# Patient Record
Sex: MALE | Race: WHITE | NOT HISPANIC OR LATINO | URBAN - METROPOLITAN AREA
[De-identification: names, ages, dates, MRNs, and addresses within clinical notes are randomized per-mention and may not be internally consistent; named-entity substitution may affect disease eponyms.]

---

## 2017-07-15 ENCOUNTER — EMERGENCY (EMERGENCY)
Facility: HOSPITAL | Age: 57
LOS: 1 days | Discharge: HOME | End: 2017-07-15
Attending: EMERGENCY MEDICINE | Admitting: EMERGENCY MEDICINE
Payer: MEDICARE

## 2017-07-15 VITALS
HEART RATE: 90 BPM | OXYGEN SATURATION: 95 % | TEMPERATURE: 98 F | DIASTOLIC BLOOD PRESSURE: 81 MMHG | RESPIRATION RATE: 18 BRPM | SYSTOLIC BLOOD PRESSURE: 127 MMHG

## 2017-07-15 VITALS
RESPIRATION RATE: 20 BRPM | TEMPERATURE: 98 F | OXYGEN SATURATION: 96 % | DIASTOLIC BLOOD PRESSURE: 81 MMHG | SYSTOLIC BLOOD PRESSURE: 134 MMHG | HEART RATE: 80 BPM | WEIGHT: 231.04 LBS

## 2017-07-15 DIAGNOSIS — J44.9 CHRONIC OBSTRUCTIVE PULMONARY DISEASE, UNSPECIFIED: ICD-10-CM

## 2017-07-15 DIAGNOSIS — R06.02 SHORTNESS OF BREATH: ICD-10-CM

## 2017-07-15 DIAGNOSIS — F17.200 NICOTINE DEPENDENCE, UNSPECIFIED, UNCOMPLICATED: ICD-10-CM

## 2017-07-15 DIAGNOSIS — Z79.899 OTHER LONG TERM (CURRENT) DRUG THERAPY: ICD-10-CM

## 2017-07-15 LAB
ALBUMIN SERPL ELPH-MCNC: 3.9 G/DL — SIGNIFICANT CHANGE UP (ref 3.3–5)
ALP SERPL-CCNC: 89 U/L — SIGNIFICANT CHANGE UP (ref 40–120)
ALT FLD-CCNC: 28 U/L — SIGNIFICANT CHANGE UP (ref 10–45)
ANION GAP SERPL CALC-SCNC: 12 MMOL/L — SIGNIFICANT CHANGE UP (ref 5–17)
AST SERPL-CCNC: 21 U/L — SIGNIFICANT CHANGE UP (ref 10–40)
BASOPHILS NFR BLD AUTO: 0.2 % — SIGNIFICANT CHANGE UP (ref 0–2)
BILIRUB SERPL-MCNC: 0.2 MG/DL — SIGNIFICANT CHANGE UP (ref 0.2–1.2)
BUN SERPL-MCNC: 17 MG/DL — SIGNIFICANT CHANGE UP (ref 7–23)
CALCIUM SERPL-MCNC: 9 MG/DL — SIGNIFICANT CHANGE UP (ref 8.4–10.5)
CHLORIDE SERPL-SCNC: 99 MMOL/L — SIGNIFICANT CHANGE UP (ref 96–108)
CO2 SERPL-SCNC: 28 MMOL/L — SIGNIFICANT CHANGE UP (ref 22–31)
CREAT SERPL-MCNC: 0.8 MG/DL — SIGNIFICANT CHANGE UP (ref 0.5–1.3)
EOSINOPHIL NFR BLD AUTO: 5 % — SIGNIFICANT CHANGE UP (ref 0–6)
GLUCOSE SERPL-MCNC: 97 MG/DL — SIGNIFICANT CHANGE UP (ref 70–99)
HCT VFR BLD CALC: 37.4 % — LOW (ref 39–50)
HGB BLD-MCNC: 12.3 G/DL — LOW (ref 13–17)
LYMPHOCYTES # BLD AUTO: 17.2 % — SIGNIFICANT CHANGE UP (ref 13–44)
MCHC RBC-ENTMCNC: 29.9 PG — SIGNIFICANT CHANGE UP (ref 27–34)
MCHC RBC-ENTMCNC: 32.9 G/DL — SIGNIFICANT CHANGE UP (ref 32–36)
MCV RBC AUTO: 91 FL — SIGNIFICANT CHANGE UP (ref 80–100)
MONOCYTES NFR BLD AUTO: 9.5 % — SIGNIFICANT CHANGE UP (ref 2–14)
NEUTROPHILS NFR BLD AUTO: 68.1 % — SIGNIFICANT CHANGE UP (ref 43–77)
PLATELET # BLD AUTO: 185 K/UL — SIGNIFICANT CHANGE UP (ref 150–400)
POTASSIUM SERPL-MCNC: 4.1 MMOL/L — SIGNIFICANT CHANGE UP (ref 3.5–5.3)
POTASSIUM SERPL-SCNC: 4.1 MMOL/L — SIGNIFICANT CHANGE UP (ref 3.5–5.3)
PROT SERPL-MCNC: 7 G/DL — SIGNIFICANT CHANGE UP (ref 6–8.3)
RBC # BLD: 4.11 M/UL — LOW (ref 4.2–5.8)
RBC # FLD: 14 % — SIGNIFICANT CHANGE UP (ref 10.3–16.9)
SODIUM SERPL-SCNC: 139 MMOL/L — SIGNIFICANT CHANGE UP (ref 135–145)
WBC # BLD: 6 K/UL — SIGNIFICANT CHANGE UP (ref 3.8–10.5)
WBC # FLD AUTO: 6 K/UL — SIGNIFICANT CHANGE UP (ref 3.8–10.5)

## 2017-07-15 PROCEDURE — 71010: CPT | Mod: 26

## 2017-07-15 PROCEDURE — 99284 EMERGENCY DEPT VISIT MOD MDM: CPT | Mod: 25

## 2017-07-15 PROCEDURE — 93010 ELECTROCARDIOGRAM REPORT: CPT

## 2017-07-15 RX ORDER — AZITHROMYCIN 500 MG/1
1 TABLET, FILM COATED ORAL
Qty: 4 | Refills: 0 | OUTPATIENT
Start: 2017-07-15 | End: 2017-07-19

## 2017-07-15 RX ORDER — ALBUTEROL 90 UG/1
2.5 AEROSOL, METERED ORAL
Qty: 0 | Refills: 0 | Status: COMPLETED | OUTPATIENT
Start: 2017-07-15 | End: 2017-07-15

## 2017-07-15 RX ORDER — AZITHROMYCIN 500 MG/1
500 TABLET, FILM COATED ORAL ONCE
Qty: 0 | Refills: 0 | Status: COMPLETED | OUTPATIENT
Start: 2017-07-15 | End: 2017-07-15

## 2017-07-15 RX ORDER — IPRATROPIUM BROMIDE 0.2 MG/ML
500 SOLUTION, NON-ORAL INHALATION ONCE
Qty: 0 | Refills: 0 | Status: COMPLETED | OUTPATIENT
Start: 2017-07-15 | End: 2017-07-15

## 2017-07-15 RX ADMIN — Medication 500 MICROGRAM(S): at 01:59

## 2017-07-15 RX ADMIN — AZITHROMYCIN 255 MILLIGRAM(S): 500 TABLET, FILM COATED ORAL at 02:27

## 2017-07-15 RX ADMIN — ALBUTEROL 2.5 MILLIGRAM(S): 90 AEROSOL, METERED ORAL at 01:33

## 2017-07-15 RX ADMIN — Medication 500 MICROGRAM(S): at 01:34

## 2017-07-15 RX ADMIN — ALBUTEROL 2.5 MILLIGRAM(S): 90 AEROSOL, METERED ORAL at 02:27

## 2017-07-15 RX ADMIN — Medication 500 MICROGRAM(S): at 02:27

## 2017-07-15 RX ADMIN — ALBUTEROL 2.5 MILLIGRAM(S): 90 AEROSOL, METERED ORAL at 01:58

## 2017-07-15 RX ADMIN — Medication 40 MILLIGRAM(S): at 01:33

## 2017-07-15 NOTE — ED PROVIDER NOTE - OBJECTIVE STATEMENT
56 yom pw sob x 3 days, hx of COPD/asthma, on albuterol and symbicort.  daily smoker.  no cp, no pleurisy, no leg swelling.  no prior hx of intubation.  last albuterol was 3pm.  no cough, no fc, no vomiting.  no hx of CAD.

## 2017-07-15 NOTE — ED PROVIDER NOTE - MEDICAL DECISION MAKING DETAILS
wheezing noted, no tachypnea or respiratory distress, no dyspnea noted, no leg swelling, hx of copd, likely exacerbation, xray, nebs, prednisone, reassess, no indication for bipap at the moment

## 2017-07-15 NOTE — ED PROVIDER NOTE - PROGRESS NOTE DETAILS
kenendi: pt received at sign out from dr young as needing to be d/c'd w/rx - pt well appearing, hemodynamically stable, ambulatory w/o resp distress

## 2017-07-15 NOTE — ED PROVIDER NOTE - DIAGNOSTIC INTERPRETATION
ER Physician: Ming Lee  CHEST XRAY INTERPRETATION: lungs clear, heart shadow normal, bony structures intact

## 2017-07-15 NOTE — ED PROVIDER NOTE - PHYSICAL EXAMINATION
CON: ao x 3, HENMT: clear oropharynx, soft neck, HEAD: atraumatic, CV: rrr, equal pulses b/l, RESP: end exp wheezing, diffuse, nonlabored breathing, no tachypnea, GI: +BS, soft, nontender, no rebound, no guarding, SKIN: no rash, MSK: no edema, moving all extremities spontaneously, NEURO: no gross motor or sensory deficit, ambulatory steadily, no dyspnea noted w/ ambulation CON: ao x 3, HENMT: clear oropharynx, soft neck, HEAD: atraumatic, CV: rrr, equal pulses b/l, RESP: end exp wheezing, diffuse, nonlabored breathing, no tachypnea, no dyspnea w/ ambulation, GI: +BS, soft, nontender, no rebound, no guarding, SKIN: no rash, MSK: no edema, moving all extremities spontaneously, NEURO: no gross motor or sensory deficit, ambulatory steadily,

## 2017-07-15 NOTE — ED ADULT NURSE NOTE - CHIEF COMPLAINT QUOTE
shortness of breath since yesterday; cant catch my breath when I walk; I took my Albuterol inhaler  tonight with mild relief; hx of COPD; smokes  1.5 ppd

## 2017-07-15 NOTE — ED ADULT NURSE NOTE - OBJECTIVE STATEMENT
pt. with PMH of COPD presented with c/o minor cough and shortness of breath today. pt. states he had similar episode on monday, went to er and got better after tx. pt. denies chest pain, n/v/d, fever, chills, blood in sputum, dizziness, weakness or changes in mentation. blood was sent, tx in progress, will monitor.

## 2017-07-15 NOTE — ED ADULT TRIAGE NOTE - CHIEF COMPLAINT QUOTE
shortness of breath since yesterday; cant catch my breath when I walk shortness of breath since yesterday; cant catch my breath when I walk; I took my Albuterol inhaler  tonight with mild relief; hx of COPD; smokes  1.5 ppd

## 2017-07-17 PROCEDURE — 36415 COLL VENOUS BLD VENIPUNCTURE: CPT

## 2017-07-17 PROCEDURE — 99284 EMERGENCY DEPT VISIT MOD MDM: CPT | Mod: 25

## 2017-07-17 PROCEDURE — 80053 COMPREHEN METABOLIC PANEL: CPT

## 2017-07-17 PROCEDURE — 71045 X-RAY EXAM CHEST 1 VIEW: CPT

## 2017-07-17 PROCEDURE — 93005 ELECTROCARDIOGRAM TRACING: CPT

## 2017-07-17 PROCEDURE — 96375 TX/PRO/DX INJ NEW DRUG ADDON: CPT

## 2017-07-17 PROCEDURE — 96374 THER/PROPH/DIAG INJ IV PUSH: CPT

## 2017-07-17 PROCEDURE — 94640 AIRWAY INHALATION TREATMENT: CPT

## 2017-07-17 PROCEDURE — 85025 COMPLETE CBC W/AUTO DIFF WBC: CPT

## 2018-05-29 ENCOUNTER — EMERGENCY (EMERGENCY)
Facility: HOSPITAL | Age: 58
LOS: 1 days | Discharge: HOME | End: 2018-05-29
Attending: EMERGENCY MEDICINE | Admitting: EMERGENCY MEDICINE
Payer: MEDICARE

## 2018-05-29 VITALS
WEIGHT: 225.09 LBS | SYSTOLIC BLOOD PRESSURE: 126 MMHG | HEART RATE: 88 BPM | DIASTOLIC BLOOD PRESSURE: 76 MMHG | RESPIRATION RATE: 18 BRPM | TEMPERATURE: 98 F | OXYGEN SATURATION: 98 %

## 2018-05-29 DIAGNOSIS — F17.200 NICOTINE DEPENDENCE, UNSPECIFIED, UNCOMPLICATED: ICD-10-CM

## 2018-05-29 DIAGNOSIS — Z79.52 LONG TERM (CURRENT) USE OF SYSTEMIC STEROIDS: ICD-10-CM

## 2018-05-29 DIAGNOSIS — Z79.2 LONG TERM (CURRENT) USE OF ANTIBIOTICS: ICD-10-CM

## 2018-05-29 DIAGNOSIS — R11.2 NAUSEA WITH VOMITING, UNSPECIFIED: ICD-10-CM

## 2018-05-29 DIAGNOSIS — R19.7 DIARRHEA, UNSPECIFIED: ICD-10-CM

## 2018-05-29 DIAGNOSIS — Z79.899 OTHER LONG TERM (CURRENT) DRUG THERAPY: ICD-10-CM

## 2018-05-29 DIAGNOSIS — J44.9 CHRONIC OBSTRUCTIVE PULMONARY DISEASE, UNSPECIFIED: ICD-10-CM

## 2018-05-29 PROCEDURE — 36415 COLL VENOUS BLD VENIPUNCTURE: CPT

## 2018-05-29 PROCEDURE — 81003 URINALYSIS AUTO W/O SCOPE: CPT

## 2018-05-29 PROCEDURE — 87086 URINE CULTURE/COLONY COUNT: CPT

## 2018-05-29 PROCEDURE — 99284 EMERGENCY DEPT VISIT MOD MDM: CPT | Mod: 25

## 2018-05-29 PROCEDURE — 85025 COMPLETE CBC W/AUTO DIFF WBC: CPT

## 2018-05-29 PROCEDURE — 80053 COMPREHEN METABOLIC PANEL: CPT

## 2018-05-29 PROCEDURE — 99284 EMERGENCY DEPT VISIT MOD MDM: CPT

## 2018-05-29 PROCEDURE — 96374 THER/PROPH/DIAG INJ IV PUSH: CPT

## 2018-05-29 PROCEDURE — 83690 ASSAY OF LIPASE: CPT

## 2018-05-29 RX ORDER — SODIUM CHLORIDE 9 MG/ML
1000 INJECTION INTRAMUSCULAR; INTRAVENOUS; SUBCUTANEOUS ONCE
Qty: 0 | Refills: 0 | Status: COMPLETED | OUTPATIENT
Start: 2018-05-29 | End: 2018-05-29

## 2018-05-29 RX ORDER — ONDANSETRON 8 MG/1
4 TABLET, FILM COATED ORAL ONCE
Qty: 0 | Refills: 0 | Status: COMPLETED | OUTPATIENT
Start: 2018-05-29 | End: 2018-05-29

## 2018-05-29 RX ADMIN — ONDANSETRON 4 MILLIGRAM(S): 8 TABLET, FILM COATED ORAL at 23:58

## 2018-05-29 RX ADMIN — SODIUM CHLORIDE 1000 MILLILITER(S): 9 INJECTION INTRAMUSCULAR; INTRAVENOUS; SUBCUTANEOUS at 23:47

## 2018-05-30 VITALS
RESPIRATION RATE: 18 BRPM | DIASTOLIC BLOOD PRESSURE: 88 MMHG | HEART RATE: 69 BPM | SYSTOLIC BLOOD PRESSURE: 144 MMHG | OXYGEN SATURATION: 97 % | TEMPERATURE: 98 F

## 2018-05-30 DIAGNOSIS — Z98.890 OTHER SPECIFIED POSTPROCEDURAL STATES: Chronic | ICD-10-CM

## 2018-05-30 NOTE — ED ADULT NURSE NOTE - OBJECTIVE STATEMENT
58 y/o male with hx of COPD and bronchitis arrived to [facility] ER reporting nausea, vomiting and diarrhea X 4 since 7pm. Pt verbalized that he had some "mexican" food prior to symptoms starting and he had a viral infection several weeks ago. Upon assessment, abdomen soft, lung fields WNL, breathing is equal and unlabored, pulses palpable, no visible injuries noted. Pt denies chest pain, headache, dizziness, blurred vision, slurred speech, fever, chills, LOC, SOB, weakness, fatigue. Care in progress.

## 2018-05-30 NOTE — ED PROVIDER NOTE - PROGRESS NOTE DETAILS
no vomiting, abd remains soft, nontender. recommend PMD f/u  I have discussed the discharge plan with the patient. The patient agrees with the plan, as discussed.  The patient understands Emergency Department diagnosis is a preliminary diagnosis often based on limited information and that the patient must adhere to the follow-up plan as discussed.  The patient understands that if the symptoms worsen the patient may return to the Emergency Department at any time for further evaluation and treatment.

## 2018-05-30 NOTE — ED PROVIDER NOTE - OBJECTIVE STATEMENT
57M hx copd, c/o n/v/d. pt states he ate mexican food tonight at his [employer].  states shortly after developed nonbloody watery diarrhea.  no abd pain.  states vomited once.  no chest pain, no SOB. no fevers. no sick contacts. no recent travel.

## 2018-05-30 NOTE — ED PROVIDER NOTE - MEDICAL DECISION MAKING DETAILS
n/v/d, no abd pain, no guarding, no rebound, no evidence of surgical abd at this time  -check labs, ivf, zofran

## 2018-06-10 ENCOUNTER — EMERGENCY (EMERGENCY)
Facility: HOSPITAL | Age: 58
LOS: 1 days | Discharge: HOME | End: 2018-06-10
Attending: EMERGENCY MEDICINE | Admitting: EMERGENCY MEDICINE
Payer: MEDICARE

## 2018-06-10 DIAGNOSIS — Z79.899 OTHER LONG TERM (CURRENT) DRUG THERAPY: ICD-10-CM

## 2018-06-10 DIAGNOSIS — F17.200 NICOTINE DEPENDENCE, UNSPECIFIED, UNCOMPLICATED: ICD-10-CM

## 2018-06-10 DIAGNOSIS — Z79.52 LONG TERM (CURRENT) USE OF SYSTEMIC STEROIDS: ICD-10-CM

## 2018-06-10 DIAGNOSIS — R06.02 SHORTNESS OF BREATH: ICD-10-CM

## 2018-06-10 DIAGNOSIS — J44.1 CHRONIC OBSTRUCTIVE PULMONARY DISEASE WITH (ACUTE) EXACERBATION: ICD-10-CM

## 2018-06-10 DIAGNOSIS — Z98.890 OTHER SPECIFIED POSTPROCEDURAL STATES: Chronic | ICD-10-CM

## 2018-06-10 DIAGNOSIS — Z79.2 LONG TERM (CURRENT) USE OF ANTIBIOTICS: ICD-10-CM

## 2018-06-10 PROCEDURE — 71046 X-RAY EXAM CHEST 2 VIEWS: CPT | Mod: 26

## 2018-06-10 PROCEDURE — 99284 EMERGENCY DEPT VISIT MOD MDM: CPT | Mod: 25

## 2018-06-11 VITALS
DIASTOLIC BLOOD PRESSURE: 71 MMHG | RESPIRATION RATE: 17 BRPM | HEART RATE: 89 BPM | SYSTOLIC BLOOD PRESSURE: 127 MMHG | OXYGEN SATURATION: 95 %

## 2018-06-11 VITALS
HEART RATE: 102 BPM | SYSTOLIC BLOOD PRESSURE: 138 MMHG | TEMPERATURE: 99 F | RESPIRATION RATE: 18 BRPM | WEIGHT: 207.23 LBS | HEIGHT: 72 IN | OXYGEN SATURATION: 94 % | DIASTOLIC BLOOD PRESSURE: 79 MMHG

## 2018-06-11 PROCEDURE — 94640 AIRWAY INHALATION TREATMENT: CPT

## 2018-06-11 PROCEDURE — 71046 X-RAY EXAM CHEST 2 VIEWS: CPT | Mod: 26

## 2018-06-11 PROCEDURE — 71046 X-RAY EXAM CHEST 2 VIEWS: CPT

## 2018-06-11 PROCEDURE — 99284 EMERGENCY DEPT VISIT MOD MDM: CPT | Mod: 25

## 2018-06-11 RX ORDER — IPRATROPIUM/ALBUTEROL SULFATE 18-103MCG
3 AEROSOL WITH ADAPTER (GRAM) INHALATION ONCE
Qty: 0 | Refills: 0 | Status: COMPLETED | OUTPATIENT
Start: 2018-06-11 | End: 2018-06-11

## 2018-06-11 RX ORDER — ALBUTEROL 90 UG/1
2 AEROSOL, METERED ORAL
Qty: 1 | Refills: 0
Start: 2018-06-11

## 2018-06-11 RX ORDER — AZITHROMYCIN 500 MG/1
500 TABLET, FILM COATED ORAL ONCE
Qty: 0 | Refills: 0 | Status: COMPLETED | OUTPATIENT
Start: 2018-06-11 | End: 2018-06-11

## 2018-06-11 RX ADMIN — AZITHROMYCIN 500 MILLIGRAM(S): 500 TABLET, FILM COATED ORAL at 01:44

## 2018-06-11 RX ADMIN — Medication 3 MILLILITER(S): at 01:20

## 2018-06-11 RX ADMIN — Medication 60 MILLIGRAM(S): at 01:44

## 2018-06-11 NOTE — ED PROVIDER NOTE - OBJECTIVE STATEMENT
57M, smoker, h/o COPD, who p/w worsening SOb and cough productive of clear phlegm x 2 days, he ran out of his albuterol at home. No f/c, no CP, no recent hospitalization. No recent travel or leg swelling.

## 2018-06-11 NOTE — ED PROVIDER NOTE - PHYSICAL EXAMINATION
GEN: Well appearing, well nourished, awake, alert, oriented to person, place, time/situation and in no apparent distress. NTAF  ENT: Airway patent, Nasal mucosa clear. Mouth with normal mucosa.  EYES: Clear bilaterally.  RESPIRATORY: Somewhat increased WOB noted, bilateral prolonged expiration with exp wheezes, no c/r. no hypoxia, no retractions, able to speak in full sentences. No stridor.  CARDIAC: Regular rate and rhythm  ABDOMEN: Soft, nontender, +bowel sounds, no rebound, rigidity, or guarding.  MSK: Range of motion is not limited, no deformities noted.  NEURO: Alert and oriented, no focal deficits.  SKIN: Skin normal color for race, warm, dry and intact. No evidence of rash.  PSYCH: Alert and oriented to person, place, time/situation. normal mood and affect. no apparent risk to self or others.

## 2018-06-11 NOTE — ED PROVIDER NOTE - PROGRESS NOTE DETAILS
Pt feels better, wheezing improved. No infiltrate on CXR. Will DC with rx, f/u with PMD or return to ER for worsening sx.  The patient is stable for DC. They were advised to call their PMD for prompt outpatient follow up. Return precautions were discussed. The patient was advised to return to the ER for any concerning or worsening symptoms.

## 2018-06-11 NOTE — ED PROVIDER NOTE - DIAGNOSTIC INTERPRETATION
ER Physician: Nadja Gutierrez   CHEST XRAY INTERPRETATION: lungs clear, heart shadow normal, bony structures intact

## 2018-06-12 RX ORDER — AZITHROMYCIN 500 MG/1
1 TABLET, FILM COATED ORAL
Qty: 4 | Refills: 0 | OUTPATIENT
Start: 2018-06-12 | End: 2018-06-15

## 2018-06-23 ENCOUNTER — EMERGENCY (EMERGENCY)
Facility: HOSPITAL | Age: 58
LOS: 1 days | Discharge: HOME | End: 2018-06-23
Attending: EMERGENCY MEDICINE | Admitting: EMERGENCY MEDICINE
Payer: MEDICARE

## 2018-06-23 VITALS
DIASTOLIC BLOOD PRESSURE: 81 MMHG | SYSTOLIC BLOOD PRESSURE: 114 MMHG | HEART RATE: 91 BPM | OXYGEN SATURATION: 96 % | TEMPERATURE: 98 F | RESPIRATION RATE: 18 BRPM | WEIGHT: 207.01 LBS

## 2018-06-23 DIAGNOSIS — Z98.890 OTHER SPECIFIED POSTPROCEDURAL STATES: Chronic | ICD-10-CM

## 2018-06-23 PROCEDURE — 94640 AIRWAY INHALATION TREATMENT: CPT

## 2018-06-23 PROCEDURE — 93005 ELECTROCARDIOGRAM TRACING: CPT

## 2018-06-23 PROCEDURE — 99284 EMERGENCY DEPT VISIT MOD MDM: CPT | Mod: 25

## 2018-06-23 PROCEDURE — 93010 ELECTROCARDIOGRAM REPORT: CPT

## 2018-06-23 RX ORDER — IPRATROPIUM/ALBUTEROL SULFATE 18-103MCG
3 AEROSOL WITH ADAPTER (GRAM) INHALATION ONCE
Qty: 0 | Refills: 0 | Status: COMPLETED | OUTPATIENT
Start: 2018-06-23 | End: 2018-06-23

## 2018-06-23 RX ADMIN — Medication 3 MILLILITER(S): at 23:57

## 2018-06-23 RX ADMIN — Medication 3 MILLILITER(S): at 23:58

## 2018-06-23 RX ADMIN — Medication 60 MILLIGRAM(S): at 23:58

## 2018-06-23 NOTE — ED ADULT TRIAGE NOTE - ARRIVAL INFO ADDITIONAL COMMENTS
pt c/o sob with exertion.  seen here last week for same.  pt has run out of his inhalers and has not gotten them refilled yet.  no sob now.

## 2018-06-23 NOTE — ED ADULT NURSE NOTE - OBJECTIVE STATEMENT
Patient w/ Hx. of COPD,  started having SOB and dsypnea w/ exertion and chest pain last night.   was seen in ED for same 2 wks ago.  No difficulty speaking in long sentences, no fevers, no cough.

## 2018-06-24 VITALS
DIASTOLIC BLOOD PRESSURE: 78 MMHG | RESPIRATION RATE: 19 BRPM | OXYGEN SATURATION: 94 % | SYSTOLIC BLOOD PRESSURE: 130 MMHG | HEART RATE: 92 BPM | TEMPERATURE: 98 F

## 2018-06-24 RX ORDER — ALBUTEROL 90 UG/1
2 AEROSOL, METERED ORAL
Qty: 9 | Refills: 0 | OUTPATIENT
Start: 2018-06-24 | End: 2018-07-23

## 2018-06-24 NOTE — ED PROVIDER NOTE - OBJECTIVE STATEMENT
58 yo male with hx of COPD, emphysema, current smoker p/w shortness of breath, wheezing and dry cough worsening over the past 2-3 days.  No fevers/ chills. No CP. No other complaints. States he ran out of his albuterol inhaler.

## 2018-06-24 NOTE — ED PROVIDER NOTE - MEDICAL DECISION MAKING DETAILS
56 yo male with hx of COPD, emphysema, current smoker p/w shortness of breath, wheezing and dry cough worsening over the past 2-3 days.  No fevers/ chills. No CP. No other complaints. States he ran out of his albuterol inhaler. Pt treated with nebs and steroids and sleeping comfortably with no distress. Rxs sent for Albuterol inhaler and prednisone and pt to f/up outpt.

## 2018-06-27 DIAGNOSIS — Z79.51 LONG TERM (CURRENT) USE OF INHALED STEROIDS: ICD-10-CM

## 2018-06-27 DIAGNOSIS — Z79.899 OTHER LONG TERM (CURRENT) DRUG THERAPY: ICD-10-CM

## 2018-06-27 DIAGNOSIS — F17.200 NICOTINE DEPENDENCE, UNSPECIFIED, UNCOMPLICATED: ICD-10-CM

## 2018-06-27 DIAGNOSIS — R06.02 SHORTNESS OF BREATH: ICD-10-CM

## 2018-06-27 DIAGNOSIS — Z79.52 LONG TERM (CURRENT) USE OF SYSTEMIC STEROIDS: ICD-10-CM

## 2018-06-27 DIAGNOSIS — J44.1 CHRONIC OBSTRUCTIVE PULMONARY DISEASE WITH (ACUTE) EXACERBATION: ICD-10-CM

## 2018-06-27 DIAGNOSIS — Z79.2 LONG TERM (CURRENT) USE OF ANTIBIOTICS: ICD-10-CM

## 2018-09-09 ENCOUNTER — EMERGENCY (EMERGENCY)
Facility: HOSPITAL | Age: 58
LOS: 1 days | Discharge: HOME | End: 2018-09-09
Admitting: EMERGENCY MEDICINE
Payer: MEDICARE

## 2018-09-09 VITALS
RESPIRATION RATE: 16 BRPM | DIASTOLIC BLOOD PRESSURE: 88 MMHG | TEMPERATURE: 98 F | OXYGEN SATURATION: 94 % | SYSTOLIC BLOOD PRESSURE: 151 MMHG | HEART RATE: 102 BPM

## 2018-09-09 VITALS
DIASTOLIC BLOOD PRESSURE: 69 MMHG | RESPIRATION RATE: 18 BRPM | HEART RATE: 104 BPM | OXYGEN SATURATION: 96 % | TEMPERATURE: 99 F | SYSTOLIC BLOOD PRESSURE: 118 MMHG

## 2018-09-09 DIAGNOSIS — Z98.890 OTHER SPECIFIED POSTPROCEDURAL STATES: Chronic | ICD-10-CM

## 2018-09-09 PROBLEM — J44.9 CHRONIC OBSTRUCTIVE PULMONARY DISEASE, UNSPECIFIED: Chronic | Status: ACTIVE | Noted: 2017-07-15

## 2018-09-09 LAB
ALBUMIN SERPL ELPH-MCNC: 3.8 G/DL — SIGNIFICANT CHANGE UP (ref 3.3–5)
ALP SERPL-CCNC: SIGNIFICANT CHANGE UP U/L (ref 40–120)
ALT FLD-CCNC: SIGNIFICANT CHANGE UP U/L (ref 10–45)
ANION GAP SERPL CALC-SCNC: 14 MMOL/L — SIGNIFICANT CHANGE UP (ref 5–17)
AST SERPL-CCNC: SIGNIFICANT CHANGE UP U/L (ref 10–40)
BASOPHILS NFR BLD AUTO: 0.3 % — SIGNIFICANT CHANGE UP (ref 0–2)
BILIRUB SERPL-MCNC: 0.3 MG/DL — SIGNIFICANT CHANGE UP (ref 0.2–1.2)
BUN SERPL-MCNC: 9 MG/DL — SIGNIFICANT CHANGE UP (ref 7–23)
CALCIUM SERPL-MCNC: 9.2 MG/DL — SIGNIFICANT CHANGE UP (ref 8.4–10.5)
CHLORIDE SERPL-SCNC: 95 MMOL/L — LOW (ref 96–108)
CO2 SERPL-SCNC: 24 MMOL/L — SIGNIFICANT CHANGE UP (ref 22–31)
CREAT SERPL-MCNC: 0.75 MG/DL — SIGNIFICANT CHANGE UP (ref 0.5–1.3)
EOSINOPHIL NFR BLD AUTO: 2.7 % — SIGNIFICANT CHANGE UP (ref 0–6)
GLUCOSE SERPL-MCNC: 97 MG/DL — SIGNIFICANT CHANGE UP (ref 70–99)
HCT VFR BLD CALC: 38.9 % — LOW (ref 39–50)
HGB BLD-MCNC: 12.8 G/DL — LOW (ref 13–17)
LYMPHOCYTES # BLD AUTO: 13.7 % — SIGNIFICANT CHANGE UP (ref 13–44)
MCHC RBC-ENTMCNC: 30.3 PG — SIGNIFICANT CHANGE UP (ref 27–34)
MCHC RBC-ENTMCNC: 32.9 G/DL — SIGNIFICANT CHANGE UP (ref 32–36)
MCV RBC AUTO: 92.2 FL — SIGNIFICANT CHANGE UP (ref 80–100)
MONOCYTES NFR BLD AUTO: 8.5 % — SIGNIFICANT CHANGE UP (ref 2–14)
NEUTROPHILS NFR BLD AUTO: 74.8 % — SIGNIFICANT CHANGE UP (ref 43–77)
PLATELET # BLD AUTO: 181 K/UL — SIGNIFICANT CHANGE UP (ref 150–400)
POTASSIUM SERPL-MCNC: SIGNIFICANT CHANGE UP MMOL/L (ref 3.5–5.3)
POTASSIUM SERPL-SCNC: SIGNIFICANT CHANGE UP MMOL/L (ref 3.5–5.3)
PROT SERPL-MCNC: 7.6 G/DL — SIGNIFICANT CHANGE UP (ref 6–8.3)
RBC # BLD: 4.22 M/UL — SIGNIFICANT CHANGE UP (ref 4.2–5.8)
RBC # FLD: 14 % — SIGNIFICANT CHANGE UP (ref 10.3–16.9)
SODIUM SERPL-SCNC: 133 MMOL/L — LOW (ref 135–145)
WBC # BLD: 6.7 K/UL — SIGNIFICANT CHANGE UP (ref 3.8–10.5)
WBC # FLD AUTO: 6.7 K/UL — SIGNIFICANT CHANGE UP (ref 3.8–10.5)

## 2018-09-09 PROCEDURE — 71046 X-RAY EXAM CHEST 2 VIEWS: CPT | Mod: 26

## 2018-09-09 PROCEDURE — 71046 X-RAY EXAM CHEST 2 VIEWS: CPT

## 2018-09-09 PROCEDURE — 94640 AIRWAY INHALATION TREATMENT: CPT

## 2018-09-09 PROCEDURE — 85025 COMPLETE CBC W/AUTO DIFF WBC: CPT

## 2018-09-09 PROCEDURE — 96365 THER/PROPH/DIAG IV INF INIT: CPT

## 2018-09-09 PROCEDURE — 99285 EMERGENCY DEPT VISIT HI MDM: CPT | Mod: 25

## 2018-09-09 PROCEDURE — 96375 TX/PRO/DX INJ NEW DRUG ADDON: CPT

## 2018-09-09 PROCEDURE — 99284 EMERGENCY DEPT VISIT MOD MDM: CPT

## 2018-09-09 PROCEDURE — 80053 COMPREHEN METABOLIC PANEL: CPT

## 2018-09-09 RX ORDER — AZITHROMYCIN 500 MG/1
500 TABLET, FILM COATED ORAL ONCE
Qty: 0 | Refills: 0 | Status: COMPLETED | OUTPATIENT
Start: 2018-09-09 | End: 2018-09-09

## 2018-09-09 RX ORDER — IPRATROPIUM/ALBUTEROL SULFATE 18-103MCG
3 AEROSOL WITH ADAPTER (GRAM) INHALATION ONCE
Qty: 0 | Refills: 0 | Status: COMPLETED | OUTPATIENT
Start: 2018-09-09 | End: 2018-09-09

## 2018-09-09 RX ORDER — AZITHROMYCIN 500 MG/1
1 TABLET, FILM COATED ORAL
Qty: 4 | Refills: 0
Start: 2018-09-09 | End: 2018-09-12

## 2018-09-09 RX ORDER — IPRATROPIUM/ALBUTEROL SULFATE 18-103MCG
3 AEROSOL WITH ADAPTER (GRAM) INHALATION
Qty: 0 | Refills: 0 | Status: COMPLETED | OUTPATIENT
Start: 2018-09-09 | End: 2018-09-09

## 2018-09-09 RX ORDER — ALBUTEROL 90 UG/1
2 AEROSOL, METERED ORAL
Qty: 9 | Refills: 0
Start: 2018-09-09 | End: 2018-10-08

## 2018-09-09 RX ADMIN — Medication 125 MILLIGRAM(S): at 08:46

## 2018-09-09 RX ADMIN — Medication 3 MILLILITER(S): at 08:45

## 2018-09-09 RX ADMIN — Medication 3 MILLILITER(S): at 09:14

## 2018-09-09 RX ADMIN — AZITHROMYCIN 500 MILLIGRAM(S): 500 TABLET, FILM COATED ORAL at 10:31

## 2018-09-09 RX ADMIN — AZITHROMYCIN 255 MILLIGRAM(S): 500 TABLET, FILM COATED ORAL at 09:14

## 2018-09-09 RX ADMIN — Medication 3 MILLILITER(S): at 10:11

## 2018-09-09 NOTE — ED ADULT TRIAGE NOTE - CHIEF COMPLAINT QUOTE
Pt states " I think Im coming down with a cold". C/o cough since yesterday, denies fevers or chills.

## 2018-09-09 NOTE — ED ADULT NURSE NOTE - NSIMPLEMENTINTERV_GEN_ALL_ED
Implemented All Universal Safety Interventions:  [place] to call system. Call bell, personal items and telephone within reach. Instruct patient to call for assistance. Room bathroom lighting operational. Non-slip footwear when patient is off stretcher. Physically safe environment: no spills, clutter or unnecessary equipment. Stretcher in lowest position, wheels locked, appropriate side rails in place.

## 2018-09-09 NOTE — ED ADULT NURSE NOTE - PMH
Bipolar disease, chronic    COPD (chronic obstructive pulmonary disease)    Depression    Emphysema with chronic bronchitis

## 2018-09-09 NOTE — ED PROVIDER NOTE - MEDICAL DECISION MAKING DETAILS
56 y/o m hx emphysema presents with nasal congestion, cough, wheezing x 3 days; vss, wheezing on exam which improved with nebs and steroids.  CXR clear, given dose of zithromax.  Pt feeling better and stable for d/c, rx inhaler, prednisone, azithromycin, has pmd f/u this week, advised to return to ED if sx worsen.

## 2018-09-13 DIAGNOSIS — R06.02 SHORTNESS OF BREATH: ICD-10-CM

## 2018-09-13 DIAGNOSIS — J44.1 CHRONIC OBSTRUCTIVE PULMONARY DISEASE WITH (ACUTE) EXACERBATION: ICD-10-CM

## 2018-09-13 DIAGNOSIS — F17.200 NICOTINE DEPENDENCE, UNSPECIFIED, UNCOMPLICATED: ICD-10-CM

## 2018-09-13 DIAGNOSIS — R00.0 TACHYCARDIA, UNSPECIFIED: ICD-10-CM

## 2018-09-13 DIAGNOSIS — Z79.899 OTHER LONG TERM (CURRENT) DRUG THERAPY: ICD-10-CM

## 2018-11-08 ENCOUNTER — EMERGENCY (EMERGENCY)
Facility: HOSPITAL | Age: 58
LOS: 1 days | Discharge: HOME | End: 2018-11-08
Attending: EMERGENCY MEDICINE | Admitting: EMERGENCY MEDICINE
Payer: MEDICARE

## 2018-11-08 VITALS
OXYGEN SATURATION: 97 % | WEIGHT: 199.96 LBS | DIASTOLIC BLOOD PRESSURE: 80 MMHG | TEMPERATURE: 97 F | SYSTOLIC BLOOD PRESSURE: 140 MMHG | RESPIRATION RATE: 18 BRPM | HEIGHT: 72 IN | HEART RATE: 83 BPM

## 2018-11-08 DIAGNOSIS — Z79.52 LONG TERM (CURRENT) USE OF SYSTEMIC STEROIDS: ICD-10-CM

## 2018-11-08 DIAGNOSIS — F31.89 OTHER BIPOLAR DISORDER: ICD-10-CM

## 2018-11-08 DIAGNOSIS — Z98.890 OTHER SPECIFIED POSTPROCEDURAL STATES: Chronic | ICD-10-CM

## 2018-11-08 DIAGNOSIS — Z79.1 LONG TERM (CURRENT) USE OF NON-STEROIDAL ANTI-INFLAMMATORIES (NSAID): ICD-10-CM

## 2018-11-08 DIAGNOSIS — R05 COUGH: ICD-10-CM

## 2018-11-08 DIAGNOSIS — Z79.899 OTHER LONG TERM (CURRENT) DRUG THERAPY: ICD-10-CM

## 2018-11-08 DIAGNOSIS — J44.9 CHRONIC OBSTRUCTIVE PULMONARY DISEASE, UNSPECIFIED: ICD-10-CM

## 2018-11-08 PROCEDURE — 99284 EMERGENCY DEPT VISIT MOD MDM: CPT

## 2018-11-08 RX ORDER — ALBUTEROL 90 UG/1
2.5 AEROSOL, METERED ORAL
Qty: 0 | Refills: 0 | Status: COMPLETED | OUTPATIENT
Start: 2018-11-08 | End: 2018-11-09

## 2018-11-08 RX ORDER — IPRATROPIUM BROMIDE 0.2 MG/ML
500 SOLUTION, NON-ORAL INHALATION ONCE
Qty: 0 | Refills: 0 | Status: COMPLETED | OUTPATIENT
Start: 2018-11-08 | End: 2018-11-08

## 2018-11-08 NOTE — ED PROVIDER NOTE - CARE PLAN
Principal Discharge DX:	COPD (chronic obstructive pulmonary disease)  Secondary Diagnosis:	Bipolar disease, chronic

## 2018-11-08 NOTE — ED PROVIDER NOTE - ATTENDING CONTRIBUTION TO CARE
57 yom pw cough, sob, hx of asthma/copd.  cough is productive, clear.  no fc, no myalgia, no arthralgia.      agree w/ PA, no tachypnea, no resp distress, speaking in full sentences, will peak flow, prednisone, bronchodilators

## 2018-11-08 NOTE — ED ADULT TRIAGE NOTE - ARRIVAL INFO ADDITIONAL COMMENTS
c.o difficulty in breathing with productive cough with clear sputum since this morning. denies any fever/chills, body aches, chest pain, weakness, dizziness. speaking in clear, complete sentences. equal and b.l chest rises with unlabored breathing noted.

## 2018-11-09 VITALS
SYSTOLIC BLOOD PRESSURE: 139 MMHG | DIASTOLIC BLOOD PRESSURE: 79 MMHG | OXYGEN SATURATION: 98 % | RESPIRATION RATE: 20 BRPM | TEMPERATURE: 97 F

## 2018-11-09 PROBLEM — F32.9 MAJOR DEPRESSIVE DISORDER, SINGLE EPISODE, UNSPECIFIED: Chronic | Status: ACTIVE | Noted: 2018-09-09

## 2018-11-09 PROBLEM — J44.9 CHRONIC OBSTRUCTIVE PULMONARY DISEASE, UNSPECIFIED: Chronic | Status: ACTIVE | Noted: 2018-09-09

## 2018-11-09 PROBLEM — F31.9 BIPOLAR DISORDER, UNSPECIFIED: Chronic | Status: ACTIVE | Noted: 2018-09-09

## 2018-11-09 PROCEDURE — 99285 EMERGENCY DEPT VISIT HI MDM: CPT | Mod: 25

## 2018-11-09 PROCEDURE — 94640 AIRWAY INHALATION TREATMENT: CPT

## 2018-11-09 RX ORDER — ALBUTEROL 90 UG/1
1 AEROSOL, METERED ORAL ONCE
Qty: 0 | Refills: 0 | Status: COMPLETED | OUTPATIENT
Start: 2018-11-09 | End: 2018-11-09

## 2018-11-09 RX ADMIN — ALBUTEROL 1 PUFF(S): 90 AEROSOL, METERED ORAL at 01:06

## 2018-11-09 RX ADMIN — ALBUTEROL 2.5 MILLIGRAM(S): 90 AEROSOL, METERED ORAL at 00:07

## 2018-11-09 RX ADMIN — Medication 500 MICROGRAM(S): at 00:08

## 2018-11-09 RX ADMIN — Medication 50 MILLIGRAM(S): at 00:46

## 2018-11-09 RX ADMIN — ALBUTEROL 2.5 MILLIGRAM(S): 90 AEROSOL, METERED ORAL at 00:45

## 2018-11-09 RX ADMIN — ALBUTEROL 2.5 MILLIGRAM(S): 90 AEROSOL, METERED ORAL at 01:04

## 2018-11-09 NOTE — ED ADULT NURSE NOTE - NSIMPLEMENTINTERV_GEN_ALL_ED
Implemented All Universal Safety Interventions:  [place] to call system. Call bell, personal items and telephone within reach. Instruct patient to call for assistance. Room bathroom lighting operational. Non-slip footwear when patient is off stretcher. Physically safe environment: no spills, clutter or unnecessary equipment. Stretcher in lowest position, wheels locked, appropriate side rails in place.

## 2018-11-09 NOTE — ED ADULT NURSE NOTE - CHPI ED NUR SYMPTOMS NEG
no dizziness/no tingling/no decreased eating/drinking/no fever/no vomiting/no weakness/no chills/no nausea/no pain

## 2018-11-09 NOTE — ED ADULT NURSE NOTE - OBJECTIVE STATEMENT
cough since the am, admits to smoking but tried to stop, states he has an MD appt this week, breathing regular non labored

## 2018-11-17 ENCOUNTER — EMERGENCY (EMERGENCY)
Facility: HOSPITAL | Age: 58
LOS: 1 days | Discharge: HOME | End: 2018-11-17
Attending: EMERGENCY MEDICINE | Admitting: EMERGENCY MEDICINE
Payer: MEDICARE

## 2018-11-17 VITALS
SYSTOLIC BLOOD PRESSURE: 134 MMHG | OXYGEN SATURATION: 96 % | RESPIRATION RATE: 18 BRPM | HEART RATE: 77 BPM | TEMPERATURE: 97 F | DIASTOLIC BLOOD PRESSURE: 85 MMHG

## 2018-11-17 DIAGNOSIS — R06.02 SHORTNESS OF BREATH: ICD-10-CM

## 2018-11-17 DIAGNOSIS — R06.09 OTHER FORMS OF DYSPNEA: ICD-10-CM

## 2018-11-17 DIAGNOSIS — R05 COUGH: ICD-10-CM

## 2018-11-17 DIAGNOSIS — Z79.2 LONG TERM (CURRENT) USE OF ANTIBIOTICS: ICD-10-CM

## 2018-11-17 DIAGNOSIS — Z79.899 OTHER LONG TERM (CURRENT) DRUG THERAPY: ICD-10-CM

## 2018-11-17 DIAGNOSIS — Z98.890 OTHER SPECIFIED POSTPROCEDURAL STATES: Chronic | ICD-10-CM

## 2018-11-17 DIAGNOSIS — Z79.52 LONG TERM (CURRENT) USE OF SYSTEMIC STEROIDS: ICD-10-CM

## 2018-11-17 DIAGNOSIS — Z79.51 LONG TERM (CURRENT) USE OF INHALED STEROIDS: ICD-10-CM

## 2018-11-17 DIAGNOSIS — F17.210 NICOTINE DEPENDENCE, CIGARETTES, UNCOMPLICATED: ICD-10-CM

## 2018-11-17 PROCEDURE — 93010 ELECTROCARDIOGRAM REPORT: CPT

## 2018-11-17 PROCEDURE — 71046 X-RAY EXAM CHEST 2 VIEWS: CPT | Mod: 26

## 2018-11-17 PROCEDURE — 99285 EMERGENCY DEPT VISIT HI MDM: CPT | Mod: 25

## 2018-11-17 RX ORDER — IPRATROPIUM/ALBUTEROL SULFATE 18-103MCG
3 AEROSOL WITH ADAPTER (GRAM) INHALATION ONCE
Qty: 0 | Refills: 0 | Status: COMPLETED | OUTPATIENT
Start: 2018-11-17 | End: 2018-11-17

## 2018-11-17 RX ADMIN — Medication 3 MILLILITER(S): at 23:39

## 2018-11-17 RX ADMIN — Medication 125 MILLIGRAM(S): at 23:39

## 2018-11-17 NOTE — ED PROVIDER NOTE - PHYSICAL EXAMINATION
VITAL SIGNS: I have reviewed nursing notes and confirm.  CONSTITUTIONAL: Well-developed; well-nourished; minimal distress.  SKIN: Skin is warm and dry, no acute rash.  HEAD: Normocephalic; atraumatic.  EYES:  EOM intact; conjunctiva and sclera clear.  ENT: No nasal discharge; airway clear.  NECK: Supple; non tender. Voluntary FROM  CARD: No rubs appreciated, Regular rate and rhythm.  RESP: bilateral wheezes diffusely, no rales. No respiratory distress, minimal accessory muscle usage  ABD: Soft; non-distended; non-tender; no rebound or guarding  EXT: Normal ROM. No cyanosis or edema.  NEURO: Alert, oriented. Grossly unremarkable.  PSYCH: Cooperative, appropriate.

## 2018-11-17 NOTE — ED PROVIDER NOTE - PROGRESS NOTE DETAILS
Pt feeling improved, wheeze improved from arrival. Advised outpt abx, f/u with pmd (pt has plan to see MD on tuesday).

## 2018-11-17 NOTE — ED PROVIDER NOTE - DIAGNOSTIC INTERPRETATION
Chest x-ray interpreted by ER Physician Dr. Berg  Findings: heart size normal, no infiltrates, lungs fully expanded, soft tissues appear normal. Unchanged from previous study.

## 2018-11-17 NOTE — ED PROVIDER NOTE - MEDICAL DECISION MAKING DETAILS
pmh copd, current daily smoker p/w sob for past week, acutely worsening today while smoking cigarettes. With associated HOUSE, No fever, no chills. Does endorse cp this morning and afternoon when coughing. No diaphoresis, lightheaded or impending doom. States feels similar to previous exacerbations. pmh copd, current daily smoker p/w sob for past week, acutely worsening today while smoking cigarettes. With associated HOUSE, No fever, no chills. Does endorse cp this morning and afternoon when coughing. No diaphoresis, lightheaded or impending doom. States feels similar to previous exacerbations. Trop neg, ekg not ischemic. Symptoms ongoing for >12 hours, no cp at this time. CXR without focal infection. Pt rx'd steroids, albuterol and azithro for copd exacerbation

## 2018-11-17 NOTE — ED PROVIDER NOTE - OBJECTIVE STATEMENT
57M pmh emphysema, COPD, smoking p/w cough and sob worse today. Pt was recently seen here for same, tx'd steroids for 5d no abx. Since then his cough has persisted until today when he noted it was worse after he smoked this morning. Also associated w/ HOUSE and chest pain when he coughs. No cp now, last episode of CP in afternoon. No nausea, no diaphoresis, no lightheaded. No fever, no chills. Taking all medications as directed.

## 2018-11-17 NOTE — ED ADULT TRIAGE NOTE - CHIEF COMPLAINT QUOTE
pt has a history fo COPD , pt c/o shortness of breath for a few days , pt also c/o occasional chest pain

## 2018-11-18 VITALS
DIASTOLIC BLOOD PRESSURE: 80 MMHG | OXYGEN SATURATION: 97 % | TEMPERATURE: 98 F | HEART RATE: 70 BPM | RESPIRATION RATE: 18 BRPM | SYSTOLIC BLOOD PRESSURE: 133 MMHG

## 2018-11-18 LAB
ALBUMIN SERPL ELPH-MCNC: 4.2 G/DL — SIGNIFICANT CHANGE UP (ref 3.3–5)
ALP SERPL-CCNC: 90 U/L — SIGNIFICANT CHANGE UP (ref 40–120)
ALT FLD-CCNC: 78 U/L — HIGH (ref 10–45)
ANION GAP SERPL CALC-SCNC: 10 MMOL/L — SIGNIFICANT CHANGE UP (ref 5–17)
AST SERPL-CCNC: 37 U/L — SIGNIFICANT CHANGE UP (ref 10–40)
BASOPHILS NFR BLD AUTO: 0.3 % — SIGNIFICANT CHANGE UP (ref 0–2)
BILIRUB SERPL-MCNC: 0.2 MG/DL — SIGNIFICANT CHANGE UP (ref 0.2–1.2)
BUN SERPL-MCNC: 22 MG/DL — SIGNIFICANT CHANGE UP (ref 7–23)
CALCIUM SERPL-MCNC: 9.3 MG/DL — SIGNIFICANT CHANGE UP (ref 8.4–10.5)
CHLORIDE SERPL-SCNC: 96 MMOL/L — SIGNIFICANT CHANGE UP (ref 96–108)
CO2 SERPL-SCNC: 27 MMOL/L — SIGNIFICANT CHANGE UP (ref 22–31)
CREAT SERPL-MCNC: 0.81 MG/DL — SIGNIFICANT CHANGE UP (ref 0.5–1.3)
EOSINOPHIL NFR BLD AUTO: 2.6 % — SIGNIFICANT CHANGE UP (ref 0–6)
GLUCOSE SERPL-MCNC: 102 MG/DL — HIGH (ref 70–99)
HCT VFR BLD CALC: 38.9 % — LOW (ref 39–50)
HGB BLD-MCNC: 12.8 G/DL — LOW (ref 13–17)
LYMPHOCYTES # BLD AUTO: 25.3 % — SIGNIFICANT CHANGE UP (ref 13–44)
MCHC RBC-ENTMCNC: 30.5 PG — SIGNIFICANT CHANGE UP (ref 27–34)
MCHC RBC-ENTMCNC: 32.9 G/DL — SIGNIFICANT CHANGE UP (ref 32–36)
MCV RBC AUTO: 92.8 FL — SIGNIFICANT CHANGE UP (ref 80–100)
MONOCYTES NFR BLD AUTO: 10.4 % — SIGNIFICANT CHANGE UP (ref 2–14)
NEUTROPHILS NFR BLD AUTO: 61.4 % — SIGNIFICANT CHANGE UP (ref 43–77)
PLATELET # BLD AUTO: 238 K/UL — SIGNIFICANT CHANGE UP (ref 150–400)
POTASSIUM SERPL-MCNC: 4.5 MMOL/L — SIGNIFICANT CHANGE UP (ref 3.5–5.3)
POTASSIUM SERPL-SCNC: 4.5 MMOL/L — SIGNIFICANT CHANGE UP (ref 3.5–5.3)
PROT SERPL-MCNC: 6.9 G/DL — SIGNIFICANT CHANGE UP (ref 6–8.3)
RBC # BLD: 4.19 M/UL — LOW (ref 4.2–5.8)
RBC # FLD: 15.4 % — SIGNIFICANT CHANGE UP (ref 10.3–16.9)
SODIUM SERPL-SCNC: 133 MMOL/L — LOW (ref 135–145)
TROPONIN T SERPL-MCNC: <0.01 NG/ML — SIGNIFICANT CHANGE UP (ref 0–0.01)
WBC # BLD: 6.6 K/UL — SIGNIFICANT CHANGE UP (ref 3.8–10.5)
WBC # FLD AUTO: 6.6 K/UL — SIGNIFICANT CHANGE UP (ref 3.8–10.5)

## 2018-11-18 PROCEDURE — 85025 COMPLETE CBC W/AUTO DIFF WBC: CPT

## 2018-11-18 PROCEDURE — 93005 ELECTROCARDIOGRAM TRACING: CPT

## 2018-11-18 PROCEDURE — 94640 AIRWAY INHALATION TREATMENT: CPT

## 2018-11-18 PROCEDURE — 96374 THER/PROPH/DIAG INJ IV PUSH: CPT

## 2018-11-18 PROCEDURE — 80053 COMPREHEN METABOLIC PANEL: CPT

## 2018-11-18 PROCEDURE — 71046 X-RAY EXAM CHEST 2 VIEWS: CPT

## 2018-11-18 PROCEDURE — 84484 ASSAY OF TROPONIN QUANT: CPT

## 2018-11-18 PROCEDURE — 99284 EMERGENCY DEPT VISIT MOD MDM: CPT | Mod: 25

## 2018-11-18 PROCEDURE — 36415 COLL VENOUS BLD VENIPUNCTURE: CPT

## 2018-11-18 RX ORDER — AZITHROMYCIN 500 MG/1
1 TABLET, FILM COATED ORAL
Qty: 8 | Refills: 0
Start: 2018-11-18 | End: 2018-11-21

## 2018-11-18 RX ORDER — ALBUTEROL 90 UG/1
2 AEROSOL, METERED ORAL
Qty: 1 | Refills: 0
Start: 2018-11-18

## 2018-11-18 RX ORDER — AZITHROMYCIN 500 MG/1
500 TABLET, FILM COATED ORAL ONCE
Qty: 0 | Refills: 0 | Status: COMPLETED | OUTPATIENT
Start: 2018-11-18 | End: 2018-11-18

## 2018-11-18 RX ORDER — ASPIRIN/CALCIUM CARB/MAGNESIUM 324 MG
325 TABLET ORAL ONCE
Qty: 0 | Refills: 0 | Status: COMPLETED | OUTPATIENT
Start: 2018-11-18 | End: 2018-11-18

## 2018-11-18 RX ADMIN — AZITHROMYCIN 500 MILLIGRAM(S): 500 TABLET, FILM COATED ORAL at 01:15

## 2018-11-18 RX ADMIN — Medication 325 MILLIGRAM(S): at 01:15

## 2018-12-16 ENCOUNTER — EMERGENCY (EMERGENCY)
Facility: HOSPITAL | Age: 58
LOS: 1 days | Discharge: HOME | End: 2018-12-16
Admitting: EMERGENCY MEDICINE
Payer: MEDICARE

## 2018-12-16 VITALS
HEART RATE: 98 BPM | DIASTOLIC BLOOD PRESSURE: 74 MMHG | OXYGEN SATURATION: 97 % | SYSTOLIC BLOOD PRESSURE: 149 MMHG | RESPIRATION RATE: 20 BRPM | TEMPERATURE: 98 F

## 2018-12-16 DIAGNOSIS — F41.9 ANXIETY DISORDER, UNSPECIFIED: ICD-10-CM

## 2018-12-16 DIAGNOSIS — F17.200 NICOTINE DEPENDENCE, UNSPECIFIED, UNCOMPLICATED: ICD-10-CM

## 2018-12-16 DIAGNOSIS — Z98.890 OTHER SPECIFIED POSTPROCEDURAL STATES: Chronic | ICD-10-CM

## 2018-12-16 DIAGNOSIS — Z79.2 LONG TERM (CURRENT) USE OF ANTIBIOTICS: ICD-10-CM

## 2018-12-16 DIAGNOSIS — Z79.52 LONG TERM (CURRENT) USE OF SYSTEMIC STEROIDS: ICD-10-CM

## 2018-12-16 DIAGNOSIS — J44.9 CHRONIC OBSTRUCTIVE PULMONARY DISEASE, UNSPECIFIED: ICD-10-CM

## 2018-12-16 DIAGNOSIS — Z79.899 OTHER LONG TERM (CURRENT) DRUG THERAPY: ICD-10-CM

## 2018-12-16 PROCEDURE — 99282 EMERGENCY DEPT VISIT SF MDM: CPT

## 2018-12-16 PROCEDURE — 99283 EMERGENCY DEPT VISIT LOW MDM: CPT

## 2018-12-16 NOTE — ED ADULT NURSE REASSESSMENT NOTE - NS ED NURSE REASSESS COMMENT FT1
pt. ambulated to restroom and back to chair, with steady gait noted, without incident, Janet at chair side for evaluation

## 2018-12-16 NOTE — ED ADULT NURSE NOTE - HPI (INCLUDE ILLNESS QUALITY, SEVERITY, DURATION, TIMING, CONTEXT, MODIFYING FACTORS, ASSOCIATED SIGNS AND SYMPTOMS)
as above, acute onset of anxiety at approx. 2300, somewhat resolved at present, denies depression, SI, HI, or other complaint at present

## 2018-12-16 NOTE — ED ADULT NURSE NOTE - OBJECTIVE STATEMENT
pt. presents with c/o acute onset of anxiety approx. 2300 last pm, states that he does have a h/o anxiety d/o and medicated himself, but still feels somewhat nervous and would "like to get checked out," nad at present, assessment as noted, awaiting provider eval.

## 2018-12-16 NOTE — ED PROVIDER NOTE - PHYSICAL EXAMINATION
CONSTITUTIONAL: Well-appearing; well-nourished; in no apparent distress.   HEAD: Normocephalic; atraumatic.   EYES: PERRL; EOM intact; conjunctiva and sclera clear  ENT: normal nose; no rhinorrhea; normal pharynx with no erythema or lesions.   NECK: Supple; non-tender;   CARDIOVASCULAR: Normal S1, S2; no murmurs, rubs, or gallops. Regular rate and rhythm.   RESPIRATORY: Breathing easily; breath sounds clear and equal bilaterally; no wheezes, rhonchi, or rales.  MSK: FROM at all extremities, normal tone   EXT: No cyanosis or edema; N/V intact  SKIN: Normal for age and race; warm; dry; good turgor; no apparent lesions or rash.  Psyc: AAOx3, mood normal

## 2018-12-16 NOTE — ED ADULT NURSE NOTE - NSIMPLEMENTINTERV_GEN_ALL_ED
Implemented All Universal Safety Interventions:  [place] to call system. Call bell, personal items and telephone within reach. Instruct patient to call for assistance. Room bathroom lighting operational. Non-slip footwear when patient is off stretcher. Physically safe environment: no spills, clutter or unnecessary equipment. Stretcher in lowest position, wheels locked, appropriate side rails in place.

## 2018-12-16 NOTE — ED PROVIDER NOTE - OBJECTIVE STATEMENT
59 yo M with pmh of COPD, depression/anxiety, former etoh abuse (sober x 1 year) c/o having a panic attack while at an AA meeting around 8pm. Pt  states he was feeling nervous and shaky. Pt went home and took his hydroxyzine but was still feeling anxious so came to the ED. Pt states he feels better now and is relaxed. Denies SI/HI, AH/VH, cp, sob, dizziness, n/v. Pt last saw his psychiatrist this past week and his hydroxyzine was increased from 50mg to 100mg.

## 2018-12-16 NOTE — ED PROVIDER NOTE - NSFOLLOWUPINSTRUCTIONS_ED_ALL_ED_FT
Follow up with your psychiatrist.    Anxiety    Generalized anxiety disorder (CAIT) is a mental disorder. It is defined as anxiety that is not necessarily related to specific events or activities or is out of proportion to said events. Symptoms include restlessness, fatigue, difficulty concentrations, irritability and difficulty concentrating. It may interfere with life functions, including relationships, work, and school. If you were started on a medication, make sure to take exactly as prescribed and follow up with a psychiatrist.    SEEK IMMEDIATE MEDICAL CARE IF YOU HAVE ANY OF THE FOLLOWING SYMPTOMS: thoughts about hurting killing yourself, thoughts about hurting or killing somebody else, hallucinations, or worsening depression.

## 2018-12-16 NOTE — ED ADULT TRIAGE NOTE - CHIEF COMPLAINT QUOTE
pt. states he had an anxiety attack yesterday night and took his meds around 11pm, presently he is still feeling anxious, shaky and nauseous and would like to be checked.

## 2018-12-16 NOTE — ED PROVIDER NOTE - MEDICAL DECISION MAKING DETAILS
59 yo M with pmh of COPD, depression/anxiety, former etoh abuse (sober x 1 year) c/o having a panic attack while at an AA meeting around 8pm. Pt  states he was feeling nervous and shaky. Pt went home and took his hydroxyzine but was still feeling anxious so came to the ED. Pt states he feels better now and is relaxed. Denies SI/HI, AH/VH, cp, sob, dizziness, n/v. Well appearing, PE unremarkable. Pt states he will call his psychiatrist on Monday to make an appointment this week.

## 2019-01-25 NOTE — ED PROVIDER NOTE - NS HIV RISK FACTOR YES
[Duration: ___ wks] : duration: [unfilled] weeks [] :  [___ lbs.] : [unfilled] lbs [___ oz.] : [unfilled] oz. [Was child in NICU?] : Child was not in NICU [FreeTextEntry6] : prior csection Declined

## 2019-02-22 ENCOUNTER — EMERGENCY (EMERGENCY)
Facility: HOSPITAL | Age: 59
LOS: 1 days | Discharge: HOME | End: 2019-02-22
Attending: EMERGENCY MEDICINE | Admitting: EMERGENCY MEDICINE
Payer: MEDICARE

## 2019-02-22 VITALS
RESPIRATION RATE: 20 BRPM | OXYGEN SATURATION: 96 % | DIASTOLIC BLOOD PRESSURE: 79 MMHG | HEART RATE: 75 BPM | SYSTOLIC BLOOD PRESSURE: 142 MMHG | TEMPERATURE: 98 F

## 2019-02-22 VITALS
WEIGHT: 194.01 LBS | SYSTOLIC BLOOD PRESSURE: 139 MMHG | OXYGEN SATURATION: 95 % | RESPIRATION RATE: 24 BRPM | HEART RATE: 74 BPM | DIASTOLIC BLOOD PRESSURE: 75 MMHG | TEMPERATURE: 98 F

## 2019-02-22 DIAGNOSIS — Z98.890 OTHER SPECIFIED POSTPROCEDURAL STATES: Chronic | ICD-10-CM

## 2019-02-22 PROCEDURE — 71045 X-RAY EXAM CHEST 1 VIEW: CPT | Mod: 26

## 2019-02-22 PROCEDURE — 99284 EMERGENCY DEPT VISIT MOD MDM: CPT | Mod: 25

## 2019-02-22 PROCEDURE — 94640 AIRWAY INHALATION TREATMENT: CPT

## 2019-02-22 PROCEDURE — 71045 X-RAY EXAM CHEST 1 VIEW: CPT

## 2019-02-22 PROCEDURE — 99285 EMERGENCY DEPT VISIT HI MDM: CPT | Mod: 25

## 2019-02-22 RX ORDER — IPRATROPIUM/ALBUTEROL SULFATE 18-103MCG
3 AEROSOL WITH ADAPTER (GRAM) INHALATION
Qty: 0 | Refills: 0 | Status: COMPLETED | OUTPATIENT
Start: 2019-02-22 | End: 2019-02-22

## 2019-02-22 RX ORDER — ALBUTEROL 90 UG/1
1 AEROSOL, METERED ORAL ONCE
Qty: 0 | Refills: 0 | Status: COMPLETED | OUTPATIENT
Start: 2019-02-22 | End: 2019-02-22

## 2019-02-22 RX ADMIN — ALBUTEROL 1 PUFF(S): 90 AEROSOL, METERED ORAL at 17:30

## 2019-02-22 RX ADMIN — Medication 3 MILLILITER(S): at 16:24

## 2019-02-22 RX ADMIN — Medication 60 MILLIGRAM(S): at 15:55

## 2019-02-22 RX ADMIN — Medication 3 MILLILITER(S): at 15:45

## 2019-02-22 RX ADMIN — Medication 3 MILLILITER(S): at 16:00

## 2019-02-22 NOTE — ED ADULT NURSE NOTE - NSIMPLEMENTINTERV_GEN_ALL_ED
Implemented All Fall with Harm Risk Interventions:  [place] to call system. Call bell, personal items and telephone within reach. Instruct patient to call for assistance. Room bathroom lighting operational. Non-slip footwear when patient is off stretcher. Physically safe environment: no spills, clutter or unnecessary equipment. Stretcher in lowest position, wheels locked, appropriate side rails in place. Provide visual cue, wrist band, yellow gown, etc. Monitor gait and stability. Monitor for mental status changes and reorient to person, place, and time. Review medications for side effects contributing to fall risk. Reinforce activity limits and safety measures with patient and family. Provide visual clues: red socks.

## 2019-02-22 NOTE — ED ADULT NURSE NOTE - OBJECTIVE STATEMENT
Patient reports running out of money in order to buy Albuterol ($8.50), was feeling short of breath after walking from [employer], inspiratory and expiratory wheezes, not breathless on exam, no accesory muscle use, no cyanosis.  Placed on continuous pulse oximetry.

## 2019-02-22 NOTE — ED PROVIDER NOTE - OBJECTIVE STATEMENT
57 y/o m hx COPD presents c/o nasal congestion, nonproductive cough with wheezing and chest tightness x 2 days.  Pt stating he ran out of his albuterol inhaler, couldn't afford the co-pay at the pharmacy for a new one.  Denies fever, chills, n/v/d, CP, all other ROS negative.

## 2019-02-22 NOTE — ED PROVIDER NOTE - ATTENDING CONTRIBUTION TO CARE
58M hx of COPD, active smoker, here w/ uri symptoms x 2 days, triggering his COPD. has no albuterol inhaler. mild expiratory wheezing on exam but ambulatory around ED without issue. inhaler given in ED. will rx steroids. DC home in NAD with strict return precautions given.

## 2019-02-22 NOTE — ED PROVIDER NOTE - CLINICAL SUMMARY MEDICAL DECISION MAKING FREE TEXT BOX
57 y/o m hx COPD presents c/o cough, nasal congestion with chest tightness and wheezing for the past 2 days; afebrile, breathing comfortably on RA, cxr negative, given nebs/steroids with improvement, will d/c with rx prednisone, given inhaler from ED, to f/u with pmd, return to ED if sx worsen.

## 2019-02-22 NOTE — ED ADULT TRIAGE NOTE - CHIEF COMPLAINT QUOTE
Pt w hx of COPD, emphysema, chronic bronchitis presents c/o SOB and HOUSE since yesterday, states had a CT on Tue and was supposed to see his pulmonologist tomorrow. Pt also states he has no money to pay copay for his inhaler. Pt is a smoker. Pt tachypneic in triage.

## 2019-02-26 DIAGNOSIS — J44.1 CHRONIC OBSTRUCTIVE PULMONARY DISEASE WITH (ACUTE) EXACERBATION: ICD-10-CM

## 2019-02-26 DIAGNOSIS — Z79.52 LONG TERM (CURRENT) USE OF SYSTEMIC STEROIDS: ICD-10-CM

## 2019-02-26 DIAGNOSIS — Z79.899 OTHER LONG TERM (CURRENT) DRUG THERAPY: ICD-10-CM

## 2019-02-26 DIAGNOSIS — Z79.51 LONG TERM (CURRENT) USE OF INHALED STEROIDS: ICD-10-CM

## 2019-02-26 DIAGNOSIS — R06.02 SHORTNESS OF BREATH: ICD-10-CM

## 2019-02-26 DIAGNOSIS — Z79.2 LONG TERM (CURRENT) USE OF ANTIBIOTICS: ICD-10-CM

## 2019-04-10 NOTE — ED ADULT NURSE NOTE - CAS DISCH TRANSFER METHOD
[facility] -   Nephrology  Progress Note    Patient Name: Cailin Pickett  MRN: [id number]  Admission Date: 4/8/2019  Hospital Length of Stay: 2 days  Attending Provider: Jesus Rodriguez, *   Primary Care Physician: Favio Titus MD  Principal Problem:ESRD needing dialysis    Subjective:     HPI: Cailin Pickett  is a pleasant 76-year-old  woman with history of hypertension, CKD stage 5, presents to hospital with generalized weakness and shortness of breath.  I saw her in the clinic about a week ago.  Advised patient to initiate hemodialysis.  Patient is here for further evaluation and possibility of starting hemodialysis.     she continues to feel tired, she has some exertion or shortness of Breath.  Peripheral edema noted.  Recent transplant note reviewed, patient was declined for a kidney transplant due to multiple medical problems, advanced age,  has solitary right kidney, she is status post left nephrectomy. Multiple cysts reported on the kidney. It measures about 16 cm. Patient Attended chronic kidney disease education and options class. she had a left arm AV fistula placed on 12/4/14 by Dr Foster .        Important Medical Info :       In 2013 patient was diagnosed with pancreatic tail carcinoma. she underwent chemo rx with 5-FU, she tolerated the cycles well. In 5/2013 she underwent resection of tail of pancreas, left nephrectomy, splenectomy, left adrenalectomy.            Interval History:     4/9/19 - seen at HD , first tx today, tolerating well,     4/10/19 - sen at HD, tolerating well,     Review of patient's allergies indicates:   Allergen Reactions    Allegra [fexofenadine] Swelling    Codeine Hives, Itching and Nausea And Vomiting     Current Facility-Administered Medications   Medication Frequency    allopurinol tablet 100 mg Daily    amLODIPine tablet 5 mg BID    carvedilol tablet 12.5 mg BID WM    furosemide tablet 80 mg Daily    gabapentin  capsule 100 mg BID    hydrALAZINE injection 10 mg Q8H PRN    hydrALAZINE tablet 100 mg Q8H    vitamin renal formula (B-complex-vitamin c-folic acid) 1 mg per capsule 1 capsule Daily       Objective:     Vital Signs (Most Recent):  Temp: 97.8 °F (36.6 °C) (04/10/19 0950)  Pulse: 78 (04/10/19 1100)  Resp: 18 (04/10/19 0725)  BP: 134/66 (04/10/19 1100)  SpO2: 96 % (04/10/19 0725)  O2 Device (Oxygen Therapy): room air (04/10/19 0725) Vital Signs (24h Range):  Temp:  [97.7 °F (36.5 °C)-98.8 °F (37.1 °C)] 97.8 °F (36.6 °C)  Pulse:  [50-79] 78  Resp:  [18-19] 18  SpO2:  [94 %-97 %] 96 %  BP: (129-162)/(53-72) 134/66     Weight: 63.9 kg (140 lb 14 oz) (04/08/19 1515)  Body mass index is 23.44 kg/m².  Body surface area is 1.71 meters squared.    I/O last 3 completed shifts:  In: 1570 [P.O.:1320; Other:250]  Out: 1500 [Other:1500]    Physical Exam   Constitutional: She is oriented to person, place, and time. She appears well-developed. No distress.   HENT:   Head: Normocephalic and atraumatic.   Mouth/Throat: Oropharynx is clear and moist. No oropharyngeal exudate.   Eyes: Pupils are equal, round, and reactive to light. Conjunctivae and EOM are normal.   Neck: Normal range of motion. Neck supple. No JVD present. Carotid bruit is not present. No tracheal deviation present. No thyroid mass and no thyromegaly present.   Cardiovascular: Normal rate, regular rhythm and intact distal pulses. Exam reveals no gallop and no friction rub.   Murmur heard.  Pulmonary/Chest: Effort normal. No respiratory distress. She has no wheezes. She has rales. She exhibits no tenderness.   Abdominal: Soft. Bowel sounds are normal. She exhibits no distension, no abdominal bruit, no ascites and no mass. There is no hepatosplenomegaly. There is no tenderness. There is no rebound, no guarding and no CVA tenderness.   Musculoskeletal: She exhibits edema. She exhibits no tenderness.   LUE AVSLADE with good thrill    Lymphadenopathy:     She has no cervical  adenopathy.   Neurological: She is alert and oriented to person, place, and time. She has normal reflexes. She displays normal reflexes. No cranial nerve deficit. She exhibits normal muscle tone. Coordination normal.   Skin: Skin is warm and intact. No rash noted. No erythema. No pallor.   Psychiatric: She has a normal mood and affect. Her behavior is normal.       Significant Labs:  CBC:   Recent Labs   Lab 04/08/19 0817   WBC 5.05   RBC 3.22*   HGB 9.3*   HCT 29.6*      MCV 92   MCH 28.9   MCHC 31.4*     CMP:   Recent Labs   Lab 04/08/19  0817  04/10/19  0554      < > 134*   CALCIUM 9.9   < > 9.1   ALBUMIN 3.6   < > 3.3*   PROT 7.1  --   --       < > 139   K 4.8   < > 4.2   CO2 20*   < > 21*      < > 105   BUN 97*   < > 65*   CREATININE 5.9*   < > 5.2*   ALKPHOS 74  --   --    ALT 31  --   --    AST 25  --   --    BILITOT 0.5  --   --     < > = values in this interval not displayed.     Coagulation:   Recent Labs   Lab 04/08/19 0817   INR 0.9   APTT 28.5     LFTs:   Recent Labs   Lab 04/08/19  0817  04/10/19  0554   ALT 31  --   --    AST 25  --   --    ALKPHOS 74  --   --    BILITOT 0.5  --   --    PROT 7.1  --   --    ALBUMIN 3.6   < > 3.3*    < > = values in this interval not displayed.     All labs within the past 24 hours have been reviewed.     Significant Imaging:  Reviewed     Lab Results   Component Value Date    .0 (H) 04/01/2019    CALCIUM 9.1 04/10/2019    PHOS 4.6 (H) 04/10/2019       Lab Results   Component Value Date    ALBUMIN 3.3 (L) 04/10/2019           Assessment/Plan:     * ESRD needing dialysis  1. ESRD :  Patient is s/p  left nephrectomy, she has  functioning right kidney, second HD today, plan third tx tomorrow,      she had a left arm AV fistula placed on 12/4/14 by Dr Foster .  Good thrill on exam.  case management consult to arrange outpatient dialysis.     Some difficulty accessing AVF, s/p angioplasty today ,     2.  Hypertension - resume home blood  pressure medications, controlled,     3.  Anemia of chronic kidney disease - Procrit with dialysis    4.  Secondary hyperparathyroidism - renal diet, Velphoro with meals,     5. D/c home when out pt HD arranged     6. SOB due to fluid over load, UF as tolerated, improved ,               Thank you for your consult. I will follow-up with patient. Please contact us if you have any additional questions.     Total time spent 40 minutes including time needed to review the records,  patient  evaluation, documentation, face-to-face discussion with the patient,  primary team,   more than 50% of the time was spent on coordination of care and counseling.       Tank Vinson MD  Nephrology  [facility]   [place]

## 2019-07-14 NOTE — ED PROVIDER NOTE - OBJECTIVE STATEMENT
- - - 58 y/o m hx emphysema presents c/o shortness of breath, chest tightness for the past 2 days.  Pt stating sx brought on by cold sx of nasal congestion, cough, sore throat.  Denies fever, chills, n/v/d, recent travel, sick contacts, all other ROS negative.

## 2019-10-01 NOTE — ED PROVIDER NOTE - CHIEF COMPLAINT
The patient is a 57y Male complaining of shortness of breath.
My signature below certifies that the above stated patient is homebound and upon completion of the Face-To-Face encounter, has the need for intermittent skilled nursing, physical therapy and/or speech or occupational therapy services in their home for their current diagnosis as outlined in their initial plan of care. These services will continue to be monitored by myself or another physician.

## 2019-10-27 ENCOUNTER — EMERGENCY (EMERGENCY)
Facility: HOSPITAL | Age: 59
LOS: 1 days | Discharge: HOME | End: 2019-10-27
Attending: EMERGENCY MEDICINE | Admitting: EMERGENCY MEDICINE
Payer: MEDICARE

## 2019-10-27 VITALS
TEMPERATURE: 98 F | HEART RATE: 99 BPM | WEIGHT: 216.05 LBS | DIASTOLIC BLOOD PRESSURE: 84 MMHG | HEIGHT: 72 IN | OXYGEN SATURATION: 93 % | RESPIRATION RATE: 17 BRPM | SYSTOLIC BLOOD PRESSURE: 149 MMHG

## 2019-10-27 DIAGNOSIS — Z98.890 OTHER SPECIFIED POSTPROCEDURAL STATES: Chronic | ICD-10-CM

## 2019-10-27 LAB
ANION GAP SERPL CALC-SCNC: 9 MMOL/L — SIGNIFICANT CHANGE UP (ref 5–17)
BASOPHILS # BLD AUTO: 0.06 K/UL — SIGNIFICANT CHANGE UP (ref 0–0.2)
BASOPHILS NFR BLD AUTO: 1.1 % — SIGNIFICANT CHANGE UP (ref 0–2)
BUN SERPL-MCNC: 18 MG/DL — SIGNIFICANT CHANGE UP (ref 7–23)
CALCIUM SERPL-MCNC: 8.8 MG/DL — SIGNIFICANT CHANGE UP (ref 8.4–10.5)
CHLORIDE SERPL-SCNC: 98 MMOL/L — SIGNIFICANT CHANGE UP (ref 96–108)
CO2 SERPL-SCNC: 29 MMOL/L — SIGNIFICANT CHANGE UP (ref 22–31)
CREAT SERPL-MCNC: 1.06 MG/DL — SIGNIFICANT CHANGE UP (ref 0.5–1.3)
EOSINOPHIL # BLD AUTO: 0.23 K/UL — SIGNIFICANT CHANGE UP (ref 0–0.5)
EOSINOPHIL NFR BLD AUTO: 4 % — SIGNIFICANT CHANGE UP (ref 0–6)
FLU A RESULT: SIGNIFICANT CHANGE UP
FLU A RESULT: SIGNIFICANT CHANGE UP
FLUAV AG NPH QL: SIGNIFICANT CHANGE UP
FLUBV AG NPH QL: SIGNIFICANT CHANGE UP
GLUCOSE SERPL-MCNC: 186 MG/DL — HIGH (ref 70–99)
HCT VFR BLD CALC: 40.4 % — SIGNIFICANT CHANGE UP (ref 39–50)
HGB BLD-MCNC: 13.1 G/DL — SIGNIFICANT CHANGE UP (ref 13–17)
IMM GRANULOCYTES NFR BLD AUTO: 0.4 % — SIGNIFICANT CHANGE UP (ref 0–1.5)
LYMPHOCYTES # BLD AUTO: 1.55 K/UL — SIGNIFICANT CHANGE UP (ref 1–3.3)
LYMPHOCYTES # BLD AUTO: 27.2 % — SIGNIFICANT CHANGE UP (ref 13–44)
MCHC RBC-ENTMCNC: 30.8 PG — SIGNIFICANT CHANGE UP (ref 27–34)
MCHC RBC-ENTMCNC: 32.4 GM/DL — SIGNIFICANT CHANGE UP (ref 32–36)
MCV RBC AUTO: 94.8 FL — SIGNIFICANT CHANGE UP (ref 80–100)
MONOCYTES # BLD AUTO: 0.56 K/UL — SIGNIFICANT CHANGE UP (ref 0–0.9)
MONOCYTES NFR BLD AUTO: 9.8 % — SIGNIFICANT CHANGE UP (ref 2–14)
NEUTROPHILS # BLD AUTO: 3.27 K/UL — SIGNIFICANT CHANGE UP (ref 1.8–7.4)
NEUTROPHILS NFR BLD AUTO: 57.5 % — SIGNIFICANT CHANGE UP (ref 43–77)
NRBC # BLD: 0 /100 WBCS — SIGNIFICANT CHANGE UP (ref 0–0)
PLATELET # BLD AUTO: 178 K/UL — SIGNIFICANT CHANGE UP (ref 150–400)
POTASSIUM SERPL-MCNC: 4.2 MMOL/L — SIGNIFICANT CHANGE UP (ref 3.5–5.3)
POTASSIUM SERPL-SCNC: 4.2 MMOL/L — SIGNIFICANT CHANGE UP (ref 3.5–5.3)
RBC # BLD: 4.26 M/UL — SIGNIFICANT CHANGE UP (ref 4.2–5.8)
RBC # FLD: 13.4 % — SIGNIFICANT CHANGE UP (ref 10.3–14.5)
RSV RESULT: SIGNIFICANT CHANGE UP
RSV RNA RESP QL NAA+PROBE: SIGNIFICANT CHANGE UP
SODIUM SERPL-SCNC: 136 MMOL/L — SIGNIFICANT CHANGE UP (ref 135–145)
WBC # BLD: 5.69 K/UL — SIGNIFICANT CHANGE UP (ref 3.8–10.5)
WBC # FLD AUTO: 5.69 K/UL — SIGNIFICANT CHANGE UP (ref 3.8–10.5)

## 2019-10-27 PROCEDURE — 85025 COMPLETE CBC W/AUTO DIFF WBC: CPT

## 2019-10-27 PROCEDURE — 87631 RESP VIRUS 3-5 TARGETS: CPT

## 2019-10-27 PROCEDURE — 71046 X-RAY EXAM CHEST 2 VIEWS: CPT

## 2019-10-27 PROCEDURE — 71046 X-RAY EXAM CHEST 2 VIEWS: CPT | Mod: 26

## 2019-10-27 PROCEDURE — 80048 BASIC METABOLIC PNL TOTAL CA: CPT

## 2019-10-27 PROCEDURE — 99284 EMERGENCY DEPT VISIT MOD MDM: CPT | Mod: 25

## 2019-10-27 PROCEDURE — 83880 ASSAY OF NATRIURETIC PEPTIDE: CPT

## 2019-10-27 PROCEDURE — 94640 AIRWAY INHALATION TREATMENT: CPT

## 2019-10-27 PROCEDURE — 93010 ELECTROCARDIOGRAM REPORT: CPT

## 2019-10-27 PROCEDURE — 93005 ELECTROCARDIOGRAM TRACING: CPT

## 2019-10-27 PROCEDURE — 84484 ASSAY OF TROPONIN QUANT: CPT

## 2019-10-27 PROCEDURE — 36415 COLL VENOUS BLD VENIPUNCTURE: CPT

## 2019-10-27 PROCEDURE — 99285 EMERGENCY DEPT VISIT HI MDM: CPT | Mod: 25

## 2019-10-27 RX ORDER — ALBUTEROL 90 UG/1
2 AEROSOL, METERED ORAL
Qty: 1 | Refills: 0
Start: 2019-10-27

## 2019-10-27 RX ORDER — IPRATROPIUM/ALBUTEROL SULFATE 18-103MCG
3 AEROSOL WITH ADAPTER (GRAM) INHALATION
Refills: 0 | Status: COMPLETED | OUTPATIENT
Start: 2019-10-27 | End: 2019-10-27

## 2019-10-27 RX ORDER — ALBUTEROL 90 UG/1
2.5 AEROSOL, METERED ORAL
Refills: 0 | Status: COMPLETED | OUTPATIENT
Start: 2019-10-27 | End: 2019-10-27

## 2019-10-27 RX ADMIN — Medication 3 MILLILITER(S): at 21:42

## 2019-10-27 RX ADMIN — Medication 60 MILLIGRAM(S): at 21:28

## 2019-10-27 RX ADMIN — Medication 3 MILLILITER(S): at 21:29

## 2019-10-27 RX ADMIN — ALBUTEROL 2.5 MILLIGRAM(S): 90 AEROSOL, METERED ORAL at 23:08

## 2019-10-27 RX ADMIN — ALBUTEROL 2.5 MILLIGRAM(S): 90 AEROSOL, METERED ORAL at 22:23

## 2019-10-27 RX ADMIN — ALBUTEROL 2.5 MILLIGRAM(S): 90 AEROSOL, METERED ORAL at 22:48

## 2019-10-27 NOTE — ED PROVIDER NOTE - TEMPLATE, MLM
"Lauren Bashir  63 y.o. female  Chief Complaint   Patient presents with   • T-5000 GLF     Per report, pt fell \"to her knees\" at aprox 1200. - head trauma, - LOC   • Knee Pain     Bilat, s/p GLF. Per pt, pain radiates down to her feet.       Pt BIB [facility] form home for above complaint. Per pt, pt's \"knees gave out and fell forward\" earlier on today. Pt denies head trauma and LOC. Pt able to move extremities and CMS is intact. Pt is A&OX4, speaking in full sentences, follows commands and responds appropriately. Pt is tearful but appears sleepy. Pt requires verbal prompting to remain awake., Pt placed on monitor. Chart up for ERP  " General

## 2019-10-27 NOTE — ED PROVIDER NOTE - MUSCULOSKELETAL, MLM
Spine appears normal, range of motion is not limited, no muscle or joint tenderness. 2+ edema bilat le (unchanged from baseline per pt)

## 2019-10-27 NOTE — ED ADULT NURSE NOTE - OBJECTIVE STATEMENT
Pt came in to ED c/o difficulty breathing. Pt with hx of chronic bronchitis, and pt expressed SOB and difficulty breathing started this morning. Pt also endorses dyspnea on exertion. Reports to be on singulair and albuterol inhaler however has not used either today as he ran out of medications. Denies CP, no N/V/D, no fever/chills at home. (+) non productive cough.

## 2019-10-27 NOTE — ED PROVIDER NOTE - PROGRESS NOTE DETAILS
Pt w/o sig change in lung exam but states feeling improved.  .  Will cont w nebs, add labs, cxr, flu swab 2/2 cough, ekg - pt may need admit for copd exacerbation. Flu neg, labs nl, cxr neg.  Pt pending reassessment after additional nebs.  Pt signed out to Dr Gutierrez and FORTUNATO Quarles - may need admit if not improved. Complete alleviation of symptoms. requesting for dc

## 2019-10-27 NOTE — ED PROVIDER NOTE - OBJECTIVE STATEMENT
59 yo M with pmh of COPD, depression/anxiety, former etoh abuse 57 yo M with pmh of COPD, depression/anxiety, former etoh abuse c/o sob similar to his copd x 1 d  Pt ran out of his albuterol and has plan to pick it up in the am but felt v sob so came to ed.  + chronic cough w clear sputum unchanged today.  No cp, uri sx, fever, palpitations, change in pt's chronic le edema (on diuretic daily), sick contacts, travel.  Last steroids June 2019, no recent admissions.  + tob.  No other complaint.

## 2019-10-27 NOTE — ED PROVIDER NOTE - DIAGNOSTIC INTERPRETATION
ER Physician:  Catherine Director  CHEST XRAY INTERPRETATION: lungs clear, heart shadow normal, bony structures intact

## 2019-10-27 NOTE — ED PROVIDER NOTE - CONSTITUTIONAL, MLM
normal... Well appearing, well nourished, awake, alert, oriented to person, place, time/situation and in no resp distress.

## 2019-10-27 NOTE — ED PROVIDER NOTE - PATIENT PORTAL LINK FT
You can access the FollowMyHealth Patient Portal offered by [facility] by registering at the following website: [email or web address]. By joining [employer]’s FollowMyHealth portal, you will also be able to view your health information using other applications (apps) compatible with our system.

## 2019-10-27 NOTE — ED PROVIDER NOTE - NSFOLLOWUPINSTRUCTIONS_ED_ALL_ED_FT
COPD exacerbation    Return for increased pain, difficulty breathing, palpitations, any other concerns.  Take prednisone once a day for 5 days.  Use albuterol 2 puffs every 4-6 hours as needed for cough/shortness of breath.  Stop smoking.  Follow up with your pmd.    Chronic Obstructive Pulmonary Disease    Chronic obstructive pulmonary disease (COPD) is a lung condition in which airflow from the lungs is limited. Causes include smoking, secondhand smoke exposure, genetics, or recurrent infections. Take all medicines (inhaled or pills) as directed by your health care provider. Avoid exposure to irritants such as smoke, chemicals, and fumes that aggravate your breathing.    If you are a smoker, the most important thing that you can do is stop smoking. Continuing to smoke will cause further lung damage and breathing trouble. Ask your health care provider for help with quitting smoking.    SEEK IMMEDIATE MEDICAL CARE IF YOU HAVE ANY OF THE FOLLOWING SYMPTOMS: shortness of breath at rest or when talking, bluish discoloration of lips, skin, fever, worsening cough, unexplained chest pain, or lightheadedness/dizziness.

## 2019-10-27 NOTE — ED PROVIDER NOTE - CLINICAL SUMMARY MEDICAL DECISION MAKING FREE TEXT BOX
Pt w h/o copd c/o sob, no mdi at home pta.  + wheezing on exam.  No fever, uri sx, change in baseline cough to suggest pulm infection.  No cp, change in le edema to suggest acs, chf.  Will try nebs/steroids and reassess.

## 2019-10-28 VITALS
TEMPERATURE: 98 F | RESPIRATION RATE: 16 BRPM | DIASTOLIC BLOOD PRESSURE: 84 MMHG | OXYGEN SATURATION: 99 % | SYSTOLIC BLOOD PRESSURE: 136 MMHG | HEART RATE: 91 BPM

## 2019-10-28 NOTE — ED ADULT NURSE REASSESSMENT NOTE - NS ED NURSE REASSESS COMMENT FT1
Pt reports feeling better. Wheezing improved, lungs clear BL. Educated on need to  medication and not running out of inhalers.

## 2019-11-01 DIAGNOSIS — F17.200 NICOTINE DEPENDENCE, UNSPECIFIED, UNCOMPLICATED: ICD-10-CM

## 2019-11-01 DIAGNOSIS — R06.02 SHORTNESS OF BREATH: ICD-10-CM

## 2019-11-01 DIAGNOSIS — J44.1 CHRONIC OBSTRUCTIVE PULMONARY DISEASE WITH (ACUTE) EXACERBATION: ICD-10-CM

## 2019-11-01 NOTE — ED POST DISCHARGE NOTE - ADDITIONAL DOCUMENTATION
results d/w pt. pt notes went to a [place] ED a couple days after this visit and negative cxr there. pt notes coughing but breathing has improved and no fever. pt has appt with pmd on 11/6/2019 and will d/w pmd xray results. given negative cxr at another hospital 2 days later will not start antibiotics at this time. return precautions d/w pt.

## 2019-11-24 ENCOUNTER — EMERGENCY (EMERGENCY)
Facility: HOSPITAL | Age: 59
LOS: 1 days | Discharge: HOME | End: 2019-11-24
Attending: EMERGENCY MEDICINE | Admitting: EMERGENCY MEDICINE
Payer: MEDICARE

## 2019-11-24 VITALS
DIASTOLIC BLOOD PRESSURE: 86 MMHG | OXYGEN SATURATION: 97 % | SYSTOLIC BLOOD PRESSURE: 130 MMHG | HEART RATE: 75 BPM | WEIGHT: 217.38 LBS | RESPIRATION RATE: 18 BRPM | TEMPERATURE: 98 F | HEIGHT: 72 IN

## 2019-11-24 DIAGNOSIS — R05 COUGH: ICD-10-CM

## 2019-11-24 DIAGNOSIS — J44.1 CHRONIC OBSTRUCTIVE PULMONARY DISEASE WITH (ACUTE) EXACERBATION: ICD-10-CM

## 2019-11-24 DIAGNOSIS — F17.200 NICOTINE DEPENDENCE, UNSPECIFIED, UNCOMPLICATED: ICD-10-CM

## 2019-11-24 DIAGNOSIS — J06.9 ACUTE UPPER RESPIRATORY INFECTION, UNSPECIFIED: ICD-10-CM

## 2019-11-24 DIAGNOSIS — Z98.890 OTHER SPECIFIED POSTPROCEDURAL STATES: Chronic | ICD-10-CM

## 2019-11-24 PROCEDURE — 71046 X-RAY EXAM CHEST 2 VIEWS: CPT

## 2019-11-24 PROCEDURE — 94640 AIRWAY INHALATION TREATMENT: CPT

## 2019-11-24 PROCEDURE — 71046 X-RAY EXAM CHEST 2 VIEWS: CPT | Mod: 26

## 2019-11-24 PROCEDURE — 99284 EMERGENCY DEPT VISIT MOD MDM: CPT

## 2019-11-24 PROCEDURE — 99283 EMERGENCY DEPT VISIT LOW MDM: CPT | Mod: 25

## 2019-11-24 RX ORDER — IPRATROPIUM/ALBUTEROL SULFATE 18-103MCG
3 AEROSOL WITH ADAPTER (GRAM) INHALATION ONCE
Refills: 0 | Status: COMPLETED | OUTPATIENT
Start: 2019-11-24 | End: 2019-11-24

## 2019-11-24 RX ORDER — ALBUTEROL 90 UG/1
2 AEROSOL, METERED ORAL
Qty: 1 | Refills: 0
Start: 2019-11-24

## 2019-11-24 RX ADMIN — Medication 3 MILLILITER(S): at 22:51

## 2019-11-24 RX ADMIN — Medication 50 MILLIGRAM(S): at 22:51

## 2019-11-24 NOTE — ED PROVIDER NOTE - PATIENT PORTAL LINK FT
You can access the FollowMyHealth Patient Portal offered by [facility] by registering at the following website: [email or web address]. By joining [employer]’s FollowMyHealth portal, you will also be able to view your health information using other applications (apps) compatible with our system.

## 2019-11-24 NOTE — ED PROVIDER NOTE - NSFOLLOWUPINSTRUCTIONS_ED_ALL_ED_FT
Can take tylenol 650mg every 6hrs as needed for pain or mild fever.  Take prednisone as prescribed.  Can take albuterol inhaler 2 puffs every 4-6hours as needed for mild shortness of breath or wheezing.   Return for persistent fevers, persistent vomit, uncontrolled pain, worsening breathing, worsening lightheaded.  Follow up with primary doctor within 1-2 days.   Follow up with pulmonologist (lung doctor). Can call [phone] to schedule appointment.     Shortness of breath    Shortness of breath (dyspnea) means you have trouble breathing and could indicate a medical problem. Causes include lung disease, heart disease, low amount of red blood cells (anemia), poor physical fitness, being overweight, smoking, etc. Your health care provider today may not be able to find a cause for your shortness of breath after your exam. In this case, it is important to have a follow-up exam with your primary care physician as instructed. If medicines were prescribed, take them as directed for the full length of time directed. Refrain from tobacco products.    SEEK IMMEDIATE MEDICAL CARE IF YOU HAVE ANY OF THE FOLLOWING SYMPTOMS: worsening shortness of breath, chest pain, back pain, abdominal pain, fever, coughing up blood, lightheadedness/dizziness.     Viral Respiratory Infection    A viral respiratory infection is an illness that affects parts of the body used for breathing, like the lungs, nose, and throat. It is caused by a germ called a virus. Symptoms can include runny nose, coughing, sneezing, fatigue, body aches, sore throat, fever, or headache. Over the counter medicine can be used to manage the symptoms but the infection typically goes away on its own in 5 to 10 days.     SEEK IMMEDIATE MEDICAL CARE IF YOU HAVE ANY OF THE FOLLOWING SYMPTOMS: shortness of breath, chest pain, fever over 10 days, or lightheadedness/dizziness. Can take tylenol 650mg every 6hrs as needed for pain or mild fever.  Take prednisone as prescribed.  Can take albuterol inhaler 2 puffs every 4-6hours as needed for mild shortness of breath or wheezing.   Return for persistent fevers, persistent vomit, uncontrolled pain, worsening breathing, worsening lightheaded.  Follow up with primary doctor within 1-2 days.   XRay from today and from 10/27 with opacity in your left upper lobe. It is important that you follow up with your primary doctor and a pulmonologist (lung doctor). Can call [phone] to schedule appointment.     Shortness of breath    Shortness of breath (dyspnea) means you have trouble breathing and could indicate a medical problem. Causes include lung disease, heart disease, low amount of red blood cells (anemia), poor physical fitness, being overweight, smoking, etc. Your health care provider today may not be able to find a cause for your shortness of breath after your exam. In this case, it is important to have a follow-up exam with your primary care physician as instructed. If medicines were prescribed, take them as directed for the full length of time directed. Refrain from tobacco products.    SEEK IMMEDIATE MEDICAL CARE IF YOU HAVE ANY OF THE FOLLOWING SYMPTOMS: worsening shortness of breath, chest pain, back pain, abdominal pain, fever, coughing up blood, lightheadedness/dizziness.     Viral Respiratory Infection    A viral respiratory infection is an illness that affects parts of the body used for breathing, like the lungs, nose, and throat. It is caused by a germ called a virus. Symptoms can include runny nose, coughing, sneezing, fatigue, body aches, sore throat, fever, or headache. Over the counter medicine can be used to manage the symptoms but the infection typically goes away on its own in 5 to 10 days.     SEEK IMMEDIATE MEDICAL CARE IF YOU HAVE ANY OF THE FOLLOWING SYMPTOMS: shortness of breath, chest pain, fever over 10 days, or lightheadedness/dizziness.

## 2019-11-24 NOTE — ED PROVIDER NOTE - CLINICAL SUMMARY MEDICAL DECISION MAKING FREE TEXT BOX
59M PMH COPD, active smoker, former etoh/MJ use, p/w cough/SOB. Pt w/ 1w of productive cough. nasal congestion/sneezing. also w/ slight SOB, tonight, feels like COPD, some relief w/ inhaler. Last had similar symptoms ~1mo ago, dc'd w/ prednisone. Post discharge note concerning for possible JAGDISH PNA, pt followed up with PMD and symptoms resolved w/o any abx. No other systemic symptoms. vitals wnl, exam as above. Very well appearing, no resp distress even w/ ambulting in ED.   ddx: Likely URI triggering RAD. Possible PNA. clinically not ACS, PE, tamponade, dissection, PTX, perf, myocarditis, mediastinitis.   nebs, steroids, CXR. Reassess.   Pt w/ frequent hospital visits for similar. Discussed importance of pulm f/u and smoking cessation.

## 2019-11-24 NOTE — ED PROVIDER NOTE - PROGRESS NOTE DETAILS
[place]: feeling much better after nebs, still wheezing but much improved. CXR grossly unchanged from prior. HAs no fevers. very low suspicion for bacterial pathology. Will dc w/ steroids. discussed w/ pt finding of opacity of xray and importance of f/u outside the hospital.

## 2019-11-24 NOTE — ED ADULT NURSE NOTE - CHPI ED NUR SYMPTOMS NEG
no vomiting/no chills/no dizziness/no tingling/no weakness/no decreased eating/drinking/no nausea/no fever/no pain

## 2019-11-24 NOTE — ED PROVIDER NOTE - PHYSICAL EXAMINATION
trace b/l LE pitting edema  lungs: no resp distress. good air entry b/l. +diffuse expiratory wheeze.

## 2019-12-01 ENCOUNTER — EMERGENCY (EMERGENCY)
Facility: HOSPITAL | Age: 59
LOS: 1 days | Discharge: HOME | End: 2019-12-01
Attending: EMERGENCY MEDICINE | Admitting: EMERGENCY MEDICINE
Payer: MEDICARE

## 2019-12-01 VITALS
DIASTOLIC BLOOD PRESSURE: 86 MMHG | SYSTOLIC BLOOD PRESSURE: 145 MMHG | OXYGEN SATURATION: 95 % | RESPIRATION RATE: 16 BRPM | HEART RATE: 84 BPM | TEMPERATURE: 98 F | WEIGHT: 216.93 LBS

## 2019-12-01 DIAGNOSIS — Z98.890 OTHER SPECIFIED POSTPROCEDURAL STATES: Chronic | ICD-10-CM

## 2019-12-01 PROCEDURE — 71046 X-RAY EXAM CHEST 2 VIEWS: CPT

## 2019-12-01 PROCEDURE — 71046 X-RAY EXAM CHEST 2 VIEWS: CPT | Mod: 26

## 2019-12-01 PROCEDURE — 94640 AIRWAY INHALATION TREATMENT: CPT

## 2019-12-01 PROCEDURE — 99284 EMERGENCY DEPT VISIT MOD MDM: CPT

## 2019-12-01 PROCEDURE — 99285 EMERGENCY DEPT VISIT HI MDM: CPT | Mod: 25

## 2019-12-01 RX ORDER — AZITHROMYCIN 500 MG/1
500 TABLET, FILM COATED ORAL ONCE
Refills: 0 | Status: COMPLETED | OUTPATIENT
Start: 2019-12-01 | End: 2019-12-01

## 2019-12-01 RX ORDER — IPRATROPIUM/ALBUTEROL SULFATE 18-103MCG
3 AEROSOL WITH ADAPTER (GRAM) INHALATION
Refills: 0 | Status: COMPLETED | OUTPATIENT
Start: 2019-12-01 | End: 2019-12-01

## 2019-12-01 RX ORDER — ALBUTEROL 90 UG/1
2 AEROSOL, METERED ORAL EVERY 6 HOURS
Refills: 0 | Status: DISCONTINUED | OUTPATIENT
Start: 2019-12-01 | End: 2019-12-18

## 2019-12-01 RX ORDER — AZITHROMYCIN 500 MG/1
1 TABLET, FILM COATED ORAL
Qty: 4 | Refills: 0
Start: 2019-12-01 | End: 2019-12-04

## 2019-12-01 RX ADMIN — ALBUTEROL 2 PUFF(S): 90 AEROSOL, METERED ORAL at 23:42

## 2019-12-01 RX ADMIN — Medication 3 MILLILITER(S): at 22:18

## 2019-12-01 RX ADMIN — Medication 3 MILLILITER(S): at 23:06

## 2019-12-01 RX ADMIN — Medication 3 MILLILITER(S): at 22:38

## 2019-12-01 RX ADMIN — AZITHROMYCIN 500 MILLIGRAM(S): 500 TABLET, FILM COATED ORAL at 23:42

## 2019-12-01 NOTE — ED PROVIDER NOTE - NSFOLLOWUPINSTRUCTIONS_ED_ALL_ED_FT
You were evaluated in the ED for shortness of breath and cough in the setting of COPD and continued smoking. You received nebulized treatments with improvement in symptoms. CONTINUE THE STEROIDS (PREDNISONE) YOU STARTED TODAY. YOU HAVE BEEN GIVEN AN ALBUTEROL INHALER TO USE (2 PUFFS EVERY 4 HOURS AS NEEDED FOR WHEEZING), AS WELL AS AZITHROMYCIN (ANTIBIOTICS) IN THE SETTING OF PERSISTENT COUGH. You had an xray of the chest which showed a persistent opacity in the left upper lobe of your lung since last month. It is recommended you get a CT of the chest to further evaluation this Please see your primary doctor this week so they can arrange further CT imaging. STOP SMOKING.    Chronic Obstructive Pulmonary Disease Exacerbation     Chronic obstructive pulmonary disease (COPD) is a long-term (chronic) condition that affects the lungs. COPD is a general term that can be used to describe many different lung problems that cause lung swelling (inflammation) and limit airflow, including chronic bronchitis and emphysema. COPD exacerbations are episodes when breathing symptoms become much worse and require extra treatment.  COPD exacerbations are usually caused by infections. Without treatment, COPD exacerbations can be severe and even life threatening. Frequent COPD exacerbations can cause further damage to the lungs.  What are the causes?  This condition may be caused by:  Respiratory infections, including viral and bacterial infections.Exposure to smoke.Exposure to air pollution, chemical fumes, or dust.Things that give you an allergic reaction (allergens).Not taking your usual COPD medicines as directed.Underlying medical problems, such as congestive heart failure or infections not involving the lungs.In many cases, the cause (trigger) of this condition is not known.  What increases the risk?  The following factors may make you more likely to develop this condition:  Smoking cigarettes.Old age.Frequent prior COPD exacerbations.What are the signs or symptoms?  Symptoms of this condition include:  Increased coughing.Increased production of mucus from your lungs (sputum).Increased wheezing.Increased shortness of breath.Rapid or labored breathing.Chest tightness.Less energy than usual.Sleep disruption from symptoms.Confusion or increased sleepiness.Often these symptoms happen or get worse even with the use of medicines.  How is this diagnosed?  This condition is diagnosed based on:  Your medical history.A physical exam.You may also have tests, including:  A chest X-ray.Blood tests.Lung (pulmonary) function tests.How is this treated?  Treatment for this condition depends on the severity and cause of the symptoms. You may need to be admitted to a hospital for treatment. Some of the treatments commonly used to treat COPD exacerbations are:  Antibiotic medicines. These may be used for severe exacerbations caused by a lung infection, such as pneumonia.Bronchodilators. These are inhaled medicines that expand the air passages and allow increased airflow.Steroid medicines. These act to reduce inflammation in the airways. They may be given with an inhaler, taken by mouth, or given through an IV tube inserted into one of your veins.Supplemental oxygen therapy.Airway clearing techniques, such as noninvasive ventilation (NIV) and positive expiratory pressure (PEP). These provide respiratory support through a mask or other noninvasive device. An example of this would be using a continuous positive airway pressure (CPAP) machine to improve delivery of oxygen into your lungs.Follow these instructions at home:  Medicines     Take over-the-counter and prescription medicines only as told by your health care provider. It is important to use correct technique with inhaled medicines.If you were prescribed an antibiotic medicine or oral steroid, take it as told by your health care provider. Do not stop taking the medicine even if you start to feel better.Lifestyle     Eat a healthy diet.Exercise regularly.Get plenty of sleep.Avoid exposure to all substances that irritate the airway, especially to tobacco smoke.Wash your hands often with soap and water to reduce the risk of infection. If soap and water are not available, use hand .During flu season, avoid enclosed spaces that are crowded with people.General instructions     Drink enough fluid to keep your urine clear or pale yellow (unless you have a medical condition that requires fluid restriction).Use a cool mist vaporizer. This humidifies the air and makes it easier for you to clear your chest when you cough.If you have a home nebulizer and oxygen, continue to use them as told by your health care provider.Keep all follow-up visits as told by your health care provider. This is important.How is this prevented?  Stay up-to-date on pneumococcal and influenza (flu) vaccines. A flu shot is recommended every year to help prevent exacerbations.Do not use any products that contain nicotine or tobacco, such as cigarettes and e-cigarettes. Quitting smoking is very important in preventing COPD from getting worse and in preventing exacerbations from happening as often. If you need help quitting, ask your health care provider.Follow all instructions for pulmonary rehabilitation after a recent exacerbation. This can help prevent future exacerbations.Work with your health care provider to develop and follow an action plan. This tells you what steps to take when you experience certain symptoms.Contact a health care provider if:  You have a worsening of your regular COPD symptoms.Get help right away if:  You have worsening shortness of breath, even when resting.You have trouble talking.You have severe chest pain.You cough up blood.You have a fever.You have weakness, vomit repeatedly, or faint.You feel confused.You are not able to sleep because of your symptoms.You have trouble doing daily activities.Summary  COPD exacerbations are episodes when breathing symptoms become much worse and require extra treatment above your normal treatment.Exacerbations can be severe and even life threatening. Frequent COPD exacerbations can cause further damage to your lungs.COPD exacerbations are usually triggered by infections such as the flu, colds, and even pneumonia.Treatment for this condition depends on the severity and cause of the symptoms. You may need to be admitted to a hospital for treatment.Quitting smoking is very important to prevent COPD from getting worse and to prevent exacerbations from happening as often.This information is not intended to replace advice given to you by your health care provider. Make sure you discuss any questions you have with your health care provider.    Steps to Quit Smoking     Smoking tobacco can be bad for your health. It can also affect almost every organ in your body. Smoking puts you and people around you at risk for many serious long-lasting (chronic) diseases. Quitting smoking is hard, but it is one of the best things that you can do for your health. It is never too late to quit.  What are the benefits of quitting smoking?  When you quit smoking, you lower your risk for getting serious diseases and conditions. They can include:  Lung cancer or lung disease.Heart disease.Stroke.Heart attack.Not being able to have children (infertility).Weak bones (osteoporosis) and broken bones (fractures).If you have coughing, wheezing, and shortness of breath, those symptoms may get better when you quit. You may also get sick less often. If you are pregnant, quitting smoking can help to lower your chances of having a baby of low birth weight.  What can I do to help me quit smoking?  Talk with your doctor about what can help you quit smoking. Some things you can do (strategies) include:  Quitting smoking totally, instead of slowly cutting back how much you smoke over a period of time.Going to in-person counseling. You are more likely to quit if you go to many counseling sessions.Using resources and support systems, such as:  Online chats with a counselor.Phone quitlines.Printed self-help materials.Support groups or group counseling.Text messaging programs.Mobile phone apps or applications.Taking medicines. Some of these medicines may have nicotine in them. If you are pregnant or breastfeeding, do not take any medicines to quit smoking unless your doctor says it is okay. Talk with your doctor about counseling or other things that can help you.Talk with your doctor about using more than one strategy at the same time, such as taking medicines while you are also going to in-person counseling. This can help make quitting easier.  What things can I do to make it easier to quit?  Quitting smoking might feel very hard at first, but there is a lot that you can do to make it easier. Take these steps:  Talk to your family and friends. Ask them to support and encourage you.Call phone quitlines, reach out to support groups, or work with a counselor.Ask people who smoke to not smoke around you.Avoid places that make you want (trigger) to smoke, such as:  Bars.Parties.Smoke-break areas at work.Spend time with people who do not smoke.Lower the stress in your life. Stress can make you want to smoke. Try these things to help your stress:  Getting regular exercise.Deep-breathing exercises.Yoga.Meditating.Doing a body scan. To do this, close your eyes, focus on one area of your body at a time from head to toe, and notice which parts of your body are tense. Try to relax the muscles in those areas.Download or buy apps on your mobile phone or tablet that can help you stick to your quit plan. There are many free apps, such as QuitGuide from the CDC (Centers for Disease Control and Prevention). You can find more support from smokefree.gov and other websites.This information is not intended to replace advice given to you by your health care provider. Make sure you discuss any questions you have with your health care provider.

## 2019-12-01 NOTE — ED PROVIDER NOTE - NS ED ROS FT
Constitutional: No fever or chills.   Eyes: No pain, blurry vision, or discharge.  ENMT: No hearing changes, pain, discharge or infections. No neck pain or stiffness.  Cardiac: No chest pain, or edema. No chest pain with exertion.  Respiratory: NSee HPI  GI: No nausea, vomiting, diarrhea or abdominal pain.  : No dysuria, frequency or burning.  MS: No myalgia, muscle weakness, joint pain or back pain.  Neuro: No headache or weakness. No LOC.  Skin: No skin rash.   Endocrine: No history of thyroid disease or diabetes.  Except as documented in the HPI, all other systems are negative.

## 2019-12-01 NOTE — ED ADULT NURSE NOTE - OBJECTIVE STATEMENT
pt to ER w/ report of sob today.  Hx of COPD, pt states he ran out of his inhaler medication.  Wheezes noted to lung fields on auscultation, Peak Flow 100.  Pt denies cp/f/c/n/v.  Skin warm and dry.  Will continue to monitor.

## 2019-12-01 NOTE — ED PROVIDER NOTE - PATIENT PORTAL LINK FT
You can access the FollowMyHealth Patient Portal offered by [facility] by registering at the following website: [email or web address]. By joining [employer]’s FollowMyHealth portal, you will also be able to view your health information using other applications (apps) compatible with our system.

## 2019-12-01 NOTE — ED PROVIDER NOTE - OBJECTIVE STATEMENT
Pt w/ PMHx COPD, current smoker p/w cough x 3 weeks w/ clear phlegm, wheezing, SOB. no CP, No known f/c. Denies vaping, drug use. Denies abd pain, n/v/d, dysuria, or rash. Pt reports he just started a coarse of steroids today, stating he thinks he took Prednisone 50 mg.

## 2019-12-01 NOTE — ED PROVIDER NOTE - CLINICAL SUMMARY MEDICAL DECISION MAKING FREE TEXT BOX
URI, COPD exacerbation. R/o PNA. Duonebs. Already started steroids today. Re-eval. Dispo pending clinical status

## 2019-12-01 NOTE — ED PROVIDER NOTE - PROGRESS NOTE DETAILS
Pt feeling better. Improved aeration, peak flow improved to 240 from 100. D/w pt XR results, persistent opacity, close f/u PCP for CT imaging. Counseled pt on importance of smoking cessation. Given persistent cough in the setting of COPD exacerbation, opacity (although low suspicion infectious) will prescribe abx. Pt already started steroid course today. Continue steroids. Close f/u

## 2019-12-01 NOTE — ED PROVIDER NOTE - PHYSICAL EXAMINATION
Constitutional: Well appearing, well nourished, awake, alert, oriented to person, place, time/situation and in no apparent distress.  ENMT: Airway patent. Normal MM  Eyes: Clear bilaterally  Cardiac: Normal rate, regular rhythm.  Heart sounds S1, S2.  No murmurs, rubs or gallops.  Respiratory: Diminished air flow w/ diffuse exp wheezing. No R/R. No increased WOB, tachypnea, hypoxia, or accessory mm use. Pt speaks in full sentences.   Musculoskeletal: Range of motion is not limited  Neuro: Alert and oriented x 3, face symmetric and speech fluent. Strength 5/5 x 4 ext and symmetric, nml gross motor movement, nml gait. No focal deficits noted.  Skin: Skin normal color for race, warm, dry and intact. No evidence of rash.  Psych: Alert and oriented to person, place, time/situation. normal mood and affect. no apparent risk to self or others.

## 2019-12-07 DIAGNOSIS — R06.02 SHORTNESS OF BREATH: ICD-10-CM

## 2019-12-07 DIAGNOSIS — J44.1 CHRONIC OBSTRUCTIVE PULMONARY DISEASE WITH (ACUTE) EXACERBATION: ICD-10-CM

## 2019-12-22 ENCOUNTER — EMERGENCY (EMERGENCY)
Facility: HOSPITAL | Age: 59
LOS: 1 days | Discharge: HOME | End: 2019-12-22
Attending: EMERGENCY MEDICINE | Admitting: EMERGENCY MEDICINE
Payer: MEDICARE

## 2019-12-22 VITALS
OXYGEN SATURATION: 96 % | SYSTOLIC BLOOD PRESSURE: 146 MMHG | HEART RATE: 84 BPM | TEMPERATURE: 97 F | RESPIRATION RATE: 24 BRPM | DIASTOLIC BLOOD PRESSURE: 83 MMHG

## 2019-12-22 DIAGNOSIS — Z98.890 OTHER SPECIFIED POSTPROCEDURAL STATES: Chronic | ICD-10-CM

## 2019-12-22 PROCEDURE — 99284 EMERGENCY DEPT VISIT MOD MDM: CPT

## 2019-12-22 PROCEDURE — 93010 ELECTROCARDIOGRAM REPORT: CPT

## 2019-12-23 VITALS
SYSTOLIC BLOOD PRESSURE: 138 MMHG | DIASTOLIC BLOOD PRESSURE: 69 MMHG | RESPIRATION RATE: 20 BRPM | OXYGEN SATURATION: 97 % | HEART RATE: 90 BPM

## 2019-12-23 PROCEDURE — 71046 X-RAY EXAM CHEST 2 VIEWS: CPT | Mod: 26

## 2019-12-23 PROCEDURE — 94640 AIRWAY INHALATION TREATMENT: CPT

## 2019-12-23 PROCEDURE — 71046 X-RAY EXAM CHEST 2 VIEWS: CPT

## 2019-12-23 RX ORDER — AZITHROMYCIN 500 MG/1
500 TABLET, FILM COATED ORAL ONCE
Refills: 0 | Status: COMPLETED | OUTPATIENT
Start: 2019-12-23 | End: 2019-12-23

## 2019-12-23 RX ORDER — AZITHROMYCIN 500 MG/1
1 TABLET, FILM COATED ORAL
Qty: 4 | Refills: 0
Start: 2019-12-23 | End: 2019-12-26

## 2019-12-23 RX ORDER — IPRATROPIUM/ALBUTEROL SULFATE 18-103MCG
3 AEROSOL WITH ADAPTER (GRAM) INHALATION
Refills: 0 | Status: DISCONTINUED | OUTPATIENT
Start: 2019-12-23 | End: 2019-12-30

## 2019-12-23 RX ORDER — ALBUTEROL 90 UG/1
2 AEROSOL, METERED ORAL
Qty: 1 | Refills: 0
Start: 2019-12-23

## 2019-12-23 RX ORDER — CEFPODOXIME PROXETIL 100 MG
1 TABLET ORAL
Qty: 10 | Refills: 0
Start: 2019-12-23 | End: 2019-12-27

## 2019-12-23 RX ORDER — MONTELUKAST 4 MG/1
1 TABLET, CHEWABLE ORAL
Qty: 30 | Refills: 0
Start: 2019-12-23 | End: 2020-01-21

## 2019-12-23 RX ORDER — CEFPODOXIME PROXETIL 100 MG
200 TABLET ORAL ONCE
Refills: 0 | Status: COMPLETED | OUTPATIENT
Start: 2019-12-23 | End: 2019-12-23

## 2019-12-23 RX ORDER — ALBUTEROL 90 UG/1
3 AEROSOL, METERED ORAL
Qty: 90 | Refills: 0
Start: 2019-12-23

## 2019-12-23 RX ADMIN — Medication 200 MILLIGRAM(S): at 01:02

## 2019-12-23 RX ADMIN — AZITHROMYCIN 500 MILLIGRAM(S): 500 TABLET, FILM COATED ORAL at 01:02

## 2019-12-23 RX ADMIN — Medication 50 MILLIGRAM(S): at 00:28

## 2019-12-23 RX ADMIN — Medication 3 MILLILITER(S): at 00:28

## 2019-12-23 NOTE — ED PROVIDER NOTE - NSFOLLOWUPINSTRUCTIONS_ED_ALL_ED_FT
PLEASE FOLLOW-UP WITH YOUR PCP IN 2-3 DAYS. PLEASE CONTINUE ANTIBIOTICS AS PRESCRIBED. PLEASE RETURN IF FEVER/CHILLS, CHEST PAIN, WORSENING SHORTNESS OF BREATH, OTHER CONCERNING SYMPTOMS.        COPD (CHRONIC OBSTRUCTIVE PULMONARY DISEASE) - Discharge Care     COPD (Chronic Obstructive Pulmonary Disease)    WHAT YOU NEED TO KNOW:    COPD (chronic obstructive pulmonary disease) can get worse quickly. Your healthcare providers will help you create a care plan to use at home. The plan will give directions on how to prevent or manage shortness of breath. Your family members or anyone who cares for you will also get directions to help you.Inspiration and Expiration         DISCHARGE INSTRUCTIONS:    Call your local emergency number (911 in the [place]) if:     You feel lightheaded, short of breath, and have chest pain.        Call your doctor if:     You are confused, dizzy, or feel faint.      Your arm or leg feels warm, tender, and painful. It may look swollen and red.      You cough up blood.      You have increased shortness of breath.      You need more medicine than usual to control your symptoms.      You are coughing or wheezing more than usual.      You are coughing up more mucus, or it has a new color or odor.      You gain more than 3 pounds in a week.      You have a fever, a runny or stuffy nose, and a sore throat, or other cold or flu symptoms.      Your skin, lips, or nails start to turn blue.      You have swelling in your legs or ankles.      You are very tired or weak for more than a day.      You notice changes in your mood, or changes in your ability to think or concentrate.      You have questions or concerns about your condition or care.    Medicines:     Short-acting bronchodilators may be called rescue inhalers or relievers. They relieve sudden, severe symptoms and start to work right away.      Long-acting bronchodilators may be called controllers or maintenance medicine. This medicine helps open the airway over time, and is used to decrease and prevent breathing problems. Long-acting bronchodilators should not be used to treat sudden, severe symptoms, such as trouble breathing.      Take your medicine as directed. Contact your healthcare provider if you think your medicine is not helping or if you have side effects. Tell him or her if you are allergic to any medicine. Keep a list of the medicines, vitamins, and herbs you take. Include the amounts, and when and why you take them. Bring the list or the pill bottles to follow-up visits. Carry your medicine list with you in case of an emergency.    Help make breathing easier:     Use pursed-lip breathing any time you feel short of breath. Take a deep breath in through your nose. Slowly breathe out through your mouth with your lips pursed. Try to take 2 times as long to breathe out as to breathe in. This helps you get rid of as much air from your lungs as possible. You can also practice this breathing pattern while you bend, lift, climb stairs, or exercise. It slows down your breathing and helps move more air in and out of your lungs.Breathe in Breathe out           Avoid anything that makes your symptoms worse. Stay out of high altitudes and places with high humidity. Stay inside, or cover your mouth and nose with a scarf when you are outside in cold weather. Stay inside on days when air pollution or pollen counts are high. Do not use aerosol sprays such as deodorant, bug spray, and hairspray.      Exercise daily. Exercise for at least 20 minutes each day to help increase your energy and decrease shortness of breath. Talk to your healthcare provider about the best exercise plan for you.Walking for Exercise         Manage COPD and help prevent exacerbations: COPD is a serious condition that gets worse over time. A COPD exacerbation means your symptoms suddenly get worse. It is important to prevent exacerbations. An exacerbation can cause more lung damage. COPD cannot be cured, but you can take action to feel better and prevent exacerbations:     Do not smoke.     If you currently smoke, quitting is the best way to keep COPD from getting worse. Nicotine and other substances can cause lung irritation or damage and make it harder for you to breathe. Do not use e-cigarettes or smokeless tobacco. They still contain nicotine. It may be hard to quit smoking. Your healthcare provider can help you find resources if you need help to quit. For support and more information:   [email or web address]  Phone: 1-388.812.5846  Web Address: [email or web address]          Avoid secondhand smoke. This is smoke another person exhales. Even if you have never smoked or have quit, it is important to avoid secondhand smoke. This smoke can also cause lung damage or trigger an exacerbation.      Go to pulmonary rehabilitation (rehab) if directed. Rehab is a program run by specialists who help you learn to manage COPD. Examples include a pulmonologist (lung specialist), dietitian, or exercise therapist. The specialists will help you make a plan to avoid triggers that cause an exacerbation.      Take your medicines as directed. Refill your medicines before you are out so that you do not miss a dose. Ask your healthcare provider if you have any questions on how to take your medicines.      Protect yourself from germs. Germs can get into your lungs and cause an infection. An infection in your lungs can create more mucus and make it harder to breathe. An infection can also create swelling in your airway and prevent air from getting in. You can decrease your risk for infection by doing the following:   Wash your hands often with soap and water. Carry germ-killing gel with you. You can use the gel to clean your hands when soap and water are not available. Handwashing           Do not touch your eyes, nose, or mouth unless you have washed your hands first.      Always cover your mouth when you cough. Cough into a tissue or your shirtsleeve so you do not spread germs from your hands.      Try to avoid people who have a cold or the flu. If you are sick, stay away from others as much as possible.      Drink more liquid. Liquid will help to keep your air passages moist and help you cough up mucus. Ask how much liquid to drink each day and which liquids are best for you.      Ask about vaccines. Influenza (the flu) and pneumonia can become life-threatening for a person who has COPD. Get a flu vaccine each year as soon as it becomes available. The pneumonia vaccine may be given every 5 years, or as directed. Ask about other vaccines you may need and when to get them.    Make decisions about your choices for future treatment: Ask for information about advanced medical directives and living cintron. These documents help you write down your choices for treatment and for end-of-life care, such as hospice. It is best to complete them when you feel well and can think clearly about your wishes. The information can then be kept for future use if you are in the hospital or become very ill.    Follow up with your doctor as directed: You may need more tests. Your doctor may refer you to a specialist, depending on your needs. Some specialist services may be available through your pulmonary rehab program. Your doctor may also refer you to home health care or palliative (comfort) care. Write down your questions so you remember to ask them during your visits.       © Copyright [employer] 2019       back to top                      © Copyright [employer] 2019

## 2019-12-23 NOTE — ED PROVIDER NOTE - PATIENT PORTAL LINK FT
You can access the FollowMyHealth Patient Portal offered by [facility] by registering at the following website: [email or web address]. By joining [employer]’s FollowMyHealth portal, you will also be able to view your health information using other applications (apps) compatible with our system.

## 2019-12-23 NOTE — ED ADULT NURSE NOTE - OBJECTIVE STATEMENT
pt c/o of worsening SOB and difficulty and increased work of  breathing especially with exertion. He denies any chest pain at this time. pt endorses recent hospital admission for PNA.

## 2019-12-23 NOTE — ED PROVIDER NOTE - CLINICAL SUMMARY MEDICAL DECISION MAKING FREE TEXT BOX
avss. nontoxic. NAD. no systemic sx. no active cp. no acute resp distress. resolved sx s/p duoneb/steroids. cxr w/o acute focal consol vs ptx vs pulm edema vs widened mediastinum. no indication for labs at this time. will dc home w/ outpatient pcp fu, refilled Rx, strict return precautions. pt agrees w/ plan. questions answered. avss. nontoxic. NAD. no systemic sx. no active cp. no acute resp distress. resolved sx s/p duoneb/steroids. cxr w/o acute focal consol vs ptx vs pulm edema vs widened mediastinum. no indication for labs at this time. pt requesting to go home. will dc home w/ already scheduled outpatient pcp fu on friday 12/27/19, refilled Rx, strict return precautions. pt agrees w/ plan. questions answered.

## 2019-12-23 NOTE — ED PROVIDER NOTE - OBJECTIVE STATEMENT
59M daily smoker, bipolar/depression, copd/emphysema, recently hospitalized for pna/copd exacerbation discharged on azithromycin/cefpodoxime but problem w/ Rx order so never got Rx (12/11/19-12/18/19, [facility]), now c/o 2-3d progressively worsening exertional sob/wheezing since restarting smoking and not getting a refill of his ventolin/symbicort inhalers. persistent chronic cough w/o change in sputum. no fever/chills, no cp, no abd pain/n/v, no orthopnea, no leg pain/swelling/rash, no etoh/ivdu, no phx acs/mi/cad.

## 2019-12-23 NOTE — ED PROVIDER NOTE - PHYSICAL EXAMINATION
CONST: nontoxic NAD speaking in full sentences walking around ED comfortably  HEAD: atraumatic  EYES: conjunctivae clear  ENT: mmm  NECK: supple, no jvd  CARD: rrr no murmurs  CHEST: bl end-exp wheezing, no stridor/retractions/tripoding  ABD: soft, nd, nttp, no rebound/guarding  EXT: FROM, symmetric distal pulses intact  SKIN: warm, dry, no rash, no pedal edema/pain/rash, cap refill <2sec  NEURO: a+ox3, 5/5 strength x4, gross sensation intact x4, normal gait

## 2019-12-23 NOTE — ED ADULT NURSE NOTE - CAS EDN DISCHARGE ASSESSMENT
Dr Boxer - Allergist  [phone], Psych Dr Cook [phone] Alert and oriented to person, place and time

## 2019-12-28 DIAGNOSIS — Z79.52 LONG TERM (CURRENT) USE OF SYSTEMIC STEROIDS: ICD-10-CM

## 2019-12-28 DIAGNOSIS — J44.1 CHRONIC OBSTRUCTIVE PULMONARY DISEASE WITH (ACUTE) EXACERBATION: ICD-10-CM

## 2019-12-28 DIAGNOSIS — Z79.899 OTHER LONG TERM (CURRENT) DRUG THERAPY: ICD-10-CM

## 2019-12-28 DIAGNOSIS — F17.200 NICOTINE DEPENDENCE, UNSPECIFIED, UNCOMPLICATED: ICD-10-CM

## 2019-12-28 DIAGNOSIS — R06.02 SHORTNESS OF BREATH: ICD-10-CM

## 2020-01-08 ENCOUNTER — EMERGENCY (EMERGENCY)
Facility: HOSPITAL | Age: 60
LOS: 1 days | Discharge: HOME | End: 2020-01-08
Attending: EMERGENCY MEDICINE | Admitting: EMERGENCY MEDICINE
Payer: MEDICARE

## 2020-01-08 VITALS
TEMPERATURE: 97 F | WEIGHT: 220.02 LBS | HEIGHT: 72 IN | HEART RATE: 92 BPM | OXYGEN SATURATION: 96 % | DIASTOLIC BLOOD PRESSURE: 85 MMHG | RESPIRATION RATE: 19 BRPM | SYSTOLIC BLOOD PRESSURE: 124 MMHG

## 2020-01-08 VITALS
DIASTOLIC BLOOD PRESSURE: 77 MMHG | RESPIRATION RATE: 18 BRPM | OXYGEN SATURATION: 100 % | SYSTOLIC BLOOD PRESSURE: 111 MMHG | TEMPERATURE: 98 F | HEART RATE: 82 BPM

## 2020-01-08 DIAGNOSIS — Z98.890 OTHER SPECIFIED POSTPROCEDURAL STATES: Chronic | ICD-10-CM

## 2020-01-08 DIAGNOSIS — Z90.89 ACQUIRED ABSENCE OF OTHER ORGANS: Chronic | ICD-10-CM

## 2020-01-08 LAB
ALBUMIN SERPL ELPH-MCNC: 4.4 G/DL — SIGNIFICANT CHANGE UP (ref 3.3–5)
ALP SERPL-CCNC: 62 U/L — SIGNIFICANT CHANGE UP (ref 40–120)
ALT FLD-CCNC: 19 U/L — SIGNIFICANT CHANGE UP (ref 10–45)
ANION GAP SERPL CALC-SCNC: 15 MMOL/L — SIGNIFICANT CHANGE UP (ref 5–17)
APTT BLD: 21.4 SEC — LOW (ref 27.5–36.3)
AST SERPL-CCNC: 19 U/L — SIGNIFICANT CHANGE UP (ref 10–40)
BASOPHILS # BLD AUTO: 0.03 K/UL — SIGNIFICANT CHANGE UP (ref 0–0.2)
BASOPHILS NFR BLD AUTO: 0.4 % — SIGNIFICANT CHANGE UP (ref 0–2)
BILIRUB SERPL-MCNC: 0.2 MG/DL — SIGNIFICANT CHANGE UP (ref 0.2–1.2)
BUN SERPL-MCNC: 14 MG/DL — SIGNIFICANT CHANGE UP (ref 7–23)
CALCIUM SERPL-MCNC: 8.8 MG/DL — SIGNIFICANT CHANGE UP (ref 8.4–10.5)
CHLORIDE SERPL-SCNC: 98 MMOL/L — SIGNIFICANT CHANGE UP (ref 96–108)
CO2 SERPL-SCNC: 25 MMOL/L — SIGNIFICANT CHANGE UP (ref 22–31)
CREAT SERPL-MCNC: 0.84 MG/DL — SIGNIFICANT CHANGE UP (ref 0.5–1.3)
EOSINOPHIL # BLD AUTO: 0.14 K/UL — SIGNIFICANT CHANGE UP (ref 0–0.5)
EOSINOPHIL NFR BLD AUTO: 1.9 % — SIGNIFICANT CHANGE UP (ref 0–6)
GLUCOSE SERPL-MCNC: 144 MG/DL — HIGH (ref 70–99)
HCT VFR BLD CALC: 41.2 % — SIGNIFICANT CHANGE UP (ref 39–50)
HGB BLD-MCNC: 13.2 G/DL — SIGNIFICANT CHANGE UP (ref 13–17)
IMM GRANULOCYTES NFR BLD AUTO: 0.5 % — SIGNIFICANT CHANGE UP (ref 0–1.5)
INR BLD: 0.98 — SIGNIFICANT CHANGE UP (ref 0.88–1.16)
LYMPHOCYTES # BLD AUTO: 2.82 K/UL — SIGNIFICANT CHANGE UP (ref 1–3.3)
LYMPHOCYTES # BLD AUTO: 38.3 % — SIGNIFICANT CHANGE UP (ref 13–44)
MCHC RBC-ENTMCNC: 30.4 PG — SIGNIFICANT CHANGE UP (ref 27–34)
MCHC RBC-ENTMCNC: 32 GM/DL — SIGNIFICANT CHANGE UP (ref 32–36)
MCV RBC AUTO: 94.9 FL — SIGNIFICANT CHANGE UP (ref 80–100)
MONOCYTES # BLD AUTO: 0.69 K/UL — SIGNIFICANT CHANGE UP (ref 0–0.9)
MONOCYTES NFR BLD AUTO: 9.4 % — SIGNIFICANT CHANGE UP (ref 2–14)
NEUTROPHILS # BLD AUTO: 3.65 K/UL — SIGNIFICANT CHANGE UP (ref 1.8–7.4)
NEUTROPHILS NFR BLD AUTO: 49.5 % — SIGNIFICANT CHANGE UP (ref 43–77)
NRBC # BLD: 0 /100 WBCS — SIGNIFICANT CHANGE UP (ref 0–0)
PLATELET # BLD AUTO: 283 K/UL — SIGNIFICANT CHANGE UP (ref 150–400)
POTASSIUM SERPL-MCNC: 4 MMOL/L — SIGNIFICANT CHANGE UP (ref 3.5–5.3)
POTASSIUM SERPL-SCNC: 4 MMOL/L — SIGNIFICANT CHANGE UP (ref 3.5–5.3)
PROT SERPL-MCNC: 7 G/DL — SIGNIFICANT CHANGE UP (ref 6–8.3)
PROTHROM AB SERPL-ACNC: 11.2 SEC — SIGNIFICANT CHANGE UP (ref 10–12.9)
RBC # BLD: 4.34 M/UL — SIGNIFICANT CHANGE UP (ref 4.2–5.8)
RBC # FLD: 14.4 % — SIGNIFICANT CHANGE UP (ref 10.3–14.5)
SODIUM SERPL-SCNC: 138 MMOL/L — SIGNIFICANT CHANGE UP (ref 135–145)
WBC # BLD: 7.37 K/UL — SIGNIFICANT CHANGE UP (ref 3.8–10.5)
WBC # FLD AUTO: 7.37 K/UL — SIGNIFICANT CHANGE UP (ref 3.8–10.5)

## 2020-01-08 PROCEDURE — 94640 AIRWAY INHALATION TREATMENT: CPT

## 2020-01-08 PROCEDURE — 99285 EMERGENCY DEPT VISIT HI MDM: CPT | Mod: 25

## 2020-01-08 PROCEDURE — 80053 COMPREHEN METABOLIC PANEL: CPT

## 2020-01-08 PROCEDURE — 99284 EMERGENCY DEPT VISIT MOD MDM: CPT

## 2020-01-08 PROCEDURE — 93010 ELECTROCARDIOGRAM REPORT: CPT

## 2020-01-08 PROCEDURE — 85730 THROMBOPLASTIN TIME PARTIAL: CPT

## 2020-01-08 PROCEDURE — 85025 COMPLETE CBC W/AUTO DIFF WBC: CPT

## 2020-01-08 PROCEDURE — 93005 ELECTROCARDIOGRAM TRACING: CPT

## 2020-01-08 PROCEDURE — 85610 PROTHROMBIN TIME: CPT

## 2020-01-08 PROCEDURE — 36415 COLL VENOUS BLD VENIPUNCTURE: CPT

## 2020-01-08 RX ORDER — IPRATROPIUM/ALBUTEROL SULFATE 18-103MCG
3 AEROSOL WITH ADAPTER (GRAM) INHALATION ONCE
Refills: 0 | Status: COMPLETED | OUTPATIENT
Start: 2020-01-08 | End: 2020-01-08

## 2020-01-08 RX ADMIN — Medication 3 MILLILITER(S): at 05:33

## 2020-01-08 RX ADMIN — Medication 40 MILLIGRAM(S): at 05:33

## 2020-01-08 NOTE — ED PROVIDER NOTE - OBJECTIVE STATEMENT
58 y/o M PMHx COPD (no home 02), BPD, depression, COPD, History of EtOH abuse presenting to [facility] ED with complaints of acute onset shortness of breath overnight. Patient states he became acutely short of breath over night prompting him to proceed to the ED for further evaluation. Patient states he was sitting at home when he became short of breath. Patient with multiple prior ED visits with similar complaints with poor outpatient follow up, missed last appt in Dec 2019 with PCP. He states he has an upcoming appt with his PCP 1/14/2020. Of note, patient notes he drank 1 pint of janeth prior to his arrival as he was feeling anxious when he became short of breath. He reports a remote history of EtOH use for which he has gone to rehab for. Last drink prior to this evening was 2 months prior. ROS otherwise negative for fevers, chills, NS, chest pain, cough, nausea/vomiting, changes in urinary and bowel habits. Remainder of ROS negative.

## 2020-01-08 NOTE — ED ADULT NURSE NOTE - OBJECTIVE STATEMENT
Patient aox3 and ambulatory upon arrival. Patient c/o SOB that started [id number] ago. Patient c/o productive cough and sinus symptoms x2weeks. Patient denies CP, dizziness, weakness, N/V/D. Patient admits to drinking "a pint" of alcohol tonight. Patient states "I hadn't been drinking in a long time, but I did tonight." Patient smokes 1 pack of cigarettes every day. Patient has bilateral wheezes present. Patient is more comfortable at rest, but dyspneic upon exertion. Patient denies any pain at this time. Patient is drowsy and sleepy, but awakes to verbal stimuli easily. Per patient, PMH: COPD, asthma

## 2020-01-08 NOTE — ED PROVIDER NOTE - PHYSICAL EXAMINATION
Constitutional: Obese  male, flat affect, resting comfortably in bed; NAD  Head: NC/AT  Eyes: PERRL, EOMI, anicteric sclera  ENT: no nasal discharge; uvula midline, no oropharyngeal erythema or exudates; MMM  Neck: supple; no JVD or thyromegaly  Respiratory: diffuse expiratory wheezing, non-labored breathing, on supplemental 02 with NC (3L), no accessory muscle use  Cardiac: +S1/S2; RRR; no M/R/G; PMI non-displaced  Gastrointestinal: abdomen soft, NT/ND; no rebound or guarding; +BSx4  Genitourinary: normal external genitalia  Back: spine midline, no bony tenderness or step-offs; no CVAT B/L  Extremities: WWP, no clubbing or cyanosis; no peripheral edema  Musculoskeletal: NROM x4; no joint swelling, tenderness or erythema  Vascular: 2+ radial, femoral, DP/PT pulses B/L  Dermatologic: skin warm, dry and intact; no rashes, wounds, or scars  Lymphatic: no submandibular or cervical LAD  Neurologic: AAOx3; CNII-XII grossly intact; no focal deficits  Psychiatric: affect and characteristics of appearance, verbalizations, behaviors are appropriate

## 2020-01-08 NOTE — ED PROVIDER NOTE - PMH
Bipolar disease, chronic    COPD (chronic obstructive pulmonary disease)    Depression    Emphysema with chronic bronchitis    History of ETOH abuse

## 2020-01-08 NOTE — ED ADULT NURSE NOTE - NSIMPLEMENTINTERV_GEN_ALL_ED
Implemented All Fall Risk Interventions:  [place] to call system. Call bell, personal items and telephone within reach. Instruct patient to call for assistance. Room bathroom lighting operational. Non-slip footwear when patient is off stretcher. Physically safe environment: no spills, clutter or unnecessary equipment. Stretcher in lowest position, wheels locked, appropriate side rails in place. Provide visual cue, wrist band, yellow gown, etc. Monitor gait and stability. Monitor for mental status changes and reorient to person, place, and time. Review medications for side effects contributing to fall risk. Reinforce activity limits and safety measures with patient and family.

## 2020-01-08 NOTE — ED PROVIDER NOTE - NSFOLLOWUPINSTRUCTIONS_ED_ALL_ED_FT
Chronic Obstructive Pulmonary Disease     Chronic obstructive pulmonary disease (COPD) is a long-term (chronic) condition that affects the lungs. COPD is a general term that can be used to describe many different lung problems that cause lung swelling (inflammation) and limit airflow, including chronic bronchitis and emphysema. If you have COPD, your lung function will probably never return to normal. In most cases, it gets worse over time. However, there are steps you can take to slow the progression of the disease and improve your quality of life.    What are the causes?  This condition may be caused by:  Smoking. This is the most common cause. Certain genes passed down through families. What increases the risk?    The following factors may make you more likely to develop this condition:  Secondhand smoke from cigarettes, pipes, or cigars. Exposure to chemicals and other irritants such as fumes and dust in the work environment. Chronic lung conditions or infections. What are the signs or symptoms?    Symptoms of this condition include:  Shortness of breath, especially during physical activity. Chronic cough with a large amount of thick mucus. Sometimes the cough may not have any mucus (dry cough).Wheezing. Rapid breaths. Gray or bluish discoloration (cyanosis) of the skin, especially in your fingers, toes, or lips. Feeling tired (fatigue).Weight loss. Chest tightness. Frequent infections. Episodes when breathing symptoms become much worse (exacerbations).Swelling in the ankles, feet, or legs. This may occur in later stages of the disease. How is this diagnosed?    This condition is diagnosed based on:  Your medical history. A physical exam. You may also have tests, including:  Lung (pulmonary) function tests. This may include a spirometry test, which measures your ability to exhale properly. Chest X-ray. CT scan. Blood tests.    How is this treated?    This condition may be treated with:  Medicines. These may include inhaled rescue medicines to treat acute exacerbations as well as long-term, or maintenance, medicines to prevent flare-ups of COPD. You have been prescribed to take an oral steroid to help decrease the inflammation in your lungs which will help you breathe better. A prescription for this medication has been sent to your pharmacy. Please take this medication as instructed.  Bronchodilators help treat COPD by dilating the airways to allow increased airflow and make your breathing more comfortable. Steroids can reduce airway inflammation and help prevent exacerbations. Smoking cessation. If you smoke, your health care provider may ask you to quit, and may also recommend therapy or replacement products to help you quit.   Pulmonary rehabilitation. This may involve working with a team of health care providers and specialists, such as respiratory, occupational, and physical therapists.   Exercise and physical activity. These are beneficial for nearly all people with COPD.   Nutrition therapy to gain weight, if you are underweight.   Oxygen. Supplemental oxygen therapy is only helpful if you have a low oxygen level in your blood (hypoxemia).Lung surgery or transplant.   Palliative care. This is to help people with COPD feel comfortable when treatment is no longer working.     Follow these instructions at home:  Medicines     Take over-the-counter and prescription medicines (inhaled or pills) only as told by your health care provider. Talk to your health care provider before taking any cough or allergy medicines. You may need to avoid certain medicines that dry out your airways.     Lifestyle     If you are a smoker, the most important thing that you can do is to stop smoking. Do not use any products that contain nicotine or tobacco, such as cigarettes and e-cigarettes. If you need help quitting, ask your health care provider. Continuing to smoke will cause the disease to progress faster. Avoid exposure to things that irritate your lungs, such as smoke, chemicals, and fumes. Stay active, but balance activity with periods of rest. Exercise and physical activity will help you maintain your ability to do things you want to do. Learn and use relaxation techniques to manage stress and to control your breathing. Get the right amount of sleep and get quality sleep. Most adults need 7 or more hours per night. Eat healthy foods. Eating smaller, more frequent meals and resting before meals may help you maintain your strength.    Controlled breathing. Learn and use controlled breathing techniques as directed by your health care provider. Controlled breathing techniques include:  Pursed lip breathing. Start by breathing in (inhaling) through your nose for 1 second. Then, purse your lips as if you were going to whistle and breathe out (exhale) through the pursed lips for 2 seconds. Diaphragmatic breathing. Start by putting one hand on your abdomen just above your waist. Inhale slowly through your nose. The hand on your abdomen should move out. Then purse your lips and exhale slowly. You should be able to feel the hand on your abdomen moving in as you exhale.     Controlled coughing. Learn and use controlled coughing to clear mucus from your lungs. Controlled coughing is a series of short, progressive coughs. The steps of controlled coughing are:  Lean your head slightly forward.  Breathe in deeply using diaphragmatic breathing.  Try to hold your breath for 3 seconds.  Keep your mouth slightly open while coughing twice.  Spit any mucus out into a tissue.  Rest and repeat the steps once or twice as needed.    General instructions    Make sure you receive all the vaccines that your health care provider recommends, especially the pneumococcal and influenza vaccines. Preventing infection and hospitalization is very important when you have COPD. Use oxygen therapy and pulmonary rehabilitation if directed to by your health care provider. If you require home oxygen therapy, ask your health care provider whether you should purchase a pulse oximeter to measure your oxygen level at home. Work with your health care provider to develop a COPD action plan. This will help you know what steps to take if your condition gets worse. Keep other chronic health conditions under control as told by your health care provider. Avoid extreme temperature and humidity changes. Avoid contact with people who have an illness that spreads from person to person (is contagious), such as viral infections or pneumonia. Keep all follow-up visits as told by your health care provider. This is important.    Contact a health care provider if:  You are coughing up more mucus than usual. There is a change in the color or thickness of your mucus. Your breathing is more labored than usual. Your breathing is faster than usual. You have difficulty sleeping. You need to use your rescue medicines or inhalers more often than expected. You have trouble doing routine activities such as getting dressed or walking around the house. Get help right away if:  You have shortness of breath while you are resting. You have shortness of breath that prevents you from:  Being able to talk. Performing your usual physical activities. You have chest pain lasting longer than 5 minutes. Your skin color is more blue (cyanotic) than usual. You measure low oxygen saturations for longer than 5 minutes with a pulse oximeter. You have a fever. You feel too tired to breathe normally.     Summary  Chronic obstructive pulmonary disease (COPD) is a long-term (chronic) condition that affects the lungs. Your lung function will probably never return to normal. In most cases, it gets worse over time. However, there are steps you can take to slow the progression of the disease and improve your quality of life. Treatment for COPD may include taking medicines, quitting smoking, pulmonary rehabilitation, and changes to diet and exercise. As the disease progresses, you may need oxygen therapy, a lung transplant, or palliative care. To help manage your condition, do not smoke, avoid exposure to things that irritate your lungs, stay up to date on all vaccines, and follow your health care provider's instructions for taking medicines. This information is not intended to replace advice given to you by your health care provider. Make sure you discuss any questions you have with your health care provider.

## 2020-01-08 NOTE — ED PROVIDER NOTE - PATIENT PORTAL LINK FT
You can access the FollowMyHealth Patient Portal offered by [facility] by registering at the following website: [email or web address]. By joining [employer]’s FollowMyHealth portal, you will also be able to view your health information using other applications (apps) compatible with our system.

## 2020-01-08 NOTE — ED PROVIDER NOTE - CLINICAL SUMMARY MEDICAL DECISION MAKING FREE TEXT BOX
60 y/o M PMHx COPD, current daily smoker, BPD, COPD, History of EtoH abuse presenting with acute onset shortness of breath x1 day. Labs sent, low suspicion for infectious etiology, shortness of breath likely 2/2 medication non-compliance and poor outpatient follow up for COPD. Will administer Duoneb x1 and Prednisone 40 mg x1. Noted to be 92% on RA, subsequently placed on NC, to be discontinued. AA0x3 however intoxicated, will continue to monitor pending clinical improvement. 60 y/o M PMHx COPD, current daily smoker, BPD, COPD, History of EtoH abuse presenting with acute onset shortness of breath x1 day. Labs sent, low suspicion for infectious etiology, shortness of breath likely 2/2 medication non-compliance and poor outpatient follow up for COPD. Will administer Duoneb x1 and Prednisone 40 mg x1. Noted to be 92% on RA, subsequently placed on NC, to be discontinued. AA0x3 however intoxicated, clinically improved s/p duoneb x1 and prednisone.

## 2020-01-08 NOTE — ED ADULT TRIAGE NOTE - CHIEF COMPLAINT QUOTE
Pt states "I have trouble breathing since tonight. I have a hx pf COPD and I smoke. I was also drank 1 pint of Cher[place]".

## 2020-01-08 NOTE — ED PROVIDER NOTE - ATTENDING CONTRIBUTION TO CARE
Attending Statement: I have personally performed a face to face diagnostic evaluation on this patient. I have reviewed the resident CP note and agree with the history, exam and plan of care, except as noted.     Attending Contribution to Care:  60 y/o M PMHx COPD, current daily smoker, BPD, COPD, History of EtoH abuse presenting with acute onset shortness of breath x1 day. Labs sent, low suspicion for infectious etiology, shortness of breath likely 2/2 medication non-compliance and poor outpatient follow up for COPD. Will administer Duoneb x1 and Prednisone 40 mg x1. Pt observed until clinically sober, wob improved. Stable for DC.

## 2020-01-12 ENCOUNTER — EMERGENCY (EMERGENCY)
Facility: HOSPITAL | Age: 60
LOS: 1 days | Discharge: HOME | End: 2020-01-12
Attending: EMERGENCY MEDICINE | Admitting: EMERGENCY MEDICINE
Payer: MEDICARE

## 2020-01-12 VITALS
SYSTOLIC BLOOD PRESSURE: 132 MMHG | TEMPERATURE: 98 F | DIASTOLIC BLOOD PRESSURE: 84 MMHG | RESPIRATION RATE: 18 BRPM | HEIGHT: 72 IN | HEART RATE: 61 BPM | WEIGHT: 210.98 LBS | OXYGEN SATURATION: 94 %

## 2020-01-12 DIAGNOSIS — Z90.89 ACQUIRED ABSENCE OF OTHER ORGANS: Chronic | ICD-10-CM

## 2020-01-12 DIAGNOSIS — Z98.890 OTHER SPECIFIED POSTPROCEDURAL STATES: Chronic | ICD-10-CM

## 2020-01-12 PROCEDURE — 93010 ELECTROCARDIOGRAM REPORT: CPT

## 2020-01-12 PROCEDURE — 99284 EMERGENCY DEPT VISIT MOD MDM: CPT

## 2020-01-12 NOTE — ED ADULT TRIAGE NOTE - CHIEF COMPLAINT QUOTE
Patient states having trouble breathing starting tonight, w/ cough , states has a slight cold, states not the first time having this and coming here for same symptoms. Took Singulair and another medication for breathing which helped .  States still smoking.  No difficulty speaking in long sentences.  Denies chest pain.  PMHx. COPD, Chronic bronchitis, Asthma, SMokes 1 pack daily

## 2020-01-13 VITALS
SYSTOLIC BLOOD PRESSURE: 113 MMHG | DIASTOLIC BLOOD PRESSURE: 73 MMHG | OXYGEN SATURATION: 96 % | RESPIRATION RATE: 18 BRPM | TEMPERATURE: 98 F | HEART RATE: 72 BPM

## 2020-01-13 PROBLEM — F10.11 ALCOHOL ABUSE, IN REMISSION: Chronic | Status: ACTIVE | Noted: 2020-01-08

## 2020-01-13 PROCEDURE — 99285 EMERGENCY DEPT VISIT HI MDM: CPT | Mod: 25

## 2020-01-13 PROCEDURE — 93005 ELECTROCARDIOGRAM TRACING: CPT

## 2020-01-13 PROCEDURE — 94640 AIRWAY INHALATION TREATMENT: CPT

## 2020-01-13 RX ORDER — ALBUTEROL 90 UG/1
2 AEROSOL, METERED ORAL
Qty: 1 | Refills: 0
Start: 2020-01-13

## 2020-01-13 RX ORDER — HYDROXYZINE HCL 10 MG
50 TABLET ORAL ONCE
Refills: 0 | Status: COMPLETED | OUTPATIENT
Start: 2020-01-13 | End: 2020-01-13

## 2020-01-13 RX ORDER — HYDROXYZINE HCL 10 MG
1 TABLET ORAL
Qty: 10 | Refills: 0
Start: 2020-01-13

## 2020-01-13 RX ORDER — IPRATROPIUM/ALBUTEROL SULFATE 18-103MCG
3 AEROSOL WITH ADAPTER (GRAM) INHALATION
Refills: 0 | Status: COMPLETED | OUTPATIENT
Start: 2020-01-13 | End: 2020-01-13

## 2020-01-13 RX ADMIN — Medication 50 MILLIGRAM(S): at 00:56

## 2020-01-13 RX ADMIN — Medication 3 MILLILITER(S): at 00:18

## 2020-01-13 RX ADMIN — Medication 3 MILLILITER(S): at 00:53

## 2020-01-13 RX ADMIN — Medication 60 MILLIGRAM(S): at 00:19

## 2020-01-13 RX ADMIN — Medication 3 MILLILITER(S): at 00:25

## 2020-01-13 NOTE — ED ADULT NURSE NOTE - NS ED PATIENT SAFETY CONCERN
-converted in the ER after push of IV dilt  -[id number] score is 0 or 1  -Baby ASA daily  -controlled on metoprolol 25 mg BID  -ECHO pending   No

## 2020-01-13 NOTE — ED ADULT NURSE NOTE - CHPI ED NUR SYMPTOMS NEG
no chills/no edema/no body aches/no headache/no cough/no diaphoresis/no wheezing/no chest pain/no fever/no hemoptysis

## 2020-01-13 NOTE — ED PROVIDER NOTE - OBJECTIVE STATEMENT
59 M pmh COPD, chronic bronchitis, etoh abuse, + tobacco use p/w sob since this evening.  States he was at [facility] in [place] using bathroom, left to smoke a cigarette then became SOB which triggered his anxiety and caused increased SOB.  states his nebulizer machine is broken, ran out of albuterol pump yesterday and also ran out of hydroxyzine a few days ago so took train here because "I like this hospital best."  Has appointment with PMD Tuesday to get rx for new nebulizer machine.  Also reports chronic cough clear sputum, no change.  Denies fever, chills, dizziness, chest pain, palpitations, abd pain, nvd, leg pain/swelling, travel, trauma

## 2020-01-13 NOTE — ED ADULT NURSE NOTE - NSIMPLEMENTINTERV_GEN_ALL_ED
Implemented All Fall with Harm Risk Interventions:  [place] to call system. Call bell, personal items and telephone within reach. Instruct patient to call for assistance. Room bathroom lighting operational. Non-slip footwear when patient is off stretcher. Physically safe environment: no spills, clutter or unnecessary equipment. Stretcher in lowest position, wheels locked, appropriate side rails in place. Provide visual cue, wrist band, yellow gown, etc. Monitor gait and stability. Monitor for mental status changes and reorient to person, place, and time. Review medications for side effects contributing to fall risk. Reinforce activity limits and safety measures with patient and family. Provide visual clues: red socks.

## 2020-01-13 NOTE — ED PROVIDER NOTE - CLINICAL SUMMARY MEDICAL DECISION MAKING FREE TEXT BOX
59 M pmh COPD, chronic bronchitis, etoh abuse, + tobacco use p/w sob since this evening. + wheezing on exam, improved with nebs/prednisone.  likely COPDe, afebrile/no systemic sxs to indicate infection, EKG nonischemic.  also given hydroxyzine for anxiety as pt ran out.  rx albuterol, prednisone, hydroxyzine sent to pharmacy.  instructed to follow up with pmd this week as scheduled.  discussed strict return parameters.  d/w attending

## 2020-01-13 NOTE — ED PROVIDER NOTE - ATTENDING CONTRIBUTION TO CARE
59M daily smoker, bipolar/depression, copd/emphysema, chronic cough w/ clear sputum, multiple ED visits for similar sx, c/o <24h sob/wheezing. nebulizer unit reportedly broken. no fever/chills, no cp, no leg pain/swelling/rash. avss. nontoxic. NAD. no active cp. no acute resp distress. ekg w/o acute abnl. sx resolved s/p duonebs/steroids. no indication for labs/cxr/abx at this time. likely uncomplicated mild copd exacerbation. will dc w/ outpatient pcp fu, albuterol/hydroxyzine refill, strict return precautions. pt agrees w/ plan. questions answered.     I saw and discussed the care of the pt directly with the ACP while the pt was in the ED. i have reviewed the ACP note and agree w/ the history, exam and plan of care other than as noted above.

## 2020-01-13 NOTE — ED PROVIDER NOTE - PATIENT PORTAL LINK FT
You can access the FollowMyHealth Patient Portal offered by [facility] by registering at the following website: [email or web address]. By joining [employer]’s FollowMyHealth portal, you will also be able to view your health information using other applications (apps) compatible with our system.

## 2020-01-13 NOTE — ED PROVIDER NOTE - NSFOLLOWUPINSTRUCTIONS_ED_ALL_ED_FT
Continue albuterol as needed for shortness of breath, take short course of prednisone and continue all other home medications.  Follow up with PMD in [place] this week as scheduled.  Return to ED for fever, difficulty breathing, chest pain, dizziness, fainting    Chronic Obstructive Pulmonary Disease    Chronic obstructive pulmonary disease (COPD) is a lung condition in which airflow from the lungs is limited. Causes include smoking, secondhand smoke exposure, genetics, or recurrent infections. Take all medicines (inhaled or pills) as directed by your health care provider. Avoid exposure to irritants such as smoke, chemicals, and fumes that aggravate your breathing.    If you are a smoker, the most important thing that you can do is stop smoking. Continuing to smoke will cause further lung damage and breathing trouble. Ask your health care provider for help with quitting smoking.    SEEK IMMEDIATE MEDICAL CARE IF YOU HAVE ANY OF THE FOLLOWING SYMPTOMS: shortness of breath at rest or when talking, bluish discoloration of lips, skin, fever, worsening cough, unexplained chest pain, or lightheadedness/dizziness.

## 2020-01-13 NOTE — ED PROVIDER NOTE - PHYSICAL EXAMINATION
Vitals reviewed  Gen: well appearing, nad, speaking in full sentences  Skin: wwp   HEENT: ncat, eomi, mmm  CV: rrr, no audible m/r/g  Resp: symmetrical expansion, decreased breath sounds b/l w/ exp wheezing, no rales/rhonchi  Ext: FROM throughout, no peripheral edema/calf ttp   Neuro: alert/oriented, no focal deficits, steady gait

## 2020-01-17 ENCOUNTER — EMERGENCY (EMERGENCY)
Facility: HOSPITAL | Age: 60
LOS: 1 days | Discharge: LEFT BEFORE DISCHARGE | End: 2020-01-17
Attending: EMERGENCY MEDICINE | Admitting: EMERGENCY MEDICINE

## 2020-01-17 VITALS
HEIGHT: 72 IN | WEIGHT: 210.1 LBS | RESPIRATION RATE: 18 BRPM | TEMPERATURE: 98 F | HEART RATE: 86 BPM | OXYGEN SATURATION: 95 % | SYSTOLIC BLOOD PRESSURE: 128 MMHG | DIASTOLIC BLOOD PRESSURE: 70 MMHG

## 2020-01-17 DIAGNOSIS — Z90.89 ACQUIRED ABSENCE OF OTHER ORGANS: Chronic | ICD-10-CM

## 2020-01-17 DIAGNOSIS — Z98.890 OTHER SPECIFIED POSTPROCEDURAL STATES: Chronic | ICD-10-CM

## 2020-01-17 NOTE — ED ADULT NURSE NOTE - EXPLANATION OF PATIENT'S REASON FOR LEAVING
refused to change into a gown and do blood work. states "I actually feel better. I don't want to stay here any longer", educated to return to the ED at anytime especially if symptoms worsen. steady gait noted. left ed in no distress

## 2020-01-18 DIAGNOSIS — R06.02 SHORTNESS OF BREATH: ICD-10-CM

## 2020-01-20 ENCOUNTER — EMERGENCY (EMERGENCY)
Facility: HOSPITAL | Age: 60
LOS: 1 days | Discharge: HOME | End: 2020-01-20
Attending: EMERGENCY MEDICINE | Admitting: EMERGENCY MEDICINE
Payer: MEDICARE

## 2020-01-20 VITALS
RESPIRATION RATE: 20 BRPM | SYSTOLIC BLOOD PRESSURE: 129 MMHG | HEIGHT: 72 IN | TEMPERATURE: 98 F | HEART RATE: 100 BPM | WEIGHT: 212.53 LBS | DIASTOLIC BLOOD PRESSURE: 84 MMHG | OXYGEN SATURATION: 96 %

## 2020-01-20 VITALS
HEART RATE: 84 BPM | OXYGEN SATURATION: 97 % | TEMPERATURE: 98 F | DIASTOLIC BLOOD PRESSURE: 90 MMHG | SYSTOLIC BLOOD PRESSURE: 158 MMHG | RESPIRATION RATE: 20 BRPM

## 2020-01-20 DIAGNOSIS — Z90.89 ACQUIRED ABSENCE OF OTHER ORGANS: Chronic | ICD-10-CM

## 2020-01-20 DIAGNOSIS — Z98.890 OTHER SPECIFIED POSTPROCEDURAL STATES: Chronic | ICD-10-CM

## 2020-01-20 LAB
ALBUMIN SERPL ELPH-MCNC: 4.1 G/DL — SIGNIFICANT CHANGE UP (ref 3.3–5)
ALP SERPL-CCNC: 55 U/L — SIGNIFICANT CHANGE UP (ref 40–120)
ALT FLD-CCNC: 19 U/L — SIGNIFICANT CHANGE UP (ref 10–45)
ANION GAP SERPL CALC-SCNC: 11 MMOL/L — SIGNIFICANT CHANGE UP (ref 5–17)
AST SERPL-CCNC: 17 U/L — SIGNIFICANT CHANGE UP (ref 10–40)
BASOPHILS # BLD AUTO: 0.03 K/UL — SIGNIFICANT CHANGE UP (ref 0–0.2)
BASOPHILS NFR BLD AUTO: 0.4 % — SIGNIFICANT CHANGE UP (ref 0–2)
BILIRUB SERPL-MCNC: 0.2 MG/DL — SIGNIFICANT CHANGE UP (ref 0.2–1.2)
BUN SERPL-MCNC: 20 MG/DL — SIGNIFICANT CHANGE UP (ref 7–23)
CALCIUM SERPL-MCNC: 8.7 MG/DL — SIGNIFICANT CHANGE UP (ref 8.4–10.5)
CHLORIDE SERPL-SCNC: 101 MMOL/L — SIGNIFICANT CHANGE UP (ref 96–108)
CO2 SERPL-SCNC: 28 MMOL/L — SIGNIFICANT CHANGE UP (ref 22–31)
CREAT SERPL-MCNC: 0.89 MG/DL — SIGNIFICANT CHANGE UP (ref 0.5–1.3)
EOSINOPHIL # BLD AUTO: 0.15 K/UL — SIGNIFICANT CHANGE UP (ref 0–0.5)
EOSINOPHIL NFR BLD AUTO: 1.9 % — SIGNIFICANT CHANGE UP (ref 0–6)
GLUCOSE SERPL-MCNC: 171 MG/DL — HIGH (ref 70–99)
HCT VFR BLD CALC: 39.7 % — SIGNIFICANT CHANGE UP (ref 39–50)
HGB BLD-MCNC: 12.8 G/DL — LOW (ref 13–17)
IMM GRANULOCYTES NFR BLD AUTO: 0.5 % — SIGNIFICANT CHANGE UP (ref 0–1.5)
LYMPHOCYTES # BLD AUTO: 1.44 K/UL — SIGNIFICANT CHANGE UP (ref 1–3.3)
LYMPHOCYTES # BLD AUTO: 18.6 % — SIGNIFICANT CHANGE UP (ref 13–44)
MCHC RBC-ENTMCNC: 31 PG — SIGNIFICANT CHANGE UP (ref 27–34)
MCHC RBC-ENTMCNC: 32.2 GM/DL — SIGNIFICANT CHANGE UP (ref 32–36)
MCV RBC AUTO: 96.1 FL — SIGNIFICANT CHANGE UP (ref 80–100)
MONOCYTES # BLD AUTO: 0.62 K/UL — SIGNIFICANT CHANGE UP (ref 0–0.9)
MONOCYTES NFR BLD AUTO: 8 % — SIGNIFICANT CHANGE UP (ref 2–14)
NEUTROPHILS # BLD AUTO: 5.45 K/UL — SIGNIFICANT CHANGE UP (ref 1.8–7.4)
NEUTROPHILS NFR BLD AUTO: 70.6 % — SIGNIFICANT CHANGE UP (ref 43–77)
NRBC # BLD: 0 /100 WBCS — SIGNIFICANT CHANGE UP (ref 0–0)
NT-PROBNP SERPL-SCNC: 42 PG/ML — SIGNIFICANT CHANGE UP (ref 0–300)
PLATELET # BLD AUTO: 197 K/UL — SIGNIFICANT CHANGE UP (ref 150–400)
POTASSIUM SERPL-MCNC: 4.3 MMOL/L — SIGNIFICANT CHANGE UP (ref 3.5–5.3)
POTASSIUM SERPL-SCNC: 4.3 MMOL/L — SIGNIFICANT CHANGE UP (ref 3.5–5.3)
PROT SERPL-MCNC: 6.4 G/DL — SIGNIFICANT CHANGE UP (ref 6–8.3)
RBC # BLD: 4.13 M/UL — LOW (ref 4.2–5.8)
RBC # FLD: 14.6 % — HIGH (ref 10.3–14.5)
SODIUM SERPL-SCNC: 140 MMOL/L — SIGNIFICANT CHANGE UP (ref 135–145)
TROPONIN T SERPL-MCNC: <0.01 NG/ML — SIGNIFICANT CHANGE UP (ref 0–0.01)
WBC # BLD: 7.73 K/UL — SIGNIFICANT CHANGE UP (ref 3.8–10.5)
WBC # FLD AUTO: 7.73 K/UL — SIGNIFICANT CHANGE UP (ref 3.8–10.5)

## 2020-01-20 PROCEDURE — 71045 X-RAY EXAM CHEST 1 VIEW: CPT | Mod: 26

## 2020-01-20 PROCEDURE — 71045 X-RAY EXAM CHEST 1 VIEW: CPT

## 2020-01-20 PROCEDURE — 99283 EMERGENCY DEPT VISIT LOW MDM: CPT | Mod: 25

## 2020-01-20 PROCEDURE — 94640 AIRWAY INHALATION TREATMENT: CPT

## 2020-01-20 PROCEDURE — 93005 ELECTROCARDIOGRAM TRACING: CPT

## 2020-01-20 PROCEDURE — 84484 ASSAY OF TROPONIN QUANT: CPT

## 2020-01-20 PROCEDURE — 85025 COMPLETE CBC W/AUTO DIFF WBC: CPT

## 2020-01-20 PROCEDURE — 93010 ELECTROCARDIOGRAM REPORT: CPT

## 2020-01-20 PROCEDURE — 99285 EMERGENCY DEPT VISIT HI MDM: CPT | Mod: 25

## 2020-01-20 PROCEDURE — 83880 ASSAY OF NATRIURETIC PEPTIDE: CPT

## 2020-01-20 PROCEDURE — 80053 COMPREHEN METABOLIC PANEL: CPT

## 2020-01-20 PROCEDURE — 36415 COLL VENOUS BLD VENIPUNCTURE: CPT

## 2020-01-20 RX ORDER — ALBUTEROL 90 UG/1
2 AEROSOL, METERED ORAL ONCE
Refills: 0 | Status: COMPLETED | OUTPATIENT
Start: 2020-01-20 | End: 2020-01-20

## 2020-01-20 RX ORDER — AZITHROMYCIN 500 MG/1
1 TABLET, FILM COATED ORAL
Qty: 4 | Refills: 0
Start: 2020-01-20 | End: 2020-01-23

## 2020-01-20 RX ORDER — SODIUM CHLORIDE 9 MG/ML
1000 INJECTION INTRAMUSCULAR; INTRAVENOUS; SUBCUTANEOUS ONCE
Refills: 0 | Status: COMPLETED | OUTPATIENT
Start: 2020-01-20 | End: 2020-01-20

## 2020-01-20 RX ORDER — DEXAMETHASONE 0.5 MG/5ML
10 ELIXIR ORAL ONCE
Refills: 0 | Status: COMPLETED | OUTPATIENT
Start: 2020-01-20 | End: 2020-01-20

## 2020-01-20 RX ORDER — AZITHROMYCIN 500 MG/1
500 TABLET, FILM COATED ORAL ONCE
Refills: 0 | Status: COMPLETED | OUTPATIENT
Start: 2020-01-20 | End: 2020-01-20

## 2020-01-20 RX ORDER — IPRATROPIUM/ALBUTEROL SULFATE 18-103MCG
3 AEROSOL WITH ADAPTER (GRAM) INHALATION ONCE
Refills: 0 | Status: COMPLETED | OUTPATIENT
Start: 2020-01-20 | End: 2020-01-20

## 2020-01-20 RX ADMIN — Medication 3 MILLILITER(S): at 09:36

## 2020-01-20 RX ADMIN — Medication 10 MILLIGRAM(S): at 09:36

## 2020-01-20 RX ADMIN — SODIUM CHLORIDE 1000 MILLILITER(S): 9 INJECTION INTRAMUSCULAR; INTRAVENOUS; SUBCUTANEOUS at 09:36

## 2020-01-20 RX ADMIN — ALBUTEROL 2 PUFF(S): 90 AEROSOL, METERED ORAL at 10:50

## 2020-01-20 RX ADMIN — AZITHROMYCIN 500 MILLIGRAM(S): 500 TABLET, FILM COATED ORAL at 10:34

## 2020-01-20 NOTE — ED PROVIDER NOTE - NSFOLLOWUPINSTRUCTIONS_ED_ALL_ED_FT
Immediately return to the Emergency Department or call 911 for any high fever, trouble breathing, severe vomiting, worsening pain, or any other concerns.      Chronic Obstructive Pulmonary Disease    Chronic obstructive pulmonary disease (COPD) is a lung condition in which airflow from the lungs is limited. Causes include smoking, secondhand smoke exposure, genetics, or recurrent infections. Take all medicines (inhaled or pills) as directed by your health care provider. Avoid exposure to irritants such as smoke, chemicals, and fumes that aggravate your breathing.    If you are a smoker, the most important thing that you can do is stop smoking. Continuing to smoke will cause further lung damage and breathing trouble. Ask your health care provider for help with quitting smoking.    SEEK IMMEDIATE MEDICAL CARE IF YOU HAVE ANY OF THE FOLLOWING SYMPTOMS: shortness of breath at rest or when talking, bluish discoloration of lips, skin, fever, worsening cough, unexplained chest pain, or lightheadedness/dizziness.

## 2020-01-20 NOTE — ED PROVIDER NOTE - OBJECTIVE STATEMENT
58 yo M smoker, with COPD, asthma, bipolar disease who presents with SOB. Patient reports he began feeling SOB after smoking last evening, causing difficulty with ambulation and using stairs. 1 week ago pt developed a "cold," congestion, worsening cough productive of clear phlegm, no blood. Denies any chest pain, palpitations, recent fevers, chills. Endorses LE edema, but this is not acute in nature. Of note, ran out of ventolin and singulair.

## 2020-01-20 NOTE — ED ADULT NURSE NOTE - OBJECTIVE STATEMENT
Patient AOX4 c/o bilat LE edema and SOB x 3 days with wheezing on auscultation--- patient speaking in full, complete sentences. Patient reports hx of chronic bronchitis and tobacco use. Denies CP/dizziness.

## 2020-01-20 NOTE — ED PROVIDER NOTE - PMH
Bipolar disease, chronic    COPD (chronic obstructive pulmonary disease)    Depression    Emphysema with chronic bronchitis    History of ETOH abuse
No adenopathy or splenomegaly. No cervical or inguinal lymphadenopathy.

## 2020-01-20 NOTE — ED PROVIDER NOTE - CLINICAL SUMMARY MEDICAL DECISION MAKING FREE TEXT BOX
60 yo M, smoker with hx of COPD and asthma who presents with SOB. Patient is HD stable, not tachypneic and does not appear to be in significant respiratory distress. Most likely 2/2 COPD exacerbation in the setting of viral URI vs cardiac but patient denies CP, palpitations, or acute changes in LE edema and EKG is wnl.   - Duonebs, decadron, azithromycin  - Discuss smoking cessation  - f/u CMP, BNP, troponin 59M, smoker with hx of COPD and asthma who presents with SOB. Patient is HD stable, not tachypneic and does not appear to be in significant respiratory distress. Most likely 2/2 COPD exacerbation in the setting of viral URI vs cardiac but patient denies CP, palpitations, or acute changes in LE edema and EKG is wnl. - Duonebs, decadron, azithromycin, completed smoking cessation discussion, pt wants to  nicotine gum. Has f/u w/ mind thursday re nebulizer machine. No sig lab abnormalities. CXr w/o focal consolidation. Unlikely cardiac. Feeling much improved, no acute life threatening illness. Pt seeking discharge.

## 2020-01-20 NOTE — ED PROVIDER NOTE - DIAGNOSTIC INTERPRETATION
Chest x-ray interpreted by ER Physician Dr. Berg  Findings: heart size normal, no infiltrates, large lung volume, no pleural effusion, no PTX, no appreciated fracture.

## 2020-01-20 NOTE — ED PROVIDER NOTE - PROGRESS NOTE DETAILS
Pt sitting up and speaking full sentences on phone. Pt resting comfortably. Pt resting comfortably. imprvoed

## 2020-01-20 NOTE — ED PROVIDER NOTE - ATTENDING CONTRIBUTION TO CARE
59M pmh COPD, chronic bronchitis, emphysema, last night started feeling sob after having smoke. Out of ventolin & nebuilzer HOUSE & SOB on walking, no palp no cp. BLE edema unhcanged. Min productive cough x1wk, nonbloody, no phlegm. Active smoker. Exam noted for mild wheezing, no resp distress. Concern copd, w/ smoking, consider pna, uri, doubt ACS given ekg wnl and sx. 59M pmh COPD, chronic bronchitis, emphysema, last night started feeling sob after having smoke. Out of ventolin & nebuilzer HOUSE & SOB on walking, no palp no cp. BLE edema unhcanged. Min productive cough x1wk, nonbloody, no phlegm. Active smoker. Exam noted for mild wheezing, no resp distress. Concern copd, w/ smoking, consider pna, uri, doubt ACS given ekg wnl and sx. Pt feeling much improved, exam improved w/ inhaler. has f/u. Feeling much improved, no acute life threatening illness. Pt seeking discharge.

## 2020-01-20 NOTE — ED PROVIDER NOTE - PATIENT PORTAL LINK FT
You can access the FollowMyHealth Patient Portal offered by [facility] by registering at the following website: [email or web address]. By joining [facility]’s FollowMyHealth portal, you will also be able to view your health information using other applications (apps) compatible with our system.

## 2020-01-24 DIAGNOSIS — F17.200 NICOTINE DEPENDENCE, UNSPECIFIED, UNCOMPLICATED: ICD-10-CM

## 2020-01-24 DIAGNOSIS — J44.1 CHRONIC OBSTRUCTIVE PULMONARY DISEASE WITH (ACUTE) EXACERBATION: ICD-10-CM

## 2020-01-24 DIAGNOSIS — R06.02 SHORTNESS OF BREATH: ICD-10-CM

## 2020-01-26 ENCOUNTER — INPATIENT (INPATIENT)
Facility: HOSPITAL | Age: 60
LOS: 1 days | Discharge: HOME | DRG: 191 | End: 2020-01-28
Attending: INTERNAL MEDICINE | Admitting: INTERNAL MEDICINE
Payer: MEDICARE

## 2020-01-26 VITALS
DIASTOLIC BLOOD PRESSURE: 96 MMHG | HEIGHT: 72 IN | SYSTOLIC BLOOD PRESSURE: 176 MMHG | TEMPERATURE: 99 F | RESPIRATION RATE: 26 BRPM | OXYGEN SATURATION: 93 % | HEART RATE: 128 BPM | WEIGHT: 214.95 LBS

## 2020-01-26 DIAGNOSIS — J44.1 CHRONIC OBSTRUCTIVE PULMONARY DISEASE WITH (ACUTE) EXACERBATION: ICD-10-CM

## 2020-01-26 DIAGNOSIS — F32.9 MAJOR DEPRESSIVE DISORDER, SINGLE EPISODE, UNSPECIFIED: ICD-10-CM

## 2020-01-26 DIAGNOSIS — Z29.9 ENCOUNTER FOR PROPHYLACTIC MEASURES, UNSPECIFIED: ICD-10-CM

## 2020-01-26 DIAGNOSIS — R63.8 OTHER SYMPTOMS AND SIGNS CONCERNING FOOD AND FLUID INTAKE: ICD-10-CM

## 2020-01-26 DIAGNOSIS — Z91.89 OTHER SPECIFIED PERSONAL RISK FACTORS, NOT ELSEWHERE CLASSIFIED: ICD-10-CM

## 2020-01-26 DIAGNOSIS — Z72.0 TOBACCO USE: ICD-10-CM

## 2020-01-26 DIAGNOSIS — Z98.890 OTHER SPECIFIED POSTPROCEDURAL STATES: Chronic | ICD-10-CM

## 2020-01-26 DIAGNOSIS — F31.9 BIPOLAR DISORDER, UNSPECIFIED: ICD-10-CM

## 2020-01-26 DIAGNOSIS — Z90.89 ACQUIRED ABSENCE OF OTHER ORGANS: Chronic | ICD-10-CM

## 2020-01-26 LAB
ANION GAP SERPL CALC-SCNC: 12 MMOL/L — SIGNIFICANT CHANGE UP (ref 5–17)
APTT BLD: 31.6 SEC — SIGNIFICANT CHANGE UP (ref 27.5–36.3)
BASOPHILS # BLD AUTO: 0.02 K/UL — SIGNIFICANT CHANGE UP (ref 0–0.2)
BASOPHILS NFR BLD AUTO: 0.3 % — SIGNIFICANT CHANGE UP (ref 0–2)
BUN SERPL-MCNC: 13 MG/DL — SIGNIFICANT CHANGE UP (ref 7–23)
CALCIUM SERPL-MCNC: 9.1 MG/DL — SIGNIFICANT CHANGE UP (ref 8.4–10.5)
CHLORIDE SERPL-SCNC: 98 MMOL/L — SIGNIFICANT CHANGE UP (ref 96–108)
CO2 SERPL-SCNC: 25 MMOL/L — SIGNIFICANT CHANGE UP (ref 22–31)
CREAT SERPL-MCNC: 0.76 MG/DL — SIGNIFICANT CHANGE UP (ref 0.5–1.3)
EOSINOPHIL # BLD AUTO: 0.09 K/UL — SIGNIFICANT CHANGE UP (ref 0–0.5)
EOSINOPHIL NFR BLD AUTO: 1.2 % — SIGNIFICANT CHANGE UP (ref 0–6)
FLU A RESULT: SIGNIFICANT CHANGE UP
FLU A RESULT: SIGNIFICANT CHANGE UP
FLUAV AG NPH QL: SIGNIFICANT CHANGE UP
FLUBV AG NPH QL: SIGNIFICANT CHANGE UP
GAS PNL BLDV: SIGNIFICANT CHANGE UP
GLUCOSE BLDC GLUCOMTR-MCNC: 110 MG/DL — HIGH (ref 70–99)
GLUCOSE BLDC GLUCOMTR-MCNC: 229 MG/DL — HIGH (ref 70–99)
GLUCOSE SERPL-MCNC: 141 MG/DL — HIGH (ref 70–99)
HCT VFR BLD CALC: 40.9 % — SIGNIFICANT CHANGE UP (ref 39–50)
HGB BLD-MCNC: 13.3 G/DL — SIGNIFICANT CHANGE UP (ref 13–17)
IMM GRANULOCYTES NFR BLD AUTO: 0.1 % — SIGNIFICANT CHANGE UP (ref 0–1.5)
INR BLD: 1.03 — SIGNIFICANT CHANGE UP (ref 0.88–1.16)
LACTATE SERPL-SCNC: 0.8 MMOL/L — SIGNIFICANT CHANGE UP (ref 0.5–2)
LYMPHOCYTES # BLD AUTO: 0.27 K/UL — LOW (ref 1–3.3)
LYMPHOCYTES # BLD AUTO: 3.5 % — LOW (ref 13–44)
MCHC RBC-ENTMCNC: 30.7 PG — SIGNIFICANT CHANGE UP (ref 27–34)
MCHC RBC-ENTMCNC: 32.5 GM/DL — SIGNIFICANT CHANGE UP (ref 32–36)
MCV RBC AUTO: 94.5 FL — SIGNIFICANT CHANGE UP (ref 80–100)
MONOCYTES # BLD AUTO: 0.36 K/UL — SIGNIFICANT CHANGE UP (ref 0–0.9)
MONOCYTES NFR BLD AUTO: 4.6 % — SIGNIFICANT CHANGE UP (ref 2–14)
NEUTROPHILS # BLD AUTO: 7.04 K/UL — SIGNIFICANT CHANGE UP (ref 1.8–7.4)
NEUTROPHILS NFR BLD AUTO: 90.3 % — HIGH (ref 43–77)
NRBC # BLD: 0 /100 WBCS — SIGNIFICANT CHANGE UP (ref 0–0)
NT-PROBNP SERPL-SCNC: 78 PG/ML — SIGNIFICANT CHANGE UP (ref 0–300)
PLATELET # BLD AUTO: 177 K/UL — SIGNIFICANT CHANGE UP (ref 150–400)
POTASSIUM SERPL-MCNC: 4.3 MMOL/L — SIGNIFICANT CHANGE UP (ref 3.5–5.3)
POTASSIUM SERPL-SCNC: 4.3 MMOL/L — SIGNIFICANT CHANGE UP (ref 3.5–5.3)
PROTHROM AB SERPL-ACNC: 11.7 SEC — SIGNIFICANT CHANGE UP (ref 10–12.9)
RBC # BLD: 4.33 M/UL — SIGNIFICANT CHANGE UP (ref 4.2–5.8)
RBC # FLD: 14.6 % — HIGH (ref 10.3–14.5)
RSV RESULT: SIGNIFICANT CHANGE UP
RSV RNA RESP QL NAA+PROBE: SIGNIFICANT CHANGE UP
SODIUM SERPL-SCNC: 135 MMOL/L — SIGNIFICANT CHANGE UP (ref 135–145)
TROPONIN T SERPL-MCNC: <0.01 NG/ML — SIGNIFICANT CHANGE UP (ref 0–0.01)
WBC # BLD: 7.79 K/UL — SIGNIFICANT CHANGE UP (ref 3.8–10.5)
WBC # FLD AUTO: 7.79 K/UL — SIGNIFICANT CHANGE UP (ref 3.8–10.5)

## 2020-01-26 PROCEDURE — 99222 1ST HOSP IP/OBS MODERATE 55: CPT | Mod: GC

## 2020-01-26 PROCEDURE — 99285 EMERGENCY DEPT VISIT HI MDM: CPT

## 2020-01-26 PROCEDURE — 71045 X-RAY EXAM CHEST 1 VIEW: CPT | Mod: 26

## 2020-01-26 RX ORDER — ALBUTEROL 90 UG/1
2.5 AEROSOL, METERED ORAL
Refills: 0 | Status: COMPLETED | OUTPATIENT
Start: 2020-01-26 | End: 2020-01-26

## 2020-01-26 RX ORDER — DEXTROSE 50 % IN WATER 50 %
12.5 SYRINGE (ML) INTRAVENOUS ONCE
Refills: 0 | Status: DISCONTINUED | OUTPATIENT
Start: 2020-01-26 | End: 2020-01-28

## 2020-01-26 RX ORDER — FLUOXETINE HCL 10 MG
60 CAPSULE ORAL DAILY
Refills: 0 | Status: DISCONTINUED | OUTPATIENT
Start: 2020-01-26 | End: 2020-01-28

## 2020-01-26 RX ORDER — IPRATROPIUM/ALBUTEROL SULFATE 18-103MCG
3 AEROSOL WITH ADAPTER (GRAM) INHALATION EVERY 4 HOURS
Refills: 0 | Status: DISCONTINUED | OUTPATIENT
Start: 2020-01-26 | End: 2020-01-28

## 2020-01-26 RX ORDER — RISPERIDONE 4 MG/1
4 TABLET ORAL AT BEDTIME
Refills: 0 | Status: DISCONTINUED | OUTPATIENT
Start: 2020-01-26 | End: 2020-01-28

## 2020-01-26 RX ORDER — HYDROXYZINE HCL 10 MG
0 TABLET ORAL
Qty: 0 | Refills: 0 | DISCHARGE

## 2020-01-26 RX ORDER — DEXTROSE 50 % IN WATER 50 %
15 SYRINGE (ML) INTRAVENOUS ONCE
Refills: 0 | Status: DISCONTINUED | OUTPATIENT
Start: 2020-01-26 | End: 2020-01-28

## 2020-01-26 RX ORDER — NICOTINE POLACRILEX 2 MG
1 GUM BUCCAL DAILY
Refills: 0 | Status: DISCONTINUED | OUTPATIENT
Start: 2020-01-26 | End: 2020-01-28

## 2020-01-26 RX ORDER — SODIUM CHLORIDE 9 MG/ML
500 INJECTION INTRAMUSCULAR; INTRAVENOUS; SUBCUTANEOUS ONCE
Refills: 0 | Status: COMPLETED | OUTPATIENT
Start: 2020-01-26 | End: 2020-01-26

## 2020-01-26 RX ORDER — HYDROXYZINE HCL 10 MG
50 TABLET ORAL AT BEDTIME
Refills: 0 | Status: DISCONTINUED | OUTPATIENT
Start: 2020-01-26 | End: 2020-01-27

## 2020-01-26 RX ORDER — SPIRONOLACTONE 25 MG/1
0 TABLET, FILM COATED ORAL
Qty: 0 | Refills: 0 | DISCHARGE

## 2020-01-26 RX ORDER — IPRATROPIUM/ALBUTEROL SULFATE 18-103MCG
3 AEROSOL WITH ADAPTER (GRAM) INHALATION ONCE
Refills: 0 | Status: COMPLETED | OUTPATIENT
Start: 2020-01-26 | End: 2020-01-26

## 2020-01-26 RX ORDER — FLUOXETINE HCL 10 MG
0 CAPSULE ORAL
Qty: 0 | Refills: 0 | DISCHARGE

## 2020-01-26 RX ORDER — CHLORHEXIDINE GLUCONATE 213 G/1000ML
1 SOLUTION TOPICAL DAILY
Refills: 0 | Status: DISCONTINUED | OUTPATIENT
Start: 2020-01-26 | End: 2020-01-28

## 2020-01-26 RX ORDER — DEXTROSE 50 % IN WATER 50 %
25 SYRINGE (ML) INTRAVENOUS ONCE
Refills: 0 | Status: DISCONTINUED | OUTPATIENT
Start: 2020-01-26 | End: 2020-01-28

## 2020-01-26 RX ORDER — ENOXAPARIN SODIUM 100 MG/ML
40 INJECTION SUBCUTANEOUS EVERY 24 HOURS
Refills: 0 | Status: DISCONTINUED | OUTPATIENT
Start: 2020-01-26 | End: 2020-01-28

## 2020-01-26 RX ORDER — RISPERIDONE 4 MG/1
0 TABLET ORAL
Qty: 0 | Refills: 0 | DISCHARGE

## 2020-01-26 RX ORDER — SODIUM CHLORIDE 9 MG/ML
1000 INJECTION, SOLUTION INTRAVENOUS
Refills: 0 | Status: DISCONTINUED | OUTPATIENT
Start: 2020-01-26 | End: 2020-01-28

## 2020-01-26 RX ORDER — GLUCAGON INJECTION, SOLUTION 0.5 MG/.1ML
1 INJECTION, SOLUTION SUBCUTANEOUS ONCE
Refills: 0 | Status: DISCONTINUED | OUTPATIENT
Start: 2020-01-26 | End: 2020-01-28

## 2020-01-26 RX ORDER — ACETAMINOPHEN 500 MG
650 TABLET ORAL EVERY 6 HOURS
Refills: 0 | Status: DISCONTINUED | OUTPATIENT
Start: 2020-01-26 | End: 2020-01-28

## 2020-01-26 RX ORDER — INSULIN LISPRO 100/ML
VIAL (ML) SUBCUTANEOUS
Refills: 0 | Status: DISCONTINUED | OUTPATIENT
Start: 2020-01-26 | End: 2020-01-28

## 2020-01-26 RX ORDER — MONTELUKAST 4 MG/1
10 TABLET, CHEWABLE ORAL DAILY
Refills: 0 | Status: DISCONTINUED | OUTPATIENT
Start: 2020-01-26 | End: 2020-01-28

## 2020-01-26 RX ADMIN — ENOXAPARIN SODIUM 40 MILLIGRAM(S): 100 INJECTION SUBCUTANEOUS at 13:18

## 2020-01-26 RX ADMIN — Medication 650 MILLIGRAM(S): at 18:54

## 2020-01-26 RX ADMIN — ALBUTEROL 2.5 MILLIGRAM(S): 90 AEROSOL, METERED ORAL at 12:33

## 2020-01-26 RX ADMIN — Medication 60 MILLIGRAM(S): at 13:19

## 2020-01-26 RX ADMIN — ALBUTEROL 2.5 MILLIGRAM(S): 90 AEROSOL, METERED ORAL at 09:19

## 2020-01-26 RX ADMIN — Medication 4: at 18:06

## 2020-01-26 RX ADMIN — MONTELUKAST 10 MILLIGRAM(S): 4 TABLET, CHEWABLE ORAL at 13:19

## 2020-01-26 RX ADMIN — ALBUTEROL 2.5 MILLIGRAM(S): 90 AEROSOL, METERED ORAL at 18:08

## 2020-01-26 RX ADMIN — Medication 125 MILLIGRAM(S): at 08:30

## 2020-01-26 RX ADMIN — RISPERIDONE 4 MILLIGRAM(S): 4 TABLET ORAL at 22:24

## 2020-01-26 RX ADMIN — Medication 1 PATCH: at 18:08

## 2020-01-26 RX ADMIN — Medication 3 MILLILITER(S): at 18:07

## 2020-01-26 RX ADMIN — SODIUM CHLORIDE 500 MILLILITER(S): 9 INJECTION INTRAMUSCULAR; INTRAVENOUS; SUBCUTANEOUS at 10:27

## 2020-01-26 RX ADMIN — ALBUTEROL 2.5 MILLIGRAM(S): 90 AEROSOL, METERED ORAL at 08:42

## 2020-01-26 RX ADMIN — Medication 3 MILLILITER(S): at 13:18

## 2020-01-26 RX ADMIN — Medication 3 MILLILITER(S): at 08:26

## 2020-01-26 RX ADMIN — ALBUTEROL 2.5 MILLIGRAM(S): 90 AEROSOL, METERED ORAL at 09:00

## 2020-01-26 RX ADMIN — ALBUTEROL 2.5 MILLIGRAM(S): 90 AEROSOL, METERED ORAL at 13:18

## 2020-01-26 RX ADMIN — Medication 1 PATCH: at 13:19

## 2020-01-26 RX ADMIN — Medication 3 MILLILITER(S): at 22:24

## 2020-01-26 NOTE — ED PROVIDER NOTE - OBJECTIVE STATEMENT
59 year old male with history of COPD (current smoker) presents to ED with concern for shortness of breath and wheezing, gradually worsening overnight.  Patient notes he does not have a nebulizer machine at home, however does use inhalers.  He did not feel as though these medications were working, prompting his ED visit.  He denies associated fever, chills, chest pain, abdominal pain, nausea, emesis, changes to bowel movements, changes to baseline peripheral edema or any additional acute complaints or concerns at this time.  He denies use of home oxygen.

## 2020-01-26 NOTE — H&P ADULT - ASSESSMENT
58 y/o M, current smoker w/ PMHx notable for COPD, anxiety, depression, bipolar disorder who is presenting with shortness of breath, wheezing and productive cough x1 days admitted for COPD exacerbation.

## 2020-01-26 NOTE — ED PROVIDER NOTE - CARE PLAN
Principal Discharge DX:	COPD exacerbation  Secondary Diagnosis:	Tobacco use  Secondary Diagnosis:	Wheezing  Secondary Diagnosis:	Shortness of breath

## 2020-01-26 NOTE — H&P ADULT - NSICDXPASTMEDICALHX_GEN_ALL_CORE_FT
PAST MEDICAL HISTORY:  Bipolar disease, chronic     COPD (chronic obstructive pulmonary disease)     Depression     Emphysema with chronic bronchitis     History of ETOH abuse

## 2020-01-26 NOTE — H&P ADULT - NSHPLABSRESULTS_GEN_ALL_CORE
.  LABS:                         13.3   7.79  )-----------( 177      ( 26 Jan 2020 08:28 )             40.9     01-26    135  |  98  |  13  ----------------------------<  141<H>  4.3   |  25  |  0.76    Ca    9.1      26 Jan 2020 08:28      PT/INR - ( 26 Jan 2020 08:28 )   PT: 11.7 sec;   INR: 1.03          PTT - ( 26 Jan 2020 08:28 )  PTT:31.6 sec    CARDIAC MARKERS ( 26 Jan 2020 08:28 )  x     / <0.01 ng/mL / x     / x     / x          Serum Pro-Brain Natriuretic Peptide: 78 pg/mL (01-26 @ 08:28)    Lactate, Blood: 0.8 mmol/L (01-26 @ 08:28)      RADIOLOGY, EKG & ADDITIONAL TESTS: Reviewed.

## 2020-01-26 NOTE — ED PROVIDER NOTE - MUSCULOSKELETAL, MLM
No pitting edema to LEs.  Spine appears normal, range of motion is not limited, no muscle or joint tenderness

## 2020-01-26 NOTE — ED ADULT NURSE REASSESSMENT NOTE - NS ED NURSE REASSESS COMMENT FT1
Pt returned from CT suite.  Upon placing pt on cardiac monitor, small insect resembling a bed bug noted on his bed. Management notified, Pt placed on contact isolation. Area terminally clean.

## 2020-01-26 NOTE — CHART NOTE - NSCHARTNOTEFT_GEN_A_CORE
Went to evaluate patient as pulmonary called for COPD exacerbation  - Patient was asleep and after attempting to engage multiple times, patient woke up and responded that he would like to sleep at this time and not be disturbed  - Recommendations discussed with admitting team but no physical exam or history was possible  - We will reattempt to evaluate in the am

## 2020-01-26 NOTE — ED ADULT NURSE NOTE - OBJECTIVE STATEMENT
Pt presents to ED complaining of shortness of breath. Pt reports started feeling short of breath last night. PMHx of COPD, not on oxygen at home. Pt noted to be tachycardic, tachypneic with labored respirations. Pt speaking in short sentences.  Pt placed on continuous cardiac and pulse oximetry monitoring. MD Guzman at the bedside. Pt presents to ED complaining of shortness of breath. Pt reports started feeling short of breath last night. PMHx of COPD, not on oxygen at home. Pt noted to be tachycardic, tachypneic with labored respirations. Pt speaking in short sentences.  Pt placed on continuous cardiac and pulse oximetry monitoring. MD Guzman at the bedside. Denies fevers, chills, n/v/d, recent sickness.

## 2020-01-26 NOTE — H&P ADULT - NSHPPHYSICALEXAM_GEN_ALL_CORE
.  VITAL SIGNS:  T(C): 37 (01-26-20 @ 08:09), Max: 37 (01-26-20 @ 08:09)  T(F): 98.6 (01-26-20 @ 08:09), Max: 98.6 (01-26-20 @ 08:09)  HR: 115 (01-26-20 @ 09:27) (115 - 128)  BP: 151/69 (01-26-20 @ 09:27) (151/69 - 176/96)  BP(mean): --  RR: 26 (01-26-20 @ 09:27) (26 - 26)  SpO2: 95% (01-26-20 @ 09:27) (93% - 95%)  Wt(kg): --    PHYSICAL EXAM:    Constitutional: WDWN resting comfortably in bed; NAD  Head: NC/AT  Eyes: PERRL, EOMI, anicteric sclera  ENT: no nasal discharge; uvula midline, no oropharyngeal erythema or exudates; MMM  Neck: supple; no JVD or thyromegaly  Respiratory: CTA B/L; no W/R/R, no retractions  Cardiac: +S1/S2; RRR; no M/R/G; PMI non-displaced  Gastrointestinal: abdomen soft, NT/ND; no rebound or guarding; +BSx4  Genitourinary: normal external genitalia  Back: spine midline, no bony tenderness or step-offs; no CVAT B/L  Extremities: WWP, no clubbing or cyanosis; no peripheral edema  Musculoskeletal: NROM x4; no joint swelling, tenderness or erythema  Vascular: 2+ radial, femoral, DP/PT pulses B/L  Dermatologic: skin warm, dry and intact; no rashes, wounds, or scars  Lymphatic: no submandibular or cervical LAD  Neurologic: AAOx3; CNII-XII grossly intact; no focal deficits  Psychiatric: affect and characteristics of appearance, verbalizations, behaviors are appropriate .  VITAL SIGNS:  T(C): 37 (01-26-20 @ 08:09), Max: 37 (01-26-20 @ 08:09)  T(F): 98.6 (01-26-20 @ 08:09), Max: 98.6 (01-26-20 @ 08:09)  HR: 115 (01-26-20 @ 09:27) (115 - 128)  BP: 151/69 (01-26-20 @ 09:27) (151/69 - 176/96)  BP(mean): --  RR: 26 (01-26-20 @ 09:27) (26 - 26)  SpO2: 95% (01-26-20 @ 09:27) (93% - 95%)  Wt(kg): --    PHYSICAL EXAM:    Constitutional: WDWN resting comfortably in bed; NAD  Head: NC/AT  Eyes: PERRL, EOMI, anicteric sclera  ENT: no nasal discharge; uvula midline, no oropharyngeal erythema or exudates; MMM  Neck: supple; no JVD or thyromegaly  Respiratory: Diffuse wheezing b/l lung fields, no accessory muscle use, speaking in full sentences   Cardiac: +S1/S2; RRR; no M/R/G; PMI non-displaced  Gastrointestinal: abdomen soft, NT/ND; no rebound or guarding; +BSx4  Back: spine midline, no bony tenderness or step-offs; no CVAT B/L  Extremities: WWP, no clubbing or cyanosis; no peripheral edema  Musculoskeletal: NROM x4; no joint swelling, tenderness or erythema  Vascular: 2+ radial, femoral, DP/PT pulses B/L  Neurologic: AAOx3; CNII-XII grossly intact; no focal deficits

## 2020-01-26 NOTE — ED PROVIDER NOTE - CLINICAL SUMMARY MEDICAL DECISION MAKING FREE TEXT BOX
Pt in ED w concern for COPD exacerbation.  + Dyspneic in conversation on ED arrival.  Given nebs, steroid.  CXR without evidence of effusion and infiltrate.  CTA chest ordered as patient is persistently tachycardic, however he is adamantly refusing.  On repeat auscultation, patient states he feels improvement, though diffuse wheezes are still present.  + Mild dyspnea in conversation.  I spoke with pt re recommendation for admission as he does not have nebulizer machine at home, this is his second ED visit within the past few days and he is still symptomatic.  Patient is initially apprehensive, however does agree to admission for ATC steroid, nebs and close monitoring.  Will admit at this time.

## 2020-01-26 NOTE — H&P ADULT - HISTORY OF PRESENT ILLNESS
60 y/o M, current smoker w/ PMHx notable for COPD    In the ED: T: 98.6, HR: 128-->115, BP: 176/96-->151/69, RR: 26, O2: 93-95% on room air  Labs WBC 7.79 with 90.3% neutrophils, Hgb 13.3, Na 135, K 4.3, BUN 13, Cr 0.76  Cxray w/ no focal consolidation, flattened diaphragms.   Patient given Solumedrol 125, Duonebs x3, NS 500cc bolus 58 y/o M, current smoker w/ PMHx notable for COPD, anxiety, depression, bipolar disorder who is presenting with shortness of breath, wheezing and productive cough x1 days. Patient reports that he was in his usual state of health and     In the ED: T: 98.6, HR: 128-->115, BP: 176/96-->151/69, RR: 26, O2: 93-95% on room air  Labs WBC 7.79 with 90.3% neutrophils, Hgb 13.3, Na 135, K 4.3, BUN 13, Cr 0.76  Cxray w/ no focal consolidation, flattened diaphragms.   Patient given Solumedrol 125, Duonebs x3, NS 500cc bolus 58 y/o M, current smoker w/ PMHx notable for COPD, anxiety, depression, bipolar disorder who is presenting with shortness of breath, wheezing and productive cough x1 days. Patient reports that he was in his usual state of health and this morning woke up with feeling of shortness of breath, productive cough with increased sputum (yellow) and shortness of breath. Reports recent URI symptoms of stuffy/runny nose and sore throat. Of note patient reports that he also ran out of his ventolin and singulare. Patient w/ multiple ED visits over the past month for COPD exacerbation with most recently on 1/20, patient discharged on Prednisone and Azithromycin however non-compliant.      In the ED: T: 98.6, HR: 128-->115, BP: 176/96-->151/69, RR: 26, O2: 93-95% on room air  Labs WBC 7.79 with 90.3% neutrophils, Hgb 13.3, Na 135, K 4.3, BUN 13, Cr 0.76  Cxray w/ no focal consolidation, flattened diaphragms.   Patient given Solumedrol 125, Duonebs x3, NS 500cc bolus   Of note nursing staff in ED noted small bug on patients belongings. Patient placed on isolation for concern of bedbugs.

## 2020-01-26 NOTE — H&P ADULT - ATTENDING COMMENTS
58 yo male smoker with hx COPD, depression, a/w SOB when walking short distance to [employer] [facility] today.  + cough. + cough and increased sputum production for past 1-2 days.  REports he does not use his inhalers regularly.    AA and O x 3 NAD  NCAT  neck supple  s1s2 RRR  lungs wheezing b/l  abd soft, NTND  ext 1+ b/l LE edema  no focal deficits  skin warm    58 yo male with COPD exacerbation    1) SOB - 2/2 COPD exacerbation  - respiratory status now improved/stabilized  - s/p IV solumedrol in ED, c/w prednisone 40mg daily  - len  - pulm consult     2) smoking - counseled on smoking cessation    3) depression - stable, c/w home meds

## 2020-01-26 NOTE — ED ADULT TRIAGE NOTE - CHIEF COMPLAINT QUOTE
patient complains of SOB since last night with exertional chest pain. tachypneic speaking short sentences in triage. patient with hx of COPD. denies fever, chills. audible wheezes

## 2020-01-26 NOTE — H&P ADULT - PROBLEM SELECTOR PLAN 1
Patient w/ shortness of breath, increase in cough and phlegm production over the past day. Patient non-compliant with medications at home and still active tobacco smoker. Patient w/ multiple ED visits over the past month for similar symptoms. Flu and RSV negative. Cxray w/ no focal consolidation. No need for abx at this time.   -S/p Solumedrol 125mg in the ED  -Will start on Prednisone 40mg daily for 5 days   -Duonebs q4hrs standing   -C/w Singulair 10mg daily  -COPD Bundle consult  -F/u RVP add on to Flu and RSV

## 2020-01-26 NOTE — H&P ADULT - PROBLEM SELECTOR PLAN 2
Patient  1PPD smoker for >40 years. Last smoked last night.   -Nicotine patch  -Discussed with patient regarding tobacco cessation

## 2020-01-27 ENCOUNTER — TRANSCRIPTION ENCOUNTER (OUTPATIENT)
Age: 60
End: 2020-01-27

## 2020-01-27 DIAGNOSIS — J44.1 CHRONIC OBSTRUCTIVE PULMONARY DISEASE WITH (ACUTE) EXACERBATION: ICD-10-CM

## 2020-01-27 DIAGNOSIS — F17.200 NICOTINE DEPENDENCE, UNSPECIFIED, UNCOMPLICATED: ICD-10-CM

## 2020-01-27 LAB
ANION GAP SERPL CALC-SCNC: 12 MMOL/L — SIGNIFICANT CHANGE UP (ref 5–17)
BUN SERPL-MCNC: 16 MG/DL — SIGNIFICANT CHANGE UP (ref 7–23)
CALCIUM SERPL-MCNC: 8.8 MG/DL — SIGNIFICANT CHANGE UP (ref 8.4–10.5)
CHLORIDE SERPL-SCNC: 104 MMOL/L — SIGNIFICANT CHANGE UP (ref 96–108)
CO2 SERPL-SCNC: 24 MMOL/L — SIGNIFICANT CHANGE UP (ref 22–31)
CREAT SERPL-MCNC: 0.72 MG/DL — SIGNIFICANT CHANGE UP (ref 0.5–1.3)
GLUCOSE BLDC GLUCOMTR-MCNC: 100 MG/DL — HIGH (ref 70–99)
GLUCOSE BLDC GLUCOMTR-MCNC: 123 MG/DL — HIGH (ref 70–99)
GLUCOSE BLDC GLUCOMTR-MCNC: 154 MG/DL — HIGH (ref 70–99)
GLUCOSE BLDC GLUCOMTR-MCNC: 164 MG/DL — HIGH (ref 70–99)
GLUCOSE SERPL-MCNC: 136 MG/DL — HIGH (ref 70–99)
HBA1C BLD-MCNC: 6.2 % — HIGH (ref 4–5.6)
HCT VFR BLD CALC: 35.3 % — LOW (ref 39–50)
HCV AB S/CO SERPL IA: 0.09 S/CO — SIGNIFICANT CHANGE UP
HCV AB SERPL-IMP: SIGNIFICANT CHANGE UP
HGB BLD-MCNC: 11.6 G/DL — LOW (ref 13–17)
MAGNESIUM SERPL-MCNC: 2.1 MG/DL — SIGNIFICANT CHANGE UP (ref 1.6–2.6)
MCHC RBC-ENTMCNC: 31 PG — SIGNIFICANT CHANGE UP (ref 27–34)
MCHC RBC-ENTMCNC: 32.9 GM/DL — SIGNIFICANT CHANGE UP (ref 32–36)
MCV RBC AUTO: 94.4 FL — SIGNIFICANT CHANGE UP (ref 80–100)
NRBC # BLD: 0 /100 WBCS — SIGNIFICANT CHANGE UP (ref 0–0)
PLATELET # BLD AUTO: 173 K/UL — SIGNIFICANT CHANGE UP (ref 150–400)
POTASSIUM SERPL-MCNC: 4 MMOL/L — SIGNIFICANT CHANGE UP (ref 3.5–5.3)
POTASSIUM SERPL-SCNC: 4 MMOL/L — SIGNIFICANT CHANGE UP (ref 3.5–5.3)
RBC # BLD: 3.74 M/UL — LOW (ref 4.2–5.8)
RBC # FLD: 15.2 % — HIGH (ref 10.3–14.5)
SODIUM SERPL-SCNC: 140 MMOL/L — SIGNIFICANT CHANGE UP (ref 135–145)
WBC # BLD: 7.55 K/UL — SIGNIFICANT CHANGE UP (ref 3.8–10.5)
WBC # FLD AUTO: 7.55 K/UL — SIGNIFICANT CHANGE UP (ref 3.8–10.5)

## 2020-01-27 PROCEDURE — 99223 1ST HOSP IP/OBS HIGH 75: CPT | Mod: GC

## 2020-01-27 PROCEDURE — 99233 SBSQ HOSP IP/OBS HIGH 50: CPT | Mod: GC

## 2020-01-27 PROCEDURE — 94010 BREATHING CAPACITY TEST: CPT | Mod: 26

## 2020-01-27 RX ORDER — BUDESONIDE AND FORMOTEROL FUMARATE DIHYDRATE 160; 4.5 UG/1; UG/1
2 AEROSOL RESPIRATORY (INHALATION)
Refills: 0 | Status: DISCONTINUED | OUTPATIENT
Start: 2020-01-27 | End: 2020-01-28

## 2020-01-27 RX ORDER — HYDROXYZINE HCL 10 MG
50 TABLET ORAL AT BEDTIME
Refills: 0 | Status: DISCONTINUED | OUTPATIENT
Start: 2020-01-27 | End: 2020-01-28

## 2020-01-27 RX ORDER — CEFTRIAXONE 500 MG/1
1000 INJECTION, POWDER, FOR SOLUTION INTRAMUSCULAR; INTRAVENOUS EVERY 24 HOURS
Refills: 0 | Status: DISCONTINUED | OUTPATIENT
Start: 2020-01-27 | End: 2020-01-28

## 2020-01-27 RX ORDER — AZITHROMYCIN 500 MG/1
500 TABLET, FILM COATED ORAL ONCE
Refills: 0 | Status: COMPLETED | OUTPATIENT
Start: 2020-01-27 | End: 2020-01-27

## 2020-01-27 RX ORDER — PANTOPRAZOLE SODIUM 20 MG/1
40 TABLET, DELAYED RELEASE ORAL
Refills: 0 | Status: DISCONTINUED | OUTPATIENT
Start: 2020-01-28 | End: 2020-01-28

## 2020-01-27 RX ORDER — AZITHROMYCIN 500 MG/1
500 TABLET, FILM COATED ORAL DAILY
Refills: 0 | Status: DISCONTINUED | OUTPATIENT
Start: 2020-01-28 | End: 2020-01-28

## 2020-01-27 RX ADMIN — BUDESONIDE AND FORMOTEROL FUMARATE DIHYDRATE 2 PUFF(S): 160; 4.5 AEROSOL RESPIRATORY (INHALATION) at 17:08

## 2020-01-27 RX ADMIN — Medication 3 MILLILITER(S): at 16:42

## 2020-01-27 RX ADMIN — CEFTRIAXONE 100 MILLIGRAM(S): 500 INJECTION, POWDER, FOR SOLUTION INTRAMUSCULAR; INTRAVENOUS at 16:39

## 2020-01-27 RX ADMIN — CHLORHEXIDINE GLUCONATE 1 APPLICATION(S): 213 SOLUTION TOPICAL at 12:15

## 2020-01-27 RX ADMIN — Medication 60 MILLIGRAM(S): at 12:12

## 2020-01-27 RX ADMIN — Medication 40 MILLIGRAM(S): at 05:22

## 2020-01-27 RX ADMIN — Medication 3 MILLILITER(S): at 00:49

## 2020-01-27 RX ADMIN — Medication 1 PATCH: at 08:15

## 2020-01-27 RX ADMIN — MONTELUKAST 10 MILLIGRAM(S): 4 TABLET, CHEWABLE ORAL at 12:11

## 2020-01-27 RX ADMIN — Medication 1 PATCH: at 12:12

## 2020-01-27 RX ADMIN — AZITHROMYCIN 500 MILLIGRAM(S): 500 TABLET, FILM COATED ORAL at 12:12

## 2020-01-27 RX ADMIN — ENOXAPARIN SODIUM 40 MILLIGRAM(S): 100 INJECTION SUBCUTANEOUS at 13:30

## 2020-01-27 RX ADMIN — Medication 2: at 12:52

## 2020-01-27 RX ADMIN — Medication 3 MILLILITER(S): at 05:22

## 2020-01-27 RX ADMIN — Medication 3 MILLILITER(S): at 21:47

## 2020-01-27 RX ADMIN — Medication 1 PATCH: at 12:00

## 2020-01-27 RX ADMIN — Medication 100 MILLIGRAM(S): at 22:10

## 2020-01-27 RX ADMIN — Medication 3 MILLILITER(S): at 10:17

## 2020-01-27 RX ADMIN — Medication 1 PATCH: at 08:08

## 2020-01-27 RX ADMIN — Medication 2: at 16:42

## 2020-01-27 RX ADMIN — RISPERIDONE 4 MILLIGRAM(S): 4 TABLET ORAL at 21:48

## 2020-01-27 NOTE — CONSULT NOTE ADULT - PROBLEM SELECTOR RECOMMENDATION 2
Reports using nicotrol inhaler as outpatient via his pulmonologist    Recommendations:  - c/w nicotine transdermal patch daily  - consider adding polacrilex gum/lozenge 4mg q1-2h PRN cravings and can discharge on this regimen if he prefers vs. the inhaler

## 2020-01-27 NOTE — PROGRESS NOTE ADULT - PROBLEM SELECTOR PLAN 2
Patient is a 1PPD smoker for >40 years. Last smoked last night prior to admission.  -Nicotine patch  -Discussed with patient regarding tobacco cessation, pt interested in quitting

## 2020-01-27 NOTE — CONSULT NOTE ADULT - SUBJECTIVE AND OBJECTIVE BOX
PULMONARY SERVICE / COPD RESPONSE TEAM INITIAL CONSULT NOTE    HPI:  58 y/o M, current smoker w/ PMHx notable for COPD, anxiety, depression, bipolar disorder who is presenting with shortness of breath, wheezing and productive cough x1 days. Patient reports that he was in his usual state of health and this morning woke up with feeling of shortness of breath, productive cough with increased sputum (yellow) and shortness of breath. Reports recent URI symptoms of stuffy/runny nose and sore throat. Of note patient reports that he also ran out of his ventolin and singulare. Patient w/ multiple ED visits over the past month for COPD exacerbation with most recently on 1/20, patient discharged on Prednisone and Azithromycin however non-compliant.      In the ED: T: 98.6, HR: 128-->115, BP: 176/96-->151/69, RR: 26, O2: 93-95% on room air  Labs WBC 7.79 with 90.3% neutrophils, Hgb 13.3, Na 135, K 4.3, BUN 13, Cr 0.76  Cxray w/ no focal consolidation, flattened diaphragms.   Patient given Solumedrol 125, Duonebs x3, NS 500cc bolus   Of note nursing staff in ED noted small bug on patients belongings. Patient placed on isolation for concern of bedbugs. (26 Jan 2020 11:37)    REVIEW OF SYSTEMS:  Constitutional: No fever, weight loss or fatigue  Eyes: No eye pain, visual disturbances, or discharge  ENMT:  No difficulty hearing, tinnitus, vertigo; No sinus or throat pain  Neck: No pain, stiffness or neck swelling  Respiratory: see HPI  Cardiovascular: No chest pain, palpitations, dizziness or leg swelling  Gastrointestinal: No abdominal or epigastric pain. No nausea, vomiting or hematemesis; No diarrhea or constipation. No melena or hematochezia.  Genitourinary: No dysuria, frequency, hematuria or incontinence  Neurological: No headaches, memory loss, loss of strength, numbness or tremors  Skin: No itching, burning, rashes or lesions   Lymph Nodes: No enlarged glands  Endocrine: No heat or cold intolerance; No hair loss  Musculoskeletal: No joint pain or swelling; No muscle, back or extremity pain  Psychiatric: No depression, anxiety, mood swings or difficulty sleeping  Heme/Lymph: No easy bruising or bleeding gums  Allergy and Immunologic: No hives or eczema    PAST MEDICAL & SURGICAL HISTORY:  History of ETOH abuse  Depression  Bipolar disease, chronic  Emphysema with chronic bronchitis  COPD (chronic obstructive pulmonary disease)  History of tonsillectomy  H/O hernia repair    FAMILY HISTORY:  Family history of cerebrovascular accident (CVA) (Father)    SOCIAL HISTORY:  Smoking Status: [X] Current, [ ] Former, [ ] Never  Pack Years: 1ppd x 40yrs trying to quit    MEDICATIONS:  Pulmonary:  albuterol/ipratropium for Nebulization. 3 milliLiter(s) Nebulizer every 4 hours  guaiFENesin   Syrup  (Sugar-Free) 100 milliGRAM(s) Oral every 6 hours PRN  montelukast 10 milliGRAM(s) Oral daily    Antimicrobials:    Anticoagulants:  enoxaparin Injectable 40 milliGRAM(s) SubCutaneous every 24 hours    Onc:    GI/:    Endocrine:  dextrose 40% Gel 15 Gram(s) Oral once PRN  dextrose 50% Injectable 12.5 Gram(s) IV Push once  dextrose 50% Injectable 25 Gram(s) IV Push once  dextrose 50% Injectable 25 Gram(s) IV Push once  glucagon  Injectable 1 milliGRAM(s) IntraMuscular once PRN  insulin lispro (HumaLOG) corrective regimen sliding scale   SubCutaneous Before meals and at bedtime  predniSONE   Tablet 40 milliGRAM(s) Oral daily    Cardiac:    Other Medications:  acetaminophen   Tablet .. 650 milliGRAM(s) Oral every 6 hours PRN  chlorhexidine 2% Cloths 1 Application(s) Topical daily  dextrose 5%. 1000 milliLiter(s) IV Continuous <Continuous>  FLUoxetine 60 milliGRAM(s) Oral daily  hydrOXYzine  Oral Tab/Cap - Peds 50 milliGRAM(s) Oral at bedtime PRN  nicotine - 21 mG/24Hr(s) Patch 1 patch Transdermal daily  risperiDONE   Tablet 4 milliGRAM(s) Oral at bedtime    Allergies    No Known Allergies    Intolerances    Vital Signs Last 24 Hrs  T(C): 36.7 (27 Jan 2020 10:29), Max: 36.9 (26 Jan 2020 23:00)  T(F): 98.1 (27 Jan 2020 10:29), Max: 98.4 (26 Jan 2020 23:00)  HR: 111 (27 Jan 2020 10:29) (97 - 113)  BP: 144/84 (27 Jan 2020 10:29) (136/76 - 158/82)  BP(mean): --  RR: 17 (27 Jan 2020 10:29) (17 - 18)  SpO2: 94% (27 Jan 2020 10:29) (92% - 98%)    01-27 @ 07:01  -  01-27 @ 13:45  --------------------------------------------------------  IN: 300 mL / OUT: 0 mL / NET: 300 mL    PHYSICAL EXAM:  Constitutional: WDWN  Head: NC/AT  EENT: PERRL, anicteric sclera; oropharynx clear, MMM  Neck: supple, no appreciable JVD  Respiratory: CTA B/L; no W/R/R  Cardiovascular: +S1/S2, RRR  Gastrointestinal: soft, NT/ND; +BSx4  Extremities: WWP; no edema, clubbing or cyanosis  Vascular: 2+ radial, DP/PT pulses B/L  Neurological: AAOx3; no focal deficits    LABS:  CBC Full  -  ( 27 Jan 2020 06:28 )  WBC Count : 7.55 K/uL  RBC Count : 3.74 M/uL  Hemoglobin : 11.6 g/dL  Hematocrit : 35.3 %  Platelet Count - Automated : 173 K/uL  Mean Cell Volume : 94.4 fl  Mean Cell Hemoglobin : 31.0 pg  Mean Cell Hemoglobin Concentration : 32.9 gm/dL  Auto Neutrophil # : x  Auto Lymphocyte # : x  Auto Monocyte # : x  Auto Eosinophil # : x  Auto Basophil # : x  Auto Neutrophil % : x  Auto Lymphocyte % : x  Auto Monocyte % : x  Auto Eosinophil % : x  Auto Basophil % : x    01-27    140  |  104  |  16  ----------------------------<  136<H>  4.0   |  24  |  0.72    Ca    8.8      27 Jan 2020 06:28  Mg     2.1     01-27      PT/INR - ( 26 Jan 2020 08:28 )   PT: 11.7 sec;   INR: 1.03          PTT - ( 26 Jan 2020 08:28 )  PTT:31.6 sec    RADIOLOGY & ADDITIONAL STUDIES:  Reviewed personally.

## 2020-01-27 NOTE — DISCHARGE NOTE PROVIDER - PROVIDER TOKENS
FREE:[LAST:[[facility] [place]],PHONE:[phone]],FAX:[(   )    -],ADDRESS:[[address] [place] [address]],SCHEDULEDAPPT:[01/31/2020],SCHEDULEDAPPTTIME:[01:30 PM],ESTABLISHEDPATIENT:[T]] FREE:[LAST:[[facility] [place]],PHONE:[phone]],FAX:[(   )    -],ADDRESS:[[address] [place] [address]],SCHEDULEDAPPT:[01/31/2020],SCHEDULEDAPPTTIME:[01:30 PM],ESTABLISHEDPATIENT:[T]],PROVIDER:[TOKEN:[[id number]],FOLLOWUP:[2 weeks]]

## 2020-01-27 NOTE — DISCHARGE NOTE PROVIDER - CARE PROVIDER_API CALL
[employer] [facility] [place],   [address] [place], [place] [address]  Phone: [phone]  Fax: (   )    -  Established Patient  Scheduled Appointment: 01/31/2020 01:30 PM [facility],   [address] [place] [address]  Phone: [phone]  Fax: (   )    -  Established Patient  Scheduled Appointment: 01/31/2020 01:30 PM    Germain Russell)  Critical Care Medicine; Internal Medicine; Pulmonary Disease  [address] [place] [address]  Phone: [phone]  Fax: [phone]  Follow Up Time: 2 weeks

## 2020-01-27 NOTE — CONSULT NOTE ADULT - ASSESSMENT
60yo man and current ppd smoker w/ COPD w/ frequent exacerbations, questionable non-compliance with therapy presenting with SOB, wheezing, and more productive cough consistent with AECOPD.

## 2020-01-27 NOTE — DISCHARGE NOTE PROVIDER - NSDCMRMEDTOKEN_GEN_ALL_CORE_FT
albuterol 90 mcg/inh inhalation aerosol: 2 puff(s) inhaled every 6 hours as needed for shortness of breath  hydrOXYzine hydrochloride 50 mg oral tablet: 1 tab(s) orally once a day (at bedtime)  Neurontin: orally once a day  predniSONE 20 mg oral tablet: 2 tab(s) orally once a day  PROzac: 60 milligram(s) orally once a day  risperiDONE 4 mg oral tablet: 1 tab(s) orally once a day (at bedtime)  Singulair 10 mg oral tablet: 1 tab(s) orally once a day albuterol 90 mcg/inh inhalation aerosol: 2 puff(s) inhaled every 6 hours as needed for shortness of breath  azithromycin 500 mg oral tablet: 1 tab(s) orally once a day  budesonide-formoterol 160 mcg-4.5 mcg/inh inhalation aerosol: 2 inhaler(s) inhaled 2 times a day   cefpodoxime 200 mg oral tablet: 1 tab(s) orally 2 times a day   hydrOXYzine hydrochloride 50 mg oral tablet: 1 tab(s) orally once a day (at bedtime)  Neurontin: orally once a day  nicotine 21 mg/24 hr transdermal film, extended release: 1 patch transdermal once a day   predniSONE 20 mg oral tablet: 2 tab(s) orally once a day  PROzac: 60 milligram(s) orally once a day  risperiDONE 4 mg oral tablet: 1 tab(s) orally once a day (at bedtime)  Singulair 10 mg oral tablet: 1 tab(s) orally once a day  tiotropium 18 mcg inhalation capsule: 1 cap(s) inhaled once a day

## 2020-01-27 NOTE — PROGRESS NOTE ADULT - PROBLEM SELECTOR PLAN 1
Patient w/ shortness of breath, increase in cough and phlegm production over the past day. Patient non-compliant with medications at home and still active tobacco smoker. Patient w/ multiple ED visits over the past month for similar symptoms. Flu and RSV negative. Cxray w/ no focal consolidation.  -S/p Solumedrol 125mg in the ED, c/w Prednisone 40mg daily for 5 days   - Duonebs q4hrs standing   -C/w Singulair 10mg daily  -COPD Bundle consulted, appreciate reccs  - Started symbicort  - Started ceftriaxone 1 gram q24hr x 5 days and azithromycin 500mg q24hr x 3 days for Gold D COPD exacerbation

## 2020-01-27 NOTE — CONSULT NOTE ADULT - PROBLEM SELECTOR RECOMMENDATION 9
Symptoms and exam consistent with AECOPD, unclear level of compliance and patient has difficulty recalling his inhalers or frequency in which he uses them. CAT score 14; presumptive GOLD class D which would necessitate triple therapy.     Recommendations:  - patient should be on ABx for severe (inpatient) COPD exacerbation; can start CFX/azithro vs. levaquin x5 days  - agree w/ 5d prednisone 40mg daily  - standing duonebs q4hrs  - patient questionably on symbicort, would start while inpatient  - once patient's exacerbation begins improving, would start spiriva and d/c duonebs (to avoid dual MERLY/LAMA dosing -- standalone ventolin/albuterol nebs would be ok to keep)  --> patient should be d/c on symbicort and spiriva and have follow-up w/ his pulmonologist in [place] within 1-2 weeks  - c/w singulair  - PT daily for COPD bundle  - COPD response team activated

## 2020-01-27 NOTE — DISCHARGE NOTE PROVIDER - HOSPITAL COURSE
Patient is 58 yo M with past medical history of COPD, anxiety, depression, bipolar disorder.    Presented with shortness of breath, wheezing and productive cough x1 days, found to have COPD exacerbation.        Problem List/Main Diagnoses (system-based):     # COPD exacerbation    Patient w/ shortness of breath, increase in cough and phlegm production over the past day. Patient non-compliant with medications at home and still active tobacco smoker. Patient w/ multiple ED visits over the past month for similar symptoms. Flu and RSV negative. Cxray w/ no focal consolidation. No need for pneumonia treatment at this time.     -S/p Solumedrol 125mg in the ED, will start on Prednisone 40mg daily for 5 days     -Duonebs q4hrs standing     -C/w Singulair 10mg daily    -COPD Bundle consulted    - Start azithromycin 500mg for 3 days        # Tobacco use    Patient  1PPD smoker for >40 years. Last smoked last night.     -Nicotine patch    -Discussed with patient regarding tobacco cessation, pt reports he would like to quit        # Depression    - C/w Prozac 60mg daily.         # Bipolar disease, chronic    - C/w Risperdal 4mg at bedtime and Hydroxizine 50mg PRN for anxiety.             Inpatient treatment course: Duonebs, prednisone 40mg for 5 days, azithromycin 500mg for 3 days        New medications: prednisone 40mg for 5 days, azithromycin 500mg for 3 days        Labs to be followed outpatient: None    Exam to be followed outpatient: Lung exam Patient is 58 yo M with past medical history of COPD, anxiety, depression, bipolar disorder.    Presented with shortness of breath, wheezing and productive cough x1 days, found to have COPD exacerbation.        Problem List/Main Diagnoses (system-based):     # COPD exacerbation    Patient w/ shortness of breath, increase in cough and phlegm production over the past day. Patient non-compliant with medications at home and still active tobacco smoker. Patient w/ multiple ED visits over the past month for similar symptoms. Flu and RSV negative. Cxray w/ no focal consolidation. No need for pneumonia treatment at this time.     -S/p Solumedrol 125mg in the ED, will start on Prednisone 40mg daily for steroid treatment for a total of 5 days     -Duonebs q4hrs standing     -C/w Singulair 10mg daily    -COPD Bundle consulted, started symbicort, spiriva, ceftriaxone and azithromycin    - Discharge on PO cefpodoxime for a total of 5 days, PO azithromycin for a total of 3 days        # Tobacco use    Patient  1PPD smoker for >40 years. Last smoked last night.     -Nicotine patch    -Discussed with patient regarding tobacco cessation, pt reports he would like to quit    - Discharge on nicotine patch and gum/lozenges        # Depression    - C/w Prozac 60mg daily.         # Bipolar disease, chronic    - C/w Risperdal 4mg at bedtime and Hydroxizine 50mg PRN for anxiety.             Inpatient treatment course: Symbicort, Spiriva, Prednisone, Ceftriaxone, Azithromycin        New medications: Symbicort, Spiriva, Prednisone 40mg for 5 days total, Cefpodoxime for 5 days total, Azithromycin 500mg for 3 days total        Labs to be followed outpatient: None    Exam to be followed outpatient: Lung exam

## 2020-01-27 NOTE — DISCHARGE NOTE PROVIDER - CARE PROVIDERS DIRECT ADDRESSES
,DirectAddress_Unknown ,DirectAddress_Unknown,[email or web address]

## 2020-01-27 NOTE — PROGRESS NOTE ADULT - SUBJECTIVE AND OBJECTIVE BOX
OVERNIGHT EVENTS: NAEO    SUBJECTIVE / INTERVAL HPI: Patient seen and examined at bedside. Pt feeling alright, does not offer any complaints at this time. Breathing is improved. Denies f/c, n/v, HA, chest pain, SOB, abdominal pain, diarrhea, constipation, dysuria.    MEDICATIONS  (STANDING):  albuterol/ipratropium for Nebulization. 3 milliLiter(s) Nebulizer every 4 hours  budesonide 160 MICROgram(s)/formoterol 4.5 MICROgram(s) Inhaler 2 Puff(s) Inhalation two times a day  cefTRIAXone   IVPB 1000 milliGRAM(s) IV Intermittent every 24 hours  chlorhexidine 2% Cloths 1 Application(s) Topical daily  dextrose 5%. 1000 milliLiter(s) (50 mL/Hr) IV Continuous <Continuous>  dextrose 50% Injectable 12.5 Gram(s) IV Push once  dextrose 50% Injectable 25 Gram(s) IV Push once  dextrose 50% Injectable 25 Gram(s) IV Push once  enoxaparin Injectable 40 milliGRAM(s) SubCutaneous every 24 hours  FLUoxetine 60 milliGRAM(s) Oral daily  insulin lispro (HumaLOG) corrective regimen sliding scale   SubCutaneous Before meals and at bedtime  montelukast 10 milliGRAM(s) Oral daily  nicotine - 21 mG/24Hr(s) Patch 1 patch Transdermal daily  predniSONE   Tablet 40 milliGRAM(s) Oral daily  risperiDONE   Tablet 4 milliGRAM(s) Oral at bedtime    MEDICATIONS  (PRN):  acetaminophen   Tablet .. 650 milliGRAM(s) Oral every 6 hours PRN Moderate Pain (4 - 6)  dextrose 40% Gel 15 Gram(s) Oral once PRN Blood Glucose LESS THAN 70 milliGRAM(s)/deciliter  glucagon  Injectable 1 milliGRAM(s) IntraMuscular once PRN Glucose LESS THAN 70 milligrams/deciliter  guaiFENesin   Syrup  (Sugar-Free) 100 milliGRAM(s) Oral every 6 hours PRN Cough  hydrOXYzine  Oral Tab/Cap - Peds 50 milliGRAM(s) Oral at bedtime PRN Anxiety    Allergies    No Known Allergies    Intolerances        VITAL SIGNS:  Vital Signs Last 24 Hrs  T(C): 36.7 (27 Jan 2020 14:03), Max: 36.9 (26 Jan 2020 23:00)  T(F): 98 (27 Jan 2020 14:03), Max: 98.4 (26 Jan 2020 23:00)  HR: 100 (27 Jan 2020 14:03) (97 - 113)  BP: 134/78 (27 Jan 2020 14:03) (134/78 - 158/82)  BP(mean): --  RR: 19 (27 Jan 2020 14:03) (17 - 19)  SpO2: 95% (27 Jan 2020 14:03) (92% - 98%)      01-27-20 @ 07:01  -  01-27-20 @ 15:19  --------------------------------------------------------  IN: 520 mL / OUT: 400 mL / NET: 120 mL        PHYSICAL EXAM:  General: NAD, Laying comfortably in bed, on RA  HEENT: NC/AT, anicteric sclera, MMM  Neck: supple  Cardiovascular: +S1/S2, RRR, No murmurs, rubs, gallops  Respiratory: Mild diffuse rhonchi and expiratory wheezing throughout all lung fields, poor air entry throughout  Gastrointestinal: soft, NT/ND, +BSx4  Extremities: WWP, no edema, clubbing or cyanosis  Vascular: 2+ radial, DP/PT pulses B/L  Neurological: AAOx3, no focal deficits      LABS:                        11.6   7.55  )-----------( 173      ( 27 Jan 2020 06:28 )             35.3     01-27    140  |  104  |  16  ----------------------------<  136<H>  4.0   |  24  |  0.72    Ca    8.8      27 Jan 2020 06:28  Mg     2.1     01-27      PT/INR - ( 26 Jan 2020 08:28 )   PT: 11.7 sec;   INR: 1.03     PTT - ( 26 Jan 2020 08:28 )  PTT:31.6 sec    CAPILLARY BLOOD GLUCOSE  POCT Blood Glucose.: 164 mg/dL (27 Jan 2020 12:34)  POCT Blood Glucose.: 123 mg/dL (27 Jan 2020 06:43)  POCT Blood Glucose.: 110 mg/dL (26 Jan 2020 21:57)  POCT Blood Glucose.: 229 mg/dL (26 Jan 2020 17:44)      RADIOLOGY & ADDITIONAL TESTS: Reviewed.

## 2020-01-27 NOTE — DISCHARGE NOTE PROVIDER - NSDCCPCAREPLAN_GEN_ALL_CORE_FT
PRINCIPAL DISCHARGE DIAGNOSIS  Diagnosis: COPD exacerbation  Assessment and Plan of Treatment: You have a lung condition called chronic obstructive pulmonary disease (COPD). It is caused by lung damage from chronic irritation and inflammation from smoking. COPD is a serious condition that can get worse over time. However, there are measures you can take to help you feel better and prevent exacerbations. These measure include smoking cessation (if you have not already), avoiding others who smoke, exercising for at least 20 minutes a day, annual influenza vaccine, and a pneumonia vaccine (if you have not received this already). When you feel short of breath, take a deep breath through your nose and slowly breath out through your mouth with your lips pursed for twice as long as you inhaled. It is important for you to take your medication as directed since it can prevent future attacks. Please return to the emergency department should have symptoms such as but not limited to: severe shortness of breath, wheezing, cough, coughing up blood, vomiting associated with cough, chest pain. Please continue to take Prednisone 40mg one tab daily for 5 days, Azithromycin 500mg one tab daily for 3 days, and your albuterol inhaler as needed and follow-up with your primary care physician (and/or pulmonologist).      SECONDARY DISCHARGE DIAGNOSES  Diagnosis: Tobacco use  Assessment and Plan of Treatment: You have a history of tobacco use. Smoking puts you at exponential risk for debilitating stroke, heart disease and heart attack, lung disease and lung cancer, and many other types of cancers including but not limited to cancer of the mouth, throat, stomach, kidney, and bladder. Cutting back or quitting will dramatically decrease your risk of premature death and all of the above medical problems. When you feel ready, there are many resources that may help you cut back and/or quit. Call the [facility] Smokers' Quitline at 8-926-HY-QUITS (1-189.316.2533) or visit www.[employer]. The Quitline also provides free starter kits of nicotine replacement therapy (NRT) to eligible [facility]. Services are free and confidential. You may also work with your primary doctor to develop strategies to cut back and/or quit. PRINCIPAL DISCHARGE DIAGNOSIS  Diagnosis: COPD exacerbation  Assessment and Plan of Treatment: You have a lung condition called chronic obstructive pulmonary disease (COPD). It is caused by lung damage from chronic irritation and inflammation from  COPD is a serious condition that can get worse over time. However, there are measures you can take to help you feel better and prevent exacerbations. These measure include smoking cessation (if you have not already), avoiding others who smoke, exercising for at least 20 minutes a day, annual influenza vaccine, and a pneumonia vaccine (if you have not received this already). When you feel short of breath, take a deep breath through your nose and slowly breath out through your mouth with your lips pursed for twice as long as you inhaled. It is important for you to take your medication as directed since it can prevent future attacks. Please return to the emergency department should have symptoms such as but not limited to: severe shortness of breath, wheezing, cough, coughing up blood, vomiting associated with cough, chest pain.   Please continue to take Prednisone for 2 more days, Cefpodoxime for 3 more days, Azithromycin for 1 more day, Spiriva and Symbicort inhalers daily, and your albuterol inhaler as needed. Please follow-up with your primary care physician (and/or pulmonologist).      SECONDARY DISCHARGE DIAGNOSES  Diagnosis: Tobacco use  Assessment and Plan of Treatment: You have a history of tobacco use. Smoking puts you at exponential risk for debilitating stroke, heart disease and heart attack, lung disease and lung cancer, and many other types of cancers including but not limited to cancer of the mouth, throat, stomach, kidney, and bladder. Cutting back or quitting will dramatically decrease your risk of premature death and all of the above medical problems. When you feel ready, there are many resources that may help you cut back and/or quit. Call the [facility] Smokers' Quitline at 8-335-FS-QUITS (1-619.709.7605) or visit www.[employer]. The Quitline also provides free starter kits of nicotine replacement therapy (NRT) to eligible [place]. Services are free and confidential. You may also work with your primary doctor to develop strategies to cut back and/or quit.

## 2020-01-27 NOTE — PROGRESS NOTE ADULT - ASSESSMENT
60 y/o M, current smoker w/ PMHx notable for COPD, anxiety, depression, bipolar disorder who is presenting with shortness of breath, wheezing and productive cough x1 days admitted for COPD exacerbation.

## 2020-01-27 NOTE — CONSULT NOTE ADULT - PROBLEM/RECOMMENDATION-2
[facility] Emergency Department    [address]    [facility] [address]    Phone:  [phone]    Fax:  [phone]                                       Micheline Camargo   MRN: [id number]    Department:  [facility] Emergency Department   Date of Visit:  11/20/2018           After Visit Summary Signature Page     I have received my discharge instructions, and my questions have been answered. I have discussed any challenges I see with this plan with the nurse or doctor.    ..........................................................................................................................................  Patient/Patient Representative Signature      ..........................................................................................................................................  Patient Representative Print Name and Relationship to Patient    ..................................................               ................................................  Date                                   Time    ..........................................................................................................................................  Reviewed by Signature/Title    ...................................................              ..............................................  Date                                               Time          22EPIC Rev 08/18         DISPLAY PLAN FREE TEXT

## 2020-01-27 NOTE — CONSULT NOTE ADULT - ATTENDING COMMENTS
59 year old male, current smoker with acute exacerbation of COPD with h/o frequent exacerbation in recent past with possible non compliance to medications/inhaler.  Labs reviewed (no eosinophilia), CXR reviewed by me: No pneumonia. Emphysema  Plan:  - Agree with adding Ceftriaxone and azithromycin for 5 days  - Continue prednisone 40 mg daily for 5 days  - Continue Duoneb Q4hr prn for now  - Continue symbicort  - Patient will need addition of LAMA (Spiriva or incruze) on discharge  - Follow up with his pulmonary as outpatient (we will reach out to his pulmonary physician)  - Counselling done about quit smoking.

## 2020-01-28 ENCOUNTER — TRANSCRIPTION ENCOUNTER (OUTPATIENT)
Age: 60
End: 2020-01-28

## 2020-01-28 VITALS
OXYGEN SATURATION: 98 % | SYSTOLIC BLOOD PRESSURE: 138 MMHG | RESPIRATION RATE: 17 BRPM | TEMPERATURE: 99 F | HEART RATE: 102 BPM | DIASTOLIC BLOOD PRESSURE: 72 MMHG

## 2020-01-28 DIAGNOSIS — R06.02 SHORTNESS OF BREATH: ICD-10-CM

## 2020-01-28 DIAGNOSIS — J44.9 CHRONIC OBSTRUCTIVE PULMONARY DISEASE, UNSPECIFIED: ICD-10-CM

## 2020-01-28 DIAGNOSIS — R09.81 NASAL CONGESTION: ICD-10-CM

## 2020-01-28 DIAGNOSIS — R05 COUGH: ICD-10-CM

## 2020-01-28 DIAGNOSIS — R06.09 OTHER FORMS OF DYSPNEA: ICD-10-CM

## 2020-01-28 DIAGNOSIS — Z79.899 OTHER LONG TERM (CURRENT) DRUG THERAPY: ICD-10-CM

## 2020-01-28 LAB
ANION GAP SERPL CALC-SCNC: 12 MMOL/L — SIGNIFICANT CHANGE UP (ref 5–17)
BUN SERPL-MCNC: 13 MG/DL — SIGNIFICANT CHANGE UP (ref 7–23)
CALCIUM SERPL-MCNC: 8.2 MG/DL — LOW (ref 8.4–10.5)
CHLORIDE SERPL-SCNC: 101 MMOL/L — SIGNIFICANT CHANGE UP (ref 96–108)
CO2 SERPL-SCNC: 27 MMOL/L — SIGNIFICANT CHANGE UP (ref 22–31)
CREAT SERPL-MCNC: 0.81 MG/DL — SIGNIFICANT CHANGE UP (ref 0.5–1.3)
GLUCOSE BLDC GLUCOMTR-MCNC: 180 MG/DL — HIGH (ref 70–99)
GLUCOSE SERPL-MCNC: 110 MG/DL — HIGH (ref 70–99)
HCT VFR BLD CALC: 34 % — LOW (ref 39–50)
HGB BLD-MCNC: 11.2 G/DL — LOW (ref 13–17)
MAGNESIUM SERPL-MCNC: 1.9 MG/DL — SIGNIFICANT CHANGE UP (ref 1.6–2.6)
MCHC RBC-ENTMCNC: 31 PG — SIGNIFICANT CHANGE UP (ref 27–34)
MCHC RBC-ENTMCNC: 32.9 GM/DL — SIGNIFICANT CHANGE UP (ref 32–36)
MCV RBC AUTO: 94.2 FL — SIGNIFICANT CHANGE UP (ref 80–100)
NRBC # BLD: 0 /100 WBCS — SIGNIFICANT CHANGE UP (ref 0–0)
PHOSPHATE SERPL-MCNC: 3.1 MG/DL — SIGNIFICANT CHANGE UP (ref 2.5–4.5)
PLATELET # BLD AUTO: 163 K/UL — SIGNIFICANT CHANGE UP (ref 150–400)
POTASSIUM SERPL-MCNC: 3.8 MMOL/L — SIGNIFICANT CHANGE UP (ref 3.5–5.3)
POTASSIUM SERPL-SCNC: 3.8 MMOL/L — SIGNIFICANT CHANGE UP (ref 3.5–5.3)
RBC # BLD: 3.61 M/UL — LOW (ref 4.2–5.8)
RBC # FLD: 15.7 % — HIGH (ref 10.3–14.5)
SODIUM SERPL-SCNC: 140 MMOL/L — SIGNIFICANT CHANGE UP (ref 135–145)
WBC # BLD: 5.47 K/UL — SIGNIFICANT CHANGE UP (ref 3.8–10.5)
WBC # FLD AUTO: 5.47 K/UL — SIGNIFICANT CHANGE UP (ref 3.8–10.5)

## 2020-01-28 PROCEDURE — 84132 ASSAY OF SERUM POTASSIUM: CPT

## 2020-01-28 PROCEDURE — 82330 ASSAY OF CALCIUM: CPT

## 2020-01-28 PROCEDURE — 83036 HEMOGLOBIN GLYCOSYLATED A1C: CPT

## 2020-01-28 PROCEDURE — 87040 BLOOD CULTURE FOR BACTERIA: CPT

## 2020-01-28 PROCEDURE — 99239 HOSP IP/OBS DSCHRG MGMT >30: CPT

## 2020-01-28 PROCEDURE — 96374 THER/PROPH/DIAG INJ IV PUSH: CPT

## 2020-01-28 PROCEDURE — 83880 ASSAY OF NATRIURETIC PEPTIDE: CPT

## 2020-01-28 PROCEDURE — 83735 ASSAY OF MAGNESIUM: CPT

## 2020-01-28 PROCEDURE — 99232 SBSQ HOSP IP/OBS MODERATE 35: CPT | Mod: GC

## 2020-01-28 PROCEDURE — 85025 COMPLETE CBC W/AUTO DIFF WBC: CPT

## 2020-01-28 PROCEDURE — 82962 GLUCOSE BLOOD TEST: CPT

## 2020-01-28 PROCEDURE — 71045 X-RAY EXAM CHEST 1 VIEW: CPT

## 2020-01-28 PROCEDURE — 84295 ASSAY OF SERUM SODIUM: CPT

## 2020-01-28 PROCEDURE — 84484 ASSAY OF TROPONIN QUANT: CPT

## 2020-01-28 PROCEDURE — 99285 EMERGENCY DEPT VISIT HI MDM: CPT | Mod: 25

## 2020-01-28 PROCEDURE — 83605 ASSAY OF LACTIC ACID: CPT

## 2020-01-28 PROCEDURE — 85027 COMPLETE CBC AUTOMATED: CPT

## 2020-01-28 PROCEDURE — 85730 THROMBOPLASTIN TIME PARTIAL: CPT

## 2020-01-28 PROCEDURE — 94640 AIRWAY INHALATION TREATMENT: CPT

## 2020-01-28 PROCEDURE — 84100 ASSAY OF PHOSPHORUS: CPT

## 2020-01-28 PROCEDURE — 36415 COLL VENOUS BLD VENIPUNCTURE: CPT

## 2020-01-28 PROCEDURE — 80048 BASIC METABOLIC PNL TOTAL CA: CPT

## 2020-01-28 PROCEDURE — 87631 RESP VIRUS 3-5 TARGETS: CPT

## 2020-01-28 PROCEDURE — 82803 BLOOD GASES ANY COMBINATION: CPT

## 2020-01-28 PROCEDURE — 85610 PROTHROMBIN TIME: CPT

## 2020-01-28 PROCEDURE — 86803 HEPATITIS C AB TEST: CPT

## 2020-01-28 RX ORDER — TIOTROPIUM BROMIDE 18 UG/1
1 CAPSULE ORAL; RESPIRATORY (INHALATION) DAILY
Refills: 0 | Status: DISCONTINUED | OUTPATIENT
Start: 2020-01-28 | End: 2020-01-28

## 2020-01-28 RX ORDER — ALBUTEROL 90 UG/1
2 AEROSOL, METERED ORAL EVERY 6 HOURS
Refills: 0 | Status: DISCONTINUED | OUTPATIENT
Start: 2020-01-28 | End: 2020-01-28

## 2020-01-28 RX ORDER — BUDESONIDE AND FORMOTEROL FUMARATE DIHYDRATE 160; 4.5 UG/1; UG/1
2 AEROSOL RESPIRATORY (INHALATION)
Qty: 1 | Refills: 0
Start: 2020-01-28 | End: 2020-02-26

## 2020-01-28 RX ORDER — IPRATROPIUM/ALBUTEROL SULFATE 18-103MCG
3 AEROSOL WITH ADAPTER (GRAM) INHALATION EVERY 6 HOURS
Refills: 0 | Status: DISCONTINUED | OUTPATIENT
Start: 2020-01-28 | End: 2020-01-28

## 2020-01-28 RX ADMIN — Medication 1 PATCH: at 12:05

## 2020-01-28 RX ADMIN — Medication 3 MILLILITER(S): at 03:23

## 2020-01-28 RX ADMIN — MONTELUKAST 10 MILLIGRAM(S): 4 TABLET, CHEWABLE ORAL at 12:02

## 2020-01-28 RX ADMIN — BUDESONIDE AND FORMOTEROL FUMARATE DIHYDRATE 2 PUFF(S): 160; 4.5 AEROSOL RESPIRATORY (INHALATION) at 07:08

## 2020-01-28 RX ADMIN — Medication 60 MILLIGRAM(S): at 12:02

## 2020-01-28 RX ADMIN — CEFTRIAXONE 100 MILLIGRAM(S): 500 INJECTION, POWDER, FOR SOLUTION INTRAMUSCULAR; INTRAVENOUS at 16:19

## 2020-01-28 RX ADMIN — PANTOPRAZOLE SODIUM 40 MILLIGRAM(S): 20 TABLET, DELAYED RELEASE ORAL at 07:07

## 2020-01-28 RX ADMIN — Medication 40 MILLIGRAM(S): at 07:12

## 2020-01-28 RX ADMIN — Medication 1 PATCH: at 12:00

## 2020-01-28 RX ADMIN — Medication 3 MILLILITER(S): at 11:57

## 2020-01-28 RX ADMIN — Medication 3 MILLILITER(S): at 07:08

## 2020-01-28 RX ADMIN — Medication 1 PATCH: at 07:14

## 2020-01-28 RX ADMIN — TIOTROPIUM BROMIDE 1 CAPSULE(S): 18 CAPSULE ORAL; RESPIRATORY (INHALATION) at 16:19

## 2020-01-28 RX ADMIN — ENOXAPARIN SODIUM 40 MILLIGRAM(S): 100 INJECTION SUBCUTANEOUS at 13:47

## 2020-01-28 RX ADMIN — AZITHROMYCIN 500 MILLIGRAM(S): 500 TABLET, FILM COATED ORAL at 16:19

## 2020-01-28 NOTE — DISCHARGE NOTE NURSING/CASE MANAGEMENT/SOCIAL WORK - PATIENT PORTAL LINK FT
You can access the FollowMyHealth Patient Portal offered by [facility] by registering at the following website: [email or web address]. By joining [facility] [employer] [facility] FollowMyHealth portal, you will also be able to view your health information using other applications (apps) compatible with our system.

## 2020-01-28 NOTE — PROGRESS NOTE ADULT - ASSESSMENT
58yo man and current ppd smoker w/ COPD w/ frequent exacerbations, questionable non-compliance with therapy presenting with SOB, wheezing, and more productive cough consistent with AECOPD.

## 2020-01-28 NOTE — PROGRESS NOTE ADULT - PROBLEM SELECTOR PLAN 2
Reports using nicotrol inhaler as outpatient via his pulmonologist    Recommendations:  - c/w nicotine transdermal patch daily  - consider adding polacrilex gum/lozenge 4mg q1-2h PRN cravings while admitted  - patient would prefer to resume using the nicotrol inhaler once he is discharged

## 2020-01-28 NOTE — PROGRESS NOTE ADULT - PROBLEM SELECTOR PLAN 1
Symptoms and exam consistent with AECOPD, unclear level of compliance and patient has difficulty recalling his inhalers or frequency in which he uses them. CAT score 14; presumptive GOLD class D which would necessitate triple therapy.     Recommendations:  - c/w 5d course of CFX/azithro for AECOPD  - c/w 5d course of prednisone 40mg daily  - standing duonebs q4hrs  - c/w symbicort  - once patient's exacerbation begins improving, would start spiriva and d/c duonebs (to avoid dual MERLY/LAMA dosing -- standalone ventolin/albuterol nebs would be ok to keep)  --> patient should be d/c on symbicort and spiriva and have follow-up w/ his pulmonologist in [place] within 1-2 weeks  - c/w singulair  - PT daily for COPD bundle  - COPD response team following  - please ensure patient has f/u with his community pulmonologist in [place] within 1-2wks of discharge from the hospital. This was also relayed to the patient on rounds.

## 2020-01-28 NOTE — PROGRESS NOTE ADULT - ATTENDING COMMENTS
Clinical improvement in COPD acute exacerbation. Recommendation as above. Follow up with his pulmonologist with in 1 week. Rest as above.

## 2020-01-28 NOTE — PROGRESS NOTE ADULT - SUBJECTIVE AND OBJECTIVE BOX
PULMONARY CONSULT SERVICE FOLLOW-UP NOTE    INTERVAL HPI:  Reviewed chart and overnight events; patient seen and examined at bedside. Denies new or worsening respiratory sx. Says "this is all just a cold." Has been ambulating to/from the bathroom and does not feel he needs oxygen. Still coughing and wheezing but feels better after nebulizer treatments.    MEDICATIONS:  Pulmonary:  ALBUTerol    90 MICROgram(s) HFA Inhaler 2 Puff(s) Inhalation every 6 hours  budesonide 160 MICROgram(s)/formoterol 4.5 MICROgram(s) Inhaler 2 Puff(s) Inhalation two times a day  guaiFENesin   Syrup  (Sugar-Free) 100 milliGRAM(s) Oral every 6 hours PRN  montelukast 10 milliGRAM(s) Oral daily  tiotropium 18 MICROgram(s) Capsule 1 Capsule(s) Inhalation daily    Antimicrobials:  azithromycin   Tablet 500 milliGRAM(s) Oral daily  cefTRIAXone   IVPB 1000 milliGRAM(s) IV Intermittent every 24 hours    Anticoagulants:  enoxaparin Injectable 40 milliGRAM(s) SubCutaneous every 24 hours    Cardiac:    Allergies    No Known Allergies    Intolerances    Vital Signs Last 24 Hrs  T(C): 37.1 (28 Jan 2020 15:18), Max: 37.1 (28 Jan 2020 15:18)  T(F): 98.7 (28 Jan 2020 15:18), Max: 98.7 (28 Jan 2020 15:18)  HR: 102 (28 Jan 2020 15:18) (102 - 113)  BP: 138/72 (28 Jan 2020 15:18) (123/73 - 150/97)  BP(mean): --  RR: 17 (28 Jan 2020 15:18) (17 - 18)  SpO2: 98% (28 Jan 2020 15:18) (94% - 99%)    01-27 @ 07:01  -  01-28 @ 07:00  --------------------------------------------------------  IN: 970 mL / OUT: 400 mL / NET: 570 mL    01-28 @ 07:01 - 01-28 @ 17:19  --------------------------------------------------------  IN: 1200 mL / OUT: 0 mL / NET: 1200 mL    PHYSICAL EXAM:  Constitutional: WDWN man resting in bed; NAD  HEENT: NC/AT; PERRL, anicteric sclera; MMM  Neck: supple  Cardiovascular: +S1/S2, RRR  Respiratory: diffuse bilateral wheezing with faint BS at bases, some component of transmitted BS; conversive in full sentences and w/o labored breathing  Gastrointestinal: soft, NT/ND  Extremities: WWP; no edema, clubbing or cyanosis  Vascular: 2+ radial pulses B/L  Neurological: awake and alert; YEE  Psychiatric: normal mood, euthymic affect    LABS:  CBC Full  -  ( 28 Jan 2020 05:49 )  WBC Count : 5.47 K/uL  RBC Count : 3.61 M/uL  Hemoglobin : 11.2 g/dL  Hematocrit : 34.0 %  Platelet Count - Automated : 163 K/uL  Mean Cell Volume : 94.2 fl  Mean Cell Hemoglobin : 31.0 pg  Mean Cell Hemoglobin Concentration : 32.9 gm/dL  Auto Neutrophil # : x  Auto Lymphocyte # : x  Auto Monocyte # : x  Auto Eosinophil # : x  Auto Basophil # : x  Auto Neutrophil % : x  Auto Lymphocyte % : x  Auto Monocyte % : x  Auto Eosinophil % : x  Auto Basophil % : x    01-28    140  |  101  |  13  ----------------------------<  110<H>  3.8   |  27  |  0.81    Ca    8.2<L>      28 Jan 2020 05:49  Phos  3.1     01-28  Mg     1.9     01-28    RADIOLOGY & ADDITIONAL STUDIES:  Reviewed personally.

## 2020-01-29 RX ORDER — CEFPODOXIME PROXETIL 100 MG
1 TABLET ORAL
Qty: 6 | Refills: 0
Start: 2020-01-29 | End: 2020-01-31

## 2020-01-29 RX ORDER — NICOTINE POLACRILEX 2 MG
1 GUM BUCCAL
Qty: 7 | Refills: 0
Start: 2020-01-29 | End: 2020-02-04

## 2020-01-29 RX ORDER — TIOTROPIUM BROMIDE 18 UG/1
1 CAPSULE ORAL; RESPIRATORY (INHALATION)
Qty: 30 | Refills: 0
Start: 2020-01-29 | End: 2020-02-27

## 2020-01-29 RX ORDER — AZITHROMYCIN 500 MG/1
1 TABLET, FILM COATED ORAL
Qty: 1 | Refills: 0
Start: 2020-01-29 | End: 2020-01-29

## 2020-01-31 DIAGNOSIS — R06.02 SHORTNESS OF BREATH: ICD-10-CM

## 2020-01-31 DIAGNOSIS — J44.9 CHRONIC OBSTRUCTIVE PULMONARY DISEASE, UNSPECIFIED: ICD-10-CM

## 2020-01-31 DIAGNOSIS — Z79.899 OTHER LONG TERM (CURRENT) DRUG THERAPY: ICD-10-CM

## 2020-01-31 LAB
CULTURE RESULTS: SIGNIFICANT CHANGE UP
SPECIMEN SOURCE: SIGNIFICANT CHANGE UP

## 2020-02-05 DIAGNOSIS — F17.210 NICOTINE DEPENDENCE, CIGARETTES, UNCOMPLICATED: ICD-10-CM

## 2020-02-05 DIAGNOSIS — F31.89 OTHER BIPOLAR DISORDER: ICD-10-CM

## 2020-02-05 DIAGNOSIS — Z91.14 PATIENT'S OTHER NONCOMPLIANCE WITH MEDICATION REGIMEN: ICD-10-CM

## 2020-02-05 DIAGNOSIS — F32.9 MAJOR DEPRESSIVE DISORDER, SINGLE EPISODE, UNSPECIFIED: ICD-10-CM

## 2020-02-05 DIAGNOSIS — J20.9 ACUTE BRONCHITIS, UNSPECIFIED: ICD-10-CM

## 2020-02-05 DIAGNOSIS — J44.1 CHRONIC OBSTRUCTIVE PULMONARY DISEASE WITH (ACUTE) EXACERBATION: ICD-10-CM

## 2020-03-10 ENCOUNTER — EMERGENCY (EMERGENCY)
Facility: HOSPITAL | Age: 60
LOS: 1 days | Discharge: HOME | End: 2020-03-10
Attending: EMERGENCY MEDICINE | Admitting: EMERGENCY MEDICINE
Payer: MEDICARE

## 2020-03-10 VITALS
DIASTOLIC BLOOD PRESSURE: 89 MMHG | WEIGHT: 210.1 LBS | RESPIRATION RATE: 18 BRPM | OXYGEN SATURATION: 94 % | SYSTOLIC BLOOD PRESSURE: 148 MMHG | HEIGHT: 72 IN | TEMPERATURE: 98 F | HEART RATE: 83 BPM

## 2020-03-10 DIAGNOSIS — J44.9 CHRONIC OBSTRUCTIVE PULMONARY DISEASE, UNSPECIFIED: ICD-10-CM

## 2020-03-10 DIAGNOSIS — R06.02 SHORTNESS OF BREATH: ICD-10-CM

## 2020-03-10 DIAGNOSIS — Z98.890 OTHER SPECIFIED POSTPROCEDURAL STATES: Chronic | ICD-10-CM

## 2020-03-10 DIAGNOSIS — Z90.89 ACQUIRED ABSENCE OF OTHER ORGANS: Chronic | ICD-10-CM

## 2020-03-10 PROCEDURE — 99284 EMERGENCY DEPT VISIT MOD MDM: CPT

## 2020-03-10 RX ORDER — IPRATROPIUM/ALBUTEROL SULFATE 18-103MCG
3 AEROSOL WITH ADAPTER (GRAM) INHALATION ONCE
Refills: 0 | Status: COMPLETED | OUTPATIENT
Start: 2020-03-10 | End: 2020-03-10

## 2020-03-10 NOTE — ED PROVIDER NOTE - PHYSICAL EXAMINATION
GEN: Well appearing, well nourished, awake, alert, oriented to person, place, time/situation and in no apparent distress.  ENT: Airway patent, Nasal mucosa clear. Mouth with normal mucosa.  EYES: Clear bilaterally.  RESPIRATORY: Breathing comfortably with normal RR.  CARDIAC: Regular rate and rhythm  ABDOMEN: Soft, nontender, +bowel sounds, no rebound, rigidity, or guarding.  MSK: Range of motion is not limited, no deformities noted.  NEURO: Alert and oriented, no focal deficits.  SKIN: Skin normal color for race, warm, dry and intact. No evidence of rash.  PSYCH: Alert and oriented to person, place, time/situation. normal mood and affect. no apparent risk to self or others. GEN: Well appearing, well nourished, awake, alert, oriented to person, place, time/situation and in no apparent distress.  ENT: Airway patent, Nasal mucosa clear. Mouth with normal mucosa.  EYES: Clear bilaterally.  RESPIRATORY: Breathing comfortably with normal RR. bilateral end expiratory wheezing, no crackles, no hypoxia, no rsp distress.  CARDIAC: Regular rate and rhythm  ABDOMEN: Soft, nontender, +bowel sounds, no rebound, rigidity, or guarding.  MSK: Range of motion is not limited, no deformities noted.  NEURO: Alert and oriented, no focal deficits.  SKIN: Skin normal color for race, warm, dry and intact. No evidence of rash.  PSYCH: Alert and oriented to person, place, time/situation. normal mood and affect. no apparent risk to self or others.

## 2020-03-10 NOTE — ED PROVIDER NOTE - CLINICAL SUMMARY MEDICAL DECISION MAKING FREE TEXT BOX
58 y/o M pt with above PMHx presents to ED with mild COPD exacerbation. Bilateral wheezing noted on exam, however pt is not hypoxic and not tachypneic. Will treat with nebs and PO Prednisone, obtain CXR, and anticipate discharge home if pt feels better and workup unremarkable.

## 2020-03-10 NOTE — ED PROVIDER NOTE - NSFOLLOWUPINSTRUCTIONS_ED_ALL_ED_FT
Please follow up with your primary care physician. If you have any problem getting an appointment please call the ED Referral Coordinator at [phone].  Please take medications as directed and pick them up from the pharmacy.  Return to the Emergency Department if you have any new or worsening symptoms, or for any other concerns. Please read below for further information.    Chronic Obstructive Pulmonary Disease    Chronic obstructive pulmonary disease (COPD) is a lung condition in which airflow from the lungs is limited. Causes include smoking, secondhand smoke exposure, genetics, or recurrent infections. Take all medicines (inhaled or pills) as directed by your health care provider. Avoid exposure to irritants such as smoke, chemicals, and fumes that aggravate your breathing.    If you are a smoker, the most important thing that you can do is stop smoking. Continuing to smoke will cause further lung damage and breathing trouble. Ask your health care provider for help with quitting smoking.    SEEK IMMEDIATE MEDICAL CARE IF YOU HAVE ANY OF THE FOLLOWING SYMPTOMS: shortness of breath at rest or when talking, bluish discoloration of lips, skin, fever, worsening cough, unexplained chest pain, or lightheadedness/dizziness.

## 2020-03-10 NOTE — ED ADULT NURSE NOTE - OBJECTIVE STATEMENT
Pt is a 58 y/o male A&Ox4 in NAD ambulatory with steady gait c/o HOUSE. Pt denies chest pain, cough, fever/chills, N/V/D. Pt reports recent admission for same complaint last month and "ran out of albuterol inhaler today." Wheezes noted on auscultation, pt talking in clear, full sentences, respirations even and unlabored, 95% on RA.

## 2020-03-10 NOTE — ED PROVIDER NOTE - OBJECTIVE STATEMENT
60 y/o M pt with PMHx of COPD, anxiety, depression, and bipolar, and PSHx of tonsillectomy presents to ED c/o shortness of breath, wheezing, and dry cough x 1 day. Pt states he ran out of his albuterol recently, but is tolerating PO. Pt denies fevers, chills, sputum production, chest pain, dizziness, syncope, recent travel, sick contacts including potential COVID-19 exposure, and any other acute complaints. 58 y/o M pt with PMHx of COPD, anxiety, depression, and bipolar, and PSHx of tonsillectomy presents to ED c/o shortness of breath, wheezing, and dry cough x 1 day. Pt states he ran out of his albuterol recently, he is tolerating PO. Pt denies fevers, chills, sputum production, chest pain, dizziness, syncope, recent travel, sick contacts including potential COVID-19 exposure, and any other acute complaints.

## 2020-03-10 NOTE — ED PROVIDER NOTE - PATIENT PORTAL LINK FT
You can access the FollowMyHealth Patient Portal offered by [facility] by registering at the following website: [email or web address]. By joining [employer]’s FollowMyHealth portal, you will also be able to view your health information using other applications (apps) compatible with our system.

## 2020-03-11 VITALS
HEART RATE: 80 BPM | OXYGEN SATURATION: 98 % | RESPIRATION RATE: 16 BRPM | TEMPERATURE: 98 F | SYSTOLIC BLOOD PRESSURE: 140 MMHG | DIASTOLIC BLOOD PRESSURE: 85 MMHG

## 2020-03-11 PROCEDURE — 71046 X-RAY EXAM CHEST 2 VIEWS: CPT | Mod: 26

## 2020-03-11 PROCEDURE — 71046 X-RAY EXAM CHEST 2 VIEWS: CPT

## 2020-03-11 PROCEDURE — 94640 AIRWAY INHALATION TREATMENT: CPT

## 2020-03-11 PROCEDURE — 99283 EMERGENCY DEPT VISIT LOW MDM: CPT | Mod: 25

## 2020-03-11 RX ORDER — ALBUTEROL 90 UG/1
2 AEROSOL, METERED ORAL
Qty: 1 | Refills: 0
Start: 2020-03-11

## 2020-03-11 RX ADMIN — Medication 3 MILLILITER(S): at 00:12

## 2020-03-11 RX ADMIN — Medication 60 MILLIGRAM(S): at 00:12

## 2020-09-18 NOTE — ED PROVIDER NOTE - ST/T WAVE
Inpatient Podiatry Consult  Consult performed by: Festus Clemente DPM  Consult ordered by: Ellie Raza MD          Patient Care Team:  Alisia Ramirez APRN as PCP - General (Nurse Practitioner)  Alisia Ramirez APRN (Nurse Practitioner)  Vijay Han MD as Consulting Physician (Hematology and Oncology)    Chief complaint: toe pain     History of Present Illness    Pleasant 68-year-old female with past medical history significant for depression, schizoaffective disorder and diabetes mellitus seen in the psychiatric [facility] at the request the primary care team Dr. Raza for evaluation of painful toe nails.  Patient states that her toenails have become very long, thick and painful.  They are catching on her socks causing discomfort.  Patient is unable to trim them herself due to her psychiatric illness and the fact that she is blind.  She has no other pedal complaints today.    Review of Systems   Constitutional: Negative for chills and fever.   HENT: Negative for facial swelling and hearing loss.    Respiratory: Negative.    Cardiovascular: Negative.    Gastrointestinal: Negative.    Endocrine: Negative.    Musculoskeletal:        Toe pain    Skin:        Dry skin    Psychiatric/Behavioral: Negative for agitation. The patient is nervous/anxious.         Past Medical History:   Diagnosis Date   • Depression    • Psychiatric illness    • Schizoaffective disorder (CMS/HCC)    ,   Past Surgical History:   Procedure Laterality Date   • COLONOSCOPY N/A 8/29/2020    Procedure: COLONOSCOPY;  Surgeon: Ge Gorman MD;  Location: [facility];  Service: Gastroenterology;  Laterality: N/A;   • ENDOSCOPY N/A 8/28/2020    Procedure: ESOPHAGOGASTRODUODENOSCOPY;  Surgeon: Ge Gorman MD;  Location: [facility];  Service: Gastroenterology;  Laterality: N/A;   ,   Family History   Problem Relation Age of Onset   • Dementia Mother    • No Known Problems Father    • No Known Problems Sister    •  "No Known Problems Brother    • No Known Problems Maternal Aunt    • No Known Problems Paternal Aunt    • No Known Problems Maternal Uncle    • No Known Problems Paternal Uncle    • No Known Problems Maternal Grandfather    • No Known Problems Maternal Grandmother    • No Known Problems Paternal Grandfather    • No Known Problems Paternal Grandmother    • No Known Problems Cousin    • No Known Problems Other    • Suicide Attempts Neg Hx    • ADD / ADHD Neg Hx    • Alcohol abuse Neg Hx    • Anxiety disorder Neg Hx    • Bipolar disorder Neg Hx    • Depression Neg Hx    • Drug abuse Neg Hx    • OCD Neg Hx    • Paranoid behavior Neg Hx    • Schizophrenia Neg Hx    • Seizures Neg Hx    • Self-Injurious Behavior  Neg Hx    ,   Social History     Tobacco Use   • Smoking status: Former Smoker   • Smokeless tobacco: Never Used   • Tobacco comment: \" a little bit a long time ago\"   Substance Use Topics   • Alcohol use: Not Currently   • Drug use: Never     E-cigarette/Vaping     E-cigarette/Vaping Substances     E-cigarette/Vaping Devices       ,   Medications Prior to Admission   Medication Sig Dispense Refill Last Dose   • Empagliflozin (Jardiance) 10 MG tablet Take 10 mg by mouth Daily.   9/3/2020 at Unknown time   • escitalopram (LEXAPRO) 20 MG tablet Take 20 mg by mouth Daily.   9/3/2020 at Unknown time   • ferrous sulfate (FerrouSul) 325 (65 FE) MG tablet Take 1 tablet by mouth Daily With Breakfast. 30 tablet 0 9/3/2020 at Unknown time   • glipiZIDE-metFORMIN (METAGLIP) 2.5-500 MG per tablet Take 1 tablet by mouth 2 (Two) Times a Day Before Meals.   9/3/2020 at Unknown time   • pantoprazole (PROTONIX) 40 MG EC tablet Take 1 tablet by mouth Daily. 30 tablet 0 9/3/2020 at Unknown time   • risperiDONE (risperDAL) 1 MG tablet Take 1 tablet by mouth Every Night.   9/3/2020 at Unknown time   • risperiDONE (risperDAL) 1 MG tablet Take 1 tablet by mouth At Night As Needed (severe paranoia or agitation).   Unknown at Unknown time "   , Scheduled Meds:  escitalopram, 10 mg, Oral, Daily  ferrous sulfate, 324 mg, Oral, Daily With Breakfast  insulin aspart, 0-7 Units, Subcutaneous, TID AC  pantoprazole, 40 mg, Oral, Q AM  risperiDONE, 2 mg, Oral, Daily With Dinner    , Continuous Infusions:   , PRN Meds:  •  aluminum-magnesium hydroxide-simethicone  •  cloNIDine  •  dextrose  •  dextrose  •  glucagon (human recombinant)  •  loperamide  •  magnesium hydroxide and Allergies:  Patient has no known allergies.    Objective      Vital Signs  Temp:  [97.3 °F (36.3 °C)-99 °F (37.2 °C)] 99 °F (37.2 °C)  Heart Rate:  [65-88] 88  Resp:  [18] 18  BP: ()/(53-57) 98/53    Physical Exam  Vitals signs reviewed.   Constitutional:       General: She is not in acute distress.     Appearance: She is well-developed.   HENT:      Head: Normocephalic and atraumatic.      Nose: Nose normal.   Cardiovascular:      Pulses:           Dorsalis pedis pulses are 2+ on the right side and 2+ on the left side.        Posterior tibial pulses are 2+ on the right side and 2+ on the left side.   Pulmonary:      Effort: Pulmonary effort is normal. No respiratory distress.      Breath sounds: No wheezing.   Musculoskeletal: Normal range of motion.         General: Tenderness present. No deformity.      Right foot: Normal range of motion. No swelling, deformity or foot drop.      Left foot: Normal range of motion. No swelling, deformity or foot drop.   Feet:      Right foot:      Protective Sensation: 5 sites tested. 5 sites sensed.      Skin integrity: Dry skin present. No ulcer or skin breakdown.      Toenail Condition: Right toenails are abnormally thick and long. Fungal disease present.     Left foot:      Protective Sensation: 5 sites tested. 5 sites sensed.      Skin integrity: Dry skin present. No ulcer or skin breakdown.      Toenail Condition: Left toenails are abnormally thick and long. Fungal disease present.  Skin:     General: Skin is warm and dry.      Capillary  Refill: Capillary refill takes less than 2 seconds.   Neurological:      Mental Status: She is alert and oriented to person, place, and time.   Psychiatric:         Mood and Affect: Mood and affect normal.         Results Review:    None        Assessment/Plan       Acute exacerbation of chronic paranoid schizophrenia (CMS/HCC)    Schizophrenia, paranoid type (CMS/HCC)    Acute blood loss anemia    DMII (diabetes mellitus, type 2) (CMS/HCC)    Onychomycosis  Chronic toe pain   DMII    - A diabetic foot screening exam was performed.  - Nails 1-5 bilateral were debrided in length and thickness with nail nipper and electric  to decrease fungal load and risk of infection.  - All the patients questions were answered.  -Thank you for allowing me to participate in the care of this patient.  Please not hesitate to call with questions.    I discussed the patients findings and my recommendations with patient and nursing staff    Festus Clemente DPM  09/18/20  18:09 CDT         no st/t changes

## 2020-11-26 ENCOUNTER — EMERGENCY (EMERGENCY)
Facility: HOSPITAL | Age: 60
LOS: 1 days | Discharge: HOME | End: 2020-11-26
Attending: EMERGENCY MEDICINE | Admitting: EMERGENCY MEDICINE
Payer: MEDICARE

## 2020-11-26 VITALS
HEIGHT: 72 IN | RESPIRATION RATE: 22 BRPM | WEIGHT: 179.9 LBS | TEMPERATURE: 98 F | HEART RATE: 84 BPM | OXYGEN SATURATION: 94 % | DIASTOLIC BLOOD PRESSURE: 86 MMHG | SYSTOLIC BLOOD PRESSURE: 140 MMHG

## 2020-11-26 VITALS — SYSTOLIC BLOOD PRESSURE: 144 MMHG | DIASTOLIC BLOOD PRESSURE: 76 MMHG | HEART RATE: 63 BPM | RESPIRATION RATE: 20 BRPM

## 2020-11-26 DIAGNOSIS — Z98.890 OTHER SPECIFIED POSTPROCEDURAL STATES: Chronic | ICD-10-CM

## 2020-11-26 DIAGNOSIS — Z79.51 LONG TERM (CURRENT) USE OF INHALED STEROIDS: ICD-10-CM

## 2020-11-26 DIAGNOSIS — Z20.828 CONTACT WITH AND (SUSPECTED) EXPOSURE TO OTHER VIRAL COMMUNICABLE DISEASES: ICD-10-CM

## 2020-11-26 DIAGNOSIS — J44.9 CHRONIC OBSTRUCTIVE PULMONARY DISEASE, UNSPECIFIED: ICD-10-CM

## 2020-11-26 DIAGNOSIS — R06.02 SHORTNESS OF BREATH: ICD-10-CM

## 2020-11-26 DIAGNOSIS — F17.200 NICOTINE DEPENDENCE, UNSPECIFIED, UNCOMPLICATED: ICD-10-CM

## 2020-11-26 DIAGNOSIS — R05 COUGH: ICD-10-CM

## 2020-11-26 DIAGNOSIS — Z90.89 ACQUIRED ABSENCE OF OTHER ORGANS: Chronic | ICD-10-CM

## 2020-11-26 LAB — SARS-COV-2 RNA SPEC QL NAA+PROBE: SIGNIFICANT CHANGE UP

## 2020-11-26 PROCEDURE — U0003: CPT

## 2020-11-26 PROCEDURE — 99285 EMERGENCY DEPT VISIT HI MDM: CPT | Mod: 25

## 2020-11-26 PROCEDURE — 94640 AIRWAY INHALATION TREATMENT: CPT

## 2020-11-26 PROCEDURE — 71045 X-RAY EXAM CHEST 1 VIEW: CPT

## 2020-11-26 PROCEDURE — 99284 EMERGENCY DEPT VISIT MOD MDM: CPT | Mod: CS

## 2020-11-26 PROCEDURE — 71045 X-RAY EXAM CHEST 1 VIEW: CPT | Mod: 26

## 2020-11-26 RX ORDER — AZITHROMYCIN 500 MG/1
1 TABLET, FILM COATED ORAL
Qty: 5 | Refills: 0
Start: 2020-11-26 | End: 2020-11-30

## 2020-11-26 RX ORDER — ALBUTEROL 90 UG/1
2 AEROSOL, METERED ORAL
Qty: 1 | Refills: 0
Start: 2020-11-26

## 2020-11-26 RX ORDER — IPRATROPIUM/ALBUTEROL SULFATE 18-103MCG
3 AEROSOL WITH ADAPTER (GRAM) INHALATION
Refills: 0 | Status: COMPLETED | OUTPATIENT
Start: 2020-11-26 | End: 2020-11-26

## 2020-11-26 RX ADMIN — Medication 3 MILLILITER(S): at 04:12

## 2020-11-26 RX ADMIN — Medication 3 MILLILITER(S): at 04:11

## 2020-11-26 RX ADMIN — Medication 3 MILLILITER(S): at 04:16

## 2020-11-26 RX ADMIN — Medication 60 MILLIGRAM(S): at 03:57

## 2020-11-26 NOTE — ED PROVIDER NOTE - PROGRESS NOTE DETAILS
pt improved s/p nebs/prednisone.  CXR with questionable R sided infiltrate vs mass- pt informed and need for outpt f/u.  will dc with albuterol, prednisone and abx.  pt to f/u outpt with pulm.  discussed strict return parameters

## 2020-11-26 NOTE — ED PROVIDER NOTE - NSFOLLOWUPINSTRUCTIONS_ED_ALL_ED_FT
Use albuterol pump as needed.  Take full course of antibiotics and prednisone as prescribed.  Follow up with primary care doctor within one week.  Return to ED for fever, difficulty breathing, chest pain, dizzines, fainting, vomiting or other concerns      COPD (Chronic Obstructive Pulmonary Disease)    WHAT YOU NEED TO KNOW:    COPD (chronic obstructive pulmonary disease) can get worse quickly. Your healthcare providers will help you create a care plan to use at home. The plan will give directions on how to prevent or manage shortness of breath. Your family members or anyone who cares for you will also get directions to help you.    Inspiration and Expiration         DISCHARGE INSTRUCTIONS:    Call your local emergency number (911 in the [place]) if:   •You feel lightheaded, short of breath, and have chest pain.          Return to the emergency department if:   •You cough up blood.      •You are confused, dizzy, or feel faint.      •Your arm or leg feels warm, tender, and painful. It may look swollen and red.      Call your doctor if:   •You have increased shortness of breath.      •You need more medicine than usual to control your symptoms.      •You are coughing or wheezing more than usual.      •You are coughing up more mucus, or it has a new color or odor.      •You gain more than 3 pounds in a week.      •You have a fever, a runny or stuffy nose, and a sore throat, or other cold or flu symptoms.      •Your skin, lips, or nails start to turn blue.      •You have swelling in your legs or ankles.      •You are very tired or weak for more than a day.      •You notice changes in your mood, or changes in your ability to think or concentrate.      •You have questions or concerns about your condition or care.      Medicines:   •Short-acting bronchodilators may be called rescue inhalers or relievers. They relieve sudden, severe symptoms and start to work right away.      •Long-acting bronchodilators may be called controllers. This medicine helps open the airways over time, and is used to decrease and prevent breathing problems. Long-acting bronchodilators should not be used to treat sudden, severe symptoms, such as trouble breathing.      •Take your medicine as directed. Contact your healthcare provider if you think your medicine is not helping or if you have side effects. Tell him or her if you are allergic to any medicine. Keep a list of the medicines, vitamins, and herbs you take. Include the amounts, and when and why you take them. Bring the list or the pill bottles to follow-up visits. Carry your medicine list with you in case of an emergency.      Help make breathing easier:   •Use pursed-lip breathing any time you feel short of breath. Take a deep breath in through your nose. Slowly breathe out through your mouth with your lips pursed. Try to take 2 times as long to breathe out as to breathe in. This helps you get rid of as much air from your lungs as possible. You can also practice this breathing pattern while you bend, lift, climb stairs, or exercise. It slows down your breathing and helps move more air in and out of your lungs.  Breathe in Breathe out           •Avoid anything that makes your symptoms worse. Stay out of high altitudes and places with high humidity. Stay inside, or cover your mouth and nose with a scarf when you are outside in cold weather. Stay inside on days when air pollution or pollen counts are high. Do not use aerosol sprays such as deodorant, bug spray, and hairspray.      •Exercise daily. Exercise for at least 20 minutes each day to help increase your energy and decrease shortness of breath. Talk to your healthcare provider about the best exercise plan for you.  Walking for Exercise           Manage COPD and help prevent exacerbations: COPD is a serious condition that gets worse over time. A COPD exacerbation means your symptoms suddenly get worse. It is important to prevent exacerbations. An exacerbation can cause more lung damage. COPD cannot be cured, but you can take action to feel better and prevent exacerbations:   •Do not smoke. Nicotine and other chemicals in cigarettes and cigars can cause lung damage and make your COPD worse. Ask your healthcare provider for information if you currently smoke and need help to quit. E-cigarettes or smokeless tobacco still contain nicotine. Talk to your healthcare provider before you use these products.       •Avoid secondhand smoke. This is smoke another person exhales. Even if you have never smoked or have quit, it is important to avoid secondhand smoke. This smoke can also cause lung damage or trigger an exacerbation.      •Go to pulmonary rehabilitation (rehab) if directed. Rehab is a program run by specialists who help you learn to manage COPD. Examples include a pulmonologist (lung specialist), dietitian, or exercise therapist. The specialists will help you make a plan to avoid triggers that cause an exacerbation.      •Take your medicines as directed. Refill your medicines before you are out so that you do not miss a dose. Ask your healthcare provider if you have any questions on how to take your medicines.      •Protect yourself from germs. Germs can get into your lungs and cause an infection. An infection in your lungs can create more mucus and make it harder to breathe. An infection can also create swelling in your airway and prevent air from getting in. You can decrease your risk for infection by doing the following:        ?Wash your hands often with soap and water. Carry germ-killing gel with you. You can use the gel to clean your hands when soap and water are not available.      ?Do not touch your eyes, nose, or mouth unless you have washed your hands first.      ?Always cover your mouth when you cough. Cough into a tissue or your shirtsleeve so you do not spread germs from your hands.      ?Try to avoid people who have a cold or the flu. If you are sick, stay away from others as much as possible.      ?Ask about vaccines. Influenza (the flu) and pneumonia can become life-threatening for a person who has COPD. Get a flu vaccine each year as soon as it becomes available. The pneumonia vaccine may be given every 5 years, or as directed. Ask about other vaccines you may need and when to get them.      •Drink liquids as directed. You may need to drink more liquid than usual. Liquid will help to keep your air passages moist and help you cough up mucus. Ask how much liquid to drink each day and which liquids are best for you.      Follow up with your doctor as directed: You may need more tests. Your doctor may refer you to a specialist, depending on your needs. Some specialist services may be available through your pulmonary rehab program. Write down your questions so you remember to ask them during your visits

## 2020-11-26 NOTE — ED ADULT NURSE NOTE - OBJECTIVE STATEMENT
Per pt, c/o SOB with exertion. Presents with h/o COPD.  Placed on continuous cardiac monitor and pulse oximetry along with administration of prescribed medications.  All procedures explained to pt with same verbalizing understanding to patient teaching satisfactorily.  Pt observed in no respiratory distress.

## 2020-11-26 NOTE — ED PROVIDER NOTE - ATTENDING CONTRIBUTION TO CARE
60M hx copd, depression, smoker, bipolar, c/o sob. +wheezing, +cough with clear phlegm.  no fevers. no hx of intubation.  states ran out of albuterol pump.  gen- nad  heent- ncat, clear conj  cv -rrr  lungs -wheezing  abd - soft, nt, nd  ext -wwp, no edema  neuro -aox3, steady gait, ramirez  SOB, wheezing on exam, given nebs, prednisone. recommend f/u with pmd

## 2020-11-26 NOTE — ED PROVIDER NOTE - CLINICAL SUMMARY MEDICAL DECISION MAKING FREE TEXT BOX
60 M pmh COPD, current smoker, anxiety, depression, bipolar d/o p/w cough, shortness of breath and wheezing x 1 day.  pt afebrile, appears comfortable lying flat on stretcher without any hypoxia/dyspnea, lungs w/ diffuse insp/exp wheezing and decreased air movement, no crackles/rhonchi.  will give duonebs/steroids, obtain CXR and covid swab and reassess after meds

## 2020-11-26 NOTE — ED PROVIDER NOTE - PATIENT PORTAL LINK FT
You can access the FollowMyHealth Patient Portal offered by [facility] by registering at the following website: [email or web address]. By joining [employer]’s FollowMyHealth portal, you will also be able to view your health information using other applications (apps) compatible with our system.

## 2020-11-26 NOTE — ED ADULT TRIAGE NOTE - BMI (KG/M2)
24.4 Is This A New Presentation, Or A Follow-Up?: Skin Lesions Have Your Skin Lesions Been Treated?: been treated

## 2020-11-26 NOTE — ED PROVIDER NOTE - PHYSICAL EXAMINATION
Vitals reviewed  Gen: well appearing, nad, speaking in full sentences- no dyspnea or hypoxia   Skin: wwp, no rash/lesions  HEENT: ncat, eomi, mmm  CV: rrr, no audible m/r/g  Resp: symmetrical expansion, diffuse insp/exp wheezing and decreased air movement, no crackles/rhonchi  Abd: nondistended, soft/nt  Ext: FROM throughout, no peripheral edema/calf ttp   Neuro: alert/oriented, no focal deficits, steady gait

## 2020-11-26 NOTE — ED PROVIDER NOTE - OBJECTIVE STATEMENT
60 M pmh COPD, current smoker, anxiety, depression, bipolar d/o p/w cough, shortness of breath and wheezing x 1 day.  pt reports he has cough w/ clear sputum and congestion which exacerbated his COPD- ran out of albuterol pump awhile ago.  States his wheezing/sob worsened when it started to rain tonight.  Denies h/o intubations.  Also requesting place to stay until trains start running.  Denies f/c, headache, dizziness, fainting, chest pain, palpitations, abd pain, nvd, urinary sxs, leg pain/swelling, sick contacts, trauma

## 2020-12-02 ENCOUNTER — EMERGENCY (EMERGENCY)
Facility: HOSPITAL | Age: 60
LOS: 1 days | Discharge: HOME | End: 2020-12-02
Attending: EMERGENCY MEDICINE | Admitting: EMERGENCY MEDICINE
Payer: MEDICARE

## 2020-12-02 VITALS
OXYGEN SATURATION: 99 % | TEMPERATURE: 98 F | WEIGHT: 179.9 LBS | DIASTOLIC BLOOD PRESSURE: 75 MMHG | HEART RATE: 65 BPM | RESPIRATION RATE: 16 BRPM | HEIGHT: 72 IN | SYSTOLIC BLOOD PRESSURE: 139 MMHG

## 2020-12-02 VITALS
TEMPERATURE: 98 F | HEART RATE: 67 BPM | OXYGEN SATURATION: 100 % | RESPIRATION RATE: 17 BRPM | DIASTOLIC BLOOD PRESSURE: 80 MMHG | SYSTOLIC BLOOD PRESSURE: 134 MMHG

## 2020-12-02 DIAGNOSIS — M54.5 LOW BACK PAIN: ICD-10-CM

## 2020-12-02 DIAGNOSIS — Z90.89 ACQUIRED ABSENCE OF OTHER ORGANS: Chronic | ICD-10-CM

## 2020-12-02 DIAGNOSIS — Z98.890 OTHER SPECIFIED POSTPROCEDURAL STATES: Chronic | ICD-10-CM

## 2020-12-02 PROCEDURE — 72100 X-RAY EXAM L-S SPINE 2/3 VWS: CPT

## 2020-12-02 PROCEDURE — 72100 X-RAY EXAM L-S SPINE 2/3 VWS: CPT | Mod: 26

## 2020-12-02 PROCEDURE — 99283 EMERGENCY DEPT VISIT LOW MDM: CPT | Mod: 25

## 2020-12-02 PROCEDURE — 99283 EMERGENCY DEPT VISIT LOW MDM: CPT

## 2020-12-02 RX ORDER — LIDOCAINE 4 G/100G
2 CREAM TOPICAL ONCE
Refills: 0 | Status: COMPLETED | OUTPATIENT
Start: 2020-12-02 | End: 2020-12-02

## 2020-12-02 RX ORDER — ACETAMINOPHEN 500 MG
1000 TABLET ORAL ONCE
Refills: 0 | Status: COMPLETED | OUTPATIENT
Start: 2020-12-02 | End: 2020-12-02

## 2020-12-02 RX ADMIN — LIDOCAINE 2 PATCH: 4 CREAM TOPICAL at 02:30

## 2020-12-02 RX ADMIN — Medication 1000 MILLIGRAM(S): at 03:05

## 2020-12-02 RX ADMIN — Medication 1000 MILLIGRAM(S): at 02:35

## 2020-12-02 NOTE — ED ADULT NURSE NOTE - OBJECTIVE STATEMENT
Pt presents with atraumatic "low back pain", onset yesterday AM when getting out of bed per pt. Denies any urinary s/s, no pain, numbness/tingling to lower extremities. Observed ambulating without difficulty. Took Tylenol 100 mg PO at 1600 without relief.    sitting up on foot of bed, awaiting provider eval

## 2020-12-02 NOTE — ED PROVIDER NOTE - PHYSICAL EXAMINATION
CONSTITUTIONAL: disheveled elderly man in NAD  SKIN: Normal color and turgor. No rash.    HEAD: NC/AT.  EYES: Conjunctiva clear. EOMI. PERRL.    ENT: Airway patent, OP without erythema, tonsillar swelling or exudate; uvula midline without swelling. Nasal mucosa clear, no rhinorrhea.   RESPIRATORY:  Breathing non-labored. No retractions or accessory muscle use.  Lungs CTA bilat.  CARDIOVASCULAR:  RRR, S1S2. No M/R/G.      GI:  Abdomen soft, nontender.  No pulsatile mass or abdominal bruits.    MSK: Neck supple with painless ROM.  TTP over lumbar spine.  No deformity, warmth, erythema, swelling.  SLR negative.  No LE edema.   Strong DP pulses bilat.   NEURO: Alert and oriented; CN II-XII grossly intact. Speech clear. 5/5 strength in all extremities.  SILT throughout including saddle region.  Good balance. Steady gait.  +2 patellar DTR bilat.  Patient refused a rectal exam.

## 2020-12-02 NOTE — ED ADULT TRIAGE NOTE - CHIEF COMPLAINT QUOTE
Pt presents with atraumatic "low back pain", onset yesterday AM when getting out of bed per pt. Denies any urinary s/s, no pain, numbness/tingling to lower extremities. Observed ambulating without difficulty. Took Tylenol 100 mg PO at 1600 without relief.

## 2020-12-02 NOTE — ED PROVIDER NOTE - OBJECTIVE STATEMENT
61 yo m with hx COPD, alcohol abuse c/o pain across the lumbar region since waking up around 8-9 am yesterday morning.  Located across the lumbar region in the midline and bilaterally, does not radiate up back, to abdomen, or to legs.  Pain worse with movement, but not having any problems walking, numbness or weakness in legs, bladder or bowel dysfunction, fever or chills.  No hx of IVDU.  Denies history of trauma.  Has been on steroids for COPD exacerbations over a period of years.  Took two doses of acetaminophen 1000 mg yesterday with slight relief (9 am and 5 pm).  Requesting a meal and somewhere to stay until trains start running in the morning.     Has an in-person appointment visit scheduled with his PCP this Bi Dec 6.

## 2020-12-02 NOTE — ED PROVIDER NOTE - PATIENT PORTAL LINK FT
You can access the FollowMyHealth Patient Portal offered by [facility] by registering at the following website: [email or web address]. By joining [employer]’s FollowMyHealth portal, you will also be able to view your health information using other applications (apps) compatible with our system.

## 2020-12-02 NOTE — ED ADULT NURSE REASSESSMENT NOTE - NS ED NURSE REASSESS COMMENT FT1
pt. medicated as noted, kevin. well, awaiting XR, given sandwich and juice, per request, assessment on-going

## 2020-12-02 NOTE — ED PROVIDER NOTE - NS ED ROS FT
CONSTITUTIONAL: No fever, chills, or weakness  NEURO: No headache, no dizziness, no syncope; No focal weakness/tingling/numbness  EYES: No visual changes  ENT: No rhinorrhea or sore throat  PULM: recent ED visit for cough/wheezing, currently on abx and prednisone, improved  CV: No chest pain or palpitations  GI: No abdominal pain, vomiting, or diarrhea  : No dysuria, hematuria, frequency  MSK: HPI  SKIN: no rash or unusual bruising

## 2020-12-02 NOTE — ED PROVIDER NOTE - NSFOLLOWUPINSTRUCTIONS_ED_ALL_ED_FT
Follow up with your doctor as scheduled this Sunday.    ===================    Acute Low Back Pain    WHAT YOU NEED TO KNOW:    Acute low back pain is sudden discomfort in your lower back area that lasts for up to 6 weeks. The discomfort makes it difficult to tolerate activity.     DISCHARGE INSTRUCTIONS:    Return to the emergency department if:   •You have severe pain.      •You have sudden stiffness and heaviness on both buttocks down to both legs.      •You have numbness or weakness in one leg, or pain in both legs.      •You have numbness in your genital area or across your lower back.      •You cannot control your urine or bowel movements.      Contact your healthcare provider if:   •You have a fever.      •You have pain at night or when you rest.      •Your pain does not get better with treatment.      •You have pain that worsens when you cough or sneeze.      •You suddenly feel something pop or snap in your back.      •You have questions or concerns about your condition or care.      Medicines: You may need any of the following:   •NSAIDs help decrease swelling and pain. This medicine is available with or without a doctor's order. NSAIDs can cause stomach bleeding or kidney problems in certain people. If you take blood thinner medicine, always ask your healthcare provider if NSAIDs are safe for you. Always read the medicine label and follow directions.      •Acetaminophen decreases pain and fever. It is available without a doctor's order. Ask how much to take and how often to take it. Follow directions. Read the labels of all other medicines you are using to see if they also contain acetaminophen, or ask your doctor or pharmacist. Acetaminophen can cause liver damage if not taken correctly. Do not use more than 4 grams (4,000 milligrams) total of acetaminophen in one day.       •Muscle relaxers decrease pain by relaxing the muscles in your lower spine.      •Prescription pain medicine may be given. Ask your healthcare provider how to take this medicine safely. Some prescription pain medicines contain acetaminophen. Do not take other medicines that contain acetaminophen without talking to your healthcare provider. Too much acetaminophen may cause liver damage. Prescription pain medicine may cause constipation. Ask your healthcare provider how to prevent or treat constipation.       Self-care:   •Stay active as much as you can without causing more pain. Bed rest could make your back pain worse. Start with some light exercises, such as walking. Avoid heavy lifting until your pain is gone. Ask for more information about the activities or exercises that are right for you.       •Apply ice on your back for 15 to 20 minutes every hour or as directed. Use an ice pack, or put crushed ice in a plastic bag. Cover it with a towel before you apply it to your skin. Ice helps prevent tissue damage and decreases swelling and pain.       •Apply heat on your back for 20 to 30 minutes every 2 hours for as many days as directed. Heat helps decrease pain and muscle spasms. Alternate heat and ice.      Prevent acute low back pain:   •Use proper body mechanics. ?Bend at the hips and knees when you  objects. Do not bend from the waist. Use your leg muscles as you lift the load. Do not use your back. Keep the object close to your chest as you lift it. Try not to twist or lift anything above your waist.      ?Change your position often when you stand for long periods of time. Rest one foot on a small box or footrest, and then switch to the other foot often.      ?Try not to sit for long periods of time. When you do, sit in a straight-backed chair with your feet flat on the floor. Never reach, pull, or push while you are sitting.      •Do exercises that strengthen your back muscles. Warm up before you exercise. Ask your healthcare provider the best exercises for you.      •Maintain a healthy weight. Ask your healthcare provider how much you should weigh. Ask him or her to help you create a weight loss plan if you are overweight.      Follow up with your healthcare provider as directed: Return for a follow-up visit if you still have pain after 1 to 3 weeks of treatment. You may need to visit an orthopedist if your back pain lasts longer than 12 weeks. Write down your questions so you remember to ask them during your visits.

## 2020-12-02 NOTE — ED ADULT NURSE NOTE - CHPI ED NUR SYMPTOMS NEG
no constipation/no bladder dysfunction/no difficulty bearing weight/no motor function loss/no neck tenderness/no numbness/no tingling/no bowel dysfunction/no fatigue/no anorexia

## 2020-12-02 NOTE — ED PROVIDER NOTE - CLINICAL SUMMARY MEDICAL DECISION MAKING FREE TEXT BOX
Acute low back pain, likely muscular strain.  Neurologically intact.  Hx of steroid use, will XR to eval for potential fx.  No fever, hx of IVDU, immunosuppression, or exam findings to suggest an infectious process.  No hx of malignancy.  Do not feel CT/MRI indicated emergently.

## 2020-12-11 ENCOUNTER — EMERGENCY (EMERGENCY)
Facility: HOSPITAL | Age: 60
LOS: 1 days | Discharge: HOME | End: 2020-12-11
Attending: EMERGENCY MEDICINE | Admitting: EMERGENCY MEDICINE
Payer: MEDICARE

## 2020-12-11 VITALS
RESPIRATION RATE: 16 BRPM | TEMPERATURE: 98 F | HEART RATE: 72 BPM | OXYGEN SATURATION: 98 % | SYSTOLIC BLOOD PRESSURE: 119 MMHG | DIASTOLIC BLOOD PRESSURE: 73 MMHG

## 2020-12-11 VITALS
HEIGHT: 72 IN | HEART RATE: 70 BPM | OXYGEN SATURATION: 96 % | DIASTOLIC BLOOD PRESSURE: 87 MMHG | WEIGHT: 179.9 LBS | SYSTOLIC BLOOD PRESSURE: 144 MMHG | RESPIRATION RATE: 16 BRPM | TEMPERATURE: 98 F

## 2020-12-11 DIAGNOSIS — Z90.89 ACQUIRED ABSENCE OF OTHER ORGANS: Chronic | ICD-10-CM

## 2020-12-11 DIAGNOSIS — Z98.890 OTHER SPECIFIED POSTPROCEDURAL STATES: Chronic | ICD-10-CM

## 2020-12-11 PROCEDURE — 99283 EMERGENCY DEPT VISIT LOW MDM: CPT

## 2020-12-11 PROCEDURE — 82962 GLUCOSE BLOOD TEST: CPT

## 2020-12-11 PROCEDURE — 99285 EMERGENCY DEPT VISIT HI MDM: CPT

## 2020-12-11 NOTE — ED ADULT NURSE NOTE - OBJECTIVE STATEMENT
pt to ED for AMS. pt reports alcohol ETOH - 4 pints of janeth, denies drug use. A+Ox3, denies injury, trauma, sob, cp, n/v/d, unilateral weakness, fever/chills/cough, urinary symptoms. no external bleeding/bruising noted.

## 2020-12-11 NOTE — ED PROVIDER NOTE - PATIENT PORTAL LINK FT
You can access the FollowMyHealth Patient Portal offered by [facility] by registering at the following website: [email or web address]. By joining [facility]’s FollowMyHealth portal, you will also be able to view your health information using other applications (apps) compatible with our system.

## 2020-12-11 NOTE — ED PROVIDER NOTE - OBJECTIVE STATEMENT
Pt is a 60M who presents to ED for altered mental status today. pt was found drinking by EMS, brought to ED by EMS. Pt with (+) odor of ETOH, pt admits to drinking rum and coke. vitals stable.

## 2020-12-11 NOTE — ED ADULT NURSE NOTE - CHPI ED NUR SYMPTOMS NEG
no pain/no dizziness/no nausea/no weakness/no vomiting/no tingling/no decreased eating/drinking/no chills/no fever

## 2020-12-11 NOTE — ED PROVIDER NOTE - CLINICAL SUMMARY MEDICAL DECISION MAKING FREE TEXT BOX
Pt with alcohol intoxication. pt with no sign of trauma. AAOX3 in ED. will observe to clinical sobriety.  On re-evaluation, pt is AAOx3 and in NAD. Pt ambulating at baseline with no difficulty. Outpatient instructions given, return to ED for worsening condition. Pt expresses understanding.

## 2020-12-15 DIAGNOSIS — F10.129 ALCOHOL ABUSE WITH INTOXICATION, UNSPECIFIED: ICD-10-CM

## 2020-12-15 DIAGNOSIS — R41.82 ALTERED MENTAL STATUS, UNSPECIFIED: ICD-10-CM

## 2021-03-05 ENCOUNTER — EMERGENCY (EMERGENCY)
Facility: HOSPITAL | Age: 61
LOS: 1 days | Discharge: HOME | End: 2021-03-05
Attending: EMERGENCY MEDICINE | Admitting: EMERGENCY MEDICINE
Payer: MEDICARE

## 2021-03-05 VITALS
SYSTOLIC BLOOD PRESSURE: 148 MMHG | TEMPERATURE: 99 F | HEIGHT: 72 IN | DIASTOLIC BLOOD PRESSURE: 88 MMHG | OXYGEN SATURATION: 98 % | HEART RATE: 68 BPM | RESPIRATION RATE: 14 BRPM

## 2021-03-05 DIAGNOSIS — F10.129 ALCOHOL ABUSE WITH INTOXICATION, UNSPECIFIED: ICD-10-CM

## 2021-03-05 DIAGNOSIS — Z98.890 OTHER SPECIFIED POSTPROCEDURAL STATES: Chronic | ICD-10-CM

## 2021-03-05 DIAGNOSIS — F31.9 BIPOLAR DISORDER, UNSPECIFIED: ICD-10-CM

## 2021-03-05 DIAGNOSIS — R41.82 ALTERED MENTAL STATUS, UNSPECIFIED: ICD-10-CM

## 2021-03-05 DIAGNOSIS — Z90.89 ACQUIRED ABSENCE OF OTHER ORGANS: Chronic | ICD-10-CM

## 2021-03-05 PROCEDURE — 82962 GLUCOSE BLOOD TEST: CPT

## 2021-03-05 PROCEDURE — 99285 EMERGENCY DEPT VISIT HI MDM: CPT

## 2021-03-05 PROCEDURE — 99283 EMERGENCY DEPT VISIT LOW MDM: CPT

## 2021-03-05 NOTE — ED ADULT TRIAGE NOTE - ARRIVAL INFO ADDITIONAL COMMENTS
Patient found lying on the ground with three opened - but partially consumed - bottled of janeth. Patient responsive to tactile stimulus. Sat up and started yelling profanities that he wanted to sleep. When inquired if he had "too much alcohol," patient responded "yes." Patient with noted unsteady gait.

## 2021-03-05 NOTE — ED ADULT NURSE NOTE - OBJECTIVE STATEMENT
pt found in a doorway with an unsteady gait and a bottle of janeth.   arrives sleeping but arouses to pain by yelling.  no sob, no n/v.

## 2021-03-06 NOTE — ED PROVIDER NOTE - CARE PLAN
Principal Discharge DX:	Bipolar disease, chronic   Principal Discharge DX:	Alcohol intoxication  Secondary Diagnosis:	Bipolar disease, chronic

## 2021-03-06 NOTE — ED PROVIDER NOTE - OBJECTIVE STATEMENT
61 yo male found to be sleeping at the entrance of ER. Appears intoxicated. Has 8 bottles  liquor ( empty and some half full) in his bag. Pt cooperative and asking for a place to sleep. No signs of injury or trauma noted.

## 2021-03-06 NOTE — ED ADULT NURSE REASSESSMENT NOTE - NS ED NURSE REASSESS COMMENT FT1
pt remains asleep.  arouses to touch.
pt remains asleep.  no distress.
pt undressed and placed in a yellow gown.  belongings labeled and placed at desk.
Patient with noted tobacco and lighters on person while undressing for examination. Personal affects secured away from patient - belongings screened by security.

## 2021-03-06 NOTE — ED PROVIDER NOTE - CLINICAL SUMMARY MEDICAL DECISION MAKING FREE TEXT BOX
Patient is clinically intoxicated, no signs of trauma or focal deficits, will observe for expected clinical sobriety.

## 2021-03-06 NOTE — ED PROVIDER NOTE - PATIENT PORTAL LINK FT
You can access the FollowMyHealth Patient Portal offered by [facility] by registering at the following website: [email or web address]. By joining [facility]’s FollowMyHealth portal, you will also be able to view your health information using other applications (apps) compatible with our system.

## 2021-03-06 NOTE — ED PROVIDER NOTE - NSFOLLOWUPINSTRUCTIONS_ED_ALL_ED_FT
ABUSE OF ALCOHOL - Discharge Care           Abuse of Alcohol    WHAT YOU NEED TO KNOW:    Alcohol abuse means you drink more than the recommended daily or weekly limits. You may be drinking alcohol regularly or drinking large amounts in a short period of time (binge drinking). You continue to drink even though it causes legal, work, or relationship problems.    DISCHARGE INSTRUCTIONS:    Call your local emergency number (911 in the ) for any of the following:   •You have sudden chest pain or trouble breathing.      •You want to harm yourself or others.      •You have a seizure or have shaking or trembling.      Call your doctor if:   •You have hallucinations (you see or hear things that are not real).      •You cannot stop vomiting or you vomit blood.      •You need help to stop drinking alcohol.       •You have questions or concerns about your condition or care.      Medicines:   •Vitamin supplements may be given to treat low vitamin levels. Alcohol can make it hard for your body to absorb enough vitamins such as B1. Vitamin supplements may also be given to prevent alcohol related brain damage.       •Take your medicine as directed. Contact your healthcare provider if you think your medicine is not helping or if you have side effects. Tell him or her if you are allergic to any medicine. Keep a list of the medicines, vitamins, and herbs you take. Include the amounts, and when and why you take them. Bring the list or the pill bottles to follow-up visits. Carry your medicine list with you in case of an emergency.      Recommended alcohol limits:   •Men 21 to 64 years should limit alcohol to 2 drinks a day. Do not have more than 4 drinks in 1 day or more than 14 in 1 week.      •All women, and men 65 or older should limit alcohol to 1 drink in a day. Do not have more than 3 drinks in 1 day or more than 7 in 1 week. No amount of alcohol is okay during pregnancy.      Health problems alcohol abuse can cause:   •Cancer in your liver, pancreas, stomach, colon, kidney, or breast      •Stroke or a heart attack      •Liver, kidney, or lung disease      •Blackouts, memory loss, brain damage, or dementia      •Diabetes, immune system problems, or thiamine (vitamin B1) deficiency      •Problems for you and your baby if you drink while pregnant      Manage alcohol use:   •Decrease the amount you drink. This can help prevent health problems such as brain, heart, and liver damage, high blood pressure, diabetes, and cancer. If you cannot stop completely, healthcare providers can help you set goals to decrease the amount you drink.      •Plan weekly alcohol use. You will be less likely to drink more than the recommended limit if you plan ahead.      •Have food when you drink alcohol. Food will prevent alcohol from getting into your system too quickly. Eat before you have your first alcohol drink.      •Time your drinks carefully. Have no more than 1 drink in an hour. Have a liquid such as water, coffee, or a soft drink between alcohol drinks.      •Do not drive if you have had alcohol. Make sure someone who has not been drinking can help you get home.      •Do not drink alcohol if you are taking medicine. Alcohol is dangerous when you combine it with certain medicines, such as acetaminophen or blood pressure medicine. Talk to your healthcare provider about all the medicines you currently take.      Follow up with your healthcare provider as directed: Write down your questions so you remember to ask them during your visits.    For support and more information:   •Alcoholics Anonymous  Web Address: http://www.aa.org      •Substance Abuse and Mental Health Services Administration  [address]  [place] [address]  Web Address: http://www.sama.gov

## 2021-03-11 NOTE — ED PROVIDER NOTE - BREATH SOUNDS
[FreeTextEntry1] : CAD\par Moderate AS\par No angina\par  \par Reassess AS - echo ordered\par 
WHEEZES

## 2021-03-14 ENCOUNTER — EMERGENCY (EMERGENCY)
Facility: HOSPITAL | Age: 61
LOS: 1 days | Discharge: HOME | End: 2021-03-14
Attending: EMERGENCY MEDICINE | Admitting: EMERGENCY MEDICINE
Payer: MEDICARE

## 2021-03-14 VITALS
DIASTOLIC BLOOD PRESSURE: 91 MMHG | TEMPERATURE: 98 F | SYSTOLIC BLOOD PRESSURE: 169 MMHG | WEIGHT: 199.96 LBS | HEART RATE: 93 BPM | RESPIRATION RATE: 18 BRPM | OXYGEN SATURATION: 99 % | HEIGHT: 72 IN

## 2021-03-14 DIAGNOSIS — Z98.890 OTHER SPECIFIED POSTPROCEDURAL STATES: Chronic | ICD-10-CM

## 2021-03-14 DIAGNOSIS — Z90.89 ACQUIRED ABSENCE OF OTHER ORGANS: Chronic | ICD-10-CM

## 2021-03-14 DIAGNOSIS — Z59.0 HOMELESSNESS: ICD-10-CM

## 2021-03-14 DIAGNOSIS — F41.9 ANXIETY DISORDER, UNSPECIFIED: ICD-10-CM

## 2021-03-14 PROCEDURE — 99283 EMERGENCY DEPT VISIT LOW MDM: CPT

## 2021-03-14 PROCEDURE — 99284 EMERGENCY DEPT VISIT MOD MDM: CPT

## 2021-03-14 PROCEDURE — 94640 AIRWAY INHALATION TREATMENT: CPT

## 2021-03-14 RX ORDER — HYDROCHLOROTHIAZIDE 25 MG
12.5 TABLET ORAL ONCE
Refills: 0 | Status: DISCONTINUED | OUTPATIENT
Start: 2021-03-14 | End: 2021-03-14

## 2021-03-14 RX ORDER — HYDROXYZINE HCL 10 MG
25 TABLET ORAL ONCE
Refills: 0 | Status: COMPLETED | OUTPATIENT
Start: 2021-03-14 | End: 2021-03-14

## 2021-03-14 RX ORDER — HYDROCHLOROTHIAZIDE 25 MG
12.5 TABLET ORAL ONCE
Refills: 0 | Status: COMPLETED | OUTPATIENT
Start: 2021-03-14 | End: 2021-03-14

## 2021-03-14 RX ORDER — ALBUTEROL 90 UG/1
1 AEROSOL, METERED ORAL ONCE
Refills: 0 | Status: COMPLETED | OUTPATIENT
Start: 2021-03-14 | End: 2021-03-14

## 2021-03-14 RX ADMIN — Medication 12.5 MILLIGRAM(S): at 21:07

## 2021-03-14 RX ADMIN — ALBUTEROL 1 PUFF(S): 90 AEROSOL, METERED ORAL at 21:07

## 2021-03-14 RX ADMIN — Medication 25 MILLIGRAM(S): at 21:13

## 2021-03-14 SDOH — ECONOMIC STABILITY - HOUSING INSECURITY: HOMELESSNESS: Z59.0

## 2021-03-14 NOTE — ED ADULT TRIAGE NOTE - CHIEF COMPLAINT QUOTE
59 y/o male came in c/o having anxiety, reports "I have not eating in two days" Pt states I have only had coffee. Pt denies any SI/HI. Pt denies any drugs or alcohol.

## 2021-03-14 NOTE — ED PROVIDER NOTE - CLINICAL SUMMARY MEDICAL DECISION MAKING FREE TEXT BOX
avss. atraumatic. clinically sober. no e/o WD/DTs. fs wnl. given food. tolerating po. sx resolved. s/p hydroxyzine per pt request. no indication for inpatient hospitalization at this time. will dc w/ outpatient pcp fu, requested albuterol inhaler, strict return precautions. pt agrees w/ plan. questions answered.

## 2021-03-14 NOTE — ED ADULT NURSE NOTE - NSIMPLEMENTINTERV_GEN_ALL_ED
Implemented All Universal Safety Interventions:  [place] to call system. Call bell, personal items and telephone within reach. Instruct patient to call for assistance. Room bathroom lighting operational. Non-slip footwear when patient is off stretcher. Physically safe environment: no spills, clutter or unnecessary equipment. Stretcher in lowest position, wheels locked, appropriate side rails in place.

## 2021-03-14 NOTE — ED ADULT NURSE NOTE - CHIEF COMPLAINT QUOTE
61 y/o male came in c/o having anxiety, reports "I have not eating in two days" Pt states I have only had coffee. Pt denies any SI/HI. Pt denies any drugs or alcohol.

## 2021-03-14 NOTE — ED PROVIDER NOTE - OBJECTIVE STATEMENT
60M undomiciled, eoth abuse, copd (no prior intubations), requesting sandwich and place to rest. did not eat today, only coffee. pt also states he ran out of his albuterol inhaler and requesting refill. pt also requesting hydroxyzine for anxiety in setting of excessive caffeine intake today. no fever/chills, no ha/dizziness, no neck pain/stiffness, no uri/cough, no cp/sob, no wheezing, no orthopnea, no exertional dyspnea, no abd pain/n/v, no diarrhea, no dysuria, no pedal edema/pain/rash, no prior covid, no trauma, no etoh-dpt/ivdu. 60M undomiciled, etoh abuse, copd (no prior intubations), requesting sandwich and place to rest. did not eat today, only coffee. pt also states he ran out of his albuterol inhaler and requesting refill. pt also requesting hydroxyzine for anxiety in setting of excessive caffeine intake today. no fever/chills, no ha/dizziness, no neck pain/stiffness, no uri/cough, no cp/sob, no wheezing, no orthopnea, no exertional dyspnea, no abd pain/n/v, no diarrhea, no dysuria, no pedal edema/pain/rash, no prior covid, no trauma, no etoh-dpt/ivdu.

## 2021-03-14 NOTE — ED PROVIDER NOTE - PATIENT PORTAL LINK FT
You can access the FollowMyHealth Patient Portal offered by [facility] by registering at the following website: [email or web address]. By joining [facility]’s FollowMyHealth portal, you will also be able to view your health information using other applications (apps) compatible with our system.

## 2021-03-14 NOTE — ED PROVIDER NOTE - NSFOLLOWUPINSTRUCTIONS_ED_ALL_ED_FT
PLEASE REST AND REMAIN HYDRATED.     PLEASE FOLLOW-UP WITH YOUR PCP IN 1-2 DAYS.    PLEASE RETURN TO THE EMERGENCY DEPARTMENT IF FEVER, SEVERE HEADACHE, CHEST PAIN, SHORTNESS OF BREATH, ABDOMINAL PAIN, VOMITING, OTHER CONCERNING SYMPTOMS.

## 2021-03-14 NOTE — ED ADULT NURSE NOTE - OBJECTIVE STATEMENT
c/o of hunger x 2 days. Verbalized only drinking coffee.  Patient anxious on arrival.  Denies SI / HI, alcohol / drug intake.

## 2021-03-14 NOTE — ED PROVIDER NOTE - PHYSICAL EXAMINATION
CONST: nontoxic NAD speaking in full sentences  HEAD: atraumatic  EYES: conjunctivae clear, PERRL, EOMI  ENT: mmm  NECK: supple/FROM, nttp, no jvd  CARD: rrr no murmurs  CHEST: ctab no r/r/w, no stridor/retractions/tripoding  ABD: soft, nd, nttp, no rebound/guarding  EXT: FROM, symmetric distal pulses intact  SKIN: warm, dry, no rash, no pedal edema/ttp/rash, cap refill <2sec  NEURO: a+ox3, 5/5 strength x4, gross sensation intact x4, normal gait

## 2021-03-14 NOTE — ED ADULT NURSE REASSESSMENT NOTE - NS ED NURSE REASSESS COMMENT FT1
Report received from Gladis TATE, will assume care of pt at this time. Pt in ER guillermo chair 7, NAD noted.

## 2021-04-08 ENCOUNTER — EMERGENCY (EMERGENCY)
Facility: HOSPITAL | Age: 61
LOS: 1 days | Discharge: HOME | End: 2021-04-08
Attending: EMERGENCY MEDICINE | Admitting: EMERGENCY MEDICINE
Payer: MEDICARE

## 2021-04-08 VITALS
RESPIRATION RATE: 16 BRPM | HEART RATE: 80 BPM | OXYGEN SATURATION: 96 % | DIASTOLIC BLOOD PRESSURE: 86 MMHG | HEIGHT: 72 IN | TEMPERATURE: 98 F | SYSTOLIC BLOOD PRESSURE: 143 MMHG

## 2021-04-08 VITALS
SYSTOLIC BLOOD PRESSURE: 129 MMHG | TEMPERATURE: 97 F | RESPIRATION RATE: 18 BRPM | HEART RATE: 91 BPM | DIASTOLIC BLOOD PRESSURE: 78 MMHG | OXYGEN SATURATION: 95 %

## 2021-04-08 DIAGNOSIS — F31.9 BIPOLAR DISORDER, UNSPECIFIED: ICD-10-CM

## 2021-04-08 DIAGNOSIS — F32.9 MAJOR DEPRESSIVE DISORDER, SINGLE EPISODE, UNSPECIFIED: ICD-10-CM

## 2021-04-08 DIAGNOSIS — F19.90 OTHER PSYCHOACTIVE SUBSTANCE USE, UNSPECIFIED, UNCOMPLICATED: ICD-10-CM

## 2021-04-08 DIAGNOSIS — Z79.899 OTHER LONG TERM (CURRENT) DRUG THERAPY: ICD-10-CM

## 2021-04-08 DIAGNOSIS — F10.129 ALCOHOL ABUSE WITH INTOXICATION, UNSPECIFIED: ICD-10-CM

## 2021-04-08 DIAGNOSIS — Z98.890 OTHER SPECIFIED POSTPROCEDURAL STATES: Chronic | ICD-10-CM

## 2021-04-08 DIAGNOSIS — R41.82 ALTERED MENTAL STATUS, UNSPECIFIED: ICD-10-CM

## 2021-04-08 DIAGNOSIS — Z79.51 LONG TERM (CURRENT) USE OF INHALED STEROIDS: ICD-10-CM
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
## 2021-04-08 DIAGNOSIS — Z79.52 LONG TERM (CURRENT) USE OF SYSTEMIC STEROIDS: ICD-10-CM

## 2021-04-08 DIAGNOSIS — J44.9 CHRONIC OBSTRUCTIVE PULMONARY DISEASE, UNSPECIFIED: ICD-10-CM

## 2021-04-08 DIAGNOSIS — Z90.89 ACQUIRED ABSENCE OF OTHER ORGANS: ICD-10-CM

## 2021-04-08 DIAGNOSIS — Z90.89 ACQUIRED ABSENCE OF OTHER ORGANS: Chronic | ICD-10-CM

## 2021-04-08 PROCEDURE — 82962 GLUCOSE BLOOD TEST: CPT

## 2021-04-08 PROCEDURE — 99284 EMERGENCY DEPT VISIT MOD MDM: CPT

## 2021-04-08 PROCEDURE — 99285 EMERGENCY DEPT VISIT HI MDM: CPT | Mod: 25

## 2021-04-08 PROCEDURE — 94640 AIRWAY INHALATION TREATMENT: CPT

## 2021-04-08 RX ORDER — ALBUTEROL 90 UG/1
2 AEROSOL, METERED ORAL EVERY 6 HOURS
Refills: 0 | Status: DISCONTINUED | OUTPATIENT
Start: 2021-04-08 | End: 2021-04-12

## 2021-04-08 RX ORDER — IPRATROPIUM/ALBUTEROL SULFATE 18-103MCG
3 AEROSOL WITH ADAPTER (GRAM) INHALATION
Refills: 0 | Status: COMPLETED | OUTPATIENT
Start: 2021-04-08 | End: 2021-04-08

## 2021-04-08 RX ADMIN — Medication 3 MILLILITER(S): at 21:52

## 2021-04-08 RX ADMIN — ALBUTEROL 2 PUFF(S): 90 AEROSOL, METERED ORAL at 20:59

## 2021-04-08 NOTE — ED PROVIDER NOTE - OBJECTIVE STATEMENT
Pt w/ PMHx COPD, alcoholism BIBA for alcohol intoxication. Pt states he was drinking alcohol and smoking marijuana and fell asleep on the street. He denies falls, trauma, and complaints. Pt has been fed a sandwich on arrival and is eating.

## 2021-04-08 NOTE — ED ADULT TRIAGE NOTE - CHIEF COMPLAINT QUOTE
Pt brought in by EMS after bystander called because pt was sleeping on the sidewalk. Admits to alcohol use 1L of [place] today and marijuana use earlier today. Denies any symptoms.

## 2021-04-08 NOTE — ED ADULT NURSE NOTE - OBJECTIVE STATEMENT
Patient A&ox3, respirations even and unlabored, speaks in clear and full sentences, unkept. Patient was brought in by EMS for sleeping on sidewalk. Patient reports drinking 1 pint of alcohol and marijuana use today. States is SOB, o2 sat currently 97& RA. Patient denies chest pain, abdominal pain. Hx COPD, emphysema, Bipolar. Patient calm and cooperative, resting in stretcher. Pending provider assessment.

## 2021-04-08 NOTE — ED PROVIDER NOTE - NSFOLLOWUPINSTRUCTIONS_ED_ALL_ED_FT
Please refrain from drinking excessive amounts of alcohol and using drugs      Alcohol Intoxication    WHAT YOU NEED TO KNOW:    Alcohol intoxication is a harmful physical condition caused when you drink more alcohol than your body can handle. It is also called ethanol poisoning, or being drunk.    DISCHARGE INSTRUCTIONS:    Call your local emergency number (911 in the [place]) if:   •You have sudden trouble breathing or chest pain.      •You have a seizure.      •You feel sad enough to harm yourself or others.      Call your doctor if:   •You have hallucinations (you see or hear things that are not real).      •You cannot stop vomiting.      •You have questions or concerns about your condition or care.      Recommended alcohol limits:   •Men 21 to 64 years should limit alcohol to 2 drinks a day. Do not have more than 4 drinks in 1 day or more than 14 in 1 week.      •All women, and men 65 or older should limit alcohol to 1 drink in a day. Do not have more than 3 drinks in 1 day or more than 7 in 1 week. No amount of alcohol is okay during pregnancy.      Manage alcohol use:   •Decrease the amount you drink. This can help prevent health problems such as brain, heart, and liver damage, high blood pressure, diabetes, and cancer. If you cannot stop completely, healthcare providers can help you set goals to decrease the amount you drink.      •Plan weekly alcohol use. You will be less likely to drink more than the recommended limit if you plan ahead.      •Have food when you drink alcohol. Food will prevent alcohol from getting into your system too quickly. Eat before you have your first alcohol drink.      •Time your drinks carefully. Have no more than 1 drink in an hour. Have a liquid such as water, coffee, or a soft drink between alcohol drinks.      •Do not drive if you have had alcohol. Make sure someone who has not been drinking can help you get home.      •Do not drink alcohol if you are taking medicine. Alcohol is dangerous when you combine it with certain medicines, such as acetaminophen or blood pressure medicine. Talk to your healthcare provider about all the medicines you currently take.      For more information:   •Alcoholics Anonymous  Web Address: http://www.aa.org      •Substance Abuse and Mental Health Services Administration  [address]  [place] [address]  Web Address: [email or web address]        Follow up with your healthcare provider as directed: Write down your questions so you remember to ask them during your visits.

## 2021-04-08 NOTE — ED PROVIDER NOTE - CARE PLAN
Principal Discharge DX:	Alcoholic intoxication without complication  Secondary Diagnosis:	Polysubstance abuse   Principal Discharge DX:	Alcoholic intoxication without complication  Secondary Diagnosis:	Polysubstance abuse  Secondary Diagnosis:	Chronic obstructive pulmonary disease, unspecified COPD type

## 2021-04-08 NOTE — ED PROVIDER NOTE - CLINICAL SUMMARY MEDICAL DECISION MAKING FREE TEXT BOX
Pt presents s/p alcohol and marijuana use. Awake and alert, no signs of trauma, no complaints. Admits to use. FS wnl. Fed on arrival. Observe for sobriety.

## 2021-04-08 NOTE — ED PROVIDER NOTE - PHYSICAL EXAMINATION
Constitutional: Well appearing, well nourished, awake, alert, oriented to person, place, time/situation and in no apparent distress. alcohol on breath  Head atraumatic, normocephalic. No signs of trauma  ENMT: Airway patent. Normal MM  Eyes: Clear bilaterally, PERRL, EOMI  Chest: no trauma  Cardiac: Normal rate, regular rhythm.  Heart sounds S1, S2.  No murmurs, rubs or gallops.  Respiratory: Breaths sounds equal and clear b/l. No increased WOB, tachypnea, hypoxia, or accessory mm use. Pt speaks in full sentences.   Gastrointestinal: Abd soft, NT, ND, NABS. No guarding, rebound, or rigidity. No pulsatile abdominal masses. No organomegaly appreciated. no trauma  Musculoskeletal: Range of motion is not limited. FROM all joints x 4 ext w/o dec ROM, pain, or signs of trauma  Neuro: Alert and oriented x 3, face symmetric. Strength 5/5 x 4 ext and symmetric, nml gross motor movement, nml gait. No focal deficits noted. Slurred speech c/w intoxication  Skin: Skin normal color for race, warm, dry and intact. No evidence of rash.  Psych: Alert and oriented to person, place, time/situation. normal mood and affect. intoxicated

## 2021-04-08 NOTE — ED PROVIDER NOTE - PATIENT PORTAL LINK FT
You can access the FollowMyHealth Patient Portal offered by [facility] by registering at the following website: [email or web address]. By joining [employer]’s FollowMyHealth portal, you will also be able to view your health information using other applications (apps) compatible with our system.

## 2021-04-08 NOTE — ED PROVIDER NOTE - PROGRESS NOTE DETAILS
Pt w/ clear speech and steady gait. Pt c/o wheezing, + wheezing on exam w/ good aeration. Will give duonebs Pt refusing further nebs. Improved to mild wheezing and good aeration. NO resp distress. Clear speech and steady gait. Pt requesting d/c

## 2021-04-08 NOTE — ED ADULT NURSE NOTE - NSIMPLEMENTINTERV_GEN_ALL_ED
Implemented All Fall Risk Interventions:  [place] to call system. Call bell, personal items and telephone within reach. Instruct patient to call for assistance. Room bathroom lighting operational. Non-slip footwear when patient is off stretcher. Physically safe environment: no spills, clutter or unnecessary equipment. Stretcher in lowest position, wheels locked, appropriate side rails in place. Provide visual cue, wrist band, yellow gown, etc. Monitor gait and stability. Monitor for mental status changes and reorient to person, place, and time. Review medications for side effects contributing to fall risk. Reinforce activity limits and safety measures with patient and family.

## 2021-04-09 ENCOUNTER — EMERGENCY (EMERGENCY)
Facility: HOSPITAL | Age: 61
LOS: 1 days | Discharge: HOME | End: 2021-04-09
Attending: EMERGENCY MEDICINE | Admitting: EMERGENCY MEDICINE
Payer: MEDICARE

## 2021-04-09 VITALS
OXYGEN SATURATION: 95 % | HEIGHT: 72 IN | TEMPERATURE: 97 F | DIASTOLIC BLOOD PRESSURE: 81 MMHG | SYSTOLIC BLOOD PRESSURE: 125 MMHG | HEART RATE: 81 BPM | RESPIRATION RATE: 18 BRPM

## 2021-04-09 DIAGNOSIS — F10.129 ALCOHOL ABUSE WITH INTOXICATION, UNSPECIFIED: ICD-10-CM

## 2021-04-09 DIAGNOSIS — Z90.89 ACQUIRED ABSENCE OF OTHER ORGANS: Chronic | ICD-10-CM

## 2021-04-09 DIAGNOSIS — Z98.890 OTHER SPECIFIED POSTPROCEDURAL STATES: Chronic | ICD-10-CM

## 2021-04-09 DIAGNOSIS — Z79.52 LONG TERM (CURRENT) USE OF SYSTEMIC STEROIDS: ICD-10-CM

## 2021-04-09 DIAGNOSIS — Z79.899 OTHER LONG TERM (CURRENT) DRUG THERAPY: ICD-10-CM

## 2021-04-09 DIAGNOSIS — J44.9 CHRONIC OBSTRUCTIVE PULMONARY DISEASE, UNSPECIFIED: ICD-10-CM

## 2021-04-09 DIAGNOSIS — F31.9 BIPOLAR DISORDER, UNSPECIFIED: ICD-10-CM

## 2021-04-09 DIAGNOSIS — F17.200 NICOTINE DEPENDENCE, UNSPECIFIED, UNCOMPLICATED: ICD-10-CM

## 2021-04-09 DIAGNOSIS — Z79.51 LONG TERM (CURRENT) USE OF INHALED STEROIDS: ICD-10-CM
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
## 2021-04-09 PROCEDURE — 99283 EMERGENCY DEPT VISIT LOW MDM: CPT

## 2021-04-09 PROCEDURE — 99285 EMERGENCY DEPT VISIT HI MDM: CPT

## 2021-04-09 NOTE — ED PROVIDER NOTE - PATIENT PORTAL LINK FT
You can access the FollowMyHealth Patient Portal offered by [facility] by registering at the following website: [email or web address]. By joining [employer]s FollowMyHealth portal, you will also be able to view your health information using other applications (apps) compatible with our system.

## 2021-04-09 NOTE — ED PROVIDER NOTE - OBJECTIVE STATEMENT
59 yo m with Hx alcoholism COPD was seen here earlier tonight brought in for intoxication found sleeping in the streets, no signs of trauma.  Rec'd neb for wheeze and sobered up.  Now brought in again for intoxication.  No complaints, but pt is apparently intoxicated again, verbally abusive to nursing staff.  Has no complaints, says he needs somewhere to sleep.  Uncooperative and refuses to answer some questions. 59 yo m with Hx alcoholism COPD was seen here earlier tonight brought in for intoxication found sleeping in the streets, no signs of trauma.  Now brought in again for intoxication.  No complaints, but seems to be intoxicated again, verbally abusive to nursing staff.  Has no complaints, denies fall or other trauma, says he needs somewhere to sleep.  Uncooperative and refuses to answer some questions when he arrived.

## 2021-04-09 NOTE — ED PROVIDER NOTE - PROGRESS NOTE DETAILS
patient states he wants to be discharged.  he has no complaints.  speech clear. awake alert and oriented to time and place. no longer verbally abusive. gait is steady.  he declined food and drink.

## 2021-04-09 NOTE — ED ADULT NURSE NOTE - OBJECTIVE STATEMENT
BIBA, per EMS pt called from outside [facility]. Seen in [facility] ED 3hour pta for intox. Speech slurred, requesting a bed repeatedly, no other complaints. Gait steady.

## 2021-04-09 NOTE — ED PROVIDER NOTE - CLINICAL SUMMARY MEDICAL DECISION MAKING FREE TEXT BOX
Alcohol intoxication. No signs of trauma. Nontoxic. HDS.  Pt requested place to sleep for a while and requested DC when he was clinically sober.

## 2021-04-09 NOTE — ED PROVIDER NOTE - PHYSICAL EXAMINATION
GENERAL: alert NAD, disheveled.  uncooperative  HEAD: NCAT  EYE: anicteric, no nystagmus  ENT:  CV:  RRR  PULM: CTAB, no signs of resp distress  GI: abd soft nontender  :  SKIN: normal color and turgor  MSK:  NEURO:  alert. speech slurred.  YEE. steady gait. GENERAL: alert NAD, disheveled.  uncooperative  HEAD: NCAT  EYE: anicteric, no nystagmus  ENT:  CV:  RRR  PULM: CTAB, no signs of resp distress  GI: abd soft nontender  :  SKIN: normal color and turgor  MSK:  NEURO:  alert. speech mildly slurred on arrival.  YEE. steady gait.

## 2021-04-09 NOTE — ED PROVIDER NOTE - NSFOLLOWUPINSTRUCTIONS_ED_ALL_ED_FT
Follow up with primary care provider in 1-2 days.  Return to the Emergency Department if you have any new or worsening symptoms, or if you have any concerns.  ======================    Abuse of Alcohol    WHAT YOU NEED TO KNOW:    Alcohol abuse means you drink more than the recommended daily or weekly limits. You may be drinking alcohol regularly or drinking large amounts in a short period of time (binge drinking). You continue to drink even though it causes legal, work, or relationship problems.    DISCHARGE INSTRUCTIONS:    Call your local emergency number (911 in the ) for any of the following:   •You have sudden chest pain or trouble breathing.      •You want to harm yourself or others.      •You have a seizure or have shaking or trembling.      Call your doctor if:   •You have hallucinations (you see or hear things that are not real).      •You cannot stop vomiting or you vomit blood.      •You need help to stop drinking alcohol.       •You have questions or concerns about your condition or care.      Medicines:   •Vitamin supplements may be given to treat low vitamin levels. Alcohol can make it hard for your body to absorb enough vitamins such as B1. Vitamin supplements may also be given to prevent alcohol related brain damage.       •Take your medicine as directed. Contact your healthcare provider if you think your medicine is not helping or if you have side effects. Tell him or her if you are allergic to any medicine. Keep a list of the medicines, vitamins, and herbs you take. Include the amounts, and when and why you take them. Bring the list or the pill bottles to follow-up visits. Carry your medicine list with you in case of an emergency.      Health problems alcohol abuse can cause:   •Cancer in your liver, pancreas, stomach, colon, kidney, or breast      •Stroke or a heart attack      •Liver, kidney, or lung disease      •Blackouts, memory loss, brain damage, or dementia      •Diabetes, immune system problems, or thiamine (vitamin B1) deficiency      •Problems for you and your baby if you drink while pregnant      Recommended alcohol limits:   •Men 21 to 64 years should limit alcohol to 2 drinks a day. Do not have more than 4 drinks in 1 day or more than 14 in 1 week.      •All women, and men 65 or older should limit alcohol to 1 drink in a day. Do not have more than 3 drinks in 1 day or more than 7 in 1 week. No amount of alcohol is okay during pregnancy.      Manage alcohol use:   •Decrease the amount you drink. This can help prevent health problems such as brain, heart, and liver damage, high blood pressure, diabetes, and cancer. If you cannot stop completely, healthcare providers can help you set goals to decrease the amount you drink.      •Plan weekly alcohol use. You will be less likely to drink more than the recommended limit if you plan ahead.      •Have food when you drink alcohol. Food will prevent alcohol from getting into your system too quickly. Eat before you have your first alcohol drink.      •Time your drinks carefully. Have no more than 1 drink in an hour. Have a liquid such as water, coffee, or a soft drink between alcohol drinks.      •Do not drive if you have had alcohol. Make sure someone who has not been drinking can help you get home.      •Do not drink alcohol if you are taking medicine. Alcohol is dangerous when you combine it with certain medicines, such as acetaminophen or blood pressure medicine. Talk to your healthcare provider about all the medicines you currently take.      Follow up with your healthcare provider as directed: Write down your questions so you remember to ask them during your visits.    For support and more information:   •Alcoholics Anonymous  Web Address: http://www.aa.org      •Substance Abuse and Mental Health Services Administration  [address]  [place] [address]  Web Address: http://www.samhsa.gov

## 2021-04-09 NOTE — ED PROVIDER NOTE - ATTENDING CONTRIBUTION TO CARE
60M hx etoh abuse, copd, BIBEMS for intoxication. pt was in ED earlier tonight for intox and was discharged. pt states would like to have a bed ot lay down in. no trauma. no vomiting.   gen- poor hygiene  heent- ncat  cv -rrr  lungs -ctab  ext -wwp  neuro - ramirez  intoxicated, no evidence of head trauma, will discharge upon clinical sobriety

## 2021-04-09 NOTE — ED ADULT TRIAGE NOTE - ARRIVAL INFO ADDITIONAL COMMENTS
pt discharged from this ER 1 hour ago and returns stating he wants to lay down.  speech slurred.  gait steady.

## 2021-08-01 ENCOUNTER — EMERGENCY (EMERGENCY)
Facility: HOSPITAL | Age: 61
LOS: 1 days | Discharge: HOME | End: 2021-08-01
Attending: EMERGENCY MEDICINE | Admitting: EMERGENCY MEDICINE
Payer: MEDICARE

## 2021-08-01 VITALS
HEART RATE: 70 BPM | DIASTOLIC BLOOD PRESSURE: 73 MMHG | RESPIRATION RATE: 18 BRPM | SYSTOLIC BLOOD PRESSURE: 161 MMHG | OXYGEN SATURATION: 98 % | HEIGHT: 72 IN | WEIGHT: 179.9 LBS | TEMPERATURE: 98 F

## 2021-08-01 VITALS
HEART RATE: 67 BPM | RESPIRATION RATE: 18 BRPM | SYSTOLIC BLOOD PRESSURE: 163 MMHG | DIASTOLIC BLOOD PRESSURE: 89 MMHG | TEMPERATURE: 98 F | OXYGEN SATURATION: 97 %

## 2021-08-01 DIAGNOSIS — Z20.822 CONTACT WITH AND (SUSPECTED) EXPOSURE TO COVID-19: ICD-10-CM
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
## 2021-08-01 DIAGNOSIS — J43.9 EMPHYSEMA, UNSPECIFIED: ICD-10-CM

## 2021-08-01 DIAGNOSIS — Z79.51 LONG TERM (CURRENT) USE OF INHALED STEROIDS: ICD-10-CM

## 2021-08-01 DIAGNOSIS — F10.10 ALCOHOL ABUSE, UNCOMPLICATED: ICD-10-CM

## 2021-08-01 DIAGNOSIS — R09.81 NASAL CONGESTION: ICD-10-CM

## 2021-08-01 DIAGNOSIS — Z90.89 ACQUIRED ABSENCE OF OTHER ORGANS: Chronic | ICD-10-CM

## 2021-08-01 DIAGNOSIS — R05 COUGH: ICD-10-CM

## 2021-08-01 DIAGNOSIS — R06.02 SHORTNESS OF BREATH: ICD-10-CM

## 2021-08-01 DIAGNOSIS — Z98.890 OTHER SPECIFIED POSTPROCEDURAL STATES: Chronic | ICD-10-CM

## 2021-08-01 LAB — SARS-COV-2 RNA SPEC QL NAA+PROBE: NEGATIVE — SIGNIFICANT CHANGE UP

## 2021-08-01 PROCEDURE — 99283 EMERGENCY DEPT VISIT LOW MDM: CPT | Mod: 25

## 2021-08-01 PROCEDURE — 93010 ELECTROCARDIOGRAM REPORT: CPT

## 2021-08-01 PROCEDURE — 71046 X-RAY EXAM CHEST 2 VIEWS: CPT | Mod: 26

## 2021-08-01 PROCEDURE — 99285 EMERGENCY DEPT VISIT HI MDM: CPT | Mod: CS,25

## 2021-08-01 PROCEDURE — 94640 AIRWAY INHALATION TREATMENT: CPT

## 2021-08-01 PROCEDURE — 71046 X-RAY EXAM CHEST 2 VIEWS: CPT

## 2021-08-01 PROCEDURE — 87635 SARS-COV-2 COVID-19 AMP PRB: CPT

## 2021-08-01 RX ORDER — AZITHROMYCIN 500 MG/1
1 TABLET, FILM COATED ORAL
Qty: 6 | Refills: 0
Start: 2021-08-01 | End: 2021-08-05

## 2021-08-01 RX ORDER — IPRATROPIUM/ALBUTEROL SULFATE 18-103MCG
3 AEROSOL WITH ADAPTER (GRAM) INHALATION
Refills: 0 | Status: DISCONTINUED | OUTPATIENT
Start: 2021-08-01 | End: 2021-08-05

## 2021-08-01 RX ADMIN — Medication 3 MILLILITER(S): at 22:01

## 2021-08-01 RX ADMIN — Medication 60 MILLIGRAM(S): at 21:23

## 2021-08-01 RX ADMIN — Medication 3 MILLILITER(S): at 21:23

## 2021-08-01 NOTE — ED PROVIDER NOTE - CLINICAL SUMMARY MEDICAL DECISION MAKING FREE TEXT BOX
60 M pmh COPD, etoh abuse p/w cough, congestion, sob/wheezing since this morning, forgot to take albuterol mdi with him when he left house. pt afebrile, appears comfortable, no dyspnea or hypoxia, lungs w/ diminished breath sounds and diffuse wheezing.  pt given nebs/prednisone with improvement.  CXR no infiltrate/effusion but will dc with zpak in setting copd/tobacco use and steroids.  discussed strict return parameters

## 2021-08-01 NOTE — ED PROVIDER NOTE - OBJECTIVE STATEMENT
60 M pmh COPD, etoh abuse p/w cough, congestion, sob/wheezing since this morning.  pt report cough w/ clear sputum and nasal congestion which he reports exacerbated his COPD today.  Used albuterol mdi prior to leaving house with improvement but forget to take with him.  Pt fully vaccinated for covid (moderna).  Denies f/c, headache, dizziness, fainting, chest pain, palpitations, abd pain, nvd, leg pain/swelling, travel, sick contacts, trauma

## 2021-08-01 NOTE — ED PROVIDER NOTE - PATIENT PORTAL LINK FT
You can access the FollowMyHealth Patient Portal offered by [facility] by registering at the following website: [email or web address]. By joining [facility]’s FollowMyHealth portal, you will also be able to view your health information using other applications (apps) compatible with our system.

## 2021-08-01 NOTE — ED PROVIDER NOTE - NSFOLLOWUPINSTRUCTIONS_ED_ALL_ED_FT
Continue albuterol as needed and take prednisone once daily as prescribed.  Take full course of antibiotics as prescribed      Chronic Obstructive Pulmonary Disease Exacerbation       Chronic obstructive pulmonary disease (COPD) is a long-term (chronic) condition that affects the lungs. COPD is a general term that can be used to describe many different lung problems that cause lung swelling (inflammation) and limit airflow, including chronic bronchitis and emphysema. COPD exacerbations are episodes when breathing symptoms become much worse and require extra treatment.    COPD exacerbations are usually caused by infections. Without treatment, COPD exacerbations can be severe and even life threatening. Frequent COPD exacerbations can cause further damage to the lungs.      What are the causes?  This condition may be caused by:  •Respiratory infections, including viral and bacterial infections.      •Exposure to smoke.      •Exposure to air pollution, chemical fumes, or dust.      •Things that give you an allergic reaction (allergens).      •Not taking your usual COPD medicines as directed.      •Underlying medical problems, such as congestive heart failure or infections not involving the lungs.      In many cases, the cause (trigger) of this condition is not known.      What increases the risk?  The following factors may make you more likely to develop this condition:  •Smoking cigarettes.      •Old age.      •Frequent prior COPD exacerbations.        What are the signs or symptoms?  Symptoms of this condition include:  •Increased coughing.      •Increased production of mucus from your lungs (sputum).      •Increased wheezing.      •Increased shortness of breath.      •Rapid or labored breathing.      •Chest tightness.      •Less energy than usual.      •Sleep disruption from symptoms.      •Confusion or increased sleepiness.      Often these symptoms happen or get worse even with the use of medicines.      How is this diagnosed?  This condition is diagnosed based on:  •Your medical history.      •A physical exam.    You may also have tests, including:  •A chest X-ray.      •Blood tests.      •Lung (pulmonary) function tests.        How is this treated?  Treatment for this condition depends on the severity and cause of the symptoms. You may need to be admitted to a hospital for treatment. Some of the treatments commonly used to treat COPD exacerbations are:  •Antibiotic medicines. These may be used for severe exacerbations caused by a lung infection, such as pneumonia.      •Bronchodilators. These are inhaled medicines that expand the air passages and allow increased airflow.      •Steroid medicines. These act to reduce inflammation in the airways. They may be given with an inhaler, taken by mouth, or given through an IV tube inserted into one of your veins.      •Supplemental oxygen therapy.      •Airway clearing techniques, such as noninvasive ventilation (NIV) and positive expiratory pressure (PEP). These provide respiratory support through a mask or other noninvasive device. An example of this would be using a continuous positive airway pressure (CPAP) machine to improve delivery of oxygen into your lungs.        Follow these instructions at home:    Medicines     •Take over-the-counter and prescription medicines only as told by your health care provider. It is important to use correct technique with inhaled medicines.      •If you were prescribed an antibiotic medicine or oral steroid, take it as told by your health care provider. Do not stop taking the medicine even if you start to feel better.      Lifestyle     •Eat a healthy diet.      •Exercise regularly.      •Get plenty of sleep.      •Avoid exposure to all substances that irritate the airway, especially to tobacco smoke.      •Wash your hands often with soap and water to reduce the risk of infection. If soap and water are not available, use hand .      •During flu season, avoid enclosed spaces that are crowded with people.      General instructions     •Drink enough fluid to keep your urine clear or pale yellow (unless you have a medical condition that requires fluid restriction).      •Use a cool mist vaporizer. This humidifies the air and makes it easier for you to clear your chest when you cough.      •If you have a home nebulizer and oxygen, continue to use them as told by your health care provider.      •Keep all follow-up visits as told by your health care provider. This is important.        How is this prevented?    •Stay up-to-date on pneumococcal and influenza (flu) vaccines. A flu shot is recommended every year to help prevent exacerbations.      • Do not use any products that contain nicotine or tobacco, such as cigarettes and e-cigarettes. Quitting smoking is very important in preventing COPD from getting worse and in preventing exacerbations from happening as often. If you need help quitting, ask your health care provider.      •Follow all instructions for pulmonary rehabilitation after a recent exacerbation. This can help prevent future exacerbations.      •Work with your health care provider to develop and follow an action plan. This tells you what steps to take when you experience certain symptoms.        Contact a health care provider if:    •You have a worsening of your regular COPD symptoms.        Get help right away if:    •You have worsening shortness of breath, even when resting.      •You have trouble talking.      •You have severe chest pain.      •You cough up blood.      •You have a fever.      •You have weakness, vomit repeatedly, or faint.      •You feel confused.      •You are not able to sleep because of your symptoms.      •You have trouble doing daily activities.        Summary    •COPD exacerbations are episodes when breathing symptoms become much worse and require extra treatment above your normal treatment.      •Exacerbations can be severe and even life threatening. Frequent COPD exacerbations can cause further damage to your lungs.      •COPD exacerbations are usually triggered by infections such as the flu, colds, and even pneumonia.      •Treatment for this condition depends on the severity and cause of the symptoms. You may need to be admitted to a hospital for treatment.      •Quitting smoking is very important to prevent COPD from getting worse and to prevent exacerbations from happening as often.      This information is not intended to replace advice given to you by your health care provider. Make sure you discuss any questions you have with your health care provider.

## 2021-08-01 NOTE — ED PROVIDER NOTE - WR ORDER NAME 1
[Weight management counseling provided] : Weight management [None] : None [Decrease Portions] : Decrease food portions [Good understanding] : Patient has a good understanding of lifestyle changes and the steps needed to achieve self management goals Xray Chest 2 Views PA/Lat

## 2021-08-01 NOTE — ED PROVIDER NOTE - PHYSICAL EXAMINATION
Vitals reviewed  Gen: well appearing, nad, speaking in full sentences- no dyspnea or hypoxia   Skin: wwp, no rash/lesions  HEENT: ncat, eomi, mmm  CV: rrr, no audible m/r/g  Resp: symmetrical expansion, diminished breath sounds, diffuse wheezing   Ext: FROM throughout, no peripheral edema/calf ttp   Neuro: alert/oriented, no focal deficits, steady gait

## 2021-08-01 NOTE — ED ADULT NURSE NOTE - OBJECTIVE STATEMENT
pt is 60y male, here for difficulty breathing today, hx of COPD, sx unrelieved with inhaler, pt is ambulatory with steady gait, normal WOB and able to speak in full sentences, expiratory wheezes bilat, normal hear sounds, NAD present pt is 60y male, here for difficulty breathing today, hx of COPD, sx unrelieved with inhaler, pt is ambulatory with steady gait, normal WOB and able to speak in full sentences, inspiratory and expiratory wheezes bilat, normal hear sounds, NAD present

## 2021-09-03 VITALS
WEIGHT: 179.9 LBS | HEIGHT: 72 IN | RESPIRATION RATE: 22 BRPM | TEMPERATURE: 98 F | OXYGEN SATURATION: 97 % | HEART RATE: 93 BPM | DIASTOLIC BLOOD PRESSURE: 75 MMHG | SYSTOLIC BLOOD PRESSURE: 159 MMHG

## 2021-09-03 PROCEDURE — 93010 ELECTROCARDIOGRAM REPORT: CPT | Mod: 76

## 2021-09-03 PROCEDURE — 99284 EMERGENCY DEPT VISIT MOD MDM: CPT | Mod: 25

## 2021-09-03 RX ORDER — IPRATROPIUM/ALBUTEROL SULFATE 18-103MCG
3 AEROSOL WITH ADAPTER (GRAM) INHALATION
Refills: 0 | Status: COMPLETED | OUTPATIENT
Start: 2021-09-03 | End: 2021-09-03

## 2021-09-03 RX ADMIN — Medication 3 MILLILITER(S): at 23:15

## 2021-09-03 RX ADMIN — Medication 3 MILLILITER(S): at 23:21

## 2021-09-03 RX ADMIN — Medication 3 MILLILITER(S): at 23:00

## 2021-09-03 NOTE — ED ADULT NURSE NOTE - OBJECTIVE STATEMENT
received A&OX3 60years old male pt with c/o of " I need breathing treatment." currently, pt is wheezing bilaterally and respiratory rate is 25, albuterol treatment started. pt is not cyanotic and able to ambulates. complains of chest congestion, the breathing treatment ran out at home. no fever, nausea, vomiting, diarrhea. ot has hx of COPD and bronchitis. pt actively smokes cigarettes. no fever

## 2021-09-04 ENCOUNTER — INPATIENT (INPATIENT)
Facility: HOSPITAL | Age: 61
LOS: 3 days | Discharge: HOME | DRG: 191 | End: 2021-09-08
Attending: HOSPITALIST | Admitting: FAMILY MEDICINE
Payer: MEDICARE

## 2021-09-04 DIAGNOSIS — J44.9 CHRONIC OBSTRUCTIVE PULMONARY DISEASE, UNSPECIFIED: ICD-10-CM

## 2021-09-04 DIAGNOSIS — Z98.890 OTHER SPECIFIED POSTPROCEDURAL STATES: Chronic | ICD-10-CM

## 2021-09-04 DIAGNOSIS — J44.1 CHRONIC OBSTRUCTIVE PULMONARY DISEASE WITH (ACUTE) EXACERBATION: ICD-10-CM

## 2021-09-04 DIAGNOSIS — F10.10 ALCOHOL ABUSE, UNCOMPLICATED: ICD-10-CM

## 2021-09-04 DIAGNOSIS — Z90.89 ACQUIRED ABSENCE OF OTHER ORGANS: Chronic | ICD-10-CM

## 2021-09-04 DIAGNOSIS — R60.9 EDEMA, UNSPECIFIED: ICD-10-CM

## 2021-09-04 DIAGNOSIS — D64.9 ANEMIA, UNSPECIFIED: ICD-10-CM

## 2021-09-04 DIAGNOSIS — E11.9 TYPE 2 DIABETES MELLITUS WITHOUT COMPLICATIONS: Chronic | ICD-10-CM

## 2021-09-04 DIAGNOSIS — R11.2 NAUSEA WITH VOMITING, UNSPECIFIED: ICD-10-CM

## 2021-09-04 DIAGNOSIS — F17.200 NICOTINE DEPENDENCE, UNSPECIFIED, UNCOMPLICATED: ICD-10-CM

## 2021-09-04 DIAGNOSIS — R63.8 OTHER SYMPTOMS AND SIGNS CONCERNING FOOD AND FLUID INTAKE: ICD-10-CM

## 2021-09-04 DIAGNOSIS — F31.9 BIPOLAR DISORDER, UNSPECIFIED: ICD-10-CM

## 2021-09-04 LAB
ALBUMIN SERPL ELPH-MCNC: 4.1 G/DL — SIGNIFICANT CHANGE UP (ref 3.3–5)
ALP SERPL-CCNC: 72 U/L — SIGNIFICANT CHANGE UP (ref 40–120)
ALT FLD-CCNC: 15 U/L — SIGNIFICANT CHANGE UP (ref 10–45)
ANION GAP SERPL CALC-SCNC: 10 MMOL/L — SIGNIFICANT CHANGE UP (ref 5–17)
ANISOCYTOSIS BLD QL: SLIGHT — SIGNIFICANT CHANGE UP
APTT BLD: 26.6 SEC — LOW (ref 27.5–35.5)
AST SERPL-CCNC: 18 U/L — SIGNIFICANT CHANGE UP (ref 10–40)
BASE EXCESS BLDV CALC-SCNC: 3.9 MMOL/L — HIGH (ref -2–3)
BASOPHILS # BLD AUTO: 0 K/UL — SIGNIFICANT CHANGE UP (ref 0–0.2)
BASOPHILS NFR BLD AUTO: 0 % — SIGNIFICANT CHANGE UP (ref 0–2)
BILIRUB SERPL-MCNC: 0.2 MG/DL — SIGNIFICANT CHANGE UP (ref 0.2–1.2)
BLD GP AB SCN SERPL QL: NEGATIVE — SIGNIFICANT CHANGE UP
BUN SERPL-MCNC: 24 MG/DL — HIGH (ref 7–23)
CA-I SERPL-SCNC: 1.12 MMOL/L — LOW (ref 1.15–1.33)
CALCIUM SERPL-MCNC: 8.6 MG/DL — SIGNIFICANT CHANGE UP (ref 8.4–10.5)
CHLORIDE SERPL-SCNC: 106 MMOL/L — SIGNIFICANT CHANGE UP (ref 96–108)
CK MB CFR SERPL CALC: 2.3 NG/ML — SIGNIFICANT CHANGE UP (ref 0–6.7)
CK SERPL-CCNC: 113 U/L — SIGNIFICANT CHANGE UP (ref 30–200)
CO2 BLDV-SCNC: 31.8 MMOL/L — HIGH (ref 22–26)
CO2 SERPL-SCNC: 29 MMOL/L — SIGNIFICANT CHANGE UP (ref 22–31)
CREAT SERPL-MCNC: 1.09 MG/DL — SIGNIFICANT CHANGE UP (ref 0.5–1.3)
D DIMER BLD IA.RAPID-MCNC: 365 NG/ML DDU — HIGH
EOSINOPHIL # BLD AUTO: 0.06 K/UL — SIGNIFICANT CHANGE UP (ref 0–0.5)
EOSINOPHIL NFR BLD AUTO: 0.8 % — SIGNIFICANT CHANGE UP (ref 0–6)
GAS PNL BLDV: 139 MMOL/L — SIGNIFICANT CHANGE UP (ref 136–145)
GAS PNL BLDV: SIGNIFICANT CHANGE UP
GAS PNL BLDV: SIGNIFICANT CHANGE UP
GIANT PLATELETS BLD QL SMEAR: PRESENT — SIGNIFICANT CHANGE UP
GLUCOSE BLDC GLUCOMTR-MCNC: 132 MG/DL — HIGH (ref 70–99)
GLUCOSE BLDC GLUCOMTR-MCNC: 154 MG/DL — HIGH (ref 70–99)
GLUCOSE SERPL-MCNC: 124 MG/DL — HIGH (ref 70–99)
HCO3 BLDV-SCNC: 30 MMOL/L — HIGH (ref 22–29)
HCT VFR BLD CALC: 23.5 % — LOW (ref 39–50)
HCT VFR BLD CALC: 24.5 % — LOW (ref 39–50)
HCT VFR BLD CALC: 26.1 % — LOW (ref 39–50)
HGB BLD-MCNC: 7.1 G/DL — LOW (ref 13–17)
HGB BLD-MCNC: 7.5 G/DL — LOW (ref 13–17)
HGB BLD-MCNC: 8.1 G/DL — LOW (ref 13–17)
HYPOCHROMIA BLD QL: SIGNIFICANT CHANGE UP
INR BLD: 1.01 — SIGNIFICANT CHANGE UP (ref 0.88–1.16)
LDH SERPL L TO P-CCNC: 405 U/L — HIGH (ref 50–242)
LYMPHOCYTES # BLD AUTO: 0.26 K/UL — LOW (ref 1–3.3)
LYMPHOCYTES # BLD AUTO: 3.5 % — LOW (ref 13–44)
MACROCYTES BLD QL: SLIGHT — SIGNIFICANT CHANGE UP
MAGNESIUM SERPL-MCNC: 2 MG/DL — SIGNIFICANT CHANGE UP (ref 1.6–2.6)
MANUAL SMEAR VERIFICATION: SIGNIFICANT CHANGE UP
MCHC RBC-ENTMCNC: 27.6 PG — SIGNIFICANT CHANGE UP (ref 27–34)
MCHC RBC-ENTMCNC: 28.1 PG — SIGNIFICANT CHANGE UP (ref 27–34)
MCHC RBC-ENTMCNC: 28.3 PG — SIGNIFICANT CHANGE UP (ref 27–34)
MCHC RBC-ENTMCNC: 30.2 GM/DL — LOW (ref 32–36)
MCHC RBC-ENTMCNC: 30.6 GM/DL — LOW (ref 32–36)
MCHC RBC-ENTMCNC: 31 GM/DL — LOW (ref 32–36)
MCV RBC AUTO: 91.3 FL — SIGNIFICANT CHANGE UP (ref 80–100)
MCV RBC AUTO: 91.4 FL — SIGNIFICANT CHANGE UP (ref 80–100)
MCV RBC AUTO: 91.8 FL — SIGNIFICANT CHANGE UP (ref 80–100)
MONOCYTES # BLD AUTO: 0.26 K/UL — SIGNIFICANT CHANGE UP (ref 0–0.9)
MONOCYTES NFR BLD AUTO: 3.5 % — SIGNIFICANT CHANGE UP (ref 2–14)
NEUTROPHILS # BLD AUTO: 6.98 K/UL — SIGNIFICANT CHANGE UP (ref 1.8–7.4)
NEUTROPHILS NFR BLD AUTO: 92.2 % — HIGH (ref 43–77)
NRBC # BLD: 0 /100 WBCS — SIGNIFICANT CHANGE UP (ref 0–0)
NRBC # BLD: 0 /100 WBCS — SIGNIFICANT CHANGE UP (ref 0–0)
NT-PROBNP SERPL-SCNC: 111 PG/ML — SIGNIFICANT CHANGE UP (ref 0–300)
OVALOCYTES BLD QL SMEAR: SLIGHT — SIGNIFICANT CHANGE UP
PCO2 BLDV: 51 MMHG — SIGNIFICANT CHANGE UP (ref 42–55)
PH BLDV: 7.38 — SIGNIFICANT CHANGE UP (ref 7.32–7.43)
PLAT MORPH BLD: ABNORMAL
PLATELET # BLD AUTO: 214 K/UL — SIGNIFICANT CHANGE UP (ref 150–400)
PLATELET # BLD AUTO: 225 K/UL — SIGNIFICANT CHANGE UP (ref 150–400)
PLATELET # BLD AUTO: 260 K/UL — SIGNIFICANT CHANGE UP (ref 150–400)
PO2 BLDV: 49 MMHG — HIGH (ref 25–45)
POLYCHROMASIA BLD QL SMEAR: SLIGHT — SIGNIFICANT CHANGE UP
POTASSIUM BLDV-SCNC: 3.8 MMOL/L — SIGNIFICANT CHANGE UP (ref 3.5–5.1)
POTASSIUM SERPL-MCNC: 4 MMOL/L — SIGNIFICANT CHANGE UP (ref 3.5–5.3)
POTASSIUM SERPL-SCNC: 4 MMOL/L — SIGNIFICANT CHANGE UP (ref 3.5–5.3)
PROCALCITONIN SERPL-MCNC: 0.31 NG/ML — HIGH (ref 0.02–0.1)
PROT SERPL-MCNC: 6.7 G/DL — SIGNIFICANT CHANGE UP (ref 6–8.3)
PROTHROM AB SERPL-ACNC: 12.1 SEC — SIGNIFICANT CHANGE UP (ref 10.6–13.6)
RAPID RVP RESULT: SIGNIFICANT CHANGE UP
RBC # BLD: 2.57 M/UL — LOW (ref 4.2–5.8)
RBC # BLD: 2.67 M/UL — LOW (ref 4.2–5.8)
RBC # BLD: 2.86 M/UL — LOW (ref 4.2–5.8)
RBC # FLD: 14.4 % — SIGNIFICANT CHANGE UP (ref 10.3–14.5)
RBC # FLD: 14.7 % — HIGH (ref 10.3–14.5)
RBC # FLD: 14.7 % — HIGH (ref 10.3–14.5)
RBC BLD AUTO: ABNORMAL
RETICS #: 35.6 K/UL — SIGNIFICANT CHANGE UP (ref 25–125)
RETICS/RBC NFR: 1.4 % — SIGNIFICANT CHANGE UP (ref 0.5–2.5)
RH IG SCN BLD-IMP: POSITIVE — SIGNIFICANT CHANGE UP
SAO2 % BLDV: 81.1 % — SIGNIFICANT CHANGE UP (ref 67–88)
SARS-COV-2 RNA SPEC QL NAA+PROBE: SIGNIFICANT CHANGE UP
SARS-COV-2 RNA SPEC QL NAA+PROBE: SIGNIFICANT CHANGE UP
SODIUM SERPL-SCNC: 145 MMOL/L — SIGNIFICANT CHANGE UP (ref 135–145)
TROPONIN T SERPL-MCNC: 0.01 NG/ML — SIGNIFICANT CHANGE UP (ref 0–0.01)
TROPONIN T SERPL-MCNC: 0.01 NG/ML — SIGNIFICANT CHANGE UP (ref 0–0.01)
TSH SERPL-MCNC: 0.72 UIU/ML — SIGNIFICANT CHANGE UP (ref 0.27–4.2)
WBC # BLD: 5.47 K/UL — SIGNIFICANT CHANGE UP (ref 3.8–10.5)
WBC # BLD: 6.05 K/UL — SIGNIFICANT CHANGE UP (ref 3.8–10.5)
WBC # BLD: 7.57 K/UL — SIGNIFICANT CHANGE UP (ref 3.8–10.5)
WBC # FLD AUTO: 5.47 K/UL — SIGNIFICANT CHANGE UP (ref 3.8–10.5)
WBC # FLD AUTO: 6.05 K/UL — SIGNIFICANT CHANGE UP (ref 3.8–10.5)
WBC # FLD AUTO: 7.57 K/UL — SIGNIFICANT CHANGE UP (ref 3.8–10.5)

## 2021-09-04 PROCEDURE — 71275 CT ANGIOGRAPHY CHEST: CPT | Mod: 26,MG

## 2021-09-04 PROCEDURE — G1004: CPT

## 2021-09-04 PROCEDURE — 71045 X-RAY EXAM CHEST 1 VIEW: CPT | Mod: 26

## 2021-09-04 PROCEDURE — 99223 1ST HOSP IP/OBS HIGH 75: CPT | Mod: GC

## 2021-09-04 RX ORDER — FLUOXETINE HCL 10 MG
40 CAPSULE ORAL AT BEDTIME
Refills: 0 | Status: DISCONTINUED | OUTPATIENT
Start: 2021-09-04 | End: 2021-09-08

## 2021-09-04 RX ORDER — DEXTROSE 50 % IN WATER 50 %
15 SYRINGE (ML) INTRAVENOUS ONCE
Refills: 0 | Status: DISCONTINUED | OUTPATIENT
Start: 2021-09-04 | End: 2021-09-08

## 2021-09-04 RX ORDER — ONDANSETRON 8 MG/1
4 TABLET, FILM COATED ORAL EVERY 8 HOURS
Refills: 0 | Status: DISCONTINUED | OUTPATIENT
Start: 2021-09-04 | End: 2021-09-08

## 2021-09-04 RX ORDER — ASPIRIN/CALCIUM CARB/MAGNESIUM 324 MG
325 TABLET ORAL ONCE
Refills: 0 | Status: COMPLETED | OUTPATIENT
Start: 2021-09-04 | End: 2021-09-04

## 2021-09-04 RX ORDER — HYDROCHLOROTHIAZIDE 25 MG
25 TABLET ORAL DAILY
Refills: 0 | Status: DISCONTINUED | OUTPATIENT
Start: 2021-09-04 | End: 2021-09-08

## 2021-09-04 RX ORDER — FLUOXETINE HCL 10 MG
60 CAPSULE ORAL
Qty: 0 | Refills: 0 | DISCHARGE

## 2021-09-04 RX ORDER — SODIUM CHLORIDE 9 MG/ML
1000 INJECTION, SOLUTION INTRAVENOUS
Refills: 0 | Status: DISCONTINUED | OUTPATIENT
Start: 2021-09-04 | End: 2021-09-08

## 2021-09-04 RX ORDER — GABAPENTIN 400 MG/1
0 CAPSULE ORAL
Qty: 0 | Refills: 1 | DISCHARGE

## 2021-09-04 RX ORDER — ALBUTEROL 90 UG/1
2.5 AEROSOL, METERED ORAL ONCE
Refills: 0 | Status: COMPLETED | OUTPATIENT
Start: 2021-09-04 | End: 2021-09-04

## 2021-09-04 RX ORDER — DEXTROSE 50 % IN WATER 50 %
25 SYRINGE (ML) INTRAVENOUS ONCE
Refills: 0 | Status: DISCONTINUED | OUTPATIENT
Start: 2021-09-04 | End: 2021-09-08

## 2021-09-04 RX ORDER — SODIUM CHLORIDE 9 MG/ML
1000 INJECTION INTRAMUSCULAR; INTRAVENOUS; SUBCUTANEOUS ONCE
Refills: 0 | Status: COMPLETED | OUTPATIENT
Start: 2021-09-04 | End: 2021-09-04

## 2021-09-04 RX ORDER — GABAPENTIN 400 MG/1
600 CAPSULE ORAL AT BEDTIME
Refills: 0 | Status: DISCONTINUED | OUTPATIENT
Start: 2021-09-04 | End: 2021-09-08

## 2021-09-04 RX ORDER — ENOXAPARIN SODIUM 100 MG/ML
40 INJECTION SUBCUTANEOUS EVERY 24 HOURS
Refills: 0 | Status: DISCONTINUED | OUTPATIENT
Start: 2021-09-04 | End: 2021-09-05

## 2021-09-04 RX ORDER — PANTOPRAZOLE SODIUM 20 MG/1
40 TABLET, DELAYED RELEASE ORAL
Refills: 0 | Status: DISCONTINUED | OUTPATIENT
Start: 2021-09-04 | End: 2021-09-04

## 2021-09-04 RX ORDER — INFLUENZA VIRUS VACCINE 15; 15; 15; 15 UG/.5ML; UG/.5ML; UG/.5ML; UG/.5ML
0.5 SUSPENSION INTRAMUSCULAR ONCE
Refills: 0 | Status: DISCONTINUED | OUTPATIENT
Start: 2021-09-04 | End: 2021-09-08

## 2021-09-04 RX ORDER — GABAPENTIN 400 MG/1
0 CAPSULE ORAL
Qty: 0 | Refills: 0 | DISCHARGE

## 2021-09-04 RX ORDER — DEXTROSE 50 % IN WATER 50 %
12.5 SYRINGE (ML) INTRAVENOUS ONCE
Refills: 0 | Status: DISCONTINUED | OUTPATIENT
Start: 2021-09-04 | End: 2021-09-08

## 2021-09-04 RX ORDER — INSULIN LISPRO 100/ML
VIAL (ML) SUBCUTANEOUS
Refills: 0 | Status: DISCONTINUED | OUTPATIENT
Start: 2021-09-04 | End: 2021-09-08

## 2021-09-04 RX ORDER — GLUCAGON INJECTION, SOLUTION 0.5 MG/.1ML
1 INJECTION, SOLUTION SUBCUTANEOUS ONCE
Refills: 0 | Status: DISCONTINUED | OUTPATIENT
Start: 2021-09-04 | End: 2021-09-08

## 2021-09-04 RX ORDER — MONTELUKAST 4 MG/1
10 TABLET, CHEWABLE ORAL DAILY
Refills: 0 | Status: DISCONTINUED | OUTPATIENT
Start: 2021-09-04 | End: 2021-09-08

## 2021-09-04 RX ORDER — PANTOPRAZOLE SODIUM 20 MG/1
40 TABLET, DELAYED RELEASE ORAL EVERY 24 HOURS
Refills: 0 | Status: DISCONTINUED | OUTPATIENT
Start: 2021-09-04 | End: 2021-09-05

## 2021-09-04 RX ORDER — HYDROXYZINE HCL 10 MG
50 TABLET ORAL AT BEDTIME
Refills: 0 | Status: DISCONTINUED | OUTPATIENT
Start: 2021-09-04 | End: 2021-09-08

## 2021-09-04 RX ORDER — RISPERIDONE 4 MG/1
1 TABLET ORAL
Qty: 0 | Refills: 0 | DISCHARGE

## 2021-09-04 RX ORDER — TIOTROPIUM BROMIDE 18 UG/1
1 CAPSULE ORAL; RESPIRATORY (INHALATION) DAILY
Refills: 0 | Status: DISCONTINUED | OUTPATIENT
Start: 2021-09-04 | End: 2021-09-08

## 2021-09-04 RX ORDER — LANOLIN ALCOHOL/MO/W.PET/CERES
3 CREAM (GRAM) TOPICAL AT BEDTIME
Refills: 0 | Status: DISCONTINUED | OUTPATIENT
Start: 2021-09-04 | End: 2021-09-08

## 2021-09-04 RX ORDER — RISPERIDONE 4 MG/1
2 TABLET ORAL AT BEDTIME
Refills: 0 | Status: DISCONTINUED | OUTPATIENT
Start: 2021-09-04 | End: 2021-09-08

## 2021-09-04 RX ORDER — ONDANSETRON 8 MG/1
4 TABLET, FILM COATED ORAL ONCE
Refills: 0 | Status: COMPLETED | OUTPATIENT
Start: 2021-09-04 | End: 2021-09-04

## 2021-09-04 RX ORDER — ALBUTEROL 90 UG/1
1 AEROSOL, METERED ORAL EVERY 6 HOURS
Refills: 0 | Status: DISCONTINUED | OUTPATIENT
Start: 2021-09-04 | End: 2021-09-08

## 2021-09-04 RX ORDER — ACETAMINOPHEN 500 MG
650 TABLET ORAL EVERY 6 HOURS
Refills: 0 | Status: DISCONTINUED | OUTPATIENT
Start: 2021-09-04 | End: 2021-09-08

## 2021-09-04 RX ADMIN — TIOTROPIUM BROMIDE 1 CAPSULE(S): 18 CAPSULE ORAL; RESPIRATORY (INHALATION) at 15:21

## 2021-09-04 RX ADMIN — PANTOPRAZOLE SODIUM 40 MILLIGRAM(S): 20 TABLET, DELAYED RELEASE ORAL at 21:07

## 2021-09-04 RX ADMIN — Medication 40 MILLIGRAM(S): at 15:20

## 2021-09-04 RX ADMIN — GABAPENTIN 600 MILLIGRAM(S): 400 CAPSULE ORAL at 22:05

## 2021-09-04 RX ADMIN — Medication 25 MILLIGRAM(S): at 15:20

## 2021-09-04 RX ADMIN — SODIUM CHLORIDE 1000 MILLILITER(S): 9 INJECTION INTRAMUSCULAR; INTRAVENOUS; SUBCUTANEOUS at 03:35

## 2021-09-04 RX ADMIN — ALBUTEROL 2.5 MILLIGRAM(S): 90 AEROSOL, METERED ORAL at 06:09

## 2021-09-04 RX ADMIN — Medication 50 MILLIGRAM(S): at 22:56

## 2021-09-04 RX ADMIN — RISPERIDONE 2 MILLIGRAM(S): 4 TABLET ORAL at 22:56

## 2021-09-04 RX ADMIN — ONDANSETRON 4 MILLIGRAM(S): 8 TABLET, FILM COATED ORAL at 02:37

## 2021-09-04 RX ADMIN — Medication 40 MILLIGRAM(S): at 22:09

## 2021-09-04 RX ADMIN — PANTOPRAZOLE SODIUM 40 MILLIGRAM(S): 20 TABLET, DELAYED RELEASE ORAL at 17:54

## 2021-09-04 RX ADMIN — MONTELUKAST 10 MILLIGRAM(S): 4 TABLET, CHEWABLE ORAL at 15:20

## 2021-09-04 RX ADMIN — Medication 2: at 22:21

## 2021-09-04 RX ADMIN — Medication 325 MILLIGRAM(S): at 02:37

## 2021-09-04 RX ADMIN — Medication 125 MILLIGRAM(S): at 02:37

## 2021-09-04 RX ADMIN — ALBUTEROL 2.5 MILLIGRAM(S): 90 AEROSOL, METERED ORAL at 04:28

## 2021-09-04 RX ADMIN — ENOXAPARIN SODIUM 40 MILLIGRAM(S): 100 INJECTION SUBCUTANEOUS at 15:20

## 2021-09-04 NOTE — H&P ADULT - PROBLEM SELECTOR PLAN 9
F: s/p 1LNS in ED, pt eating and drinking  E: replete when K<4 and Mg<2  N: regular diet     VTE PPx: not required, pt low risk  GI PPx: not required     Code status: FULL CODE    Dispo: TERI

## 2021-09-04 NOTE — ED PROVIDER NOTE - PHYSICAL EXAMINATION
CONST: nontoxic NAD speaking in full sentences  HEAD: atraumatic  EYES: conjunctivae clear, PERRL, EOMI  ENT: mmm  NECK: supple/FROM, nttp, no jvd  CARD: rrr no murmurs  CHEST: +intermittent bl end exp wheezing, no rales/rhonchi/stridor/tripoding  ABD: soft, nd, nttp, no rebound/guarding  EXT: FROM, symmetric distal pulses intact  SKIN: warm, dry, no rash, no pedal edema/ttp/rash, cap refill <2sec  NEURO: a+ox3, 5/5 strength x4, gross sensation intact x4, baseline gait CONST: nontoxic NAD speaking in full sentences  HEAD: atraumatic  EYES: conjunctivae clear, PERRL, EOMI  ENT: mmm  NECK: supple/FROM, nttp, no jvd  CARD: rrr no murmurs  CHEST: +intermittent bl end exp wheezing, no rales/rhonchi/stridor/tripoding  ABD: soft, nd, nttp, no rebound/guarding  EXT: FROM, symmetric distal pulses intact  SKIN: warm, dry, no rash, cap refill <2sec  NEURO: a+ox3, 5/5 strength x4, gross sensation intact x4, baseline gait

## 2021-09-04 NOTE — H&P ADULT - PROBLEM SELECTOR PLAN 1
Met SIRS 2/4 criteria at presentation w/ HR 93 and RR 22, improved to HR 80s and RR <20 on RA. RVP sent x2, covid neg. CXR w/o infiltrates, CT PE protocol negative for PE (9/4), clinically improved w/ duonebs and steroids. At home takes albuterol, ventolin, and tiotropium.   -s/p IV solumedrol 125 mg in ED   -start prednisone 40mg PO daily (9/4-), plan for total 5 day course   -c/w duonebs PRN   -c/w home albuterol, ventolin, and tiotropium  -repeat RVP, f/u  -f/u add-on procalcitonin   -no signs of bacterial infection at this time , continue to monitor for infection  -trend CBC Met SIRS 2/4 criteria at presentation w/ HR 93 and RR 22, improved to HR 80s and RR <20 on RA. RVP sent x2, covid neg. CXR w/o infiltrates, CT PE protocol negative for PE (9/4), clinically improved w/ duonebs and steroids. At home takes albuterol, ventolin, montelukast and tiotropium.   -s/p IV solumedrol 125 mg in ED   -start prednisone 40mg PO daily (9/4-), plan for total 5 day course   -c/w duonebs PRN   -c/w home albuterol, ventolin, and tiotropium  -repeat RVP, f/u  -f/u add-on procalcitonin   -no signs of bacterial infection at this time , continue to monitor for infection  -trend CBC Met SIRS 2/4 criteria at presentation w/ HR 93 and RR 22, improved to HR 80s and RR <20 on RA. RVP sent x2, covid neg. CXR w/o infiltrates, CT PE protocol negative for PE (9/4), clinically improved w/ duonebs and steroids. At home takes albuterol, ventolin, montelukast and tiotropium.   -s/p IV solumedrol 125 mg in ED   -start prednisone 40mg PO daily (9/4-), plan for total 5 day course   -c/w duonebs PRN   -c/w home albuterol, ventolin, and tiotropium  -repeat RVP, f/u  -f/u add-on procalcitonin   -no signs of bacterial infection at this time, continue to monitor for infection  -trend CBC

## 2021-09-04 NOTE — H&P ADULT - NSHPSOCIALHISTORY_GEN_ALL_CORE
Lives in 3/4 house w/ roommates, likes current roommates, one from previous house. Never , no children. Has a brother in [place] and sister who lives in [place]. Parents both . Not working, previously worked as  and foot messenger. EtOH abuse in past, attends AA, sober 17 months currently. Active smoker, tries to smoke less than 1 ppd, is interested in quitting and will f/u options w/ PCP. Denies other drugs.

## 2021-09-04 NOTE — H&P ADULT - NSHPLABSRESULTS_GEN_ALL_CORE
LABS:                         8.1    7.57  >-----< 260           ( 09-04-21 @ 02:35 )             26.1       145  |  106  |   24  ----------------------< 124    (09-04-21 @ 02:35)     4.0  |  29  |  1.09    Anion Gap: 10    Ca   8.6   (09-04-21 @ 02:35)  Mg   2.0   (09-04-21 @ 02:35)  Phos x    (09-04-21 @ 02:35)       TP 8.6     |  AST 4.1    -------------------------     Alb x     |  ALT x               (09-04-21 @ 02:35)  -------------------------     T-bili 4.1  |  AlkPh 72    D-bili x   COAGULATION STUDIES:     aPTT  26.6 sec    (09-04-21 @ 02:35)     PT     12.1 sec    (09-04-21 @ 02:35)     INR    1.01          (09-04-21 @ 02:35)     Trop 0.01->0.01            CKMB 0.01

## 2021-09-04 NOTE — H&P ADULT - PROBLEM SELECTOR PLAN 6
Currently sober 1 yr and 5 months, regular AA attendance.   -encouraged continued sobriety and AA attendance. Currently sober 1 yr and 5 months, regular AA attendance. Takes acamprosate 333 mg daily at home.  -c/w home acamprosate  -encouraged continued sobriety and AA attendance. Pt last smoked before AA meeting yesterday (~7:30PM), pt is interested in quitting and will do so w/ medical aids w/ PCP as outpatient.  -declined nicotine replacement while inpatient

## 2021-09-04 NOTE — H&P ADULT - PROBLEM SELECTOR PROBLEM 9
[facility]  Certificate of Child Health Examination  Student's Name:   Krys Swann Birth Date  2010  Sex  female  Race/Ethnicity   School/Grade Level/ID#     Address:   [address] [facility]  [address] [place] [id number]  Parent/Guardian             Telephone#                                            Home:                               Work:   IMMUNIZATIONS: To be completed by health care provider. The mo/da/yr for every dose administered is required. If a specific vaccine is medically contraindicated, a separate written statement must be attached by the health care responsible for completing the health examination explaining the medical reason for the contraindication.      Immunization History   Administered Date(s) Administered   • DTaP, 5 Pertussis Antigens (DAPTACEL) 05/09/2011, 07/08/2011, 05/17/2012, 06/07/2012, 05/27/2015   • HIB (HbOC) 05/09/2011, 08/07/2011, 05/17/2012   • Hep A, ped/adol, 2 dose 11/25/2019   • Hep B, adolescent or pediatric 01/10/2011, 02/16/2011, 05/09/2011, 07/08/2011, 05/07/2012   • Influenza, Unspecified Formulation 10/17/2019   • MMR 01/23/2012, 07/20/2015   • Pneumococcal Conjugate 13 valent 01/10/2011, 05/09/2011, 01/23/2012   • Polio, INACTIVATED 02/28/2013, 02/27/2014, 05/21/2014, 05/24/2015   • Rotavirus - monovalent 02/10/2011, 05/09/2011   • Varicella 07/20/2015, 08/17/2016      Immunizations given 11/25/2019: None      Health care provider (MD, DO, APN, PA, school health professional, health official) verifying above immunization history must sign below. If adding dates to the above immunization history section, put your initials by date(s) and sign here.)  Signature                                                                 Title                                                Date  _____________________________________________________________________________________  Signature                                                                 Title                                                 Date  _____________________________________________________________________________________   ALTERNATIVE PROOF OF IMMUNITY   1. Clinical diagnosis (measles, mumps, hepatitis B) is allowed when verified by physician and supported with lab confirmation.  Attach copy of lab result.    * MEASLES (Rubeola)  MO   DA  YR          **MUMPS  MO   DA  YR             HEPATITIS B  MO   DA   YR           VARICELLA  MO   DA  YR      2. History of varicella (chickenpox) disease is acceptable if verified by health care provider, school health professional or health official.  Person signing below verifies that the parent/guardian’s description of varicella disease history is indicative of past infection and is accepting such history as documentation of disease.  Date of Disease                                  Signature                                                           Title   3. Laboratory Evidence of Immunity (check one)      __ Measles*         __ Mumps**        __ Rubella        __Varicella    (Attach copy of lab result)         *All measles cases diagnosed on or after July 1, 2002, must be confirmed by laboratory evidence.      **All mumps cases diagnosed on or after July 1, 2013, must be confirmed by laboratory evidence.     Completion of Alternative 1 or 3 MUST be accompanied by Labs & Physician Signature: ___________________________  Physician Statements of Immunity MUST be submitted to [facility] for review.     Certificates of [employer] Exemption to Immunizations or Physician Medical Statements of Medical Contraindication Are Reviewed   and Maintained by the School Authority     (11/2015)                                         (COMPLETE BOTH SIDES)                                  Approved [facility] 3/2016      Student's Name:  Krys Swann Birth Date 2010 Sex female School Grade Level/ID#     Page 2 of 2   HEALTH HISTORY      TO BE COMPLETED AND SIGNED BY  PARENT/GUARDIAN AND VERIFIED BY HEALTH CARE PROVIDER  ALLERGIES: (Food, drug, insect, other) No has no allergies on file.   MEDICATION: (List all prescribed or taken on a regular basis.)   No   Diagnosis of asthma?  Child wakes during night coughing? Yes    No  Yes    No  Loss of function of one of paired  organs?(eye/ear/kidney/testicle) Yes    No    Birth defects? Yes    No  Hospitalizations? Yes    No    Developmental delay? Yes    No   When? What for? Yes    No    Blood disorders? Hemophilia,  Sickle Cell, Other? Explain. Yes    No  Surgery? (List all.)  When? What for? Yes    No    Diabetes? Yes    No  Serious injury or illness? Yes    No    Head injury/Concussion/Passed out? Yes    No  TB skin test positive (past/present)? * Yes    No *If yes, refer to local health dept   Seizures? What are they like?  Yes    No  TB disease (past or present)? * Yes    No *If yes, refer to local [facility] dept   Heart problem/Shortness of breath?  Yes    No  Tobacco use (type, frequency)?  Yes    No    Heart murmur/High blood pressure? Yes    No  Alcohol/Drug use?  Yes    No    Dizziness or chest pain with exercise? Yes    No  Family history of sudden death  before age 50? (Cause?) Yes    No    Eye/Vision problems? ______           __Glasses          __Contacts  Last exam by eye doctor ______  Other concerns? (crossed eye, drooping lids, squinting, difficulty reading) Dental?   __ Braces     __ Bridge     __ Plate   __Other   Ear/Hearing problems? Yes    No  Information may be shared with appropriate personnel for health and educational purposes.   Bone/Joint problem/injury/scoliosis? Yes    No  Parent/Guardian  Signature                                               Date   PHYSICAL EXAMINATION REQUIREMENTS   Entire section below to be completed by MD/DO/APN/PA Head Circumference if <2-3 years old - /64   Temp 97.1 °F (36.2 °C) (Temporal)   Ht 4' 7.63\" (1.413 m)   Wt (!) 45.6 kg (100 lb 8 oz)   BMI 22.83 kg/m²   BSA  1.32 m²    97 %ile (Z= 1.82) based on CDC (Girls, 2-20 Years) BMI-for-age based on BMI available as of 11/25/2019.   DIABETES SCREENING (NOT REQUIRED FOR ) BMI>85% age/sex No     And any two of the following: Family History: No  Ethnic Minority: No    Signs of Insulin Resistance (hypertension, dyslipidemia, polycystic ovarian syndrome, acanthosis nigricans): No  At Risk: No   LEAD RISK QUESTIONNAIRE Required for children age 6 months through 6 years enrolled in licensed or public school operated day care, , nursery school and/or . (Blood test required if resides in [place] or high risk [place] [facility]  Questionnaire Administered? Yes  Blood Test Indicated? No   Blood Test Date _____   Blood Test Result ______________   TB SKIN OR BLOOD TEST Recommended only for children in high-risk groups including children immunosuppressed due to HIV infection or other conditions, frequent travel to or born in high prevalence countries or those exposed to adults in high-risk categories. See CDC guidelines. http://www.cdc.gov/tb/publications/factsheets/testing/TB_testing.htm.  Test Needed: No     Test performed: No                          Skin Test:    Date Read              /      /             Result:   Positive__      Negative__        mm________                          Blood Test: Date Reported       /      /               Result:  Positive__      Negative__      Value________  LAB TESTS (recommended) Date/Result  Date/Results   Hemoglobin or Hematocrit NA Sickle Cell (when indicated) NA   Urinalysis NA Developmental Screening Tool NA        SYSTEM REVIEW Normal  Comments/Follow-up/Needs  Normal Comments/Follow-up/Needs   Skin  Yes  Endocrine Yes    Ears Yes                  Screening Result Gastrointestinal Yes    Eyes Yes                  Screening Result: Genito-Urinary Yes                                            LMP   Nose Yes  Neurologic Yes    Throat Yes  Musculoskeletal Yes    Mouth/Dental Yes   Spinal Exam Yes    Cardiovascular/HTN Yes  Nutritional status Yes    Respiratory Yes Diagnosis of Asthma: No   Mental Health Yes    Currently Prescribed Asthma Medication:        No  Quick-relief medication (e.g. Short Acting Beta Antagonist)        No   Controller medication (e.g. inhaled corticosteroid) Other     NEEDS/MODIFICATIONS required in the school setting: No restrictions DIETARY Needs/Restrictions: No restrictions   SPECIAL INSTRUCTIONS/DEVICES e.g. safety glasses, glass eye, chest protector for arrhythmia, pacemaker, prosthetic device, dental bridge, false teeth, athletic support/cup: No restrictions   MENTAL HEALTH/OTHER Is there anything else the school should know about this student?  If you would like to discuss this student’s health with school or school health personnel:  Not needed   EMERGENCY ACTION needed while at school due to child’s health condition (e.g. ,seizures, asthma, insect sting, food, peanut allergy, bleeding problem, diabetes, heart problem)?   No   On the basis of the examination on this day, I approve this child’s participation in                                (If No or Modified please attach explanation.)  PHYSICAL EDUCATION:  Yes                               INTERSCHOLASTIC SPORTS   Yes   Print Name  Mariella Monterroso CNP         Signature                                                                       Date: 11/25/2019   Address:  [address] [address]  [phone]         (Complete Both Sides)     Approved [facility] 3/2016   Nutrition, metabolism, and development symptoms

## 2021-09-04 NOTE — H&P ADULT - PROBLEM SELECTOR PLAN 5
Pt last smoked before AA meeting yesterday (~7:30PM), pt is interested in quitting and will do so w/ medical aids w/ PCP as outpatient.  -declined nicotine replacement while inpatient Pt takes at home neurontin 1200 mg, prozac 60 mg, risperidone 2 mg, and hydroxyzine 50 mg at bedtime.  -c/w home neurontin, prozac, risperidone, and hydroxyzine Pt takes at home neurontin 1200 mg, prozac 60 mg, risperidone 2 mg, and hydroxyzine 50 mg at bedtime.  -c/w home neurontin, prozac, risperidone, and hydroxyzine  -f/u AM A1c, lipid panel

## 2021-09-04 NOTE — H&P ADULT - PROBLEM SELECTOR PLAN 4
Pt takes at home neurontin 1200 mg, prozac 60 mg, risperidone 2 mg, and hydroxyzine 50 mg at bedtime.  -c/w home neurontin, prozac, risperidone, and hydroxyxine Pt takes at home neurontin 1200 mg, prozac 60 mg, risperidone 2 mg, and hydroxyzine 50 mg at bedtime.  -c/w home neurontin, prozac, risperidone, and hydroxyzine Patient presented with Hgb of 8.1, reportedly previously in the 11 to 12s on previous checks. Denies melanic stool or BRBPR. Denies any recent blood loss.  - f/u repeat CBC at 6 PM  - Transfusion goal >7 given no known CAD

## 2021-09-04 NOTE — ED ADULT NURSE REASSESSMENT NOTE - NS ED NURSE REASSESS COMMENT FT1
pt complaining of chest discomfort and had 1 episode of vomiting. IV access established, medications given per MD order, 2nd EKG performed. pt resting on the stretcher. connected to cardiac monitor

## 2021-09-04 NOTE — H&P ADULT - PROBLEM SELECTOR PLAN 3
No known history of CHF, pt takes hydrochlorothiazide at home and sleeps with legs elevated. , low concern for CHF at this time.   -c/w home hydrochlorothiazide   -recommended compression stockings at home   -pt can f/u w/ PCP as outpatient No known history of CHF, pt takes hydrochlorothiazide 25 mg daily at home and sleeps with legs elevated. , low concern for CHF at this time.   -c/w home hydrochlorothiazide   -recommended compression stockings at home   -pt can f/u w/ PCP as outpatient No known history of CHF, pt takes hydrochlorothiazide 25 mg daily at home and sleeps with legs elevated. , low concern for CHF at this time.   -c/w home hydrochlorothiazide   -recommended compression stockings at home   -echocardiogram ordered to evaluate heart function

## 2021-09-04 NOTE — ED PROVIDER NOTE - PROGRESS NOTE DETAILS
upon reeval, pt now c/o nausea/nbnb emesis x4 episodes w/ subsequent persistent nonradiating nonpositional nonpleuritic nonexertional lower L chest pressure. wheezing resolved s/p duoneb. pt now consenting to labs/rpt ekg/cxr. trop x 2 neg. CTA neg for PE. Pt c/o worsening sob. +wheezing throughout. Given another neb, still wheezing. Will admit for COPD exacerbation

## 2021-09-04 NOTE — ED PROVIDER NOTE - SHIFT CHANGE DETAILS
60M copd c/o <24h wheezing/sob improved s/p duoneb but then develop n/v and cp while in ED. avss. repeat ekg nonischemic. found to have elavated ddimer.     dispo: pending cta chest and repeat trop -- anticipate admission 60M copd c/o <24h wheezing/sob improved s/p duoneb but then develop n/v and cp while in ED. avss. repeat ekg nonischemic. found to have elevated ddimer.     dispo: pending cta chest and repeat trop -- okay to dc home if no acute abnl

## 2021-09-04 NOTE — H&P ADULT - NSHPADDITIONALINFOADULT_GEN_ALL_CORE
60M PMH current smoker (less than 1PPD), asthma and COPD (not on home O2, no prior intubations, +prior hospitalizations for exacerbations), bipolar/depression, past EtOH abuse (currently sober 1 yr 5 months), multiple ED visits for COPD exacerbation (last in 08/2021) and EtOH intoxication p/w difficulty breathing and coughing x1 day, admitted for likely COPD exacerbation.     Plan  -patient w/ b/l diffuse wheezes; c/w IV solumedrol 50mg BID, spiriva, singulair, albuterol PRN  -elevated JVD, b/l 2+ pitting edema; BNP WNL, no congestion or effusion on CT chest, findings possibly 2/2 R-sided cardiac pathology; pending TTE   -anemia present; repeat CBC   -Cr WNL though uptrending, will monitor  -monitor QTc in the setting of zofran prn

## 2021-09-04 NOTE — ED PROVIDER NOTE - OBJECTIVE STATEMENT
60M daily smoker, bipolar/depression, etoh abuse, copd (no home o2, no prior intubations), multiple ED visits for etoh intoxication vs copd exacerbation, now c/o insidious wheezing/sob while at AA meeting. +chronic cough w/o change in clear sputum. no fever/chills, no cp/sob, no abd pain/n/v, no diarrhea, no dysuria, no pedal edema/pain/rash, no trauma, no etoh-dpt/ivdu/recreational drug use.

## 2021-09-04 NOTE — H&P ADULT - PROBLEM SELECTOR PLAN 7
3x episodes of NBNB emesis in ED early this morning (9/4). No further episodes of emesis, no nausea. Likely 2/2 food poisoning, pt suspects soup and chicken he got at a [employer] last night for dinner.   -continue to monitor  -zofran PRN nausea Currently sober 1 yr and 5 months, regular AA attendance. Takes acamprosate 333 mg daily at home.  -c/w home acamprosate  -encouraged continued sobriety and AA attendance. Currently sober 1 yr and 5 months, regular AA attendance. Takes acamprosate 333 mg daily at home.  -no acamprosate while inpatient, pt can c/w home acamprosate after d/c   -encouraged continued sobriety and AA attendance.

## 2021-09-04 NOTE — H&P ADULT - NSHPPHYSICALEXAM_GEN_ALL_CORE
GENERAL: Elderly male sitting up in bed in NAD, talkative, on room air   HEAD:  Atraumatic, Normocephalic  EYES: EOMI, PERRLA, conjunctiva and sclera clear  ENT: Moist mucous membranes, oropharynx clear, good dentition, some brown staining on all teeth, missing 1 bottom front tooth,   NECK: Supple, No JVD  CHEST/LUNG: on room air, mild wheezing b/l. Unlabored respirations  HEART: Regular rate and rhythm (HR 98); No murmurs, rubs, or gallops  ABDOMEN: BSx4; Soft, nontender, nondistended  EXTREMITIES:  2+ Peripheral Pulses, brisk capillary refill. No clubbing, or cyanosis. +2 pitting edema up to knees b/l over socks and shoes, non-tender. Calf squeeze negative.  NERVOUS SYSTEM:  A&Ox3, CNII-XII grossly intact, no focal deficits   SKIN: No rashes or lesions  PSYCH: affect somewhat flat but appropriate, mood is ok and at baseline GENERAL: Elderly male sitting up in bed in NAD, talkative, on room air   HEAD:  Atraumatic, Normocephalic  EYES: EOMI, PERRLA, conjunctiva and sclera clear  ENT: Moist mucous membranes, oropharynx clear, good dentition, some brown staining on all teeth, missing 1 bottom front tooth,   NECK: Supple, No JVD  CHEST/LUNG: on room air, mild wheezing b/l. Unlabored respirations  HEART: Regular rate and rhythm (HR 98); No murmurs, rubs, or gallops  ABDOMEN: BSx4; Soft, nontender, nondistended  EXTREMITIES:  2+ Peripheral Pulses, brisk capillary refill. No clubbing, or cyanosis. +2 pitting edema up to knees b/l over socks and shoes, non-tender. Calf squeeze negative.  NERVOUS SYSTEM: A&Ox3, CNII-XII grossly intact, no focal deficits   SKIN: No rashes or lesions  PSYCH: affect somewhat flat but appropriate, mood is ok and at baseline

## 2021-09-04 NOTE — H&P ADULT - PROBLEM SELECTOR PLAN 8
F: s/p 1LNC in ED, pt eating and drinking  E: replete when K<4 and Mg<2  N: regular diet     VTE PPx: not required, pt low risk  GI PPx: not required     Code status: FULL CODE    Dispo: TERI F: s/p 1LNS in ED, pt eating and drinking  E: replete when K<4 and Mg<2  N: regular diet     VTE PPx: not required, pt low risk  GI PPx: not required     Code status: FULL CODE    Dispo: TERI 3x episodes of NBNB emesis in ED early this morning (9/4). No further episodes of emesis, no nausea. Likely 2/2 food poisoning, pt suspects soup and chicken he got at a [employer] last night for dinner.   -continue to monitor  -zofran PRN nausea 3x episodes of NBNB emesis in ED early this morning (9/4). No further episodes of emesis, no nausea. Likely 2/2 food poisoning, pt suspects soup and chicken he got at a [employer] last night for dinner.   -continue to monitor  -zofran PRN nausea (QTc 430)

## 2021-09-04 NOTE — ED PROVIDER NOTE - CLINICAL SUMMARY MEDICAL DECISION MAKING FREE TEXT BOX
60M copd c/o <24h wheezing/sob improved s/p duoneb but then develop n/v and cp while in ED. avss. repeat ekg nonischemic. found to have elevated ddimer. s/o'd overnight team stable pending cta chest and repeat trop -- okay to dc home if no acute abnl.

## 2021-09-04 NOTE — H&P ADULT - HISTORY OF PRESENT ILLNESS
CC: "difficulty breathing"  HPI: 60M PMH current smoker (less than 1PPD), asthma and COPD (not on home O2, no prior intubations, +prior hospitalizations for exacerbations), bipolar/depression, past EtOH abuse (currently sober 1 yr 5 months), multiple ED visits for COPD exacerbation (last in 08/2021) and EtOH intoxication p/w difficulty breathing and coughing last night at his AA meeting, causing him to have to leave his meeting. Some wheezing last night as well, symptoms similar to prior COPD exacerbations. Has chronic cough 2/2 smoking, some clear sputum but denies any increase in sputum production or color. No f/c, recently got 2nd dose of Moderna on 8/31/2021, denies sick contacts. Had chest pain when he first came to ED, now resolved, pt thinks it was related to his difficulty breathing. Also had 3x episodes of NBNB emesis last night after eating soup and chicken at a [employer], no emesis during last few hours, no nausea currently. No diarrhea/constipation, no dysuria or changes in urinary/bowel habits. ROS significant for +b/l edema, takes a "water pill," not aware of any heart failure diagnosis.      In the ED:    - VS: Tmax: 36.8, HR: 93, BP: 159/75, RR: 22, O2: 97% on room air    - Pertinent Labs: Hgb 8.1 (last 11.2 in 01/2020, 12-13 before that),     - Imaging: CXR: CT: US: Cath: EKG:     - Treatment/interventions:     PMHx:   PSHx:  Meds: See med rec  Allergies:  Social: see below     CC: "difficulty breathing"  HPI: 60M PMH current smoker (less than 1PPD), asthma and COPD (not on home O2, no prior intubations, +prior hospitalizations for exacerbations), bipolar/depression, past EtOH abuse (currently sober 1 yr 5 months), multiple ED visits for COPD exacerbation (last in 08/2021) and EtOH intoxication p/w difficulty breathing and coughing last night at his AA meeting, causing him to have to leave his meeting. Some wheezing last night as well, symptoms similar to prior COPD exacerbations. Has chronic cough 2/2 smoking, some clear sputum but denies any increase in sputum production or color. No f/c, recently got 2nd dose of Moderna on 8/31/2021, denies sick contacts. Home nebulizer has also not been working for past few months, pt intends to get it fixed/replaced. Had chest pain when he first came to ED, now resolved, pt thinks it was related to his difficulty breathing. Breathing much better since last night after steroids and duonebs. Also had 3x episodes of NBNB emesis last night after eating soup and chicken at a [employer], no emesis during last few hours, no nausea currently. No diarrhea/constipation, no dysuria or changes in urinary/bowel habits. ROS significant for +b/l edema, takes a "water pill," not aware of any heart failure diagnosis.       In the ED:    - VS: Tmax: 36.8, HR: 93, BP: 159/75, RR: 22, O2: 97% on room air    - Pertinent Labs: Hgb 8.1 (last 11.2 in 01/2020, 12-13 before that), WBC 7.57 w/ 92.2% neuts, DDimer 365; VBG w/ CO2 31.8, HCO3 30; covid neg; Trop 0.01->0.01, CK/CKMB wnl;     - Imaging: CXR: no infiltrates, CT PE protocol: pulmonary emphysema, no definite PE (study limited by suboptimal timing of bolus; EKG no ischemic changes    - Treatment/interventions: pt received duonebs x3, albuterol x2, solumedrol 125, 1LNS, and zofran x1 for vomiting    PMHx: as above per HPI  PSHx: see below   Meds: See med rec  Allergies: NKDA  Social: see below

## 2021-09-04 NOTE — H&P ADULT - NSICDXFAMILYHX_GEN_ALL_CORE_FT
FAMILY HISTORY:  Father  Still living? No  Family history of cerebrovascular accident (CVA), Age at diagnosis: Age Unknown  FH: hypertension, Age at diagnosis: Age Unknown    Mother  Still living? Unknown  FH: diabetes mellitus, Age at diagnosis: Age Unknown

## 2021-09-05 DIAGNOSIS — K92.2 GASTROINTESTINAL HEMORRHAGE, UNSPECIFIED: ICD-10-CM

## 2021-09-05 LAB
A1C WITH ESTIMATED AVERAGE GLUCOSE RESULT: 5.6 % — SIGNIFICANT CHANGE UP (ref 4–5.6)
ANION GAP SERPL CALC-SCNC: 11 MMOL/L — SIGNIFICANT CHANGE UP (ref 5–17)
BLD GP AB SCN SERPL QL: NEGATIVE — SIGNIFICANT CHANGE UP
BLD GP AB SCN SERPL QL: NEGATIVE — SIGNIFICANT CHANGE UP
BUN SERPL-MCNC: 18 MG/DL — SIGNIFICANT CHANGE UP (ref 7–23)
CALCIUM SERPL-MCNC: 8.7 MG/DL — SIGNIFICANT CHANGE UP (ref 8.4–10.5)
CHLORIDE SERPL-SCNC: 104 MMOL/L — SIGNIFICANT CHANGE UP (ref 96–108)
CHOLEST SERPL-MCNC: 135 MG/DL — SIGNIFICANT CHANGE UP
CO2 SERPL-SCNC: 25 MMOL/L — SIGNIFICANT CHANGE UP (ref 22–31)
COVID-19 SPIKE DOMAIN AB INTERP: POSITIVE
COVID-19 SPIKE DOMAIN ANTIBODY RESULT: 171 U/ML — HIGH
CREAT SERPL-MCNC: 0.73 MG/DL — SIGNIFICANT CHANGE UP (ref 0.5–1.3)
ESTIMATED AVERAGE GLUCOSE: 114 MG/DL — SIGNIFICANT CHANGE UP (ref 68–114)
FERRITIN SERPL-MCNC: 6 NG/ML — LOW (ref 30–400)
GLUCOSE BLDC GLUCOMTR-MCNC: 125 MG/DL — HIGH (ref 70–99)
GLUCOSE BLDC GLUCOMTR-MCNC: 132 MG/DL — HIGH (ref 70–99)
GLUCOSE BLDC GLUCOMTR-MCNC: 158 MG/DL — HIGH (ref 70–99)
GLUCOSE BLDC GLUCOMTR-MCNC: 163 MG/DL — HIGH (ref 70–99)
GLUCOSE SERPL-MCNC: 122 MG/DL — HIGH (ref 70–99)
HAPTOGLOB SERPL-MCNC: 158 MG/DL — SIGNIFICANT CHANGE UP (ref 34–200)
HCT VFR BLD CALC: 22.4 % — LOW (ref 39–50)
HCT VFR BLD CALC: 27.3 % — LOW (ref 39–50)
HDLC SERPL-MCNC: 62 MG/DL — SIGNIFICANT CHANGE UP
HGB BLD-MCNC: 6.9 G/DL — CRITICAL LOW (ref 13–17)
HGB BLD-MCNC: 8.4 G/DL — LOW (ref 13–17)
IRON SATN MFR SERPL: 18 UG/DL — LOW (ref 45–165)
IRON SATN MFR SERPL: 5 % — LOW (ref 16–55)
LDH SERPL L TO P-CCNC: 184 U/L — SIGNIFICANT CHANGE UP (ref 50–242)
LIPID PNL WITH DIRECT LDL SERPL: 64 MG/DL — SIGNIFICANT CHANGE UP
MAGNESIUM SERPL-MCNC: 2 MG/DL — SIGNIFICANT CHANGE UP (ref 1.6–2.6)
MCHC RBC-ENTMCNC: 27.4 PG — SIGNIFICANT CHANGE UP (ref 27–34)
MCHC RBC-ENTMCNC: 27.9 PG — SIGNIFICANT CHANGE UP (ref 27–34)
MCHC RBC-ENTMCNC: 30.8 GM/DL — LOW (ref 32–36)
MCHC RBC-ENTMCNC: 30.8 GM/DL — LOW (ref 32–36)
MCV RBC AUTO: 88.9 FL — SIGNIFICANT CHANGE UP (ref 80–100)
MCV RBC AUTO: 90.7 FL — SIGNIFICANT CHANGE UP (ref 80–100)
NON HDL CHOLESTEROL: 73 MG/DL — SIGNIFICANT CHANGE UP
NRBC # BLD: 0 /100 WBCS — SIGNIFICANT CHANGE UP (ref 0–0)
NRBC # BLD: 0 /100 WBCS — SIGNIFICANT CHANGE UP (ref 0–0)
PHOSPHATE SERPL-MCNC: 4.6 MG/DL — HIGH (ref 2.5–4.5)
PLATELET # BLD AUTO: 206 K/UL — SIGNIFICANT CHANGE UP (ref 150–400)
PLATELET # BLD AUTO: 234 K/UL — SIGNIFICANT CHANGE UP (ref 150–400)
POTASSIUM SERPL-MCNC: 4.4 MMOL/L — SIGNIFICANT CHANGE UP (ref 3.5–5.3)
POTASSIUM SERPL-SCNC: 4.4 MMOL/L — SIGNIFICANT CHANGE UP (ref 3.5–5.3)
RBC # BLD: 2.47 M/UL — LOW (ref 4.2–5.8)
RBC # BLD: 3.07 M/UL — LOW (ref 4.2–5.8)
RBC # FLD: 14.6 % — HIGH (ref 10.3–14.5)
RBC # FLD: 15.4 % — HIGH (ref 10.3–14.5)
RH IG SCN BLD-IMP: POSITIVE — SIGNIFICANT CHANGE UP
RH IG SCN BLD-IMP: POSITIVE — SIGNIFICANT CHANGE UP
SARS-COV-2 IGG+IGM SERPL QL IA: 171 U/ML — HIGH
SARS-COV-2 IGG+IGM SERPL QL IA: POSITIVE
SODIUM SERPL-SCNC: 140 MMOL/L — SIGNIFICANT CHANGE UP (ref 135–145)
TIBC SERPL-MCNC: 388 UG/DL — SIGNIFICANT CHANGE UP (ref 220–430)
TRIGL SERPL-MCNC: 46 MG/DL — SIGNIFICANT CHANGE UP
UIBC SERPL-MCNC: 370 UG/DL — SIGNIFICANT CHANGE UP (ref 110–370)
WBC # BLD: 6.84 K/UL — SIGNIFICANT CHANGE UP (ref 3.8–10.5)
WBC # BLD: 8.63 K/UL — SIGNIFICANT CHANGE UP (ref 3.8–10.5)
WBC # FLD AUTO: 6.84 K/UL — SIGNIFICANT CHANGE UP (ref 3.8–10.5)
WBC # FLD AUTO: 8.63 K/UL — SIGNIFICANT CHANGE UP (ref 3.8–10.5)

## 2021-09-05 PROCEDURE — 99233 SBSQ HOSP IP/OBS HIGH 50: CPT | Mod: GC

## 2021-09-05 PROCEDURE — 71045 X-RAY EXAM CHEST 1 VIEW: CPT | Mod: 26,77

## 2021-09-05 PROCEDURE — 99222 1ST HOSP IP/OBS MODERATE 55: CPT | Mod: GC

## 2021-09-05 PROCEDURE — 71045 X-RAY EXAM CHEST 1 VIEW: CPT | Mod: 26

## 2021-09-05 RX ORDER — IRON SUCROSE 20 MG/ML
200 INJECTION, SOLUTION INTRAVENOUS EVERY 24 HOURS
Refills: 0 | Status: DISCONTINUED | OUTPATIENT
Start: 2021-09-05 | End: 2021-09-05

## 2021-09-05 RX ORDER — IRON SUCROSE 20 MG/ML
200 INJECTION, SOLUTION INTRAVENOUS EVERY 24 HOURS
Refills: 0 | Status: DISCONTINUED | OUTPATIENT
Start: 2021-09-05 | End: 2021-09-06

## 2021-09-05 RX ORDER — PANTOPRAZOLE SODIUM 20 MG/1
40 TABLET, DELAYED RELEASE ORAL EVERY 12 HOURS
Refills: 0 | Status: DISCONTINUED | OUTPATIENT
Start: 2021-09-05 | End: 2021-09-08

## 2021-09-05 RX ORDER — SENNA PLUS 8.6 MG/1
1 TABLET ORAL ONCE
Refills: 0 | Status: COMPLETED | OUTPATIENT
Start: 2021-09-05 | End: 2021-09-05

## 2021-09-05 RX ADMIN — Medication 40 MILLIGRAM(S): at 06:19

## 2021-09-05 RX ADMIN — PANTOPRAZOLE SODIUM 40 MILLIGRAM(S): 20 TABLET, DELAYED RELEASE ORAL at 17:42

## 2021-09-05 RX ADMIN — Medication 50 MILLIGRAM(S): at 11:19

## 2021-09-05 RX ADMIN — Medication 40 MILLIGRAM(S): at 21:33

## 2021-09-05 RX ADMIN — Medication 50 MILLIGRAM(S): at 17:41

## 2021-09-05 RX ADMIN — RISPERIDONE 2 MILLIGRAM(S): 4 TABLET ORAL at 21:33

## 2021-09-05 RX ADMIN — SENNA PLUS 1 TABLET(S): 8.6 TABLET ORAL at 22:13

## 2021-09-05 RX ADMIN — IRON SUCROSE 110 MILLIGRAM(S): 20 INJECTION, SOLUTION INTRAVENOUS at 16:37

## 2021-09-05 RX ADMIN — Medication 25 MILLIGRAM(S): at 06:19

## 2021-09-05 RX ADMIN — Medication 650 MILLIGRAM(S): at 17:41

## 2021-09-05 RX ADMIN — GABAPENTIN 600 MILLIGRAM(S): 400 CAPSULE ORAL at 21:32

## 2021-09-05 RX ADMIN — Medication 650 MILLIGRAM(S): at 18:41

## 2021-09-05 RX ADMIN — MONTELUKAST 10 MILLIGRAM(S): 4 TABLET, CHEWABLE ORAL at 11:26

## 2021-09-05 RX ADMIN — Medication 50 MILLIGRAM(S): at 21:32

## 2021-09-05 RX ADMIN — TIOTROPIUM BROMIDE 1 CAPSULE(S): 18 CAPSULE ORAL; RESPIRATORY (INHALATION) at 09:52

## 2021-09-05 RX ADMIN — Medication 2: at 17:42

## 2021-09-05 RX ADMIN — Medication 2: at 21:33

## 2021-09-05 NOTE — CONSULT NOTE ADULT - ASSESSMENT
60M PMH current smoker (less than 1PPD), asthma and COPD (not on home O2, no prior intubations, +prior hospitalizations for exacerbations), bipolar/depression, past EtOH abuse (currently sober 1 yr 5 months), multiple ED visits for COPD exacerbation (last in 08/2021) and EtOH intoxication p/w difficulty breathing and coughing, admitted to [facility] for COPD exacerbation, GI consulted for acute drop in H/H in the setting of ROSALIE and for consideration of endoscopic evaluation.    #Anemia  Patient p/w SOB, wheezing, coughing, admitted for COPD exacerbation, with multiple prior admissions for COPD exacerbation in the past requiring multiple courses of Prednisone. Also on ASa 81 mg daily at home. Baseline Hgb 11-12 per record review in 2020. Now presenting with Hgb in 7-8 range, overnight acutely dropping to 6.9, requiring pRBC transfusion. ddx including PUD in the setting of ASA and frequent Prednisone use.   -Maintain active T&S  -Transfusion goal as per primary team  -Monitor H/H  -Protonix 40 mg IVP BID  -Given ROSALIE and no prior C-scope evaluation for CRC screening, would warrant bidirectional evaluation with EGD/C-scope   -Will tentatively plan for EGD/C-scope Wednesday 9/8 pending clinical improvement of COPD exacerbation     Case discussed with [facility] attending and primary team.    Ashley Hi DO  Gastroenterology Fellow  Pager: [phone]

## 2021-09-05 NOTE — PROGRESS NOTE ADULT - PROBLEM SELECTOR PLAN 4
Patient presented with Hgb of 8.1, reportedly previously in the 11 to 12s on previous checks. Denies melanic stool or BRBPR. Denies any recent blood loss.  - f/u repeat CBC at 6 PM  - Transfusion goal >7 given no known CAD Patient presented with Hgb of 8.1, reportedly previously in the 11 to 12s on previous checks. Denies melanic stool or BRBPR. Denies any recent blood loss.   -Hb 9/5 was 6.9, given 1u RBC  - Transfusion goal >7 given no known CAD Patient presented with Hgb of 8.1, reportedly previously in the 11 to 12s on previous checks. Denies melanic stool or BRBPR. Denies any recent blood loss. Now R/o GI bleed  - Transfusion goal >7 given no known CAD  -Hb 9/5 was 6.9, given 1u RBC  -F/u 2pm CBC  -D/c lovenox. No SCDs due to leg edema  -Protonix 40 BID  -Supplemental iron IV  -F/u FOBT. contact GI if + Patient presented with Hgb of 8.1, reportedly previously in the 11 to 12s on previous checks. Denies melanic stool or BRBPR. Denies any recent blood loss. Now R/o GI bleed  - Transfusion goal >7 given no known CAD  -Hb 9/5 was 6.9, given 1u RBC  -F/u 2pm CBC  -D/c lovenox. No SCDs due to leg edema  -Protonix 40 BID  -Supplemental iron sucrose 200mg IV daily  -F/u FOBT. contact GI if +

## 2021-09-05 NOTE — PROVIDER CONTACT NOTE (CRITICAL VALUE NOTIFICATION) - NS PROVIDER READ BACK TO LAB
[No falls in past year] : Patient reported no falls in the past year [] : No [de-identified] : 3x/week 2 drinks each time yes

## 2021-09-05 NOTE — PROGRESS NOTE ADULT - ASSESSMENT
60M PMH current smoker (less than 1PPD), asthma and COPD (not on home O2, no prior intubations, +prior hospitalizations for exacerbations), bipolar/depression, past EtOH abuse (currently sober 1 yr 5 months), multiple ED visits for COPD exacerbation (last in 08/2021) and EtOH intoxication p/w difficulty breathing and coughing x1 day, admitted for likely COPD exacerbation.  60M PMH current smoker (less than 1PPD), asthma and COPD (not on home O2, no prior intubations, +prior hospitalizations for exacerbations), bipolar/depression, past EtOH abuse (currently sober 1 yr 5 months), multiple ED visits for COPD exacerbation (last in 08/2021) and EtOH intoxication p/w difficulty breathing and coughing x1 day, admitted for likely COPD exacerbation found to be anemic. 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

## 2021-09-05 NOTE — PROGRESS NOTE ADULT - PROBLEM SELECTOR PLAN 8
3x episodes of NBNB emesis in ED early this morning (9/4). No further episodes of emesis, no nausea. Likely 2/2 food poisoning, pt suspects soup and chicken he got at a [employer] last night for dinner.   -continue to monitor  -zofran PRN nausea (QTc 430)

## 2021-09-05 NOTE — PROGRESS NOTE ADULT - PROBLEM SELECTOR PLAN 3
No known history of CHF, pt takes hydrochlorothiazide 25 mg daily at home and sleeps with legs elevated. , low concern for CHF at this time.   -c/w home hydrochlorothiazide   -recommended compression stockings at home   -echocardiogram ordered to evaluate heart function No known history of CHF, pt takes hydrochlorothiazide 25 mg daily at home and sleeps with legs elevated. , low concern for CHF at this time.   -c/w home hydrochlorothiazide   -recommended compression stockings at home   -echocardiogram ordered to evaluate heart function  -Elevate legs

## 2021-09-05 NOTE — CONSULT NOTE ADULT - ATTENDING COMMENTS
Anemia secondary to ROSALIE.  Will plan for an EGD and a colonoscopy when optimized from a pulmonary standpoint (COPD).  Continue PPI bid.

## 2021-09-05 NOTE — PROGRESS NOTE ADULT - PROBLEM SELECTOR PLAN 1
Met SIRS 2/4 criteria at presentation w/ HR 93 and RR 22, improved to HR 80s and RR <20 on RA. RVP sent x2, covid neg. CXR w/o infiltrates, CT PE protocol negative for PE (9/4), clinically improved w/ duonebs and steroids. At home takes albuterol, ventolin, montelukast and tiotropium.   -s/p IV solumedrol 125 mg in ED   -start prednisone 40mg PO daily (9/4-), plan for total 5 day course   -c/w duonebs PRN   -c/w home albuterol, ventolin, and tiotropium  -repeat RVP, f/u  -f/u add-on procalcitonin   -no signs of bacterial infection at this time, continue to monitor for infection  -trend CBC Met SIRS 2/4 criteria at presentation w/ HR 93 and RR 22, improved to HR 80s and RR <20 on RA. RVP sent x2, covid neg. CXR w/o infiltrates, CT PE protocol negative for PE (9/4), clinically improved w/ duonebs and steroids. At home takes albuterol, ventolin, montelukast and tiotropium. s/p IV solumedrol 125 mg in ED   -start prednisone 40mg PO daily (9/4-) now Solumedrol 50mg IV for total 5 day course   -c/w duonebs PRN   -c/w home albuterol, ventolin, and tiotropium   -no signs of bacterial infection at this time, continue to monitor for infection  -trend CBC  -F/u CXR

## 2021-09-05 NOTE — PROGRESS NOTE ADULT - ATTENDING COMMENTS
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    Plan  -still w/ significant wheezes and SOB, will c/w IV steroids for now despite the increased risk of UGIB; c/w nebs  -patient denies melena, BRBPR, hematemesis, hematuria; reports unintentional 20lb weight loss; GI consulted, recs appreciated  -SQ lovenox d/c'd; s/p 1u prbc; continue to trend CBC; starting IV ppi   -IV iron for significantly low iron  -f/u TTE; holding SCDs 2/2 LE edema

## 2021-09-05 NOTE — CONSULT NOTE ADULT - SUBJECTIVE AND OBJECTIVE BOX
HPI:    CC: "difficulty breathing"  HPI: 60M PMH current smoker (less than 1PPD), asthma and COPD (not on home O2, no prior intubations, +prior hospitalizations for exacerbations), bipolar/depression, past EtOH abuse (currently sober 1 yr 5 months), multiple ED visits for COPD exacerbation (last in 08/2021) and EtOH intoxication p/w difficulty breathing and coughing last night at his AA meeting, causing him to have to leave his meeting. Some wheezing last night as well, symptoms similar to prior COPD exacerbations. Has chronic cough 2/2 smoking, some clear sputum but denies any increase in sputum production or color. No f/c, recently got 2nd dose of Moderna on 8/31/2021, denies sick contacts. Home nebulizer has also not been working for past few months, pt intends to get it fixed/replaced. Had chest pain when he first came to ED, now resolved, pt thinks it was related to his difficulty breathing. Breathing much better since last night after steroids and duonebs. Also had 3x episodes of NBNB emesis last night after eating soup and chicken at a [employer], no emesis during last few hours, no nausea currently. No diarrhea/constipation, no dysuria or changes in urinary/bowel habits. ROS significant for +b/l edema, takes a "water pill," not aware of any heart failure diagnosis.       In the ED:    - VS: Tmax: 36.8, HR: 93, BP: 159/75, RR: 22, O2: 97% on room air    - Pertinent Labs: Hgb 8.1 (last 11.2 in 01/2020, 12-13 before that), WBC 7.57 w/ 92.2% neuts, DDimer 365; VBG w/ CO2 31.8, HCO3 30; covid neg; Trop 0.01->0.01, CK/CKMB wnl;     - Imaging: CXR: no infiltrates, CT PE protocol: pulmonary emphysema, no definite PE (study limited by suboptimal timing of bolus; EKG no ischemic changes    - Treatment/interventions: pt received duonebs x3, albuterol x2, solumedrol 125, 1LNS, and zofran x1 for vomiting    PMHx: as above per HPI  PSHx: see below   Meds: See med rec  Allergies: NKDA  Social: see below   (04 Sep 2021 12:04)    Allergies    No Known Allergies    Intolerances      MEDICATIONS:  MEDICATIONS  (STANDING):  dextrose 40% Gel 15 Gram(s) Oral once  dextrose 5%. 1000 milliLiter(s) (50 mL/Hr) IV Continuous <Continuous>  dextrose 5%. 1000 milliLiter(s) (100 mL/Hr) IV Continuous <Continuous>  dextrose 50% Injectable 25 Gram(s) IV Push once  dextrose 50% Injectable 12.5 Gram(s) IV Push once  dextrose 50% Injectable 25 Gram(s) IV Push once  FLUoxetine 40 milliGRAM(s) Oral at bedtime  gabapentin 600 milliGRAM(s) Oral at bedtime  glucagon  Injectable 1 milliGRAM(s) IntraMuscular once  hydrochlorothiazide 25 milliGRAM(s) Oral daily  hydrOXYzine hydrochloride 50 milliGRAM(s) Oral at bedtime  influenza   Vaccine 0.5 milliLiter(s) IntraMuscular once  insulin lispro (ADMELOG) corrective regimen sliding scale   SubCutaneous Before meals and at bedtime  iron sucrose IVPB 200 milliGRAM(s) IV Intermittent every 24 hours  methylPREDNISolone sodium succinate Injectable 50 milliGRAM(s) IV Push two times a day  montelukast 10 milliGRAM(s) Oral daily  pantoprazole  Injectable 40 milliGRAM(s) IV Push every 12 hours  risperiDONE   Tablet 2 milliGRAM(s) Oral at bedtime  tiotropium 18 MICROgram(s) Capsule 1 Capsule(s) Inhalation daily    MEDICATIONS  (PRN):  acetaminophen   Tablet .. 650 milliGRAM(s) Oral every 6 hours PRN Temp greater or equal to 38.5C (101.3F), Mild Pain (1 - 3)  ALBUTerol    90 MICROgram(s) HFA Inhaler 1 Puff(s) Inhalation every 6 hours PRN Bronchospasm  melatonin 3 milliGRAM(s) Oral at bedtime PRN Insomnia  ondansetron Injectable 4 milliGRAM(s) IV Push every 8 hours PRN Nausea and/or Vomiting    PAST MEDICAL & SURGICAL HISTORY:  COPD (chronic obstructive pulmonary disease)    Emphysema with chronic bronchitis    Bipolar disease, chronic    Depression    History of ETOH abuse    H/O hernia repair    History of tonsillectomy      FAMILY HISTORY:  Family history of cerebrovascular accident (CVA) (Father)    FH: hypertension (Father)    FH: diabetes mellitus (Mother)      SOCIAL HISTORY:  [employer]: [ ] Current, [ ] Former, [ ] Never; Pack Years:  Alcohol:  Illicit Drugs:    REVIEW OF SYSTEMS:  Constitutional: No fever, chills, weight loss, or fatigue  ENMT:  No visual changes; No difficulty hearing, tinnitus, vertigo; No sinus or throat pain  Neck: No pain or stiffness  Respiratory: No cough, wheezing, or hemoptysis; No shortness of breath  Cardiovascular: No chest pain, palpitations, dizziness, orthopnea, PND, or leg swelling  Gastrointestinal: No abdominal or epigastric pain. No  nausea, vomiting, or hematemesis. No diarrhea, constipation, or steatorrhea. No melena or hematochezia  Genitourinary: No dysuria, urinary frequency/hesitancy, or hematuria  Skin: No itching, burning, rashes or lesions   Musculoskeletal: No joint pain or swelling; No muscle, back or extremity pain    Vital Signs Last 24 Hrs  T(C): 36.8 (05 Sep 2021 12:43), Max: 36.9 (04 Sep 2021 20:25)  T(F): 98.3 (05 Sep 2021 12:43), Max: 98.4 (04 Sep 2021 20:25)  HR: 83 (05 Sep 2021 12:43) (83 - 84)  BP: 143/72 (05 Sep 2021 12:43) (131/75 - 154/81)  BP(mean): --  RR: 18 (05 Sep 2021 12:43) (17 - 18)  SpO2: 95% (05 Sep 2021 12:43) (94% - 96%)      PHYSICAL EXAM:    General: Well developed; well nourished; in no acute distress  HEENT: MMM, conjunctiva and sclera clear  Gastrointestinal: Soft, non-tender non-distended; Normal bowel sounds; No rebound or guarding  Extremities: Normal range of motion, No clubbing, cyanosis or edema  Neurological: Alert and oriented x3  Skin: Warm and dry. No obvious rash    LABS:                        8.4    8.63  )-----------( 234      ( 05 Sep 2021 15:21 )             27.3     09-05    140  |  104  |  18  ----------------------------<  122<H>  4.4   |  25  |  0.73    Ca    8.7      05 Sep 2021 06:32  Phos  4.6     09-05  Mg     2.0     09-05    TPro  6.7  /  Alb  4.1  /  TBili  0.2  /  DBili  x   /  AST  18  /  ALT  15  /  AlkPhos  72  09-04        RADIOLOGY & ADDITIONAL STUDIES:    HPI:    CC: "difficulty breathing"  HPI: 60M PMH current smoker (less than 1PPD), asthma and COPD (not on home O2, no prior intubations, +prior hospitalizations for exacerbations), bipolar/depression, past EtOH abuse (currently sober 1 yr 5 months), multiple ED visits for COPD exacerbation (last in 08/2021) and EtOH intoxication p/w difficulty breathing and coughing last night at his AA meeting, causing him to have to leave his meeting. Some wheezing last night as well, symptoms similar to prior COPD exacerbations. Has chronic cough 2/2 smoking, some clear sputum but denies any increase in sputum production or color. No f/c, recently got 2nd dose of Moderna on 8/31/2021, denies sick contacts. Home nebulizer has also not been working for past few months, pt intends to get it fixed/replaced. Had chest pain when he first came to ED, now resolved, pt thinks it was related to his difficulty breathing. Breathing much better since last night after steroids and duonebs. Also had 3x episodes of NBNB emesis last night after eating soup and chicken at a [employer], no emesis during last few hours, no nausea currently. No diarrhea/constipation, no dysuria or changes in urinary/bowel habits. ROS significant for +b/l edema, takes a "water pill," not aware of any heart failure diagnosis.       In the ED:    - VS: Tmax: 36.8, HR: 93, BP: 159/75, RR: 22, O2: 97% on room air    - Pertinent Labs: Hgb 8.1 (last 11.2 in 01/2020, 12-13 before that), WBC 7.57 w/ 92.2% neuts, DDimer 365; VBG w/ CO2 31.8, HCO3 30; covid neg; Trop 0.01->0.01, CK/CKMB wnl;     - Imaging: CXR: no infiltrates, CT PE protocol: pulmonary emphysema, no definite PE (study limited by suboptimal timing of bolus; EKG no ischemic changes    - Treatment/interventions: pt received duonebs x3, albuterol x2, solumedrol 125, 1LNS, and zofran x1 for vomiting    PMHx: as above per HPI  PSHx: see below   Meds: See med rec  Allergies: NKDA  Social: see below   (04 Sep 2021 12:04)    GI Consulted for acute drop in H/H in the setting of ROSALIE and for consideration of endoscopic evaluation.     Endoscopic History  No prior EGD or C-scope    Allergies    No Known Allergies    Intolerances      MEDICATIONS:  MEDICATIONS  (STANDING):  dextrose 40% Gel 15 Gram(s) Oral once  dextrose 5%. 1000 milliLiter(s) (50 mL/Hr) IV Continuous <Continuous>  dextrose 5%. 1000 milliLiter(s) (100 mL/Hr) IV Continuous <Continuous>  dextrose 50% Injectable 25 Gram(s) IV Push once  dextrose 50% Injectable 12.5 Gram(s) IV Push once  dextrose 50% Injectable 25 Gram(s) IV Push once  FLUoxetine 40 milliGRAM(s) Oral at bedtime  gabapentin 600 milliGRAM(s) Oral at bedtime  glucagon  Injectable 1 milliGRAM(s) IntraMuscular once  hydrochlorothiazide 25 milliGRAM(s) Oral daily  hydrOXYzine hydrochloride 50 milliGRAM(s) Oral at bedtime  influenza   Vaccine 0.5 milliLiter(s) IntraMuscular once  insulin lispro (ADMELOG) corrective regimen sliding scale   SubCutaneous Before meals and at bedtime  iron sucrose IVPB 200 milliGRAM(s) IV Intermittent every 24 hours  methylPREDNISolone sodium succinate Injectable 50 milliGRAM(s) IV Push two times a day  montelukast 10 milliGRAM(s) Oral daily  pantoprazole  Injectable 40 milliGRAM(s) IV Push every 12 hours  risperiDONE   Tablet 2 milliGRAM(s) Oral at bedtime  tiotropium 18 MICROgram(s) Capsule 1 Capsule(s) Inhalation daily    MEDICATIONS  (PRN):  acetaminophen   Tablet .. 650 milliGRAM(s) Oral every 6 hours PRN Temp greater or equal to 38.5C (101.3F), Mild Pain (1 - 3)  ALBUTerol    90 MICROgram(s) HFA Inhaler 1 Puff(s) Inhalation every 6 hours PRN Bronchospasm  melatonin 3 milliGRAM(s) Oral at bedtime PRN Insomnia  ondansetron Injectable 4 milliGRAM(s) IV Push every 8 hours PRN Nausea and/or Vomiting    PAST MEDICAL & SURGICAL HISTORY:  COPD (chronic obstructive pulmonary disease)    Emphysema with chronic bronchitis    Bipolar disease, chronic    Depression    History of ETOH abuse    H/O hernia repair    History of tonsillectomy      FAMILY HISTORY:  Family history of cerebrovascular accident (CVA) (Father)    FH: hypertension (Father)    FH: diabetes mellitus (Mother)      SOCIAL HISTORY:  [employer]: [ ] Current, [ ] Former, [ ] Never; Pack Years:  Alcohol:  Illicit Drugs:    REVIEW OF SYSTEMS:  Constitutional: No fever, chills, weight loss, or fatigue  ENMT:  No visual changes; No difficulty hearing, tinnitus, vertigo; No sinus or throat pain  Neck: No pain or stiffness  Respiratory: No cough, wheezing, or hemoptysis; No shortness of breath  Cardiovascular: No chest pain, palpitations, dizziness, orthopnea, PND, or leg swelling  Gastrointestinal: No abdominal or epigastric pain. No  nausea, vomiting, or hematemesis. No diarrhea, constipation, or steatorrhea. No melena or hematochezia  Genitourinary: No dysuria, urinary frequency/hesitancy, or hematuria  Skin: No itching, burning, rashes or lesions   Musculoskeletal: No joint pain or swelling; No muscle, back or extremity pain    Vital Signs Last 24 Hrs  T(C): 36.8 (05 Sep 2021 12:43), Max: 36.9 (04 Sep 2021 20:25)  T(F): 98.3 (05 Sep 2021 12:43), Max: 98.4 (04 Sep 2021 20:25)  HR: 83 (05 Sep 2021 12:43) (83 - 84)  BP: 143/72 (05 Sep 2021 12:43) (131/75 - 154/81)  BP(mean): --  RR: 18 (05 Sep 2021 12:43) (17 - 18)  SpO2: 95% (05 Sep 2021 12:43) (94% - 96%)      PHYSICAL EXAM:    General: WDWN male, hard of hearing; on RA  HEENT: NC/AT; PERRL, anicteric sclera; MMM  Gastrointestinal: soft, NT/ND; +BSx4  Extremities: WWP; no edema, clubbing or cyanosis  Vascular: 2+ radial, DP/PT pulses B/L  Neurological: AAOx3; no focal deficits    LABS:                        8.4    8.63  )-----------( 234      ( 05 Sep 2021 15:21 )             27.3     09-05    140  |  104  |  18  ----------------------------<  122<H>  4.4   |  25  |  0.73    Ca    8.7      05 Sep 2021 06:32  Phos  4.6     09-05  Mg     2.0     09-05    TPro  6.7  /  Alb  4.1  /  TBili  0.2  /  DBili  x   /  AST  18  /  ALT  15  /  AlkPhos  72  09-04        RADIOLOGY & ADDITIONAL STUDIES:    HPI:    CC: "difficulty breathing"  HPI: 60M PMH current smoker (less than 1PPD), asthma and COPD (not on home O2, no prior intubations, +prior hospitalizations for exacerbations), bipolar/depression, past EtOH abuse (currently sober 1 yr 5 months), multiple ED visits for COPD exacerbation (last in 08/2021) and EtOH intoxication p/w difficulty breathing and coughing last night at his AA meeting, causing him to have to leave his meeting. Some wheezing last night as well, symptoms similar to prior COPD exacerbations. Has chronic cough 2/2 smoking, some clear sputum but denies any increase in sputum production or color. No f/c, recently got 2nd dose of Moderna on 8/31/2021, denies sick contacts. Home nebulizer has also not been working for past few months, pt intends to get it fixed/replaced. Had chest pain when he first came to ED, now resolved, pt thinks it was related to his difficulty breathing. Breathing much better since last night after steroids and duonebs. Also had 3x episodes of NBNB emesis last night after eating soup and chicken at a [employer], no emesis during last few hours, no nausea currently. No diarrhea/constipation, no dysuria or changes in urinary/bowel habits. ROS significant for +b/l edema, takes a "water pill," not aware of any heart failure diagnosis.       In the ED:    - VS: Tmax: 36.8, HR: 93, BP: 159/75, RR: 22, O2: 97% on room air    - Pertinent Labs: Hgb 8.1 (last 11.2 in 01/2020, 12-13 before that), WBC 7.57 w/ 92.2% neuts, DDimer 365; VBG w/ CO2 31.8, HCO3 30; covid neg; Trop 0.01->0.01, CK/CKMB wnl;     - Imaging: CXR: no infiltrates, CT PE protocol: pulmonary emphysema, no definite PE (study limited by suboptimal timing of bolus; EKG no ischemic changes    - Treatment/interventions: pt received duonebs x3, albuterol x2, solumedrol 125, 1LNS, and zofran x1 for vomiting    PMHx: as above per HPI  PSHx: see below   Meds: See med rec  Allergies: NKDA  Social: see below   (04 Sep 2021 12:04)    GI Consulted for acute drop in H/H in the setting of ROSALIE and for consideration of endoscopic evaluation.     Endoscopic History  No prior EGD or C-scope    Allergies    No Known Allergies    Intolerances      MEDICATIONS:  MEDICATIONS  (STANDING):  dextrose 40% Gel 15 Gram(s) Oral once  dextrose 5%. 1000 milliLiter(s) (50 mL/Hr) IV Continuous <Continuous>  dextrose 5%. 1000 milliLiter(s) (100 mL/Hr) IV Continuous <Continuous>  dextrose 50% Injectable 25 Gram(s) IV Push once  dextrose 50% Injectable 12.5 Gram(s) IV Push once  dextrose 50% Injectable 25 Gram(s) IV Push once  FLUoxetine 40 milliGRAM(s) Oral at bedtime  gabapentin 600 milliGRAM(s) Oral at bedtime  glucagon  Injectable 1 milliGRAM(s) IntraMuscular once  hydrochlorothiazide 25 milliGRAM(s) Oral daily  hydrOXYzine hydrochloride 50 milliGRAM(s) Oral at bedtime  influenza   Vaccine 0.5 milliLiter(s) IntraMuscular once  insulin lispro (ADMELOG) corrective regimen sliding scale   SubCutaneous Before meals and at bedtime  iron sucrose IVPB 200 milliGRAM(s) IV Intermittent every 24 hours  methylPREDNISolone sodium succinate Injectable 50 milliGRAM(s) IV Push two times a day  montelukast 10 milliGRAM(s) Oral daily  pantoprazole  Injectable 40 milliGRAM(s) IV Push every 12 hours  risperiDONE   Tablet 2 milliGRAM(s) Oral at bedtime  tiotropium 18 MICROgram(s) Capsule 1 Capsule(s) Inhalation daily    MEDICATIONS  (PRN):  acetaminophen   Tablet .. 650 milliGRAM(s) Oral every 6 hours PRN Temp greater or equal to 38.5C (101.3F), Mild Pain (1 - 3)  ALBUTerol    90 MICROgram(s) HFA Inhaler 1 Puff(s) Inhalation every 6 hours PRN Bronchospasm  melatonin 3 milliGRAM(s) Oral at bedtime PRN Insomnia  ondansetron Injectable 4 milliGRAM(s) IV Push every 8 hours PRN Nausea and/or Vomiting    PAST MEDICAL & SURGICAL HISTORY:  COPD (chronic obstructive pulmonary disease)    Emphysema with chronic bronchitis    Bipolar disease, chronic    Depression    History of ETOH abuse    H/O hernia repair    History of tonsillectomy      FAMILY HISTORY:  Family history of cerebrovascular accident (CVA) (Father)    FH: hypertension (Father)    FH: diabetes mellitus (Mother)      SOCIAL HISTORY:  [employer]: [ ] Current, [ ] Former, [ ] Never; Pack Years:  Alcohol:  Illicit Drugs:    REVIEW OF SYSTEMS:  Constitutional: No fever, chills, weight loss, or fatigue  ENMT:  No visual changes; No difficulty hearing, tinnitus, vertigo; No sinus or throat pain  Neck: No pain or stiffness  Respiratory: No cough, wheezing, or hemoptysis; No shortness of breath  Cardiovascular: No chest pain, palpitations, dizziness, orthopnea, PND, or leg swelling  Gastrointestinal: No abdominal or epigastric pain. No  nausea, vomiting, or hematemesis. No diarrhea, constipation, or steatorrhea. No melena or hematochezia  Genitourinary: No dysuria, urinary frequency/hesitancy, or hematuria  Skin: No itching, burning, rashes or lesions   Musculoskeletal: No joint pain or swelling; No muscle, back or extremity pain    Vital Signs Last 24 Hrs  T(C): 36.8 (05 Sep 2021 12:43), Max: 36.9 (04 Sep 2021 20:25)  T(F): 98.3 (05 Sep 2021 12:43), Max: 98.4 (04 Sep 2021 20:25)  HR: 83 (05 Sep 2021 12:43) (83 - 84)  BP: 143/72 (05 Sep 2021 12:43) (131/75 - 154/81)  BP(mean): --  RR: 18 (05 Sep 2021 12:43) (17 - 18)  SpO2: 95% (05 Sep 2021 12:43) (94% - 96%)      PHYSICAL EXAM:    General: WDWN male, hard of hearing; on RA  HEENT: NC/AT; PERRL, anicteric sclera; MMM  Gastrointestinal: soft, NT/ND; +BSx4  Rectal: brown stool in rectal vault, no external hemorrhoids or anal fissures noted  Extremities: WWP; no edema, clubbing or cyanosis  Vascular: 2+ radial, DP/PT pulses B/L  Neurological: AAOx3; no focal deficits    LABS:                        8.4    8.63  )-----------( 234      ( 05 Sep 2021 15:21 )             27.3     09-05    140  |  104  |  18  ----------------------------<  122<H>  4.4   |  25  |  0.73    Ca    8.7      05 Sep 2021 06:32  Phos  4.6     09-05  Mg     2.0     09-05    TPro  6.7  /  Alb  4.1  /  TBili  0.2  /  DBili  x   /  AST  18  /  ALT  15  /  AlkPhos  72  09-04        RADIOLOGY & ADDITIONAL STUDIES:

## 2021-09-05 NOTE — PROGRESS NOTE ADULT - PROBLEM SELECTOR PLAN 5
Pt takes at home neurontin 1200 mg, prozac 60 mg, risperidone 2 mg, and hydroxyzine 50 mg at bedtime.  -c/w home neurontin, prozac, risperidone, and hydroxyzine  -f/u AM A1c, lipid panel Pt takes at home neurontin 1200 mg, prozac 60 mg, risperidone 2 mg, and hydroxyzine 50 mg at bedtime. Lipid panel wnl. A1c 5.6  -c/w home neurontin, prozac, risperidone, and hydroxyzine  -f/u AM A1c, lipid panel

## 2021-09-05 NOTE — PROGRESS NOTE ADULT - SUBJECTIVE AND OBJECTIVE BOX
OVERNIGHT EVENTS:    SUBJECTIVE / INTERVAL HPI: Patient seen and examined at bedside.     VITAL SIGNS:  Vital Signs Last 24 Hrs  T(C): 36.9 (04 Sep 2021 20:25), Max: 37.2 (04 Sep 2021 06:45)  T(F): 98.4 (04 Sep 2021 20:25), Max: 99 (04 Sep 2021 06:45)  HR: 83 (04 Sep 2021 20:25) (83 - 100)  BP: 154/81 (04 Sep 2021 20:25) (124/76 - 154/81)  BP(mean): --  RR: 18 (04 Sep 2021 20:25) (17 - 18)  SpO2: 96% (04 Sep 2021 20:25) (95% - 99%)    PHYSICAL EXAM:    General: Well developed, well nourished, no acute distress  HEENT: NC/AT; PERRL, anicteric sclera; MMM  Neck: supple  Cardiovascular: +S1/S2, RRR, no murmurs, rubs, gallops  Respiratory: CTA B/L; no W/R/R  Gastrointestinal: soft, NT/ND; +BSx4  Extremities: WWP; no edema, clubbing or cyanosis  Vascular: 2+ radial, DP/PT pulses B/L  Neurological: AAOx3; no focal deficits    MEDICATIONS:  MEDICATIONS  (STANDING):  dextrose 40% Gel 15 Gram(s) Oral once  dextrose 5%. 1000 milliLiter(s) (50 mL/Hr) IV Continuous <Continuous>  dextrose 5%. 1000 milliLiter(s) (100 mL/Hr) IV Continuous <Continuous>  dextrose 50% Injectable 25 Gram(s) IV Push once  dextrose 50% Injectable 12.5 Gram(s) IV Push once  dextrose 50% Injectable 25 Gram(s) IV Push once  enoxaparin Injectable 40 milliGRAM(s) SubCutaneous every 24 hours  FLUoxetine 40 milliGRAM(s) Oral at bedtime  gabapentin 600 milliGRAM(s) Oral at bedtime  glucagon  Injectable 1 milliGRAM(s) IntraMuscular once  hydrochlorothiazide 25 milliGRAM(s) Oral daily  hydrOXYzine hydrochloride 50 milliGRAM(s) Oral at bedtime  influenza   Vaccine 0.5 milliLiter(s) IntraMuscular once  insulin lispro (ADMELOG) corrective regimen sliding scale   SubCutaneous Before meals and at bedtime  montelukast 10 milliGRAM(s) Oral daily  pantoprazole  Injectable 40 milliGRAM(s) IV Push every 24 hours  predniSONE   Tablet 40 milliGRAM(s) Oral daily  risperiDONE   Tablet 2 milliGRAM(s) Oral at bedtime  tiotropium 18 MICROgram(s) Capsule 1 Capsule(s) Inhalation daily    MEDICATIONS  (PRN):  acetaminophen   Tablet .. 650 milliGRAM(s) Oral every 6 hours PRN Temp greater or equal to 38.5C (101.3F), Mild Pain (1 - 3)  ALBUTerol    90 MICROgram(s) HFA Inhaler 1 Puff(s) Inhalation every 6 hours PRN Bronchospasm  melatonin 3 milliGRAM(s) Oral at bedtime PRN Insomnia  ondansetron Injectable 4 milliGRAM(s) IV Push every 8 hours PRN Nausea and/or Vomiting      ALLERGIES:  Allergies    No Known Allergies    Intolerances        LABS:                        7.5    6.05  )-----------( 214      ( 04 Sep 2021 23:04 )             24.5     09-04    145  |  106  |  24<H>  ----------------------------<  124<H>  4.0   |  29  |  1.09    Ca    8.6      04 Sep 2021 02:35  Mg     2.0     09-04    TPro  6.7  /  Alb  4.1  /  TBili  0.2  /  DBili  x   /  AST  18  /  ALT  15  /  AlkPhos  72  09-04    PT/INR - ( 04 Sep 2021 02:35 )   PT: 12.1 sec;   INR: 1.01          PTT - ( 04 Sep 2021 02:35 )  PTT:26.6 sec    CAPILLARY BLOOD GLUCOSE      POCT Blood Glucose.: 154 mg/dL (04 Sep 2021 22:20)      RADIOLOGY & ADDITIONAL TESTS: Reviewed.    PLAN:  OVERNIGHT EVENTS: AM Hb 6.9, ordered for 1 unit pRBC.    SUBJECTIVE / INTERVAL HPI: Patient seen and examined at bedside. Denied CP, SOB, feeling lightheaded, nausea, diarrhea.    VITAL SIGNS:  Vital Signs Last 24 Hrs  T(C): 36.9 (04 Sep 2021 20:25), Max: 37.2 (04 Sep 2021 06:45)  T(F): 98.4 (04 Sep 2021 20:25), Max: 99 (04 Sep 2021 06:45)  HR: 83 (04 Sep 2021 20:25) (83 - 100)  BP: 154/81 (04 Sep 2021 20:25) (124/76 - 154/81)  BP(mean): --  RR: 18 (04 Sep 2021 20:25) (17 - 18)  SpO2: 96% (04 Sep 2021 20:25) (95% - 99%)    PHYSICAL EXAM:    General: NAD. Breathing comfortably on RA, intermittently coughing  HEENT: NC/AT; PERRL, anicteric sclera; MMM  Neck: supple  Cardiovascular: +S1/S2, RRR, no murmurs, rubs, gallops  Respiratory: B/L wheezing  Gastrointestinal: soft, NT/ND; +BSx4  Extremities: WWP; no edema, clubbing or cyanosis  Vascular: 2+ radial, DP/PT pulses B/L  Neurological: AAOx3; no focal deficits    MEDICATIONS:  MEDICATIONS  (STANDING):  dextrose 40% Gel 15 Gram(s) Oral once  dextrose 5%. 1000 milliLiter(s) (50 mL/Hr) IV Continuous <Continuous>  dextrose 5%. 1000 milliLiter(s) (100 mL/Hr) IV Continuous <Continuous>  dextrose 50% Injectable 25 Gram(s) IV Push once  dextrose 50% Injectable 12.5 Gram(s) IV Push once  dextrose 50% Injectable 25 Gram(s) IV Push once  enoxaparin Injectable 40 milliGRAM(s) SubCutaneous every 24 hours  FLUoxetine 40 milliGRAM(s) Oral at bedtime  gabapentin 600 milliGRAM(s) Oral at bedtime  glucagon  Injectable 1 milliGRAM(s) IntraMuscular once  hydrochlorothiazide 25 milliGRAM(s) Oral daily  hydrOXYzine hydrochloride 50 milliGRAM(s) Oral at bedtime  influenza   Vaccine 0.5 milliLiter(s) IntraMuscular once  insulin lispro (ADMELOG) corrective regimen sliding scale   SubCutaneous Before meals and at bedtime  montelukast 10 milliGRAM(s) Oral daily  pantoprazole  Injectable 40 milliGRAM(s) IV Push every 24 hours  predniSONE   Tablet 40 milliGRAM(s) Oral daily  risperiDONE   Tablet 2 milliGRAM(s) Oral at bedtime  tiotropium 18 MICROgram(s) Capsule 1 Capsule(s) Inhalation daily    MEDICATIONS  (PRN):  acetaminophen   Tablet .. 650 milliGRAM(s) Oral every 6 hours PRN Temp greater or equal to 38.5C (101.3F), Mild Pain (1 - 3)  ALBUTerol    90 MICROgram(s) HFA Inhaler 1 Puff(s) Inhalation every 6 hours PRN Bronchospasm  melatonin 3 milliGRAM(s) Oral at bedtime PRN Insomnia  ondansetron Injectable 4 milliGRAM(s) IV Push every 8 hours PRN Nausea and/or Vomiting      ALLERGIES:  Allergies    No Known Allergies    Intolerances        LABS:                        7.5    6.05  )-----------( 214      ( 04 Sep 2021 23:04 )             24.5     09-04    145  |  106  |  24<H>  ----------------------------<  124<H>  4.0   |  29  |  1.09    Ca    8.6      04 Sep 2021 02:35  Mg     2.0     09-04    TPro  6.7  /  Alb  4.1  /  TBili  0.2  /  DBili  x   /  AST  18  /  ALT  15  /  AlkPhos  72  09-04    PT/INR - ( 04 Sep 2021 02:35 )   PT: 12.1 sec;   INR: 1.01          PTT - ( 04 Sep 2021 02:35 )  PTT:26.6 sec    CAPILLARY BLOOD GLUCOSE      POCT Blood Glucose.: 154 mg/dL (04 Sep 2021 22:20)      RADIOLOGY & ADDITIONAL TESTS: Reviewed.    PLAN:  OVERNIGHT EVENTS: AM Hb 6.9, ordered for 1 unit pRBC.    SUBJECTIVE / INTERVAL HPI: Patient seen and examined at bedside. Denied CP, SOB, feeling lightheaded, nausea, diarrhea.    VITAL SIGNS:  Vital Signs Last 24 Hrs  T(C): 36.9 (04 Sep 2021 20:25), Max: 37.2 (04 Sep 2021 06:45)  T(F): 98.4 (04 Sep 2021 20:25), Max: 99 (04 Sep 2021 06:45)  HR: 83 (04 Sep 2021 20:25) (83 - 100)  BP: 154/81 (04 Sep 2021 20:25) (124/76 - 154/81)  BP(mean): --  RR: 18 (04 Sep 2021 20:25) (17 - 18)  SpO2: 96% (04 Sep 2021 20:25) (95% - 99%)    PHYSICAL EXAM:    General: NAD. Breathing comfortably on RA, intermittently coughing  HEENT: NC/AT; PERRL, anicteric sclera; MMM  Neck: +JVD  Cardiovascular: +S1/S2, RRR, no murmurs, rubs, gallops. 2+ pitting LE Edema  Respiratory: B/L wheezing  Gastrointestinal: soft, NT/ND; +BSx4  Extremities: WWP; no edema, clubbing or cyanosis  Vascular: 2+ radial, DP/PT pulses B/L  Neurological: AAOx3; no focal deficits    MEDICATIONS:  MEDICATIONS  (STANDING):  dextrose 40% Gel 15 Gram(s) Oral once  dextrose 5%. 1000 milliLiter(s) (50 mL/Hr) IV Continuous <Continuous>  dextrose 5%. 1000 milliLiter(s) (100 mL/Hr) IV Continuous <Continuous>  dextrose 50% Injectable 25 Gram(s) IV Push once  dextrose 50% Injectable 12.5 Gram(s) IV Push once  dextrose 50% Injectable 25 Gram(s) IV Push once  enoxaparin Injectable 40 milliGRAM(s) SubCutaneous every 24 hours  FLUoxetine 40 milliGRAM(s) Oral at bedtime  gabapentin 600 milliGRAM(s) Oral at bedtime  glucagon  Injectable 1 milliGRAM(s) IntraMuscular once  hydrochlorothiazide 25 milliGRAM(s) Oral daily  hydrOXYzine hydrochloride 50 milliGRAM(s) Oral at bedtime  influenza   Vaccine 0.5 milliLiter(s) IntraMuscular once  insulin lispro (ADMELOG) corrective regimen sliding scale   SubCutaneous Before meals and at bedtime  montelukast 10 milliGRAM(s) Oral daily  pantoprazole  Injectable 40 milliGRAM(s) IV Push every 24 hours  predniSONE   Tablet 40 milliGRAM(s) Oral daily  risperiDONE   Tablet 2 milliGRAM(s) Oral at bedtime  tiotropium 18 MICROgram(s) Capsule 1 Capsule(s) Inhalation daily    MEDICATIONS  (PRN):  acetaminophen   Tablet .. 650 milliGRAM(s) Oral every 6 hours PRN Temp greater or equal to 38.5C (101.3F), Mild Pain (1 - 3)  ALBUTerol    90 MICROgram(s) HFA Inhaler 1 Puff(s) Inhalation every 6 hours PRN Bronchospasm  melatonin 3 milliGRAM(s) Oral at bedtime PRN Insomnia  ondansetron Injectable 4 milliGRAM(s) IV Push every 8 hours PRN Nausea and/or Vomiting      ALLERGIES:  Allergies    No Known Allergies    Intolerances        LABS:                        7.5    6.05  )-----------( 214      ( 04 Sep 2021 23:04 )             24.5     09-04    145  |  106  |  24<H>  ----------------------------<  124<H>  4.0   |  29  |  1.09    Ca    8.6      04 Sep 2021 02:35  Mg     2.0     09-04    TPro  6.7  /  Alb  4.1  /  TBili  0.2  /  DBili  x   /  AST  18  /  ALT  15  /  AlkPhos  72  09-04    PT/INR - ( 04 Sep 2021 02:35 )   PT: 12.1 sec;   INR: 1.01          PTT - ( 04 Sep 2021 02:35 )  PTT:26.6 sec    CAPILLARY BLOOD GLUCOSE      POCT Blood Glucose.: 154 mg/dL (04 Sep 2021 22:20)      RADIOLOGY & ADDITIONAL TESTS: Reviewed.    PLAN:

## 2021-09-06 DIAGNOSIS — I10 ESSENTIAL (PRIMARY) HYPERTENSION: ICD-10-CM

## 2021-09-06 LAB
ANION GAP SERPL CALC-SCNC: 11 MMOL/L — SIGNIFICANT CHANGE UP (ref 5–17)
BASOPHILS # BLD AUTO: 0.01 K/UL — SIGNIFICANT CHANGE UP (ref 0–0.2)
BASOPHILS NFR BLD AUTO: 0.1 % — SIGNIFICANT CHANGE UP (ref 0–2)
BUN SERPL-MCNC: 19 MG/DL — SIGNIFICANT CHANGE UP (ref 7–23)
CALCIUM SERPL-MCNC: 9.6 MG/DL — SIGNIFICANT CHANGE UP (ref 8.4–10.5)
CHLORIDE SERPL-SCNC: 100 MMOL/L — SIGNIFICANT CHANGE UP (ref 96–108)
CO2 SERPL-SCNC: 27 MMOL/L — SIGNIFICANT CHANGE UP (ref 22–31)
CREAT SERPL-MCNC: 0.78 MG/DL — SIGNIFICANT CHANGE UP (ref 0.5–1.3)
EOSINOPHIL # BLD AUTO: 0 K/UL — SIGNIFICANT CHANGE UP (ref 0–0.5)
EOSINOPHIL NFR BLD AUTO: 0 % — SIGNIFICANT CHANGE UP (ref 0–6)
GLUCOSE BLDC GLUCOMTR-MCNC: 100 MG/DL — HIGH (ref 70–99)
GLUCOSE BLDC GLUCOMTR-MCNC: 122 MG/DL — HIGH (ref 70–99)
GLUCOSE BLDC GLUCOMTR-MCNC: 124 MG/DL — HIGH (ref 70–99)
GLUCOSE BLDC GLUCOMTR-MCNC: 169 MG/DL — HIGH (ref 70–99)
GLUCOSE SERPL-MCNC: 109 MG/DL — HIGH (ref 70–99)
HCT VFR BLD CALC: 29.1 % — LOW (ref 39–50)
HGB BLD-MCNC: 9.3 G/DL — LOW (ref 13–17)
IMM GRANULOCYTES NFR BLD AUTO: 0.5 % — SIGNIFICANT CHANGE UP (ref 0–1.5)
LYMPHOCYTES # BLD AUTO: 0.67 K/UL — LOW (ref 1–3.3)
LYMPHOCYTES # BLD AUTO: 6.1 % — LOW (ref 13–44)
MAGNESIUM SERPL-MCNC: 2.2 MG/DL — SIGNIFICANT CHANGE UP (ref 1.6–2.6)
MCHC RBC-ENTMCNC: 28.6 PG — SIGNIFICANT CHANGE UP (ref 27–34)
MCHC RBC-ENTMCNC: 32 GM/DL — SIGNIFICANT CHANGE UP (ref 32–36)
MCV RBC AUTO: 89.5 FL — SIGNIFICANT CHANGE UP (ref 80–100)
MONOCYTES # BLD AUTO: 0.58 K/UL — SIGNIFICANT CHANGE UP (ref 0–0.9)
MONOCYTES NFR BLD AUTO: 5.3 % — SIGNIFICANT CHANGE UP (ref 2–14)
NEUTROPHILS # BLD AUTO: 9.62 K/UL — HIGH (ref 1.8–7.4)
NEUTROPHILS NFR BLD AUTO: 88 % — HIGH (ref 43–77)
NRBC # BLD: 0 /100 WBCS — SIGNIFICANT CHANGE UP (ref 0–0)
OB PNL STL: NEGATIVE — SIGNIFICANT CHANGE UP
PHOSPHATE SERPL-MCNC: 4.1 MG/DL — SIGNIFICANT CHANGE UP (ref 2.5–4.5)
PLATELET # BLD AUTO: 270 K/UL — SIGNIFICANT CHANGE UP (ref 150–400)
POTASSIUM SERPL-MCNC: 4.3 MMOL/L — SIGNIFICANT CHANGE UP (ref 3.5–5.3)
POTASSIUM SERPL-SCNC: 4.3 MMOL/L — SIGNIFICANT CHANGE UP (ref 3.5–5.3)
RBC # BLD: 3.25 M/UL — LOW (ref 4.2–5.8)
RBC # FLD: 15.2 % — HIGH (ref 10.3–14.5)
SODIUM SERPL-SCNC: 138 MMOL/L — SIGNIFICANT CHANGE UP (ref 135–145)
WBC # BLD: 10.94 K/UL — HIGH (ref 3.8–10.5)
WBC # FLD AUTO: 10.94 K/UL — HIGH (ref 3.8–10.5)

## 2021-09-06 PROCEDURE — 99232 SBSQ HOSP IP/OBS MODERATE 35: CPT | Mod: GC

## 2021-09-06 PROCEDURE — 99233 SBSQ HOSP IP/OBS HIGH 50: CPT | Mod: GC

## 2021-09-06 RX ORDER — FERROUS SULFATE 325(65) MG
325 TABLET ORAL DAILY
Refills: 0 | Status: DISCONTINUED | OUTPATIENT
Start: 2021-09-06 | End: 2021-09-08

## 2021-09-06 RX ADMIN — Medication 40 MILLIGRAM(S): at 22:03

## 2021-09-06 RX ADMIN — Medication 50 MILLIGRAM(S): at 22:03

## 2021-09-06 RX ADMIN — Medication 325 MILLIGRAM(S): at 18:24

## 2021-09-06 RX ADMIN — Medication 50 MILLIGRAM(S): at 06:04

## 2021-09-06 RX ADMIN — MONTELUKAST 10 MILLIGRAM(S): 4 TABLET, CHEWABLE ORAL at 11:15

## 2021-09-06 RX ADMIN — PANTOPRAZOLE SODIUM 40 MILLIGRAM(S): 20 TABLET, DELAYED RELEASE ORAL at 06:04

## 2021-09-06 RX ADMIN — PANTOPRAZOLE SODIUM 40 MILLIGRAM(S): 20 TABLET, DELAYED RELEASE ORAL at 18:24

## 2021-09-06 RX ADMIN — Medication 2: at 18:24

## 2021-09-06 RX ADMIN — RISPERIDONE 2 MILLIGRAM(S): 4 TABLET ORAL at 22:03

## 2021-09-06 RX ADMIN — Medication 3 MILLIGRAM(S): at 22:04

## 2021-09-06 RX ADMIN — Medication 25 MILLIGRAM(S): at 06:05

## 2021-09-06 RX ADMIN — ALBUTEROL 1 PUFF(S): 90 AEROSOL, METERED ORAL at 05:59

## 2021-09-06 RX ADMIN — GABAPENTIN 600 MILLIGRAM(S): 400 CAPSULE ORAL at 22:04

## 2021-09-06 RX ADMIN — TIOTROPIUM BROMIDE 1 CAPSULE(S): 18 CAPSULE ORAL; RESPIRATORY (INHALATION) at 11:15

## 2021-09-06 NOTE — PROGRESS NOTE ADULT - ASSESSMENT
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    #Anemia  Patient p/w SOB, wheezing, coughing, admitted for COPD exacerbation, with multiple prior admissions for COPD exacerbation in the past requiring multiple courses of Prednisone. Also on ASa 81 mg daily at home. Baseline Hgb 11-12 per record review in 2020. Now presenting with Hgb in 7-8 range, overnight acutely dropping to 6.9, requiring pRBC transfusion. ddx including PUD in the setting of ASA and frequent Prednisone use.   -Maintain active T&S  -Transfusion goal as per primary team  -Monitor H/H  -Protonix 40 mg IVP BID  -Given ROSALIE and no prior C-scope evaluation for CRC screening, would warrant bidirectional evaluation with EGD/C-scope   -Will tentatively plan for EGD/C-scope Wednesday 9/8 pending clinical improvement of COPD exacerbation     Tuesday 9/7  -CLD 9/7   -Order for GoLytely 4 L, start @ 6 pm on 9/7  -NPO after MN (Tues-->Wed), except medications with sips of water  -Will need repeat COVID PCR swab 9/7  -AM coags on 9/8    Case discussed with c attending and primary team.    Ashley Hi DO  Gastroenterology Fellow  Pager: [phone]

## 2021-09-06 NOTE — PROGRESS NOTE ADULT - PROBLEM SELECTOR PLAN 3
No known history of CHF, pt takes hydrochlorothiazide 25 mg daily at home and sleeps with legs elevated. , low concern for CHF at this time.   -c/w home hydrochlorothiazide   -recommended compression stockings at home   -echocardiogram ordered to evaluate heart function  -Elevate legs No known history of CHF, pt takes hydrochlorothiazide 25 mg daily at home and sleeps with legs elevated. , low concern for CHF at this time.   -c/w home hydrochlorothiazide   -recommended compression stockings at home   -f/u echo  -Elevate legs

## 2021-09-06 NOTE — PROGRESS NOTE ADULT - PROBLEM SELECTOR PLAN 1
Met SIRS 2/4 criteria at presentation w/ HR 93 and RR 22, improved to HR 80s and RR <20 on RA. RVP sent x2, covid neg. CXR w/o infiltrates, CT PE protocol negative for PE (9/4), clinically improved w/ duonebs and steroids. At home takes albuterol, ventolin, montelukast and tiotropium. s/p IV solumedrol 125 mg in ED   -start prednisone 40mg PO daily (9/4-) now Solumedrol 50mg IV for total 5 day course   -c/w duonebs PRN   -c/w home albuterol, ventolin, and tiotropium   -no signs of bacterial infection at this time, continue to monitor for infection  -trend CBC  -F/u CXR Met SIRS 2/4 criteria at presentation w/ HR 93 and RR 22, improved to HR 80s and RR <20 on RA. RVP sent x2, covid neg. CXR w/o infiltrates, CT PE protocol negative for PE (9/4), CXR without infiltrates, clinically improved w/ duonebs and steroids. At home takes albuterol, montelukast and tiotropium.   - On Solumedrol 50mg IV, switched back to oral prednisone 40mg (9/7 - )  -c/w home albuterol, montelukast, and tiotropium

## 2021-09-06 NOTE — PROGRESS NOTE ADULT - SUBJECTIVE AND OBJECTIVE BOX
GASTROENTEROLOGY PROGRESS NOTE  Patient seen and examined at bedside. s/p 1 u PRBC, Hgb responded appropriately, 6.9--> 8.4. AM labs with Hgb 9.3.    ROS: Patient notes no black or bloody stools. No nausea/vomiting, abdominal pain. Notes that his breathing has improved since his admission.    PERTINENT REVIEW OF SYSTEMS:  CONSTITUTIONAL: No weakness, fevers or chills  HEENT: No visual changes; No vertigo or throat pain   GASTROINTESTINAL: As above.  NEUROLOGICAL: No numbness or weakness  SKIN: No itching, burning, rashes, or lesions     Allergies    No Known Allergies    Intolerances      MEDICATIONS:  MEDICATIONS  (STANDING):  dextrose 40% Gel 15 Gram(s) Oral once  dextrose 5%. 1000 milliLiter(s) (50 mL/Hr) IV Continuous <Continuous>  dextrose 5%. 1000 milliLiter(s) (100 mL/Hr) IV Continuous <Continuous>  dextrose 50% Injectable 25 Gram(s) IV Push once  dextrose 50% Injectable 12.5 Gram(s) IV Push once  dextrose 50% Injectable 25 Gram(s) IV Push once  ferrous    sulfate 325 milliGRAM(s) Oral daily  FLUoxetine 40 milliGRAM(s) Oral at bedtime  gabapentin 600 milliGRAM(s) Oral at bedtime  glucagon  Injectable 1 milliGRAM(s) IntraMuscular once  hydrochlorothiazide 25 milliGRAM(s) Oral daily  hydrOXYzine hydrochloride 50 milliGRAM(s) Oral at bedtime  influenza   Vaccine 0.5 milliLiter(s) IntraMuscular once  insulin lispro (ADMELOG) corrective regimen sliding scale   SubCutaneous Before meals and at bedtime  montelukast 10 milliGRAM(s) Oral daily  pantoprazole  Injectable 40 milliGRAM(s) IV Push every 12 hours  risperiDONE   Tablet 2 milliGRAM(s) Oral at bedtime  tiotropium 18 MICROgram(s) Capsule 1 Capsule(s) Inhalation daily    MEDICATIONS  (PRN):  acetaminophen   Tablet .. 650 milliGRAM(s) Oral every 6 hours PRN Temp greater or equal to 38.5C (101.3F), Mild Pain (1 - 3)  ALBUTerol    90 MICROgram(s) HFA Inhaler 1 Puff(s) Inhalation every 6 hours PRN Bronchospasm  melatonin 3 milliGRAM(s) Oral at bedtime PRN Insomnia  ondansetron Injectable 4 milliGRAM(s) IV Push every 8 hours PRN Nausea and/or Vomiting    Vital Signs Last 24 Hrs  T(C): 36.4 (06 Sep 2021 05:54), Max: 36.9 (05 Sep 2021 20:05)  T(F): 97.6 (06 Sep 2021 05:54), Max: 98.4 (05 Sep 2021 20:05)  HR: 73 (06 Sep 2021 05:54) (73 - 80)  BP: 160/84 (06 Sep 2021 05:54) (160/84 - 160/84)  BP(mean): --  RR: 17 (06 Sep 2021 05:54) (17 - 18)  SpO2: 96% (06 Sep 2021 05:54) (95% - 96%)    PHYSICAL EXAM:    General: WDWN male, hard of hearing; on RA  HEENT: NC/AT; PERRL, anicteric sclera; MMM  Gastrointestinal: soft, NT/ND; +BSx4  Extremities: WWP; no edema, clubbing or cyanosis  Vascular: 2+ radial, DP/PT pulses B/L  Neurological: AAOx3; no focal deficits    LABS:                        9.3    10.94 )-----------( 270      ( 06 Sep 2021 08:02 )             29.1     09-06    138  |  100  |  19  ----------------------------<  109<H>  4.3   |  27  |  0.78    Ca    9.6      06 Sep 2021 07:56  Phos  4.1     09-06  Mg     2.2     09-06                        RADIOLOGY & ADDITIONAL STUDIES:  Reviewed

## 2021-09-06 NOTE — PROGRESS NOTE ADULT - PROBLEM SELECTOR PLAN 4
Patient presented with Hgb of 8.1, reportedly previously in the 11 to 12s on previous checks. Denies melanic stool or BRBPR. Denies any recent blood loss. Now R/o GI bleed  - Transfusion goal >7 given no known CAD  -Hb 9/5 was 6.9, given 1u RBC  -F/u 2pm CBC  -D/c lovenox. No SCDs due to leg edema  -Protonix 40 BID  -Supplemental iron sucrose 200mg IV daily  -F/u FOBT. contact GI if + Patient presented with Hgb of 8.1, reportedly previously in the 11 to 12s on previous checks. Denies melanic stool or BRBPR. Denies any recent blood loss. FOBT negative. Scope planned for Wednesday.   - Transfusion goal >7 given no known CAD  -Hb 9/5 was 6.9, given 1u PRBC. Hb since has been 8.4-9.3  -D/c'd lovenox. No SCDs due to leg edema  -Protonix 40 BID  -S/p 200mg IV iron, now on 325 mg daily

## 2021-09-06 NOTE — PROGRESS NOTE ADULT - ASSESSMENT
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

## 2021-09-06 NOTE — PROGRESS NOTE ADULT - PROBLEM SELECTOR PLAN 9
F: s/p 1LNS in ED, pt eating and drinking  E: replete when K<4 and Mg<2  N: regular diet     VTE PPx: not required, pt low risk  GI PPx: not required     Code status: FULL CODE    Dispo: TERI -c/w home HCTZ 25mg daily

## 2021-09-06 NOTE — PROGRESS NOTE ADULT - SUBJECTIVE AND OBJECTIVE BOX
***INCOMPLETE****  OVERNIGHT EVENTS:    SUBJECTIVE / INTERVAL HPI: Patient seen and examined at bedside.     ROS: negative unless otherwise stated above.    VITAL SIGNS:  Vital Signs Last 24 Hrs  T(C): 36.9 (05 Sep 2021 20:05), Max: 36.9 (05 Sep 2021 20:05)  T(F): 98.4 (05 Sep 2021 20:05), Max: 98.4 (05 Sep 2021 20:05)  HR: 80 (05 Sep 2021 20:05) (80 - 84)  BP: 160/84 (05 Sep 2021 20:05) (131/75 - 160/84)  BP(mean): --  RR: 18 (05 Sep 2021 20:05) (17 - 18)  SpO2: 95% (05 Sep 2021 20:05) (94% - 95%)    PHYSICAL EXAM:    General: In no apparent distress  HEENT: NC/AT; PERRL, anicteric sclera; MMM  Neck: supple  Cardiovascular: +S1/S2; RRR  Respiratory: CTA B/L; no W/R/R  Gastrointestinal: soft, NT/ND; +BSx4  Extremities: WWP; no edema, clubbing or cyanosis  Vascular: 2+ radial, DP/PT pulses B/L  Neurological: AAOx3; no focal deficits    MEDICATIONS:  MEDICATIONS  (STANDING):  dextrose 40% Gel 15 Gram(s) Oral once  dextrose 5%. 1000 milliLiter(s) (100 mL/Hr) IV Continuous <Continuous>  dextrose 5%. 1000 milliLiter(s) (50 mL/Hr) IV Continuous <Continuous>  dextrose 50% Injectable 25 Gram(s) IV Push once  dextrose 50% Injectable 12.5 Gram(s) IV Push once  dextrose 50% Injectable 25 Gram(s) IV Push once  FLUoxetine 40 milliGRAM(s) Oral at bedtime  gabapentin 600 milliGRAM(s) Oral at bedtime  glucagon  Injectable 1 milliGRAM(s) IntraMuscular once  hydrochlorothiazide 25 milliGRAM(s) Oral daily  hydrOXYzine hydrochloride 50 milliGRAM(s) Oral at bedtime  influenza   Vaccine 0.5 milliLiter(s) IntraMuscular once  insulin lispro (ADMELOG) corrective regimen sliding scale   SubCutaneous Before meals and at bedtime  iron sucrose IVPB 200 milliGRAM(s) IV Intermittent every 24 hours  methylPREDNISolone sodium succinate Injectable 50 milliGRAM(s) IV Push two times a day  montelukast 10 milliGRAM(s) Oral daily  pantoprazole  Injectable 40 milliGRAM(s) IV Push every 12 hours  risperiDONE   Tablet 2 milliGRAM(s) Oral at bedtime  tiotropium 18 MICROgram(s) Capsule 1 Capsule(s) Inhalation daily    MEDICATIONS  (PRN):  acetaminophen   Tablet .. 650 milliGRAM(s) Oral every 6 hours PRN Temp greater or equal to 38.5C (101.3F), Mild Pain (1 - 3)  ALBUTerol    90 MICROgram(s) HFA Inhaler 1 Puff(s) Inhalation every 6 hours PRN Bronchospasm  melatonin 3 milliGRAM(s) Oral at bedtime PRN Insomnia  ondansetron Injectable 4 milliGRAM(s) IV Push every 8 hours PRN Nausea and/or Vomiting      ALLERGIES:  Allergies    No Known Allergies    Intolerances        LABS:                        8.4    8.63  )-----------( 234      ( 05 Sep 2021 15:21 )             27.3     09-05    140  |  104  |  18  ----------------------------<  122<H>  4.4   |  25  |  0.73    Ca    8.7      05 Sep 2021 06:32  Phos  4.6     09-05  Mg     2.0     09-05          CAPILLARY BLOOD GLUCOSE      POCT Blood Glucose.: 163 mg/dL (05 Sep 2021 21:31)      RADIOLOGY & ADDITIONAL TESTS: Reviewed. OVERNIGHT EVENTS: No acute events overnight    SUBJECTIVE / INTERVAL HPI: Patient seen and examined at bedside. Patient without complaints this AM. He has not had a BM overnight. No n/v/d or constipation. Pt reports SOB improved.     ROS: negative unless otherwise stated above.    VITAL SIGNS:  Vital Signs Last 24 Hrs  T(C): 36.9 (05 Sep 2021 20:05), Max: 36.9 (05 Sep 2021 20:05)  T(F): 98.4 (05 Sep 2021 20:05), Max: 98.4 (05 Sep 2021 20:05)  HR: 80 (05 Sep 2021 20:05) (80 - 84)  BP: 160/84 (05 Sep 2021 20:05) (131/75 - 160/84)  RR: 18 (05 Sep 2021 20:05) (17 - 18)  SpO2: 95% (05 Sep 2021 20:05) (94% - 95%)    PHYSICAL EXAM:  General: In no apparent distress  HEENT: NC/AT; PERRL, anicteric sclera; MMM  Neck: supple  Cardiovascular: +S1/S2; RRR  Respiratory:+Wheeze  Gastrointestinal: soft, NT/ND; +BSx4  Extremities: WWP; no edema, clubbing or cyanosis  Vascular: 2+ radial, DP/PT pulses B/L  Neurological: AAOx3; no focal deficits    MEDICATIONS:  MEDICATIONS  (STANDING):  dextrose 40% Gel 15 Gram(s) Oral once  dextrose 5%. 1000 milliLiter(s) (100 mL/Hr) IV Continuous <Continuous>  dextrose 5%. 1000 milliLiter(s) (50 mL/Hr) IV Continuous <Continuous>  dextrose 50% Injectable 25 Gram(s) IV Push once  dextrose 50% Injectable 12.5 Gram(s) IV Push once  dextrose 50% Injectable 25 Gram(s) IV Push once  FLUoxetine 40 milliGRAM(s) Oral at bedtime  gabapentin 600 milliGRAM(s) Oral at bedtime  glucagon  Injectable 1 milliGRAM(s) IntraMuscular once  hydrochlorothiazide 25 milliGRAM(s) Oral daily  hydrOXYzine hydrochloride 50 milliGRAM(s) Oral at bedtime  influenza   Vaccine 0.5 milliLiter(s) IntraMuscular once  insulin lispro (ADMELOG) corrective regimen sliding scale   SubCutaneous Before meals and at bedtime  iron sucrose IVPB 200 milliGRAM(s) IV Intermittent every 24 hours  methylPREDNISolone sodium succinate Injectable 50 milliGRAM(s) IV Push two times a day  montelukast 10 milliGRAM(s) Oral daily  pantoprazole  Injectable 40 milliGRAM(s) IV Push every 12 hours  risperiDONE   Tablet 2 milliGRAM(s) Oral at bedtime  tiotropium 18 MICROgram(s) Capsule 1 Capsule(s) Inhalation daily    MEDICATIONS  (PRN):  acetaminophen   Tablet .. 650 milliGRAM(s) Oral every 6 hours PRN Temp greater or equal to 38.5C (101.3F), Mild Pain (1 - 3)  ALBUTerol    90 MICROgram(s) HFA Inhaler 1 Puff(s) Inhalation every 6 hours PRN Bronchospasm  melatonin 3 milliGRAM(s) Oral at bedtime PRN Insomnia  ondansetron Injectable 4 milliGRAM(s) IV Push every 8 hours PRN Nausea and/or Vomiting      ALLERGIES:  Allergies    No Known Allergies    Intolerances        LABS:                        8.4    8.63  )-----------( 234      ( 05 Sep 2021 15:21 )             27.3     09-05    140  |  104  |  18  ----------------------------<  122<H>  4.4   |  25  |  0.73    Ca    8.7      05 Sep 2021 06:32  Phos  4.6     09-05  Mg     2.0     09-05          CAPILLARY BLOOD GLUCOSE      POCT Blood Glucose.: 163 mg/dL (05 Sep 2021 21:31)      RADIOLOGY & ADDITIONAL TESTS: Reviewed.

## 2021-09-06 NOTE — PROGRESS NOTE ADULT - PROBLEM SELECTOR PLAN 5
Pt takes at home neurontin 1200 mg, prozac 60 mg, risperidone 2 mg, and hydroxyzine 50 mg at bedtime. Lipid panel wnl. A1c 5.6  -c/w home neurontin, prozac, risperidone, and hydroxyzine  -f/u AM A1c, lipid panel Pt takes at home neurontin 1200 mg, prozac 60 mg, risperidone 2 mg, and hydroxyzine 50 mg at bedtime. Lipid panel wnl. A1c 5.6 and lipid panel WNL.  -c/w home neurontin, prozac, risperidone, and hydroxyzine

## 2021-09-06 NOTE — PROGRESS NOTE ADULT - PROBLEM SELECTOR PLAN 10
See Anemia above F: s/p 1LNS in ED, pt eating and drinking  E: replete when K<4 and Mg<2  N: regular diet     VTE PPx: not required, pt low risk  GI PPx: not required     Code status: FULL CODE    Dispo: Home

## 2021-09-06 NOTE — PROGRESS NOTE ADULT - PROBLEM SELECTOR PLAN 8
3x episodes of NBNB emesis in ED early this morning (9/4). No further episodes of emesis, no nausea. Likely 2/2 food poisoning, pt suspects soup and chicken he got at a [employer] last night for dinner.   -continue to monitor  -zofran PRN nausea (QTc 430) 3x episodes of NBNB emesis in ED early morning of 9/4. No further episodes of emesis, no nausea. Likely 2/2 food poisoning, pt suspects soup and chicken he got at a [employer] last night for dinner.   -continue to monitor  -zofran PRN nausea (QTc 430)

## 2021-09-07 LAB
ANION GAP SERPL CALC-SCNC: 8 MMOL/L — SIGNIFICANT CHANGE UP (ref 5–17)
BASOPHILS # BLD AUTO: 0 K/UL — SIGNIFICANT CHANGE UP (ref 0–0.2)
BASOPHILS NFR BLD AUTO: 0 % — SIGNIFICANT CHANGE UP (ref 0–2)
BUN SERPL-MCNC: 19 MG/DL — SIGNIFICANT CHANGE UP (ref 7–23)
CALCIUM SERPL-MCNC: 8.8 MG/DL — SIGNIFICANT CHANGE UP (ref 8.4–10.5)
CHLORIDE SERPL-SCNC: 99 MMOL/L — SIGNIFICANT CHANGE UP (ref 96–108)
CO2 SERPL-SCNC: 32 MMOL/L — HIGH (ref 22–31)
CREAT SERPL-MCNC: 0.98 MG/DL — SIGNIFICANT CHANGE UP (ref 0.5–1.3)
EOSINOPHIL # BLD AUTO: 0.05 K/UL — SIGNIFICANT CHANGE UP (ref 0–0.5)
EOSINOPHIL NFR BLD AUTO: 0.5 % — SIGNIFICANT CHANGE UP (ref 0–6)
GLUCOSE BLDC GLUCOMTR-MCNC: 111 MG/DL — HIGH (ref 70–99)
GLUCOSE BLDC GLUCOMTR-MCNC: 131 MG/DL — HIGH (ref 70–99)
GLUCOSE BLDC GLUCOMTR-MCNC: 145 MG/DL — HIGH (ref 70–99)
GLUCOSE BLDC GLUCOMTR-MCNC: 92 MG/DL — SIGNIFICANT CHANGE UP (ref 70–99)
GLUCOSE SERPL-MCNC: 94 MG/DL — SIGNIFICANT CHANGE UP (ref 70–99)
HCT VFR BLD CALC: 28.8 % — LOW (ref 39–50)
HGB BLD-MCNC: 9.1 G/DL — LOW (ref 13–17)
IMM GRANULOCYTES NFR BLD AUTO: 0.7 % — SIGNIFICANT CHANGE UP (ref 0–1.5)
LYMPHOCYTES # BLD AUTO: 1.97 K/UL — SIGNIFICANT CHANGE UP (ref 1–3.3)
LYMPHOCYTES # BLD AUTO: 19.8 % — SIGNIFICANT CHANGE UP (ref 13–44)
MAGNESIUM SERPL-MCNC: 2.4 MG/DL — SIGNIFICANT CHANGE UP (ref 1.6–2.6)
MCHC RBC-ENTMCNC: 27.7 PG — SIGNIFICANT CHANGE UP (ref 27–34)
MCHC RBC-ENTMCNC: 31.6 GM/DL — LOW (ref 32–36)
MCV RBC AUTO: 87.8 FL — SIGNIFICANT CHANGE UP (ref 80–100)
MONOCYTES # BLD AUTO: 0.94 K/UL — HIGH (ref 0–0.9)
MONOCYTES NFR BLD AUTO: 9.4 % — SIGNIFICANT CHANGE UP (ref 2–14)
NEUTROPHILS # BLD AUTO: 6.93 K/UL — SIGNIFICANT CHANGE UP (ref 1.8–7.4)
NEUTROPHILS NFR BLD AUTO: 69.6 % — SIGNIFICANT CHANGE UP (ref 43–77)
NRBC # BLD: 0 /100 WBCS — SIGNIFICANT CHANGE UP (ref 0–0)
PLATELET # BLD AUTO: 280 K/UL — SIGNIFICANT CHANGE UP (ref 150–400)
POTASSIUM SERPL-MCNC: 3.7 MMOL/L — SIGNIFICANT CHANGE UP (ref 3.5–5.3)
POTASSIUM SERPL-SCNC: 3.7 MMOL/L — SIGNIFICANT CHANGE UP (ref 3.5–5.3)
RBC # BLD: 3.28 M/UL — LOW (ref 4.2–5.8)
RBC # FLD: 15.2 % — HIGH (ref 10.3–14.5)
SODIUM SERPL-SCNC: 139 MMOL/L — SIGNIFICANT CHANGE UP (ref 135–145)
WBC # BLD: 9.96 K/UL — SIGNIFICANT CHANGE UP (ref 3.8–10.5)
WBC # FLD AUTO: 9.96 K/UL — SIGNIFICANT CHANGE UP (ref 3.8–10.5)

## 2021-09-07 PROCEDURE — 93306 TTE W/DOPPLER COMPLETE: CPT | Mod: 26

## 2021-09-07 PROCEDURE — 99232 SBSQ HOSP IP/OBS MODERATE 35: CPT

## 2021-09-07 RX ORDER — SOD SULF/SODIUM/NAHCO3/KCL/PEG
4000 SOLUTION, RECONSTITUTED, ORAL ORAL ONCE
Refills: 0 | Status: COMPLETED | OUTPATIENT
Start: 2021-09-07 | End: 2021-09-07

## 2021-09-07 RX ORDER — POTASSIUM CHLORIDE 20 MEQ
20 PACKET (EA) ORAL ONCE
Refills: 0 | Status: COMPLETED | OUTPATIENT
Start: 2021-09-07 | End: 2021-09-07

## 2021-09-07 RX ADMIN — MONTELUKAST 10 MILLIGRAM(S): 4 TABLET, CHEWABLE ORAL at 11:25

## 2021-09-07 RX ADMIN — Medication 40 MILLIGRAM(S): at 06:36

## 2021-09-07 RX ADMIN — Medication 3 MILLIGRAM(S): at 21:29

## 2021-09-07 RX ADMIN — TIOTROPIUM BROMIDE 1 CAPSULE(S): 18 CAPSULE ORAL; RESPIRATORY (INHALATION) at 10:25

## 2021-09-07 RX ADMIN — Medication 40 MILLIGRAM(S): at 21:30

## 2021-09-07 RX ADMIN — PANTOPRAZOLE SODIUM 40 MILLIGRAM(S): 20 TABLET, DELAYED RELEASE ORAL at 06:36

## 2021-09-07 RX ADMIN — Medication 20 MILLIEQUIVALENT(S): at 09:40

## 2021-09-07 RX ADMIN — Medication 10 MILLIGRAM(S): at 19:38

## 2021-09-07 RX ADMIN — RISPERIDONE 2 MILLIGRAM(S): 4 TABLET ORAL at 21:30

## 2021-09-07 RX ADMIN — Medication 4000 MILLILITER(S): at 18:38

## 2021-09-07 RX ADMIN — Medication 325 MILLIGRAM(S): at 11:25

## 2021-09-07 RX ADMIN — Medication 50 MILLIGRAM(S): at 21:30

## 2021-09-07 RX ADMIN — Medication 25 MILLIGRAM(S): at 06:37

## 2021-09-07 RX ADMIN — PANTOPRAZOLE SODIUM 40 MILLIGRAM(S): 20 TABLET, DELAYED RELEASE ORAL at 17:56

## 2021-09-07 RX ADMIN — GABAPENTIN 600 MILLIGRAM(S): 400 CAPSULE ORAL at 21:30

## 2021-09-07 NOTE — PROGRESS NOTE ADULT - PROBLEM SELECTOR PLAN 1
Met SIRS 2/4 criteria at presentation w/ HR 93 and RR 22, improved to HR 80s and RR <20 on RA. RVP sent x2, covid neg. CXR w/o infiltrates, CT PE protocol negative for PE (9/4), CXR without infiltrates, clinically improved w/ duonebs and steroids. At home takes albuterol, montelukast and tiotropium.   - On Solumedrol 50mg IV, switched back to oral prednisone 40mg (9/7 - )  -c/w home albuterol, montelukast, and tiotropium Patient presented with Hgb of 8.1, reportedly previously in the 11 to 12s on previous checks. Denies melanic stool or BRBPR. Denies any recent blood loss. FOBT negative. Hb dropped to 6.9 on 9/5, corrected after 1 unit pRBS. Scope planned for Wednesday.   - Transfusion goal >7 given no known CAD  -D/c'd lovenox and prednisone  -Protonix 40 BID  -Ferrous sulfate 325 mg daily

## 2021-09-07 NOTE — PROGRESS NOTE ADULT - PROBLEM SELECTOR PLAN 10
F: s/p 1LNS in ED, pt eating and drinking  E: replete when K<4 and Mg<2  N: regular diet     VTE PPx: not required, pt low risk  GI PPx: not required     Code status: FULL CODE    Dispo: Home F: Tolerating PO  E: replete when K<4 and Mg<2  N: regular diet     VTE PPx: not required, pt low risk  GI PPx: not required     Code status: FULL CODE    Dispo: Home F: Tolerating PO  E: replete when K<4 and Mg<2  N: regular diet     VTE PPx: SCDs  GI PPx: not required     Code status: FULL CODE    Dispo: Home

## 2021-09-07 NOTE — PROGRESS NOTE ADULT - SUBJECTIVE AND OBJECTIVE BOX
OVERNIGHT EVENTS:    SUBJECTIVE / INTERVAL HPI: Patient seen and examined at bedside.     VITAL SIGNS:  Vital Signs Last 24 Hrs  T(C): 36.4 (06 Sep 2021 20:44), Max: 36.5 (06 Sep 2021 14:04)  T(F): 97.6 (06 Sep 2021 20:44), Max: 97.7 (06 Sep 2021 14:04)  HR: 68 (06 Sep 2021 20:44) (68 - 74)  BP: 146/82 (06 Sep 2021 20:44) (117/64 - 146/82)  BP(mean): --  RR: 18 (06 Sep 2021 20:44) (18 - 18)  SpO2: 98% (06 Sep 2021 20:44) (98% - 98%)    PHYSICAL EXAM:    General: Well developed, well nourished, no acute distress  HEENT: NC/AT; PERRL, anicteric sclera; MMM  Neck: supple  Cardiovascular: +S1/S2, RRR, no murmurs, rubs, gallops  Respiratory: CTA B/L; no W/R/R  Gastrointestinal: soft, NT/ND; +BSx4  Extremities: WWP; no edema, clubbing or cyanosis  Vascular: 2+ radial, DP/PT pulses B/L  Neurological: AAOx3; no focal deficits    MEDICATIONS:  MEDICATIONS  (STANDING):  dextrose 40% Gel 15 Gram(s) Oral once  dextrose 5%. 1000 milliLiter(s) (50 mL/Hr) IV Continuous <Continuous>  dextrose 5%. 1000 milliLiter(s) (100 mL/Hr) IV Continuous <Continuous>  dextrose 50% Injectable 25 Gram(s) IV Push once  dextrose 50% Injectable 12.5 Gram(s) IV Push once  dextrose 50% Injectable 25 Gram(s) IV Push once  ferrous    sulfate 325 milliGRAM(s) Oral daily  FLUoxetine 40 milliGRAM(s) Oral at bedtime  gabapentin 600 milliGRAM(s) Oral at bedtime  glucagon  Injectable 1 milliGRAM(s) IntraMuscular once  hydrochlorothiazide 25 milliGRAM(s) Oral daily  hydrOXYzine hydrochloride 50 milliGRAM(s) Oral at bedtime  influenza   Vaccine 0.5 milliLiter(s) IntraMuscular once  insulin lispro (ADMELOG) corrective regimen sliding scale   SubCutaneous Before meals and at bedtime  montelukast 10 milliGRAM(s) Oral daily  pantoprazole  Injectable 40 milliGRAM(s) IV Push every 12 hours  predniSONE   Tablet 40 milliGRAM(s) Oral daily  risperiDONE   Tablet 2 milliGRAM(s) Oral at bedtime  tiotropium 18 MICROgram(s) Capsule 1 Capsule(s) Inhalation daily    MEDICATIONS  (PRN):  acetaminophen   Tablet .. 650 milliGRAM(s) Oral every 6 hours PRN Temp greater or equal to 38.5C (101.3F), Mild Pain (1 - 3)  ALBUTerol    90 MICROgram(s) HFA Inhaler 1 Puff(s) Inhalation every 6 hours PRN Bronchospasm  melatonin 3 milliGRAM(s) Oral at bedtime PRN Insomnia  ondansetron Injectable 4 milliGRAM(s) IV Push every 8 hours PRN Nausea and/or Vomiting      ALLERGIES:  Allergies    No Known Allergies    Intolerances        LABS:                        9.3    10.94 )-----------( 270      ( 06 Sep 2021 08:02 )             29.1     09-06    138  |  100  |  19  ----------------------------<  109<H>  4.3   |  27  |  0.78    Ca    9.6      06 Sep 2021 07:56  Phos  4.1     09-06  Mg     2.2     09-06          CAPILLARY BLOOD GLUCOSE      POCT Blood Glucose.: 100 mg/dL (06 Sep 2021 21:54)      RADIOLOGY & ADDITIONAL TESTS: Reviewed.    PLAN:  OVERNIGHT EVENTS: JESSICA. VSS    SUBJECTIVE / INTERVAL HPI: Patient seen and examined at bedside. Last BM yesterday, dark brown. Denied f/c, cough. Denied n/v.    VITAL SIGNS:  Vital Signs Last 24 Hrs  T(C): 36.4 (06 Sep 2021 20:44), Max: 36.5 (06 Sep 2021 14:04)  T(F): 97.6 (06 Sep 2021 20:44), Max: 97.7 (06 Sep 2021 14:04)  HR: 68 (06 Sep 2021 20:44) (68 - 74)  BP: 146/82 (06 Sep 2021 20:44) (117/64 - 146/82)  BP(mean): --  RR: 18 (06 Sep 2021 20:44) (18 - 18)  SpO2: 98% (06 Sep 2021 20:44) (98% - 98%)    PHYSICAL EXAM:    General: NAD  HEENT: NC/AT; PERRL, anicteric sclera; MMM  Neck: supple  Cardiovascular: +S1/S2, RRR, no murmurs, rubs, gallops  Respiratory: Wheezes b/l  Gastrointestinal: soft, NT/ND; +BSx4  Extremities: WWP; no edema, clubbing or cyanosis  Vascular: 2+ radial, DP/PT pulses B/L  Neurological: AAOx3; no focal deficits    MEDICATIONS:  MEDICATIONS  (STANDING):  dextrose 40% Gel 15 Gram(s) Oral once  dextrose 5%. 1000 milliLiter(s) (50 mL/Hr) IV Continuous <Continuous>  dextrose 5%. 1000 milliLiter(s) (100 mL/Hr) IV Continuous <Continuous>  dextrose 50% Injectable 25 Gram(s) IV Push once  dextrose 50% Injectable 12.5 Gram(s) IV Push once  dextrose 50% Injectable 25 Gram(s) IV Push once  ferrous    sulfate 325 milliGRAM(s) Oral daily  FLUoxetine 40 milliGRAM(s) Oral at bedtime  gabapentin 600 milliGRAM(s) Oral at bedtime  glucagon  Injectable 1 milliGRAM(s) IntraMuscular once  hydrochlorothiazide 25 milliGRAM(s) Oral daily  hydrOXYzine hydrochloride 50 milliGRAM(s) Oral at bedtime  influenza   Vaccine 0.5 milliLiter(s) IntraMuscular once  insulin lispro (ADMELOG) corrective regimen sliding scale   SubCutaneous Before meals and at bedtime  montelukast 10 milliGRAM(s) Oral daily  pantoprazole  Injectable 40 milliGRAM(s) IV Push every 12 hours  predniSONE   Tablet 40 milliGRAM(s) Oral daily  risperiDONE   Tablet 2 milliGRAM(s) Oral at bedtime  tiotropium 18 MICROgram(s) Capsule 1 Capsule(s) Inhalation daily    MEDICATIONS  (PRN):  acetaminophen   Tablet .. 650 milliGRAM(s) Oral every 6 hours PRN Temp greater or equal to 38.5C (101.3F), Mild Pain (1 - 3)  ALBUTerol    90 MICROgram(s) HFA Inhaler 1 Puff(s) Inhalation every 6 hours PRN Bronchospasm  melatonin 3 milliGRAM(s) Oral at bedtime PRN Insomnia  ondansetron Injectable 4 milliGRAM(s) IV Push every 8 hours PRN Nausea and/or Vomiting      ALLERGIES:  Allergies    No Known Allergies    Intolerances        LABS:                        9.3    10.94 )-----------( 270      ( 06 Sep 2021 08:02 )             29.1     09-06    138  |  100  |  19  ----------------------------<  109<H>  4.3   |  27  |  0.78    Ca    9.6      06 Sep 2021 07:56  Phos  4.1     09-06  Mg     2.2     09-06          CAPILLARY BLOOD GLUCOSE      POCT Blood Glucose.: 100 mg/dL (06 Sep 2021 21:54)      RADIOLOGY & ADDITIONAL TESTS: Reviewed.    PLAN:

## 2021-09-07 NOTE — PROGRESS NOTE ADULT - PROBLEM SELECTOR PLAN 3
No known history of CHF, pt takes hydrochlorothiazide 25 mg daily at home and sleeps with legs elevated. , low concern for CHF at this time.   -c/w home hydrochlorothiazide   -recommended compression stockings at home   -f/u echo  -Elevate legs plan as above for COPD exacerbation

## 2021-09-07 NOTE — PROGRESS NOTE ADULT - PROBLEM SELECTOR PLAN 8
3x episodes of NBNB emesis in ED early morning of 9/4. No further episodes of emesis, no nausea. Likely 2/2 food poisoning, pt suspects soup and chicken he got at a [employer] last night for dinner.   -continue to monitor  -zofran PRN nausea (QTc 430) (Resolved). 3x episodes of NBNB emesis in ED early morning of 9/4. No further episodes of emesis, no nausea. Likely 2/2 food poisoning, pt suspects soup and chicken he got at a [employer] last night for dinner.   -continue to monitor  -zofran PRN nausea (QTc 430)

## 2021-09-07 NOTE — PROGRESS NOTE ADULT - ATTENDING COMMENTS
Monitor Hb/hct and transfuse as needed  GI consult appreciated  Plan for endoscopy tashia  Discharge planning

## 2021-09-07 NOTE — PROGRESS NOTE ADULT - ASSESSMENT
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 60M PMH current smoker (less than 1PPD), asthma and COPD admitted for COPD exacerbation now improved course c/b drop in Hb being evaluated for possible GIB, 60M PMH current smoker (less than 1PPD), asthma and COPD admitted for COPD exacerbation now improved course c/b drop in Hb being evaluated for possible GIB (PUD vs malignancy),

## 2021-09-07 NOTE — PROGRESS NOTE ADULT - PROBLEM SELECTOR PLAN 4
Patient presented with Hgb of 8.1, reportedly previously in the 11 to 12s on previous checks. Denies melanic stool or BRBPR. Denies any recent blood loss. FOBT negative. Scope planned for Wednesday.   - Transfusion goal >7 given no known CAD  -Hb 9/5 was 6.9, given 1u PRBC. Hb since has been 8.4-9.3  -D/c'd lovenox. No SCDs due to leg edema  -Protonix 40 BID  -S/p 200mg IV iron, now on 325 mg daily No known history of CHF, pt takes hydrochlorothiazide 25 mg daily at home and sleeps with legs elevated. , low concern for CHF at this time.   -c/w home hydrochlorothiazide   -recommended compression stockings at home   -f/u echo  -Elevate legs

## 2021-09-07 NOTE — PROGRESS NOTE ADULT - PROBLEM SELECTOR PLAN 5
Pt takes at home neurontin 1200 mg, prozac 60 mg, risperidone 2 mg, and hydroxyzine 50 mg at bedtime. Lipid panel wnl. A1c 5.6 and lipid panel WNL.  -c/w home neurontin, prozac, risperidone, and hydroxyzine

## 2021-09-07 NOTE — PROGRESS NOTE ADULT - PROBLEM SELECTOR PLAN 2
plan as above for COPD exacerbation Met SIRS 2/4 criteria at presentation w/ HR 93 and RR 22, improved to HR 80s and RR <20 on RA. RVP sent x2, covid neg. CXR w/o infiltrates, CT PE protocol negative for PE (9/4), CXR without infiltrates, clinically improved w/ duonebs and steroids. At home takes albuterol, montelukast and tiotropium.   -Was on Solumedrol 50mg IV, switched back to oral prednisone 40mg (9/7 - )  -c/w home albuterol, montelukast, and tiotropium  -Holding prednisone i/s/o GIB workup

## 2021-09-07 NOTE — PROGRESS NOTE ADULT - ASSESSMENT
60M PMH current smoker (less than 1PPD), asthma and COPD (not on home O2, no prior intubations, +prior hospitalizations for exacerbations), bipolar/depression, past EtOH abuse (currently sober 1 yr 5 months), multiple ED visits for COPD exacerbation (last in 08/2021) and EtOH intoxication p/w difficulty breathing and coughing, admitted to [facility] for COPD exacerbation, GI consulted for acute drop in H/H in the setting of ROSALIE and for consideration of endoscopic evaluation.    #Anemia  Patient p/w SOB, wheezing, coughing, admitted for COPD exacerbation, with multiple prior admissions for COPD exacerbation in the past requiring multiple courses of Prednisone. Also on ASa 81 mg daily at home. Baseline Hgb 11-12 per record review in 2020. Now presenting with Hgb in 7-8 range, overnight acutely dropping to 6.9, requiring pRBC transfusion. ddx including PUD in the setting of ASA and frequent Prednisone use.   -Maintain active T&S  -Transfusion goal as per primary team  -Monitor H/H, remains stable  -Protonix 40 mg IVP BID  -Given ROSALIE and no prior C-scope evaluation for CRC screening, would warrant bidirectional evaluation with EGD/C-scope   -Will tentatively plan for EGD/C-scope Wednesday 9/8 pending clinical improvement of COPD exacerbation     Tuesday 9/7  -CLD 9/7   -Order for GoLytely 4 L, start @ 6 pm on 9/7  -NPO after MN (Tues-->Wed), except medications with sips of water  -Will need repeat COVID PCR swab 9/7  -AM coags on 9/8    Case discussed with c attending and primary team.    Ashley Hi DO  Gastroenterology Fellow  Pager: [phone]

## 2021-09-07 NOTE — PROGRESS NOTE ADULT - SUBJECTIVE AND OBJECTIVE BOX
GASTROENTEROLOGY PROGRESS NOTE  Patient seen and examined at bedside. AM Hgb 9.1, stable.    ROS: Patient notes BM yesterday, brown in colo. Denies any abdominal pain, nausea/    PERTINENT REVIEW OF SYSTEMS:  CONSTITUTIONAL: No weakness, fevers or chills  HEENT: No visual changes; No vertigo or throat pain   GASTROINTESTINAL: As above.  NEUROLOGICAL: No numbness or weakness  SKIN: No itching, burning, rashes, or lesions     Allergies    No Known Allergies    Intolerances      MEDICATIONS:  MEDICATIONS  (STANDING):  dextrose 40% Gel 15 Gram(s) Oral once  dextrose 5%. 1000 milliLiter(s) (50 mL/Hr) IV Continuous <Continuous>  dextrose 5%. 1000 milliLiter(s) (100 mL/Hr) IV Continuous <Continuous>  dextrose 50% Injectable 25 Gram(s) IV Push once  dextrose 50% Injectable 12.5 Gram(s) IV Push once  dextrose 50% Injectable 25 Gram(s) IV Push once  ferrous    sulfate 325 milliGRAM(s) Oral daily  FLUoxetine 40 milliGRAM(s) Oral at bedtime  gabapentin 600 milliGRAM(s) Oral at bedtime  glucagon  Injectable 1 milliGRAM(s) IntraMuscular once  hydrochlorothiazide 25 milliGRAM(s) Oral daily  hydrOXYzine hydrochloride 50 milliGRAM(s) Oral at bedtime  influenza   Vaccine 0.5 milliLiter(s) IntraMuscular once  insulin lispro (ADMELOG) corrective regimen sliding scale   SubCutaneous Before meals and at bedtime  montelukast 10 milliGRAM(s) Oral daily  pantoprazole  Injectable 40 milliGRAM(s) IV Push every 12 hours  potassium chloride   Powder 20 milliEquivalent(s) Oral once  predniSONE   Tablet 40 milliGRAM(s) Oral daily  risperiDONE   Tablet 2 milliGRAM(s) Oral at bedtime  tiotropium 18 MICROgram(s) Capsule 1 Capsule(s) Inhalation daily    MEDICATIONS  (PRN):  acetaminophen   Tablet .. 650 milliGRAM(s) Oral every 6 hours PRN Temp greater or equal to 38.5C (101.3F), Mild Pain (1 - 3)  ALBUTerol    90 MICROgram(s) HFA Inhaler 1 Puff(s) Inhalation every 6 hours PRN Bronchospasm  melatonin 3 milliGRAM(s) Oral at bedtime PRN Insomnia  ondansetron Injectable 4 milliGRAM(s) IV Push every 8 hours PRN Nausea and/or Vomiting    Vital Signs Last 24 Hrs  T(C): 36.5 (07 Sep 2021 06:11), Max: 36.5 (06 Sep 2021 14:04)  T(F): 97.7 (07 Sep 2021 06:11), Max: 97.7 (06 Sep 2021 14:04)  HR: 70 (07 Sep 2021 06:11) (68 - 74)  BP: 161/89 (07 Sep 2021 06:11) (117/64 - 161/89)  BP(mean): --  RR: 19 (07 Sep 2021 06:11) (18 - 19)  SpO2: 96% (07 Sep 2021 06:11) (96% - 98%)    PHYSICAL EXAM:    General: in no acute distress  HEENT: MMM, conjunctiva and sclera clear  Gastrointestinal: Soft non-tender non-distended; No rebound or guarding  Skin: Warm and dry. No obvious rash    LABS:                        9.1    9.96  )-----------( 280      ( 07 Sep 2021 06:36 )             28.8     09-07    139  |  99  |  19  ----------------------------<  94  3.7   |  32<H>  |  0.98    Ca    8.8      07 Sep 2021 06:36  Phos  4.1     09-06  Mg     2.4     09-07                        RADIOLOGY & ADDITIONAL STUDIES:  Reviewed GASTROENTEROLOGY PROGRESS NOTE  Patient seen and examined at bedside. AM Hgb 9.1, stable.    ROS: Patient notes BM yesterday, brown in color. Denies any abdominal pain, nausea/vomiting.     PERTINENT REVIEW OF SYSTEMS:  CONSTITUTIONAL: No weakness, fevers or chills  HEENT: No visual changes; No vertigo or throat pain   GASTROINTESTINAL: As above.  NEUROLOGICAL: No numbness or weakness  SKIN: No itching, burning, rashes, or lesions     Allergies    No Known Allergies    Intolerances      MEDICATIONS:  MEDICATIONS  (STANDING):  dextrose 40% Gel 15 Gram(s) Oral once  dextrose 5%. 1000 milliLiter(s) (50 mL/Hr) IV Continuous <Continuous>  dextrose 5%. 1000 milliLiter(s) (100 mL/Hr) IV Continuous <Continuous>  dextrose 50% Injectable 25 Gram(s) IV Push once  dextrose 50% Injectable 12.5 Gram(s) IV Push once  dextrose 50% Injectable 25 Gram(s) IV Push once  ferrous    sulfate 325 milliGRAM(s) Oral daily  FLUoxetine 40 milliGRAM(s) Oral at bedtime  gabapentin 600 milliGRAM(s) Oral at bedtime  glucagon  Injectable 1 milliGRAM(s) IntraMuscular once  hydrochlorothiazide 25 milliGRAM(s) Oral daily  hydrOXYzine hydrochloride 50 milliGRAM(s) Oral at bedtime  influenza   Vaccine 0.5 milliLiter(s) IntraMuscular once  insulin lispro (ADMELOG) corrective regimen sliding scale   SubCutaneous Before meals and at bedtime  montelukast 10 milliGRAM(s) Oral daily  pantoprazole  Injectable 40 milliGRAM(s) IV Push every 12 hours  potassium chloride   Powder 20 milliEquivalent(s) Oral once  predniSONE   Tablet 40 milliGRAM(s) Oral daily  risperiDONE   Tablet 2 milliGRAM(s) Oral at bedtime  tiotropium 18 MICROgram(s) Capsule 1 Capsule(s) Inhalation daily    MEDICATIONS  (PRN):  acetaminophen   Tablet .. 650 milliGRAM(s) Oral every 6 hours PRN Temp greater or equal to 38.5C (101.3F), Mild Pain (1 - 3)  ALBUTerol    90 MICROgram(s) HFA Inhaler 1 Puff(s) Inhalation every 6 hours PRN Bronchospasm  melatonin 3 milliGRAM(s) Oral at bedtime PRN Insomnia  ondansetron Injectable 4 milliGRAM(s) IV Push every 8 hours PRN Nausea and/or Vomiting    Vital Signs Last 24 Hrs  T(C): 36.5 (07 Sep 2021 06:11), Max: 36.5 (06 Sep 2021 14:04)  T(F): 97.7 (07 Sep 2021 06:11), Max: 97.7 (06 Sep 2021 14:04)  HR: 70 (07 Sep 2021 06:11) (68 - 74)  BP: 161/89 (07 Sep 2021 06:11) (117/64 - 161/89)  BP(mean): --  RR: 19 (07 Sep 2021 06:11) (18 - 19)  SpO2: 96% (07 Sep 2021 06:11) (96% - 98%)    PHYSICAL EXAM:    General: WDWN male, hard of hearing; on RA  HEENT: NC/AT; PERRL, anicteric sclera; MMM  Gastrointestinal: soft, NT/ND; +BSx4  Extremities: WWP; no edema, clubbing or cyanosis  Vascular: 2+ radial, DP/PT pulses B/L  Neurological: AAOx3; no focal deficits    LABS:                        9.1    9.96  )-----------( 280      ( 07 Sep 2021 06:36 )             28.8     09-07    139  |  99  |  19  ----------------------------<  94  3.7   |  32<H>  |  0.98    Ca    8.8      07 Sep 2021 06:36  Phos  4.1     09-06  Mg     2.4     09-07                        RADIOLOGY & ADDITIONAL STUDIES:  Reviewed

## 2021-09-08 ENCOUNTER — TRANSCRIPTION ENCOUNTER (OUTPATIENT)
Age: 61
End: 2021-09-08

## 2021-09-08 VITALS
HEART RATE: 71 BPM | SYSTOLIC BLOOD PRESSURE: 123 MMHG | RESPIRATION RATE: 18 BRPM | OXYGEN SATURATION: 97 % | DIASTOLIC BLOOD PRESSURE: 72 MMHG | TEMPERATURE: 99 F

## 2021-09-08 LAB
ANION GAP SERPL CALC-SCNC: 9 MMOL/L — SIGNIFICANT CHANGE UP (ref 5–17)
BLD GP AB SCN SERPL QL: NEGATIVE — SIGNIFICANT CHANGE UP
BUN SERPL-MCNC: 14 MG/DL — SIGNIFICANT CHANGE UP (ref 7–23)
CALCIUM SERPL-MCNC: 8.4 MG/DL — SIGNIFICANT CHANGE UP (ref 8.4–10.5)
CHLORIDE SERPL-SCNC: 99 MMOL/L — SIGNIFICANT CHANGE UP (ref 96–108)
CO2 SERPL-SCNC: 31 MMOL/L — SIGNIFICANT CHANGE UP (ref 22–31)
CREAT SERPL-MCNC: 0.84 MG/DL — SIGNIFICANT CHANGE UP (ref 0.5–1.3)
GLUCOSE BLDC GLUCOMTR-MCNC: 95 MG/DL — SIGNIFICANT CHANGE UP (ref 70–99)
GLUCOSE BLDC GLUCOMTR-MCNC: 96 MG/DL — SIGNIFICANT CHANGE UP (ref 70–99)
GLUCOSE SERPL-MCNC: 92 MG/DL — SIGNIFICANT CHANGE UP (ref 70–99)
HCT VFR BLD CALC: 28.7 % — LOW (ref 39–50)
HGB BLD-MCNC: 9.3 G/DL — LOW (ref 13–17)
INR BLD: 1.11 — SIGNIFICANT CHANGE UP (ref 0.88–1.16)
MAGNESIUM SERPL-MCNC: 2 MG/DL — SIGNIFICANT CHANGE UP (ref 1.6–2.6)
MCHC RBC-ENTMCNC: 28.1 PG — SIGNIFICANT CHANGE UP (ref 27–34)
MCHC RBC-ENTMCNC: 32.4 GM/DL — SIGNIFICANT CHANGE UP (ref 32–36)
MCV RBC AUTO: 86.7 FL — SIGNIFICANT CHANGE UP (ref 80–100)
NRBC # BLD: 0 /100 WBCS — SIGNIFICANT CHANGE UP (ref 0–0)
PLATELET # BLD AUTO: 255 K/UL — SIGNIFICANT CHANGE UP (ref 150–400)
POTASSIUM SERPL-MCNC: 3.4 MMOL/L — LOW (ref 3.5–5.3)
POTASSIUM SERPL-SCNC: 3.4 MMOL/L — LOW (ref 3.5–5.3)
PROTHROM AB SERPL-ACNC: 13.3 SEC — SIGNIFICANT CHANGE UP (ref 10.6–13.6)
RBC # BLD: 3.31 M/UL — LOW (ref 4.2–5.8)
RBC # FLD: 14.7 % — HIGH (ref 10.3–14.5)
RH IG SCN BLD-IMP: POSITIVE — SIGNIFICANT CHANGE UP
SARS-COV-2 RNA SPEC QL NAA+PROBE: NEGATIVE — SIGNIFICANT CHANGE UP
SODIUM SERPL-SCNC: 139 MMOL/L — SIGNIFICANT CHANGE UP (ref 135–145)
WBC # BLD: 7.14 K/UL — SIGNIFICANT CHANGE UP (ref 3.8–10.5)
WBC # FLD AUTO: 7.14 K/UL — SIGNIFICANT CHANGE UP (ref 3.8–10.5)

## 2021-09-08 PROCEDURE — 84443 ASSAY THYROID STIM HORMONE: CPT

## 2021-09-08 PROCEDURE — 83540 ASSAY OF IRON: CPT

## 2021-09-08 PROCEDURE — 84132 ASSAY OF SERUM POTASSIUM: CPT

## 2021-09-08 PROCEDURE — 86850 RBC ANTIBODY SCREEN: CPT

## 2021-09-08 PROCEDURE — 99233 SBSQ HOSP IP/OBS HIGH 50: CPT | Mod: GC

## 2021-09-08 PROCEDURE — 85027 COMPLETE CBC AUTOMATED: CPT

## 2021-09-08 PROCEDURE — 82330 ASSAY OF CALCIUM: CPT

## 2021-09-08 PROCEDURE — G1004: CPT

## 2021-09-08 PROCEDURE — 99285 EMERGENCY DEPT VISIT HI MDM: CPT

## 2021-09-08 PROCEDURE — 83010 ASSAY OF HAPTOGLOBIN QUANT: CPT

## 2021-09-08 PROCEDURE — 96374 THER/PROPH/DIAG INJ IV PUSH: CPT

## 2021-09-08 PROCEDURE — 84145 PROCALCITONIN (PCT): CPT

## 2021-09-08 PROCEDURE — 82784 ASSAY IGA/IGD/IGG/IGM EACH: CPT

## 2021-09-08 PROCEDURE — 83735 ASSAY OF MAGNESIUM: CPT

## 2021-09-08 PROCEDURE — 0225U NFCT DS DNA&RNA 21 SARSCOV2: CPT

## 2021-09-08 PROCEDURE — 85730 THROMBOPLASTIN TIME PARTIAL: CPT

## 2021-09-08 PROCEDURE — 80053 COMPREHEN METABOLIC PANEL: CPT

## 2021-09-08 PROCEDURE — 86364 TISS TRNSGLTMNASE EA IG CLAS: CPT

## 2021-09-08 PROCEDURE — 80048 BASIC METABOLIC PNL TOTAL CA: CPT

## 2021-09-08 PROCEDURE — 86677 HELICOBACTER PYLORI ANTIBODY: CPT

## 2021-09-08 PROCEDURE — 82962 GLUCOSE BLOOD TEST: CPT

## 2021-09-08 PROCEDURE — 85045 AUTOMATED RETICULOCYTE COUNT: CPT

## 2021-09-08 PROCEDURE — 36415 COLL VENOUS BLD VENIPUNCTURE: CPT

## 2021-09-08 PROCEDURE — 82728 ASSAY OF FERRITIN: CPT

## 2021-09-08 PROCEDURE — 93005 ELECTROCARDIOGRAM TRACING: CPT

## 2021-09-08 PROCEDURE — 84100 ASSAY OF PHOSPHORUS: CPT

## 2021-09-08 PROCEDURE — 36430 TRANSFUSION BLD/BLD COMPNT: CPT

## 2021-09-08 PROCEDURE — 71045 X-RAY EXAM CHEST 1 VIEW: CPT

## 2021-09-08 PROCEDURE — 94640 AIRWAY INHALATION TREATMENT: CPT

## 2021-09-08 PROCEDURE — 83550 IRON BINDING TEST: CPT

## 2021-09-08 PROCEDURE — 82803 BLOOD GASES ANY COMBINATION: CPT

## 2021-09-08 PROCEDURE — 84295 ASSAY OF SERUM SODIUM: CPT

## 2021-09-08 PROCEDURE — 87635 SARS-COV-2 COVID-19 AMP PRB: CPT

## 2021-09-08 PROCEDURE — 96375 TX/PRO/DX INJ NEW DRUG ADDON: CPT

## 2021-09-08 PROCEDURE — 93306 TTE W/DOPPLER COMPLETE: CPT

## 2021-09-08 PROCEDURE — 86900 BLOOD TYPING SEROLOGIC ABO: CPT

## 2021-09-08 PROCEDURE — 83880 ASSAY OF NATRIURETIC PEPTIDE: CPT

## 2021-09-08 PROCEDURE — 83615 LACTATE (LD) (LDH) ENZYME: CPT

## 2021-09-08 PROCEDURE — 86923 COMPATIBILITY TEST ELECTRIC: CPT

## 2021-09-08 PROCEDURE — 82553 CREATINE MB FRACTION: CPT

## 2021-09-08 PROCEDURE — 83520 IMMUNOASSAY QUANT NOS NONAB: CPT

## 2021-09-08 PROCEDURE — 86769 SARS-COV-2 COVID-19 ANTIBODY: CPT

## 2021-09-08 PROCEDURE — 86901 BLOOD TYPING SEROLOGIC RH(D): CPT

## 2021-09-08 PROCEDURE — 82550 ASSAY OF CK (CPK): CPT

## 2021-09-08 PROCEDURE — 83036 HEMOGLOBIN GLYCOSYLATED A1C: CPT

## 2021-09-08 PROCEDURE — 84484 ASSAY OF TROPONIN QUANT: CPT

## 2021-09-08 PROCEDURE — P9016: CPT

## 2021-09-08 PROCEDURE — 80061 LIPID PANEL: CPT

## 2021-09-08 PROCEDURE — 82272 OCCULT BLD FECES 1-3 TESTS: CPT

## 2021-09-08 PROCEDURE — 71275 CT ANGIOGRAPHY CHEST: CPT | Mod: MG

## 2021-09-08 PROCEDURE — 85379 FIBRIN DEGRADATION QUANT: CPT

## 2021-09-08 PROCEDURE — 45378 DIAGNOSTIC COLONOSCOPY: CPT | Mod: 59

## 2021-09-08 PROCEDURE — 85025 COMPLETE CBC W/AUTO DIFF WBC: CPT

## 2021-09-08 PROCEDURE — 85610 PROTHROMBIN TIME: CPT

## 2021-09-08 PROCEDURE — 43235 EGD DIAGNOSTIC BRUSH WASH: CPT

## 2021-09-08 RX ORDER — TIOTROPIUM BROMIDE 18 UG/1
1 CAPSULE ORAL; RESPIRATORY (INHALATION)
Qty: 30 | Refills: 0
Start: 2021-09-08 | End: 2021-10-07

## 2021-09-08 RX ORDER — FERROUS SULFATE 325(65) MG
1 TABLET ORAL
Qty: 15 | Refills: 0
Start: 2021-09-08 | End: 2021-10-07

## 2021-09-08 RX ORDER — PANTOPRAZOLE SODIUM 20 MG/1
1 TABLET, DELAYED RELEASE ORAL
Qty: 30 | Refills: 0
Start: 2021-09-08 | End: 2021-10-07

## 2021-09-08 RX ORDER — PANTOPRAZOLE SODIUM 20 MG/1
40 TABLET, DELAYED RELEASE ORAL
Refills: 0 | Status: DISCONTINUED | OUTPATIENT
Start: 2021-09-08 | End: 2021-09-08

## 2021-09-08 RX ADMIN — Medication 650 MILLIGRAM(S): at 15:37

## 2021-09-08 RX ADMIN — ALBUTEROL 1 PUFF(S): 90 AEROSOL, METERED ORAL at 15:36

## 2021-09-08 RX ADMIN — PANTOPRAZOLE SODIUM 40 MILLIGRAM(S): 20 TABLET, DELAYED RELEASE ORAL at 06:50

## 2021-09-08 RX ADMIN — TIOTROPIUM BROMIDE 1 CAPSULE(S): 18 CAPSULE ORAL; RESPIRATORY (INHALATION) at 15:36

## 2021-09-08 RX ADMIN — Medication 25 MILLIGRAM(S): at 06:50

## 2021-09-08 RX ADMIN — Medication 325 MILLIGRAM(S): at 15:37

## 2021-09-08 RX ADMIN — MONTELUKAST 10 MILLIGRAM(S): 4 TABLET, CHEWABLE ORAL at 15:37

## 2021-09-08 NOTE — DISCHARGE NOTE PROVIDER - NSDCMRMEDTOKEN_GEN_ALL_CORE_FT
acamprosate 333 mg tablet,delayed release:   albuterol 90 mcg/inh inhalation aerosol: 2 puff(s) inhaled every 4 hours   FeroSul 325 mg (65 mg elemental iron) oral tablet: 1 tab(s) orally every other day (at bedtime)   fluoxetine 40 mg capsule:   gabapentin 600 mg tablet:   hydrochlorothiazide 25 mg tablet:   hydrOXYzine hydrochloride 50 mg oral tablet: 1 tab(s) orally once a day (at bedtime)  pantoprazole 40 mg oral delayed release tablet: 1 tab(s) orally once a day (before a meal)  risperidone 2 mg tablet:   Singulair 10 mg oral tablet: 1 tab(s) orally once a day  tiotropium 18 mcg inhalation capsule: 1 cap(s) inhaled once a day  Ventolin HFA 90 mcg/actuation aerosol inhaler:

## 2021-09-08 NOTE — DISCHARGE NOTE NURSING/CASE MANAGEMENT/SOCIAL WORK - NSDCPEWEB_GEN_ALL_CORE
[facility] for Tobacco Control website --- [email or web address] --- [email or web address]

## 2021-09-08 NOTE — PROGRESS NOTE ADULT - PROBLEM SELECTOR PLAN 7
Currently sober 1 yr and 5 months, regular AA attendance. Takes acamprosate 333 mg daily at home.  -no acamprosate while inpatient, pt can c/w home acamprosate after d/c   -encouraged continued sobriety and AA attendance.
Currently sober 1 yr and 5 months, regular AA attendance. Takes acamprosate 333 mg daily at home.    TO DO:  -no acamprosate while inpatient, pt can c/w home acamprosate after d/c   -encourage continued sobriety and AA attendance.
Currently sober 1 yr and 5 months, regular AA attendance. Takes acamprosate 333 mg daily at home.  -no acamprosate while inpatient, pt can c/w home acamprosate after d/c   -encouraged continued sobriety and AA attendance.
Currently sober 1 yr and 5 months, regular AA attendance. Takes acamprosate 333 mg daily at home.  -no acamprosate while inpatient, pt can c/w home acamprosate after d/c   -encouraged continued sobriety and AA attendance.

## 2021-09-08 NOTE — PROGRESS NOTE ADULT - PROBLEM SELECTOR PROBLEM 7
2017    Veronika Corley         : 1960  [address] [address]    To Whom it May Concern:    Due to an acute Illness, Ms. Corley is to be excused from work from 17 up to and including 17.      Sincerely,      Alvarez Patricio MD  [facility]  [address] [address]  Tel: [phone]  Fax: [phone]    
Alcohol abuse

## 2021-09-08 NOTE — DISCHARGE NOTE NURSING/CASE MANAGEMENT/SOCIAL WORK - NSDCPEEMAIL_GEN_ALL_CORE
[facility] for Tobacco Control email [email or web address]

## 2021-09-08 NOTE — DISCHARGE NOTE PROVIDER - HOSPITAL COURSE
#Discharge: do not delete    Patient is 59 yo M/F with past medical history of smoking, COPD, alcohol abuse, bipolar/depression, asthma presented with cough and SOB, found to have COPD exacerbation and was eventually managed for acute drop in Hb during his stay in the hospital.     Hospital course:  1. COPD exacerbation: RESOLVED  - Pt met SIRS 2/4 criteria at presentation w/ HR 93 and RR 22, improved to HR 80s and RR <20 on RA.  -CT PE protocol negative for PE (9/4), CXR without infiltrates, clinically improved w/ duonebs and steroids  -s/p solumedrol and prednisone ( discontinued due to drop in Hb suspicion 2/2 LGI bleed)  - c/w home albuterol, montelukast, and tiotropium.    2. Anemia:  - On admission Hb 8.1 but dropped to 6.9 on 9/5 and corrected after 1 unit pRBS and on discharge 9.3  -s/p colonoscopy   - c/w Ferrous sulfate 325 mg every other day.    3. Peripheral edema:  - No known h/o edema and ECHO wnl   - c/w home hydrochlorothiazide   - recommended compression stockings at home and Elevate legs.    4. Bipolar Ds/ Depression:  - Lipid panel wnl and A1c 5.6   - c/w home neurontin, prozac, risperidone, and hydroxyzine.    5. Alcohol abuse:   - continued sobriety and AA attendance  - c/w home acamprosate    6. Active Smoker:   - f/u and discuss plans with w/ medical aids w/ PCP as outpatient.    7. Hypertension:  -c/w home HCTZ 25mg daily.    8. GI ppx:  - c/w pantoprazole 40 mg daily          Patient was discharged to: Home    New medications: Ferrous sulfate 325 mg every other day and  pantoprazole 40 mg daily   Changes to old medications: none   Medications that were stopped: none    Items to follow up as outpatient: H. pylori serology, tissue transglutaminase, IgA antibody level    Physical exam at the time of discharge:  General: WDWN  HEENT: NC/AT; PERRL, anicteric sclera; MMM  Neck: supple  Cardiovascular: +S1/S2, RRR  Respiratory: b/l wheezes  Gastrointestinal: soft, NT/ND; +BSx4  Extremities: WWP; no edema, clubbing or cyanosis  Vascular: 2+ radial, DP/PT pulses B/L  Neurological: AAOx3; no focal deficits     #Discharge: do not delete    Patient is 61 yo M/F with past medical history of smoking, COPD, alcohol abuse, bipolar/depression, asthma presented with cough and SOB, found to have COPD exacerbation and was eventually managed for acute drop in Hb during his stay in the hospital.     Hospital course:  1. COPD exacerbation: RESOLVED  - Pt met SIRS 2/4 criteria at presentation w/ HR 93 and RR 22, improved to HR 80s and RR <20 on RA.  -CT PE protocol negative for PE (9/4), CXR without infiltrates, clinically improved w/ duonebs and steroids  -s/p solumedrol and prednisone ( discontinued due to drop in Hb suspicion 2/2 LGI bleed)  - c/w home albuterol, montelukast, and tiotropium.    2. Anemia:  - On admission Hb 8.1 but dropped to 6.9 on 9/5 and corrected after 1 unit pRBS and on discharge 9.3  -s/p colonoscopy: atopic gastritis, rest unrevealing   - c/w Ferrous sulfate 325 mg every other day.    3. Peripheral edema:  - No known h/o edema and ECHO wnl   - c/w home hydrochlorothiazide   - recommended compression stockings at home and Elevate legs.    4. Bipolar Ds/ Depression:  - Lipid panel wnl and A1c 5.6   - c/w home neurontin, prozac, risperidone, and hydroxyzine.    5. Alcohol abuse:   - continued sobriety and AA attendance  - c/w home acamprosate    6. Active Smoker:   - f/u and discuss plans with w/ medical aids w/ PCP as outpatient.    7. Hypertension:  -c/w home HCTZ 25mg daily.    8. GI ppx:  - c/w pantoprazole 40 mg daily          Patient was discharged to: Home    New medications: Ferrous sulfate 325 mg every other day and  pantoprazole 40 mg daily   Changes to old medications: none   Medications that were stopped: none    Items to follow up as outpatient: H. pylori serology, tissue transglutaminase, IgA antibody level    Physical exam at the time of discharge:  General: WDWN  HEENT: NC/AT; PERRL, anicteric sclera; MMM  Neck: supple  Cardiovascular: +S1/S2, RRR  Respiratory: b/l wheezes  Gastrointestinal: soft, NT/ND; +BSx4  Extremities: WWP; no edema, clubbing or cyanosis  Vascular: 2+ radial, DP/PT pulses B/L  Neurological: AAOx3; no focal deficits

## 2021-09-08 NOTE — PROGRESS NOTE ADULT - PROBLEM SELECTOR PLAN 5
Pt takes at home neurontin 1200 mg, prozac 60 mg, risperidone 2 mg, and hydroxyzine 50 mg at bedtime. Lipid panel wnl and A1c 5.6     TO DO:  -c/w home neurontin, prozac, risperidone, and hydroxyzine

## 2021-09-08 NOTE — CHART NOTE - NSCHARTNOTEFT_GEN_A_CORE
EGD/C-scope performed today with Dr Linn and myself, findings as noted below:    Impression:    EGD:  -Normal esophagus.    -Localized erosions of the mucosa with no bleeding was noted in the pre-pyloric region. These findings are compatible with erosive gastritis.   Atrophic gastritis noted in fundus.   -Normal duodenum.      C-scope:  -A few non-bleeding diverticula with small openings were seen in the sigmoid colon. Diverticulosis appeared to be of mild severity.    -Normal mucosa was noted in the whole colon.     Plan:  -Resume Previous Diet   -Monitor H/H   -Obtain celiac serologies and H pylori ab serology   -Protonix 40 mg po daily

## 2021-09-08 NOTE — DISCHARGE NOTE PROVIDER - PROVIDER TOKENS
FREE:[LAST:[Primary Care Provider],PHONE:[phone]],FAX:[(   [phone]    -],ADDRESS:[Please make appointment with your primary care provider]]

## 2021-09-08 NOTE — PROGRESS NOTE ADULT - PROVIDER SPECIALTY LIST ADULT
Internal Medicine
Gastroenterology
Gastroenterology
Internal Medicine

## 2021-09-08 NOTE — DISCHARGE NOTE PROVIDER - CARE PROVIDER_API CALL
Primary Care Provider,   Please make appointment with your primary care provider  Phone: [phone]  Fax: (   )    -  Follow Up Time:

## 2021-09-08 NOTE — DISCHARGE NOTE PROVIDER - NSDCCPCAREPLAN_GEN_ALL_CORE_FT
PRINCIPAL DISCHARGE DIAGNOSIS  Diagnosis: COPD exacerbation  Assessment and Plan of Treatment: You presenetd with COPD exacerbation which causes shortness of breath and productive cough. You were treated with steroids and inhalers (albuterol and tiotropium) which lead to improvement in your symptoms. Please continue taking your home medications albuterol, montelukast, and tiotropium. In order to prevent such exacerbations:      1. Quit Smoking    - Get help to quit. It's the best thing you can do for your COPD and your overall health.  -Join a stop-smoking program. There are even telephone, text message, and online programs to help you quit.  -Ask your healthcare provider about medicines or other methods to help you quit.  - Ask family members to quit smoking as well.  - Don't allow people to smoke in your home, in your car, or when they are around you.  -Don't use e-cigarettes or vaping products because they have harmful side effects.  2. Prevent Infection  -Wash your hands often. Do your best to keep your hands away from your face. Most germs are spread from your hands to your mouth.  -Get a flu shot every year. Also ask your provider about pneumonia vaccines.  -Stay away from crowds. It's especially important to do this in the winter when more people have colds and flu.  -To stay healthy, get enough sleep, exercise regularly, and eat a balanced diet.  3. See a physician immediately if:   -More mucus  -Yellow, green, bloody, or smelly mucus  -Fever or chills  4. Call 911 if:  - Swollen ankles  - Shortness of breath, wheezing, or trouble breathing that doesn't improve with treatment  - Tightness in your chest that does not go away with your normal medicines  - An irregular heartbeat or feeling that your heart is racing  - Trouble talking  - Feeling of lightheadedness or dizziness  -Feeling of doom  -Skin turning blue, gray, or purple in color  Please follow up with your PCP within 1 week.      SECONDARY DISCHARGE DIAGNOSES  Diagnosis: Anemia  Assessment and Plan of Treatment: You presented with Hb of 8.1 but dropped to 6.9 on 9/5 and corrected after 1 unit pRBS and on discharge Hb 9.3. Anemia means you have fewer red blood cells than normal, or you do not have enough iron in your blood. When this happens, your red blood cells cannot carry enough oxygen to the rest of your body. Symptoms include:  -Feeling very weak, tired or short of breath  -Feeling dizzy or light-headed  -Skin, gums, nail beds or lower eyelids that are pale.  We suspected Lower GI bleed as the cause of anemia and did colonoscopy was unrevealing. Here are something you can do:  -Eat a healthy diet with lots of iron-rich foods (like beef, liver, canned salmon, dried fruits and fortified cereals). Drink lots of fluids.  -Sleep more at night and take naps during the day.  -Plan your day to include rest periods. Don't try to over-schedule yourself. Save your energy for the things that are most important to you.  -Try to do light exercise every day. Avoid heavy exercise or activity.  -Don't get up too quickly when lying or sitting. This may make you dizzy. Change positions slowly.  -Avoid injuries that might cause bleeding or bruising. Keep your home as safe as possible. Do not use razors or sharp knives.  Please call 911 or see your PCP immediately if:  -You feel very weak and tired all the time.  -You have trouble breathing or feel short of breath.  -You are dizzy or light-headed.  -Your heart rate is faster than normal.  -You have headaches often.  We have prescribed you Ferrous sulphate which you are to take every other day and pantoprazole which you will take every morning before breakfast.       Diagnosis: Bipolar depression  Assessment and Plan of Treatment: You have a history of bipolar depression. Bipolar disorder, also known as manic depression, is a biological disorder. This chronic (lifelong) illness causes unusual shifts in your mood, energy, and ability to function. When you have bipolar disorder, your mood shifts are severe — quite different from normal, everyday ups and downs. Untreated, these mood shifts can hurt your relationships and your performance at  work or school. They cause poor decisions and distort your sense of  self. Not surprisingly, people with untreated bipolar disorder are at  risk for suicide. But there is good news. Bipolar disorder is highly treatable. With good medical help for your illness, you can lead a full and productive life. Here are certains tips that can help you:  Be safe with medicines. Take your medicines exactly as prescribed. Do not stop or change a medicine without talking to your doctor first. You and your doctor may need to try different combinations of medicines to find what works for you.  -Take your medicines on schedule to keep your moods even. When you feel good, you may think that you do not need your medicines. But it is important to keep taking them.  -Go to your counselling sessions. Call and talk with your counsellor if you can't go to a session or if you don't think the sessions are helping. Do not just stop going.  -Get at least 30 minutes of activity on most days of the week. Walking is a good choice. You also may want to do other things, such as running, swimming, or cycling.  -Get enough sleep. Keep your room dark and quiet. Try to go to bed at the same time every night.  -Eat a healthy diet. This includes whole grains, dairy, fruits, vegetables, and protein. Eat foods from each of these groups.  -Try to lower your stress. Manage your time, build a strong support system, and lead a healthy lifestyle.   -Do not use alcohol, cannabis, or illegal drugs.  Please continue takin home neurontin, prozac, risperidone, and hydroxyzine and f/u with your outpatient psychiatrist.    Diagnosis: Alcohol abuse  Assessment and Plan of Treatment: You had a history of alcohol use disorder and been sober for last 1year and 5 months. Alcohol use disorder means having unhealthy or dangerous drinking habits, such as drinking every day or drinking too much at a time. It can range from mild to severe. When you have alcohol use disorder, you continue to drink even though you know your drinking is causing problems. You have a strong need, or craving, to drink. You feel like you must drink just to get by. It can harm your relationships, cause you to miss work, and lead to legal problems such as driving while drunk (intoxicated). Alcohol use disorder is a long-term (chronic) disease. It's not a weakness or a lack of willpower. You are doing very good and please keep attending AA meetings to help you maintain soberity. Counseling and support groups like Alcoholics Anonymous support recovery. Recovery is a long-term process, not something you can achieve in a few weeks. Treatment doesn't focus on alcohol use alone. It addresses other parts of your life, like your relationships, work, medical problems, and living situation. Treatment and recovery support you in making positive changes so you can live without alcohol.    Diagnosis: Current every day smoker  Assessment and Plan of Treatment: Here are some tips that can help you with quitting smoking:  - Get help to quit. It's the best thing you can do for your COPD and your overall health.  -Join a stop-smoking program. There are even telephone, text message, and online programs to help you quit.  -Ask your healthcare provider about medicines or other methods to help you quit.  - Ask family members to quit smoking as well.  - Don't allow people to smoke in your home, in your car, or when they are around you.  -Don't use e-cigarettes or vaping products because they have harmful side effects.  Please follow up wih your PCP outpatient for a plan and adherence

## 2021-09-08 NOTE — PROGRESS NOTE ADULT - PROBLEM SELECTOR PLAN 2
Met SIRS 2/4 criteria at presentation w/ HR 93 and RR 22, improved to HR 80s and RR <20 on RA. RVP sent x2, covid neg. CXR w/o infiltrates, CT PE protocol negative for PE (9/4), CXR without infiltrates, clinically improved w/ duonebs and steroids. At home takes albuterol, montelukast and tiotropium.   - Discontinued prednisone     TO DO:  -c/w home albuterol, montelukast, and tiotropium

## 2021-09-08 NOTE — PROGRESS NOTE ADULT - SUBJECTIVE AND OBJECTIVE BOX
incomplete OVERNIGHT EVENTS: Pt NPO overnight for colonoscopy in the AM    SUBJECTIVE / INTERVAL HPI: Patient seen and examined at bedside. He was resting comfortably on bed, breathing on room air and mentioned that his stool color was brownish today as compared to black stool yesterday. He states that he has been breathing a lot better and denies headache, chest pain, cough, SOB, abdominal pain, diarrhea, constipation, weakness or numbness.      VITAL SIGNS:  Vital Signs Last 24 Hrs  T(C): 36.9 (08 Sep 2021 05:54), Max: 36.9 (08 Sep 2021 05:54)  T(F): 98.4 (08 Sep 2021 05:54), Max: 98.4 (08 Sep 2021 05:54)  HR: 79 (08 Sep 2021 05:54) (72 - 79)  BP: 156/88 (08 Sep 2021 05:54) (152/77 - 156/88)  BP(mean): --  RR: 19 (08 Sep 2021 05:54) (18 - 19)  SpO2: 93% (08 Sep 2021 05:54) (93% - 97%)    PHYSICAL EXAM:    General: WDWN  HEENT: NC/AT; PERRL, anicteric sclera; MMM  Neck: supple  Cardiovascular: +S1/S2, RRR  Respiratory: b/l wheezes  Gastrointestinal: soft, NT/ND; +BSx4  Extremities: WWP; no edema, clubbing or cyanosis  Vascular: 2+ radial, DP/PT pulses B/L  Neurological: AAOx3; no focal deficits    MEDICATIONS:  MEDICATIONS  (STANDING):  dextrose 40% Gel 15 Gram(s) Oral once  dextrose 5%. 1000 milliLiter(s) (50 mL/Hr) IV Continuous <Continuous>  dextrose 5%. 1000 milliLiter(s) (100 mL/Hr) IV Continuous <Continuous>  dextrose 50% Injectable 25 Gram(s) IV Push once  dextrose 50% Injectable 12.5 Gram(s) IV Push once  dextrose 50% Injectable 25 Gram(s) IV Push once  ferrous    sulfate 325 milliGRAM(s) Oral daily  FLUoxetine 40 milliGRAM(s) Oral at bedtime  gabapentin 600 milliGRAM(s) Oral at bedtime  glucagon  Injectable 1 milliGRAM(s) IntraMuscular once  hydrochlorothiazide 25 milliGRAM(s) Oral daily  hydrOXYzine hydrochloride 50 milliGRAM(s) Oral at bedtime  influenza   Vaccine 0.5 milliLiter(s) IntraMuscular once  insulin lispro (ADMELOG) corrective regimen sliding scale   SubCutaneous Before meals and at bedtime  montelukast 10 milliGRAM(s) Oral daily  pantoprazole  Injectable 40 milliGRAM(s) IV Push every 12 hours  risperiDONE   Tablet 2 milliGRAM(s) Oral at bedtime  tiotropium 18 MICROgram(s) Capsule 1 Capsule(s) Inhalation daily    MEDICATIONS  (PRN):  acetaminophen   Tablet .. 650 milliGRAM(s) Oral every 6 hours PRN Temp greater or equal to 38.5C (101.3F), Mild Pain (1 - 3)  ALBUTerol    90 MICROgram(s) HFA Inhaler 1 Puff(s) Inhalation every 6 hours PRN Bronchospasm  melatonin 3 milliGRAM(s) Oral at bedtime PRN Insomnia  ondansetron Injectable 4 milliGRAM(s) IV Push every 8 hours PRN Nausea and/or Vomiting      ALLERGIES:  Allergies    No Known Allergies    Intolerances        LABS:                        9.3    7.14  )-----------( 255      ( 08 Sep 2021 06:07 )             28.7     09-08    139  |  99  |  14  ----------------------------<  92  3.4<L>   |  31  |  0.84    Ca    8.4      08 Sep 2021 06:07  Mg     2.0     09-08      PT/INR - ( 08 Sep 2021 06:08 )   PT: 13.3 sec;   INR: 1.11              CAPILLARY BLOOD GLUCOSE      POCT Blood Glucose.: 96 mg/dL (08 Sep 2021 09:13)      RADIOLOGY & ADDITIONAL TESTS: Reviewed.

## 2021-09-08 NOTE — PROGRESS NOTE ADULT - ASSESSMENT
60M PMH current smoker (less than 1PPD), asthma and COPD admitted for COPD exacerbation now improved course c/b drop in Hb being evaluated for possible GIB (PUD vs malignancy),

## 2021-09-08 NOTE — DISCHARGE NOTE NURSING/CASE MANAGEMENT/SOCIAL WORK - PATIENT PORTAL LINK FT
You can access the FollowMyHealth Patient Portal offered by [facility] by registering at the following website: [email or web address]. By joining [facility]’s FollowMyHealth portal, you will also be able to view your health information using other applications (apps) compatible with our system.

## 2021-09-08 NOTE — PROGRESS NOTE ADULT - PROBLEM SELECTOR PLAN 1
Patient presented with Hgb of 8.1, reportedly previously in the 11 to 12s on previous checks. Denies melanic stool or BRBPR. Denies any recent blood loss. FOBT negative. Hb dropped to 6.9 on 9/5 and corrected after 1 unit pRBS.   - Discontinued lovenox and prednisone    TO DO:  - Colonoscopy today (NPO overnight )  - Transfusion goal >7 given no known CAD  - to be discharged on Protonix 40 BID and Ferrous sulfate 325 mg every other day Patient presented with Hgb of 8.1, reportedly previously in the 11 to 12s on previous checks. Denies melanic stool or BRBPR. Denies any recent blood loss. FOBT negative. Hb dropped to 6.9 on 9/5 and corrected after 1 unit pRBS.   - Discontinued lovenox and prednisone  - s/p colonoscopy: atopic gastritis, rest unrevealing     TO DO:  - Transfusion goal >7 given no known CAD  - to be discharged on Protonix 40 BID and Ferrous sulfate 325 mg every other day

## 2021-09-08 NOTE — DISCHARGE NOTE NURSING/CASE MANAGEMENT/SOCIAL WORK - NSDCPEFALRISK_GEN_ALL_CORE
For information on Fall & injury Prevention, visit [email or web address]

## 2021-09-08 NOTE — PROGRESS NOTE ADULT - PROBLEM SELECTOR PLAN 10
F: Tolerating PO  E: replete when K<4 and Mg<2  N: regular diet     VTE PPx: SCDs  GI PPx: not required     Code status: FULL CODE    Dispo: Home

## 2021-09-08 NOTE — PROGRESS NOTE ADULT - PROBLEM SELECTOR PLAN 4
No known history of CHF, pt takes hydrochlorothiazide 25 mg daily at home and sleeps with legs elevated. , low concern for CHF at this time.   - ECHO:  1. Normal left ventricular size and systolic function.   2. Normal right ventricular size and systolic function.   3. Normal atria.   4. No significant valvular disease.   5. No evidence of pulmonary hypertension.   6. No pericardial effusion.      TO DO:  -c/w home hydrochlorothiazide   -recommended compression stockings at home   -Elevate legs

## 2021-09-08 NOTE — PROGRESS NOTE ADULT - PROBLEM SELECTOR PLAN 8
(Resolved). 3x episodes of NBNB emesis in ED early morning of 9/4. No further episodes of emesis, no nausea. Likely 2/2 food poisoning, pt suspects soup and chicken he got at a [employer] last night for dinner.     TO DO:  -continue to monitor  -zofran PRN nausea (QTc 430)

## 2021-09-08 NOTE — PROGRESS NOTE ADULT - PROBLEM SELECTOR PLAN 6
Pt last smoked before AA meeting yesterday (~7:30PM), pt is interested in quitting and will do so w/ medical aids w/ PCP as outpatient.  -declined nicotine replacement while inpatient
Pt is an active smoker and is interested in quitting and will do so w/ medical aids w/ PCP as outpatient.  -declined nicotine replacement while inpatient
Pt last smoked before AA meeting yesterday (~7:30PM), pt is interested in quitting and will do so w/ medical aids w/ PCP as outpatient.  -declined nicotine replacement while inpatient
Pt last smoked before AA meeting yesterday (~7:30PM), pt is interested in quitting and will do so w/ medical aids w/ PCP as outpatient.  -declined nicotine replacement while inpatient

## 2021-09-09 LAB
IGA FLD-MCNC: 93 MG/DL — SIGNIFICANT CHANGE UP (ref 84–499)
IGG FLD-MCNC: 877 MG/DL — SIGNIFICANT CHANGE UP (ref 610–1660)
IGM SERPL-MCNC: 36 MG/DL — SIGNIFICANT CHANGE UP (ref 35–242)
KAPPA LC SER QL IFE: 1.14 MG/DL — SIGNIFICANT CHANGE UP (ref 0.33–1.94)
KAPPA/LAMBDA FREE LIGHT CHAIN RATIO, SERUM: 0.55 RATIO — SIGNIFICANT CHANGE UP (ref 0.26–1.65)
LAMBDA LC SER QL IFE: 2.06 MG/DL — SIGNIFICANT CHANGE UP (ref 0.57–2.63)
TTG IGA SER-ACNC: <1.2 U/ML — SIGNIFICANT CHANGE UP
TTG IGA SER-ACNC: NEGATIVE — SIGNIFICANT CHANGE UP
TTG IGG SER IA-ACNC: NEGATIVE — SIGNIFICANT CHANGE UP
TTG IGG SER-ACNC: <1.2 U/ML — SIGNIFICANT CHANGE UP

## 2021-09-13 DIAGNOSIS — R65.10 SYSTEMIC INFLAMMATORY RESPONSE SYNDROME (SIRS) OF NON-INFECTIOUS ORIGIN WITHOUT ACUTE ORGAN DYSFUNCTION: ICD-10-CM

## 2021-09-13 DIAGNOSIS — F31.9 BIPOLAR DISORDER, UNSPECIFIED: ICD-10-CM

## 2021-09-13 DIAGNOSIS — R60.9 EDEMA, UNSPECIFIED: ICD-10-CM

## 2021-09-13 DIAGNOSIS — A05.9 BACTERIAL FOODBORNE INTOXICATION, UNSPECIFIED: ICD-10-CM

## 2021-09-13 DIAGNOSIS — Z79.51 LONG TERM (CURRENT) USE OF INHALED STEROIDS: ICD-10-CM

## 2021-09-13 DIAGNOSIS — R71.0 PRECIPITOUS DROP IN HEMATOCRIT: ICD-10-CM

## 2021-09-13 DIAGNOSIS — K29.00 ACUTE GASTRITIS WITHOUT BLEEDING: ICD-10-CM

## 2021-09-13 DIAGNOSIS — Z79.82 LONG TERM (CURRENT) USE OF ASPIRIN: ICD-10-CM

## 2021-09-13 DIAGNOSIS — I10 ESSENTIAL (PRIMARY) HYPERTENSION: ICD-10-CM

## 2021-09-13 DIAGNOSIS — K57.30 DIVERTICULOSIS OF LARGE INTESTINE WITHOUT PERFORATION OR ABSCESS WITHOUT BLEEDING: ICD-10-CM

## 2021-09-13 DIAGNOSIS — J44.1 CHRONIC OBSTRUCTIVE PULMONARY DISEASE WITH (ACUTE) EXACERBATION: ICD-10-CM

## 2021-09-13 DIAGNOSIS — F17.210 NICOTINE DEPENDENCE, CIGARETTES, UNCOMPLICATED: ICD-10-CM

## 2021-09-13 DIAGNOSIS — K29.40 CHRONIC ATROPHIC GASTRITIS WITHOUT BLEEDING: ICD-10-CM

## 2021-09-13 DIAGNOSIS — F10.11 ALCOHOL ABUSE, IN REMISSION: ICD-10-CM

## 2021-09-15 LAB — H PYLORI AB SER-ACNC: 5.9 UNITS — SIGNIFICANT CHANGE UP

## 2021-09-22 NOTE — H&P ADULT - ASSESSMENT
reconciliation was performed with family, who verbalizes understanding of administration of home medications. Advised obtaining a 90-day supply of all daily and as-needed medications. Covid Risk Education     Educated patient about risk for severe COVID-19 due to risk factors according to CDC guidelines. CTN reviewed discharge instructions, medical action plan and red flag symptoms with the family who verbalized understanding. Discussed COVID vaccination status: Yes. Education provided on COVID-19 vaccination as appropriate. Discussed exposure protocols and quarantine with CDC Guidelines. Family was given an opportunity to verbalize any questions and concerns and agrees to contact CTN or health care provider for questions related to their healthcare. Reviewed and educated family on any new and changed medications related to discharge diagnosis. Was patient discharged with a pulse oximeter? No Discussed and confirmed pulse oximeter discharge instructions and when to notify provider or seek emergency care. CTN provided contact information. Plan for follow-up call in 5-7 days based on severity of symptoms and risk factors. Plan for next call: symptom management-Has shortness of breath improved? Non-face-to-face services provided:  Scheduled appointment with PCP-CTN confirmed with patient sister Jennifer Orozco) that patient is scheduled to follow up with PCP at [facility] on 9/29/21 at 8 am.,  Obtained and reviewed discharge summary and/or continuity of care documents  Education of patient/family/caregiver/guardian to support self-management-Discussed COPD/PNA zone tool and COVID precautions knowing when to seek medial attention  Assessment and support for treatment adherence and medication management-Advised for patient to take Decadron as directed until completely finished.    Establishment or re-establishment of referrals-CTN confirmed with pateint sister Jennifer Orozco) that [facility] and nurse is currently at home for visit today. Care Transitions 24 Hour Call    Do you have any ongoing symptoms?: Yes  Patient-reported symptoms: Shortness of Breath  Do you have a copy of your discharge instructions?: Yes  Do you have all of your prescriptions and are they filled?: Yes  Have you been contacted by a [address]?: No  Have you scheduled your follow up appointment?: Yes  How are you going to get to your appointment?: Car - family or friend to transport (Comment: sister Bhakti Zamora will provide transporttaion to PCP f/u appt.)  Were you discharged with any Home Care or Post Acute Services: Yes  Post Acute Services: [facility] Health ([facility]  Do you feel like you have everything you need to keep you well at home?: Yes  Care Transitions Interventions  No Identified Needs       Spoke with patient sister Pricilla Peacock) on communication release form regarding hospital discharge/COVID-19+ follow up. Bhakti Zamora reports patient continues having shortness of breath with exertion but admits oxygen saturation quickly rebounds with rest. States patient is on continuous home oxygen at 5 lpm and admits last saturation today was 93%. Denies patient having any chest pain, chest discomfort, abdominal pain, nausea, vomiting, diarrhea, chills or fever. Provided a complete review of home meds with Bhakti Zamora and confirmed patient obtained new meds/vitamins prior to hospital discharge. Advised for patient to take and taper down on Decadron dose as directed until completely finished. Confirmed with Bhakti Zamora that patient quit smoking one year ago. Discussed COPD/PNA zone tools and COVID precautions knowing when to seek medical attention. Confirmed patient is scheduled to follow up with PCP at [facility] on 9/29/21 at 10 am. Bhakti Zamora states she will provide transportation to appointment. Bhakti Zamora states patient is resuming care with [facility] and advises nurse is currently at home for visit.  Declines any other complaints at this time. Bre Juan Joseangelica is receptive to subsequent calls. CTN will continue to follow for Care Transition.      Follow Up  Future Appointments   Date Time Provider Mik Rosario   9/29/2021 10:00 AM ARGELIA Duong - NP [facility]       ARGELIA Prado 60M PMH current smoker (less than 1PPD), asthma and COPD (not on home O2, no prior intubations, +prior hospitalizations for exacerbations), bipolar/depression, past EtOH abuse (currently sober 1 yr 5 months), multiple ED visits for COPD exacerbation (last in 08/2021) and EtOH intoxication p/w difficulty breathing and coughing x1 day, admitted for likely COPD exacerbation.

## 2021-10-29 ENCOUNTER — EMERGENCY (EMERGENCY)
Facility: HOSPITAL | Age: 61
LOS: 1 days | Discharge: LEFT BEFORE DISCHARGE | End: 2021-10-29
Admitting: EMERGENCY MEDICINE
Payer: MEDICARE

## 2021-10-29 VITALS
RESPIRATION RATE: 18 BRPM | SYSTOLIC BLOOD PRESSURE: 117 MMHG | HEIGHT: 72 IN | OXYGEN SATURATION: 100 % | TEMPERATURE: 98 F | DIASTOLIC BLOOD PRESSURE: 78 MMHG | HEART RATE: 77 BPM

## 2021-10-29 DIAGNOSIS — R06.02 SHORTNESS OF BREATH: ICD-10-CM

## 2021-10-29 DIAGNOSIS — Z90.89 ACQUIRED ABSENCE OF OTHER ORGANS: Chronic | ICD-10-CM

## 2021-10-29 DIAGNOSIS — Z98.890 OTHER SPECIFIED POSTPROCEDURAL STATES: Chronic | ICD-10-CM

## 2021-10-29 DIAGNOSIS — Z53.21 PROCEDURE AND TREATMENT NOT CARRIED OUT DUE TO PATIENT LEAVING PRIOR TO BEING SEEN BY HEALTH CARE PROVIDER: ICD-10-CM

## 2021-10-29 PROCEDURE — L9991: CPT

## 2021-10-29 NOTE — ED ADULT NURSE NOTE - OBJECTIVE STATEMENT
Pt left prior to primary nursing assessment. Pt ambulates out of ER with steady gait. Speaking full and complete sentences.

## 2021-12-20 NOTE — ED ADULT TRIAGE NOTE - CHIEF COMPLAINT QUOTE
John Randolph Medical Center Health Management per ems "he was drinking rum and coke when we picked him up"

## 2022-01-07 NOTE — ED ADULT NURSE NOTE - NS PRO PASSIVE SMOKE EXP
Called patient, verified name and . Relayed information regarding medication. Scheduled follow up appointment for 22 at 4:30pm. Patient had no further questions or concerns. No

## 2022-01-08 ENCOUNTER — EMERGENCY (EMERGENCY)
Facility: HOSPITAL | Age: 62
LOS: 1 days | Discharge: HOME | End: 2022-01-08
Attending: EMERGENCY MEDICINE | Admitting: EMERGENCY MEDICINE
Payer: MEDICARE

## 2022-01-08 VITALS
RESPIRATION RATE: 19 BRPM | OXYGEN SATURATION: 97 % | HEART RATE: 72 BPM | DIASTOLIC BLOOD PRESSURE: 81 MMHG | SYSTOLIC BLOOD PRESSURE: 144 MMHG

## 2022-01-08 VITALS
SYSTOLIC BLOOD PRESSURE: 144 MMHG | HEIGHT: 72 IN | OXYGEN SATURATION: 98 % | WEIGHT: 199.96 LBS | HEART RATE: 80 BPM | DIASTOLIC BLOOD PRESSURE: 79 MMHG | RESPIRATION RATE: 20 BRPM | TEMPERATURE: 97 F

## 2022-01-08 DIAGNOSIS — Z98.890 OTHER SPECIFIED POSTPROCEDURAL STATES: Chronic | ICD-10-CM

## 2022-01-08 DIAGNOSIS — R05.9 COUGH, UNSPECIFIED: ICD-10-CM

## 2022-01-08 DIAGNOSIS — Z87.891 PERSONAL HISTORY OF NICOTINE DEPENDENCE: ICD-10-CM

## 2022-01-08 DIAGNOSIS — J44.1 CHRONIC OBSTRUCTIVE PULMONARY DISEASE WITH (ACUTE) EXACERBATION: ICD-10-CM

## 2022-01-08 DIAGNOSIS — Z87.09 PERSONAL HISTORY OF OTHER DISEASES OF THE RESPIRATORY SYSTEM: ICD-10-CM

## 2022-01-08 DIAGNOSIS — U07.1 COVID-19: ICD-10-CM

## 2022-01-08 DIAGNOSIS — Z90.89 ACQUIRED ABSENCE OF OTHER ORGANS: Chronic | ICD-10-CM

## 2022-01-08 LAB
FLUAV AG NPH QL: SIGNIFICANT CHANGE UP
FLUBV AG NPH QL: SIGNIFICANT CHANGE UP
RSV RNA NPH QL NAA+NON-PROBE: SIGNIFICANT CHANGE UP
SARS-COV-2 RNA SPEC QL NAA+PROBE: DETECTED
SARS-COV-2 RNA SPEC QL NAA+PROBE: DETECTED

## 2022-01-08 PROCEDURE — 99284 EMERGENCY DEPT VISIT MOD MDM: CPT | Mod: 25

## 2022-01-08 PROCEDURE — 71045 X-RAY EXAM CHEST 1 VIEW: CPT

## 2022-01-08 PROCEDURE — 87637 SARSCOV2&INF A&B&RSV AMP PRB: CPT

## 2022-01-08 PROCEDURE — 71045 X-RAY EXAM CHEST 1 VIEW: CPT | Mod: 26

## 2022-01-08 PROCEDURE — U0005: CPT

## 2022-01-08 PROCEDURE — U0003: CPT

## 2022-01-08 PROCEDURE — 94640 AIRWAY INHALATION TREATMENT: CPT

## 2022-01-08 PROCEDURE — 99283 EMERGENCY DEPT VISIT LOW MDM: CPT | Mod: FS,CS

## 2022-01-08 RX ORDER — IPRATROPIUM/ALBUTEROL SULFATE 18-103MCG
3 AEROSOL WITH ADAPTER (GRAM) INHALATION
Refills: 0 | Status: COMPLETED | OUTPATIENT
Start: 2022-01-08 | End: 2022-01-08

## 2022-01-08 RX ADMIN — Medication 3 MILLILITER(S): at 01:03

## 2022-01-08 RX ADMIN — Medication 3 MILLILITER(S): at 01:04

## 2022-01-08 RX ADMIN — Medication 3 MILLILITER(S): at 00:54

## 2022-01-08 RX ADMIN — Medication 60 MILLIGRAM(S): at 00:55

## 2022-01-08 NOTE — ED ADULT NURSE REASSESSMENT NOTE - NS ED NURSE REASSESS COMMENT FT1
Pt received from previous shift. Asleep on stretcher with even chest rise, easily roused to voice, no apparent resp distress, no complaints. Awaiting SW for shelter placement, +Covid. In iso room.

## 2022-01-08 NOTE — ED PROVIDER NOTE - CARE PLAN
1 Principal Discharge DX:	2019 novel coronavirus disease (COVID-19)  Secondary Diagnosis:	COPD exacerbation

## 2022-01-08 NOTE — ED ADULT TRIAGE NOTE - CHIEF COMPLAINT QUOTE
cough and cold within this week, trouble breathing started about 2 hours ago, hx of copd, asthma and bronchitis

## 2022-01-08 NOTE — ED PROVIDER NOTE - PROGRESS NOTE DETAILS
Ree: pt received from night team at s/o; pt dx'd w/ COVID/ COPD exacerbation and currently living in group home. To be seen by social work regarding safety of dc back to group home. kennedi: pt received at sign out from skyla hernández/dr thomas as +covid, pending sw/case management eval for ? placement -- pt seen by sw and advised to be dc'd back to his current home w/covid precautions, not a candidate for placement Ree: pt seen by sw and pt stating he has a roommate he shares a room with. Information given to pt on COVID shelters and hotels. Pt advised on supportive care, wearing mask and washing hands frequently.

## 2022-01-08 NOTE — ED PROVIDER NOTE - PATIENT PORTAL LINK FT
You can access the FollowMyHealth Patient Portal offered by [facility] by registering at the following website: [email or web address]. By joining "[employer]’s FollowMyHealth portal, you will also be able to view your health information using other applications (apps) compatible with our system.

## 2022-01-08 NOTE — ED PROVIDER NOTE - NSFOLLOWUPINSTRUCTIONS_ED_ALL_ED_FT
YOU HAVE COVID     Continue to use your albuterol as needed.  Take steroids as prescribed daily     Please rest and remain well hydrated with plenty of fluids.  You can take motrin 600-800mg and tylenol 650mg every 3 hours, switching between the two for pain/bodyaches or fevers (>100.4F/>38C)    Call to arrange follow up with your primary care doctor.      Return to ED if you have chest pain, difficulty breathing, vomiting, abdominal pain, fainting or other concerns

## 2022-01-08 NOTE — ED ADULT TRIAGE NOTE - AS HEIGHT TYPE
[Normal Amount/Duration] :  normal amount and duration [Regular Cycle Intervals] : periods have been regular [Menarche Age: ____] : age at menarche was [unfilled] [FreeTextEntry1] : 06/29/2021 [Currently Active] : currently active [Men] : men [Vaginal] : vaginal [No] : No stated

## 2022-01-08 NOTE — ED PROVIDER NOTE - PHYSICAL EXAMINATION
Vitals reviewed  Gen: well appearing, nad, speaking in full sentences- no hypoxia/dyspnea, amb sat 97%  Skin: wwp, no rash/lesions  HEENT: ncat, eomi, mmm  CV: rrr, no audible m/r/g  Resp: symmetrical expansion,  b/l breath sounds w/ insp/exp wheezing, no rales  Abd: nondistended, soft/nt  Ext: FROM throughout, no peripheral edema  Neuro: alert/oriented, no focal deficits, steady gait

## 2022-01-08 NOTE — ED PROVIDER NOTE - OBJECTIVE STATEMENT
61 M pmh bronchitis, copd p/w cough/congestion and sob x 2 weeks.  pt reports dry cough and nasal congestion over past 2 weeks w/ occasional sob which improves with his albuterol nebulizer.  This evening was at meeting and reports + sob w/ wheezing, used his albuterol mdi but no improvement and couldn't make it home to use machine.  pt vaccinated for covid x2.  denies f/c, headache, dizziness, chest pain, palpitations, abd pain, nvd, urinary sxs, leg pain/swelling, sick contacts, travel

## 2022-01-08 NOTE — ED PROVIDER NOTE - CLINICAL SUMMARY MEDICAL DECISION MAKING FREE TEXT BOX
61 M pmh bronchitis, copd p/w cough/congestion and sob x 2 weeks. 61 M pmh bronchitis, copd p/w cough/congestion and sob x 2 weeks.  pt vaccinated for covid x2.  pt afebrile, no hypoxia, amb sat 97%, diffuse wheezing on exam.  will give nebs/steroids and send covid/flu swab and cxr.    covid +, feeling better s/p nebs/steroids.  pt lives in group home and mult clients in one room.  will hold for SW dispo in AM

## 2022-01-08 NOTE — ED ADULT NURSE REASSESSMENT NOTE - NS ED NURSE REASSESS COMMENT FT1
Provider re-evaluation completed at bedside. Patient for Social Work Consult in the morning for placement - patient is COVID positive, currently resides in a half-way housing.

## 2022-02-17 ENCOUNTER — EMERGENCY (EMERGENCY)
Facility: HOSPITAL | Age: 62
LOS: 1 days | Discharge: HOME | End: 2022-02-17
Attending: EMERGENCY MEDICINE | Admitting: EMERGENCY MEDICINE
Payer: MEDICARE

## 2022-02-17 VITALS
HEART RATE: 81 BPM | RESPIRATION RATE: 20 BRPM | OXYGEN SATURATION: 95 % | WEIGHT: 199.96 LBS | DIASTOLIC BLOOD PRESSURE: 88 MMHG | TEMPERATURE: 97 F | SYSTOLIC BLOOD PRESSURE: 154 MMHG | HEIGHT: 72 IN

## 2022-02-17 DIAGNOSIS — Z20.822 CONTACT WITH AND (SUSPECTED) EXPOSURE TO COVID-19: ICD-10-CM

## 2022-02-17 DIAGNOSIS — R07.89 OTHER CHEST PAIN: ICD-10-CM

## 2022-02-17 DIAGNOSIS — Z87.891 PERSONAL HISTORY OF NICOTINE DEPENDENCE: ICD-10-CM

## 2022-02-17 DIAGNOSIS — Z90.89 ACQUIRED ABSENCE OF OTHER ORGANS: Chronic | ICD-10-CM

## 2022-02-17 DIAGNOSIS — J44.1 CHRONIC OBSTRUCTIVE PULMONARY DISEASE WITH (ACUTE) EXACERBATION: ICD-10-CM

## 2022-02-17 DIAGNOSIS — R06.00 DYSPNEA, UNSPECIFIED: ICD-10-CM

## 2022-02-17 DIAGNOSIS — Z98.890 OTHER SPECIFIED POSTPROCEDURAL STATES: Chronic | ICD-10-CM

## 2022-02-17 LAB — SARS-COV-2 RNA SPEC QL NAA+PROBE: NEGATIVE — SIGNIFICANT CHANGE UP

## 2022-02-17 PROCEDURE — 96365 THER/PROPH/DIAG IV INF INIT: CPT

## 2022-02-17 PROCEDURE — 99284 EMERGENCY DEPT VISIT MOD MDM: CPT

## 2022-02-17 PROCEDURE — 99284 EMERGENCY DEPT VISIT MOD MDM: CPT | Mod: 25

## 2022-02-17 PROCEDURE — 87635 SARS-COV-2 COVID-19 AMP PRB: CPT

## 2022-02-17 PROCEDURE — 94640 AIRWAY INHALATION TREATMENT: CPT

## 2022-02-17 PROCEDURE — 96366 THER/PROPH/DIAG IV INF ADDON: CPT

## 2022-02-17 PROCEDURE — 96375 TX/PRO/DX INJ NEW DRUG ADDON: CPT

## 2022-02-17 RX ORDER — ALBUTEROL 90 UG/1
3 AEROSOL, METERED ORAL
Qty: 100 | Refills: 0
Start: 2022-02-17 | End: 2022-03-18

## 2022-02-17 RX ORDER — ALBUTEROL 90 UG/1
2 AEROSOL, METERED ORAL
Qty: 1 | Refills: 0
Start: 2022-02-17 | End: 2022-03-18

## 2022-02-17 RX ORDER — IPRATROPIUM/ALBUTEROL SULFATE 18-103MCG
3 AEROSOL WITH ADAPTER (GRAM) INHALATION ONCE
Refills: 0 | Status: COMPLETED | OUTPATIENT
Start: 2022-02-17 | End: 2022-02-17

## 2022-02-17 RX ORDER — IPRATROPIUM/ALBUTEROL SULFATE 18-103MCG
3 AEROSOL WITH ADAPTER (GRAM) INHALATION ONCE
Refills: 0 | Status: DISCONTINUED | OUTPATIENT
Start: 2022-02-17 | End: 2022-02-20

## 2022-02-17 RX ORDER — ALBUTEROL 90 UG/1
3 AEROSOL, METERED ORAL
Qty: 100 | Refills: 0
Start: 2022-02-17 | End: 2023-01-18

## 2022-02-17 RX ORDER — ALBUTEROL 90 UG/1
3 AEROSOL, METERED ORAL
Qty: 100 | Refills: 0
Start: 2022-02-17 | End: 2022-06-06

## 2022-02-17 RX ORDER — MAGNESIUM SULFATE 500 MG/ML
2 VIAL (ML) INJECTION ONCE
Refills: 0 | Status: COMPLETED | OUTPATIENT
Start: 2022-02-17 | End: 2022-02-17

## 2022-02-17 RX ADMIN — Medication 150 GRAM(S): at 04:58

## 2022-02-17 RX ADMIN — Medication 2 GRAM(S): at 06:29

## 2022-02-17 RX ADMIN — Medication 3 MILLILITER(S): at 04:58

## 2022-02-17 RX ADMIN — Medication 125 MILLIGRAM(S): at 04:54

## 2022-02-17 RX ADMIN — Medication 3 MILLILITER(S): at 04:54

## 2022-02-17 NOTE — ED ADULT TRIAGE NOTE - BSA (M2)
Related to inadequate protein energy intake with decreased appetite in setting of recent AAA repair, CHF, CHB 2.13

## 2022-02-17 NOTE — ED PROVIDER NOTE - CROS ED CONS ALL NEG
"Requested Prescriptions   Pending Prescriptions Disp Refills     fluticasone (FLONASE) 50 MCG/ACT spray [Pharmacy Med Name: FLUTICASONE 50MCG NASAL SP (120) RX]  Last Written Prescription Date:  6/5/18  Last Fill Quantity: 1 BOTTLE,  # refills: 11   Last office visit: 11/2/17 with prescribing provider:  LUCA   Future Office Visit:     48 mL 11     Sig: SHAKE LIQUID WELL AND USE 1 TO 2 SPRAYS IN EACH NOSTRIL EVERY DAY    Inhaled Steroids Protocol Passed    6/6/2018  3:37 PM       Passed - Patient is age 12 or older       Passed - Recent (12 mo) or future (30 days) visit within the authorizing provider's specialty    Patient had office visit in the last 12 months or has a visit in the next 30 days with authorizing provider or within the authorizing provider's specialty.  See \"Patient Info\" tab in inbasket, or \"Choose Columns\" in Meds & Orders section of the refill encounter.              "
RX was refilled on 06/05/18 for a one year supply.  RX resubmitted so patient may receive 3 month supply at a time.  VICENTE Wyatt RN    
negative...

## 2022-02-17 NOTE — ED ADULT NURSE NOTE - OBJECTIVE STATEMENT
c/o wheezing, sob. Hx copd. No distress noted. Able to speak in complete sentences. Bed bugs noted crawling on patients clothes. States he lives in 3/4 house in [place].

## 2022-02-17 NOTE — ED PROVIDER NOTE - PATIENT PORTAL LINK FT
You can access the FollowMyHealth Patient Portal offered by [facility] by registering at the following website: [email or web address]. By joining [employer]’s FollowMyHealth portal, you will also be able to view your health information using other applications (apps) compatible with our system.

## 2022-02-17 NOTE — ED PROVIDER NOTE - CLINICAL SUMMARY MEDICAL DECISION MAKING FREE TEXT BOX
60 yo male with h/o asthma/copd in the Er c/o wheezing and chest tightness tonight. Pt reports he woke up to go to the bathroom and felt very tight in his chest. Pt mentioned he runs out of his rescue inhalers and his nebulizer machine is not working.  Pt denies CP, fever, chills, cold, cough, flu-like symptoms, has no other complaints. Was tested covid+ last month, fully vaccinated last year for covid. 62 yo male with h/o asthma/copd in the Er c/o wheezing and chest tightness tonight. Pt reports he woke up to go to the bathroom and felt very tight in his chest. Pt mentioned he runs out of his rescue inhalers and his nebulizer machine is not working.  Pt denies CP, fever, chills, cold, cough, flu-like symptoms, has no other complaints. Was tested covid+ last month, fully vaccinated last year for covid.    Pt non-toxic appearing, afebrile, has b/l wheezes on exam and decreased air movement. Plan : duonebs x 3, magnesium and solumedrol IV, re-evaluate. anticipate d/c home when improves. 60 yo male with h/o asthma/copd, smoker,  in the Er c/o wheezing and chest tightness tonight. Pt reports he woke up to go to the bathroom and felt very tight in his chest. Pt mentioned he runs out of his rescue inhalers and his nebulizer machine is not working.  Pt denies CP, fever, chills, cold, cough, flu-like symptoms, has no other complaints. Was tested covid+ last month, fully vaccinated last year for covid.    Pt non-toxic appearing, afebrile, has b/l wheezes on exam and decreased air movement. Plan : duonebs x 3, magnesium and solumedrol IV, re-evaluate. anticipate d/c home when improves.

## 2022-02-17 NOTE — ED PROVIDER NOTE - ATTENDING CONTRIBUTION TO CARE
Pt w/ PMHx asthma / COPD (no hx intubations) p/w SOB onset this morning w/ wheezing. Pt has run out of his rescue inhaler and his nebulizer is broken. No f/c, cough, sputum production, nor CP. Pt had COVID19 last month, not hospitalized. Pt w/ PMHx asthma / COPD (no hx intubations) p/w SOB onset this morning w/ wheezing. Pt has run out of his rescue inhaler and his nebulizer is broken. No f/c, cough, sputum production, nor CP. Pt had COVID19 last month, not hospitalized.  Constitutional: Well appearing, awake, alert, oriented to person, place, time/situation and in no apparent distress.  ENMT: Airway patent.   Eyes: Clear bilaterally  Cardiac: Normal rate, regular rhythm. No JVD or LE edema  Respiratory: + DIffuse exp wheezing, fair aeartion. No increased WOB, tachypnea, hypoxia, or accessory mm use. Pt speaks in full sentences.   Musculoskeletal: Range of motion is not limited. no calf ttp  Neuro: Alert and oriented x 3, face symmetric and speech fluent. Nml gross motor movement, grossly non focal   Skin: Skin normal color for race, warm, dry and intact. No evidence of rash.  Psych: Alert and oriented to person, place, time/situation. normal mood and affect. no apparent risk to self or others.   SOB c/w asthma / COPD exacerbation. nebs, steroids, Mag given w/ improvement in sx. Advised on cessation of smoking. Rx, f/u PCP, return precautions given

## 2022-02-17 NOTE — ED PROVIDER NOTE - OBJECTIVE STATEMENT
60 yo male with h/o asthma/copd in the Er c/o wheezing and chest tightness tonight. Pt reports he woke up to go to the bathroom and felt very tight in his chest. Pt mentioned he runs out of his rescue inhalers and his nebulizer machine is not working.  Pt denies CP, fever, chills, has no other complaints. Was tested covid+ last month, fully vaccinated last year for covid.

## 2022-02-25 NOTE — PROGRESS NOTE ADULT - ATTENDING COMMENTS
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    Plan  -w/ improved wheezes; denies SOB, will transition to PO pred 40; c/w nebs  -patient denies melena, BRBPR, hematemesis, hematuria; reports unintentional 20lb weight loss; plan for EGD/c-scope tomorrow; GI recs appreciated  -s/p 1u prbc yest; continue to trend CBC; starting IV ppi  -c/w PO iron   -f/u TTE; holding SCDs 2/2 LE edema  -hypertensive; will hold off on antihypertensives in the setting of unclear source of anemia, though acute bleed seems unlikely 60M PMH current smoker (less than 1PPD), asthma and COPD (not on home O2, no prior intubations, +prior hospitalizations for exacerbations), bipolar/depression, past EtOH abuse (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    Plan  -w/ improved wheezes; denies SOB, will transition to PO pred 40; c/w nebs  -patient denies melena, BRBPR, hematemesis, hematuria; reports unintentional 20lb weight loss; plan for EGD/c-scope Wed; GI recs appreciated  -s/p 1u prbc yest; continue to trend CBC; starting IV ppi  -c/w PO iron   -f/u TTE; holding SCDs 2/2 LE edema  -hypertensive; will hold off on antihypertensives in the setting of unclear source of anemia, though acute bleed seems unlikely 24

## 2022-03-11 ENCOUNTER — EMERGENCY (EMERGENCY)
Facility: HOSPITAL | Age: 62
LOS: 1 days | Discharge: HOME | End: 2022-03-11
Attending: EMERGENCY MEDICINE | Admitting: EMERGENCY MEDICINE
Payer: MEDICARE

## 2022-03-11 VITALS
HEART RATE: 73 BPM | OXYGEN SATURATION: 98 % | DIASTOLIC BLOOD PRESSURE: 71 MMHG | HEIGHT: 72 IN | SYSTOLIC BLOOD PRESSURE: 119 MMHG | WEIGHT: 186.07 LBS | TEMPERATURE: 98 F | RESPIRATION RATE: 21 BRPM

## 2022-03-11 VITALS
HEART RATE: 78 BPM | OXYGEN SATURATION: 95 % | RESPIRATION RATE: 18 BRPM | SYSTOLIC BLOOD PRESSURE: 120 MMHG | DIASTOLIC BLOOD PRESSURE: 69 MMHG | TEMPERATURE: 98 F

## 2022-03-11 DIAGNOSIS — Z90.89 ACQUIRED ABSENCE OF OTHER ORGANS: Chronic | ICD-10-CM

## 2022-03-11 DIAGNOSIS — Z98.49 CATARACT EXTRACTION STATUS, UNSPECIFIED EYE: Chronic | ICD-10-CM

## 2022-03-11 DIAGNOSIS — H26.9 UNSPECIFIED CATARACT: Chronic | ICD-10-CM

## 2022-03-11 DIAGNOSIS — Z98.890 OTHER SPECIFIED POSTPROCEDURAL STATES: Chronic | ICD-10-CM

## 2022-03-11 LAB — SARS-COV-2 RNA SPEC QL NAA+PROBE: NEGATIVE — SIGNIFICANT CHANGE UP

## 2022-03-11 PROCEDURE — 99284 EMERGENCY DEPT VISIT MOD MDM: CPT | Mod: 25

## 2022-03-11 PROCEDURE — 87635 SARS-COV-2 COVID-19 AMP PRB: CPT

## 2022-03-11 PROCEDURE — 99284 EMERGENCY DEPT VISIT MOD MDM: CPT

## 2022-03-11 PROCEDURE — 94640 AIRWAY INHALATION TREATMENT: CPT

## 2022-03-11 RX ORDER — ALBUTEROL 90 UG/1
2.5 AEROSOL, METERED ORAL
Refills: 0 | Status: COMPLETED | OUTPATIENT
Start: 2022-03-11 | End: 2022-03-11

## 2022-03-11 RX ORDER — IPRATROPIUM BROMIDE 0.2 MG/ML
500 SOLUTION, NON-ORAL INHALATION ONCE
Refills: 0 | Status: COMPLETED | OUTPATIENT
Start: 2022-03-11 | End: 2022-03-11

## 2022-03-11 RX ORDER — AZITHROMYCIN 500 MG/1
500 TABLET, FILM COATED ORAL ONCE
Refills: 0 | Status: COMPLETED | OUTPATIENT
Start: 2022-03-11 | End: 2022-03-11

## 2022-03-11 RX ORDER — AZITHROMYCIN 500 MG/1
1 TABLET, FILM COATED ORAL
Qty: 4 | Refills: 0
Start: 2022-03-11 | End: 2022-03-14

## 2022-03-11 RX ADMIN — ALBUTEROL 2.5 MILLIGRAM(S): 90 AEROSOL, METERED ORAL at 22:28

## 2022-03-11 RX ADMIN — Medication 50 MILLIGRAM(S): at 22:38

## 2022-03-11 RX ADMIN — AZITHROMYCIN 500 MILLIGRAM(S): 500 TABLET, FILM COATED ORAL at 22:38

## 2022-03-11 RX ADMIN — ALBUTEROL 2.5 MILLIGRAM(S): 90 AEROSOL, METERED ORAL at 22:38

## 2022-03-11 RX ADMIN — ALBUTEROL 2.5 MILLIGRAM(S): 90 AEROSOL, METERED ORAL at 22:24

## 2022-03-11 RX ADMIN — Medication 500 MICROGRAM(S): at 22:24

## 2022-03-11 NOTE — ED PROVIDER NOTE - NSICDXPASTSURGICALHX_GEN_ALL_CORE_FT
PAST SURGICAL HISTORY:  H/O hernia repair     History of tonsillectomy     S/P cataract surgery

## 2022-03-11 NOTE — ED PROVIDER NOTE - NSSUBSTANCEUSE_GEN_ALL_CORE_SD
Pt c/o pain and burning to right eye s/p cleaning and feeling something fly into eye just prior to arrival.     Jaquelin Velez RN  07/21/21 [id number]    
never used

## 2022-03-11 NOTE — ED ADULT TRIAGE NOTE - 
ADDITIONAL INFORMATION
Patient reports that he lost his vaccina card and is unable to recall ; reports two vaccine doses last year

## 2022-03-11 NOTE — ED ADULT TRIAGE NOTE - RESPIRATORY RATE (BREATHS/MIN)
21 [Constipation: Grade 0] : Constipation: Grade 0 [Diarrhea: Grade 0] : Diarrhea: Grade 0 [Hematuria: Grade 0] : Hematuria: Grade 0 [Vaginal Infection: Grade 2 - Localized; local intervention indicated (e.g., topical antibiotic, antifungal, or antiviral)] : Vaginal Infection: Grade 2 - Localized; local intervention indicated (e.g., topical antibiotic, antifungal, or antiviral)

## 2022-03-11 NOTE — ED PROVIDER NOTE - CLINICAL SUMMARY MEDICAL DECISION MAKING FREE TEXT BOX
Dx respiratory infection, copd or asthma exacerbation, undiagnosed hf, anemia.     Plan: zpack, covid testing, albuterol, oral steroids,

## 2022-03-11 NOTE — ED PROVIDER NOTE - PHYSICAL EXAMINATION
VITAL SIGNS:  T(C): 36.7 (03-11-22 @ 21:15), Max: 36.7 (03-11-22 @ 21:15)  HR: 73 (03-11-22 @ 21:15) (73 - 73)  BP: 119/71 (03-11-22 @ 21:15) (119/71 - 119/71)  RR: 21 (03-11-22 @ 21:15) (21 - 21)  SpO2: 98% (03-11-22 @ 21:15) (98% - 98%)    PHYSICAL EXAM:  Constitutional: WDWN, comfortable in bed; NAD  Neurologic: CNII-XII grossly intact; no focal deficits  HEENT: NC/AT; clear conjunctiva, anicteric sclera; no nasal discharge; no oropharyngeal erythema or exudates, MMM  Neck: supple; no cervical, post-auricular or supraclavicular lymphadenopathy  Respiratory: +b/l wheezes with scattered rhonchi in lower R lobe, no diminished breath sounds, no increased work of breathing or accessory muscle use  Cardiac: +S1/S2, no murmurs/rubs/gallops, RRR  Gastrointestinal: abdomen soft, NT/ND; no rebound or guarding, no hepatosplenomegaly; +BSx4  Extremities: +1 b/l LE pitting edema  Dermatologic: skin warm, dry and intact; no rashes, wounds, or scars  Psychiatric: affect and characteristics of appearance, verbalizations, behaviors are appropriate

## 2022-03-11 NOTE — ED PROVIDER NOTE - PATIENT PORTAL LINK FT
You can access the FollowMyHealth Patient Portal offered by [facility] by registering at the following website: [email or web address]. By joining [employer]’s FollowMyHealth portal, you will also be able to view your health information using other applications (apps) compatible with our system.

## 2022-03-11 NOTE — ED ADULT TRIAGE NOTE - ARRIVAL INFO ADDITIONAL COMMENTS
Patient reports chronic COPD - attempted to use his rescue inhaler with no reported relief of symptoms. Patient reports that he currently smokes danita a pack of Tobacco a day. Denies fever and chest pain.

## 2022-03-11 NOTE — ED PROVIDER NOTE - OBJECTIVE STATEMENT
62 yo M pmh of copd, asthma presents with 1 day hx of difficulty breathing and shortness of breath. Patient states he tried using albuterol but felt minimal improvement. He was passing by the hospital and decided to come in to get his condition checked out. He states that he has been using his rescue albuterol more frequently recently. He also feels that he has a minor cold. Endorsing mild sore throat and increase mucus in his nose. Denies fever, chills. Pt only vaccinated for two doses of the covid vaccine. No hx of HF, though pt has chronic leg swelling and is on a thiazide diuretic. No pillow orthopnea, no hx of dvt, pleuritic chest pain, cp. Hx of anemia with workup for GI bleed that was inconclusive only showing gastritis. No current dark stools, hematuria, diarrhea. No hx of allergies or anaphylaxis.

## 2022-03-11 NOTE — ED ADULT NURSE NOTE - OBJECTIVE STATEMENT
62 Y/o M / ambulates to ED complaining  of shortness of breath ,  chest tightness, sneezing  starting tonight . Per patient he runs out of atrovent and he's been using Albuterol puff but its not working. PMhx  of bipolar, COPD , Asthma . Patient denies  cp, fever . Patient is fully  covid vaccinated .

## 2022-03-14 DIAGNOSIS — F31.9 BIPOLAR DISORDER, UNSPECIFIED: ICD-10-CM

## 2022-03-14 DIAGNOSIS — Z20.822 CONTACT WITH AND (SUSPECTED) EXPOSURE TO COVID-19: ICD-10-CM

## 2022-03-14 DIAGNOSIS — J43.9 EMPHYSEMA, UNSPECIFIED: ICD-10-CM

## 2022-03-14 DIAGNOSIS — R06.02 SHORTNESS OF BREATH: ICD-10-CM

## 2022-03-14 DIAGNOSIS — F17.210 NICOTINE DEPENDENCE, CIGARETTES, UNCOMPLICATED: ICD-10-CM

## 2022-04-05 ENCOUNTER — EMERGENCY (EMERGENCY)
Facility: HOSPITAL | Age: 62
LOS: 1 days | Discharge: HOME | End: 2022-04-05
Attending: EMERGENCY MEDICINE | Admitting: EMERGENCY MEDICINE
Payer: MEDICARE

## 2022-04-05 VITALS
SYSTOLIC BLOOD PRESSURE: 151 MMHG | TEMPERATURE: 98 F | RESPIRATION RATE: 26 BRPM | WEIGHT: 173.94 LBS | HEIGHT: 60 IN | DIASTOLIC BLOOD PRESSURE: 90 MMHG | OXYGEN SATURATION: 98 % | HEART RATE: 103 BPM

## 2022-04-05 VITALS
TEMPERATURE: 98 F | SYSTOLIC BLOOD PRESSURE: 141 MMHG | HEART RATE: 87 BPM | OXYGEN SATURATION: 100 % | DIASTOLIC BLOOD PRESSURE: 67 MMHG | RESPIRATION RATE: 20 BRPM

## 2022-04-05 DIAGNOSIS — F17.200 NICOTINE DEPENDENCE, UNSPECIFIED, UNCOMPLICATED: ICD-10-CM

## 2022-04-05 DIAGNOSIS — Z98.890 OTHER SPECIFIED POSTPROCEDURAL STATES: Chronic | ICD-10-CM

## 2022-04-05 DIAGNOSIS — R06.02 SHORTNESS OF BREATH: ICD-10-CM

## 2022-04-05 DIAGNOSIS — J44.1 CHRONIC OBSTRUCTIVE PULMONARY DISEASE WITH (ACUTE) EXACERBATION: ICD-10-CM

## 2022-04-05 DIAGNOSIS — Z90.89 ACQUIRED ABSENCE OF OTHER ORGANS: Chronic | ICD-10-CM

## 2022-04-05 DIAGNOSIS — Z98.49 CATARACT EXTRACTION STATUS, UNSPECIFIED EYE: Chronic | ICD-10-CM

## 2022-04-05 DIAGNOSIS — Z20.822 CONTACT WITH AND (SUSPECTED) EXPOSURE TO COVID-19: ICD-10-CM

## 2022-04-05 DIAGNOSIS — Z59.00 HOMELESSNESS UNSPECIFIED: ICD-10-CM

## 2022-04-05 LAB
ALBUMIN SERPL ELPH-MCNC: 4.3 G/DL — SIGNIFICANT CHANGE UP (ref 3.3–5)
ALP SERPL-CCNC: 73 U/L — SIGNIFICANT CHANGE UP (ref 40–120)
ALT FLD-CCNC: 18 U/L — SIGNIFICANT CHANGE UP (ref 10–45)
ANION GAP SERPL CALC-SCNC: 10 MMOL/L — SIGNIFICANT CHANGE UP (ref 5–17)
APTT BLD: 27 SEC — LOW (ref 27.5–35.5)
AST SERPL-CCNC: 15 U/L — SIGNIFICANT CHANGE UP (ref 10–40)
BASE EXCESS BLDV CALC-SCNC: 1.7 MMOL/L — SIGNIFICANT CHANGE UP (ref -2–3)
BASOPHILS # BLD AUTO: 0.01 K/UL — SIGNIFICANT CHANGE UP (ref 0–0.2)
BASOPHILS NFR BLD AUTO: 0.2 % — SIGNIFICANT CHANGE UP (ref 0–2)
BILIRUB SERPL-MCNC: 0.2 MG/DL — SIGNIFICANT CHANGE UP (ref 0.2–1.2)
BUN SERPL-MCNC: 19 MG/DL — SIGNIFICANT CHANGE UP (ref 7–23)
CA-I SERPL-SCNC: 1.11 MMOL/L — LOW (ref 1.15–1.33)
CALCIUM SERPL-MCNC: 8.9 MG/DL — SIGNIFICANT CHANGE UP (ref 8.4–10.5)
CHLORIDE SERPL-SCNC: 100 MMOL/L — SIGNIFICANT CHANGE UP (ref 96–108)
CK MB CFR SERPL CALC: 1.9 NG/ML — SIGNIFICANT CHANGE UP (ref 0–6.7)
CK SERPL-CCNC: 66 U/L — SIGNIFICANT CHANGE UP (ref 30–200)
CO2 BLDV-SCNC: 29.2 MMOL/L — HIGH (ref 22–26)
CO2 SERPL-SCNC: 26 MMOL/L — SIGNIFICANT CHANGE UP (ref 22–31)
CREAT SERPL-MCNC: 0.87 MG/DL — SIGNIFICANT CHANGE UP (ref 0.5–1.3)
EGFR: 98 ML/MIN/1.73M2 — SIGNIFICANT CHANGE UP
EOSINOPHIL # BLD AUTO: 0.01 K/UL — SIGNIFICANT CHANGE UP (ref 0–0.5)
EOSINOPHIL NFR BLD AUTO: 0.2 % — SIGNIFICANT CHANGE UP (ref 0–6)
GAS PNL BLDV: 129 MMOL/L — LOW (ref 136–145)
GAS PNL BLDV: SIGNIFICANT CHANGE UP
GAS PNL BLDV: SIGNIFICANT CHANGE UP
GLUCOSE SERPL-MCNC: 115 MG/DL — HIGH (ref 70–99)
HCO3 BLDV-SCNC: 28 MMOL/L — SIGNIFICANT CHANGE UP (ref 22–29)
HCT VFR BLD CALC: 33.8 % — LOW (ref 39–50)
HGB BLD-MCNC: 11 G/DL — LOW (ref 13–17)
IMM GRANULOCYTES NFR BLD AUTO: 0.2 % — SIGNIFICANT CHANGE UP (ref 0–1.5)
INR BLD: 1.02 — SIGNIFICANT CHANGE UP (ref 0.88–1.16)
LACTATE SERPL-SCNC: 1.2 MMOL/L — SIGNIFICANT CHANGE UP (ref 0.5–2)
LYMPHOCYTES # BLD AUTO: 0.86 K/UL — LOW (ref 1–3.3)
LYMPHOCYTES # BLD AUTO: 15.7 % — SIGNIFICANT CHANGE UP (ref 13–44)
MAGNESIUM SERPL-MCNC: 2.2 MG/DL — SIGNIFICANT CHANGE UP (ref 1.6–2.6)
MCHC RBC-ENTMCNC: 29.3 PG — SIGNIFICANT CHANGE UP (ref 27–34)
MCHC RBC-ENTMCNC: 32.5 GM/DL — SIGNIFICANT CHANGE UP (ref 32–36)
MCV RBC AUTO: 89.9 FL — SIGNIFICANT CHANGE UP (ref 80–100)
MONOCYTES # BLD AUTO: 0.68 K/UL — SIGNIFICANT CHANGE UP (ref 0–0.9)
MONOCYTES NFR BLD AUTO: 12.4 % — SIGNIFICANT CHANGE UP (ref 2–14)
NEUTROPHILS # BLD AUTO: 3.92 K/UL — SIGNIFICANT CHANGE UP (ref 1.8–7.4)
NEUTROPHILS NFR BLD AUTO: 71.3 % — SIGNIFICANT CHANGE UP (ref 43–77)
NRBC # BLD: 0 /100 WBCS — SIGNIFICANT CHANGE UP (ref 0–0)
NT-PROBNP SERPL-SCNC: 111 PG/ML — SIGNIFICANT CHANGE UP (ref 0–300)
PCO2 BLDV: 48 MMHG — SIGNIFICANT CHANGE UP (ref 42–55)
PH BLDV: 7.37 — SIGNIFICANT CHANGE UP (ref 7.32–7.43)
PLATELET # BLD AUTO: 333 K/UL — SIGNIFICANT CHANGE UP (ref 150–400)
PO2 BLDV: 72 MMHG — HIGH (ref 25–45)
POTASSIUM BLDV-SCNC: 5.9 MMOL/L — HIGH (ref 3.5–5.1)
POTASSIUM SERPL-MCNC: 4.5 MMOL/L — SIGNIFICANT CHANGE UP (ref 3.5–5.3)
POTASSIUM SERPL-SCNC: 4.5 MMOL/L — SIGNIFICANT CHANGE UP (ref 3.5–5.3)
PROT SERPL-MCNC: 6.7 G/DL — SIGNIFICANT CHANGE UP (ref 6–8.3)
PROTHROM AB SERPL-ACNC: 12.2 SEC — SIGNIFICANT CHANGE UP (ref 10.5–13.4)
RBC # BLD: 3.76 M/UL — LOW (ref 4.2–5.8)
RBC # FLD: 16.9 % — HIGH (ref 10.3–14.5)
SAO2 % BLDV: 94.6 % — HIGH (ref 67–88)
SARS-COV-2 RNA SPEC QL NAA+PROBE: NEGATIVE — SIGNIFICANT CHANGE UP
SODIUM SERPL-SCNC: 136 MMOL/L — SIGNIFICANT CHANGE UP (ref 135–145)
TROPONIN T SERPL-MCNC: 0.01 NG/ML — SIGNIFICANT CHANGE UP (ref 0–0.01)
WBC # BLD: 5.49 K/UL — SIGNIFICANT CHANGE UP (ref 3.8–10.5)
WBC # FLD AUTO: 5.49 K/UL — SIGNIFICANT CHANGE UP (ref 3.8–10.5)

## 2022-04-05 PROCEDURE — 82550 ASSAY OF CK (CPK): CPT

## 2022-04-05 PROCEDURE — 82330 ASSAY OF CALCIUM: CPT

## 2022-04-05 PROCEDURE — 87635 SARS-COV-2 COVID-19 AMP PRB: CPT

## 2022-04-05 PROCEDURE — 83605 ASSAY OF LACTIC ACID: CPT

## 2022-04-05 PROCEDURE — 71045 X-RAY EXAM CHEST 1 VIEW: CPT | Mod: 26

## 2022-04-05 PROCEDURE — 96374 THER/PROPH/DIAG INJ IV PUSH: CPT

## 2022-04-05 PROCEDURE — 82553 CREATINE MB FRACTION: CPT

## 2022-04-05 PROCEDURE — 93005 ELECTROCARDIOGRAM TRACING: CPT

## 2022-04-05 PROCEDURE — 84132 ASSAY OF SERUM POTASSIUM: CPT

## 2022-04-05 PROCEDURE — 36415 COLL VENOUS BLD VENIPUNCTURE: CPT

## 2022-04-05 PROCEDURE — 80053 COMPREHEN METABOLIC PANEL: CPT

## 2022-04-05 PROCEDURE — 83880 ASSAY OF NATRIURETIC PEPTIDE: CPT

## 2022-04-05 PROCEDURE — 85025 COMPLETE CBC W/AUTO DIFF WBC: CPT

## 2022-04-05 PROCEDURE — 85610 PROTHROMBIN TIME: CPT

## 2022-04-05 PROCEDURE — 82803 BLOOD GASES ANY COMBINATION: CPT

## 2022-04-05 PROCEDURE — 84295 ASSAY OF SERUM SODIUM: CPT

## 2022-04-05 PROCEDURE — 71045 X-RAY EXAM CHEST 1 VIEW: CPT

## 2022-04-05 PROCEDURE — 83735 ASSAY OF MAGNESIUM: CPT

## 2022-04-05 PROCEDURE — 93010 ELECTROCARDIOGRAM REPORT: CPT

## 2022-04-05 PROCEDURE — 85730 THROMBOPLASTIN TIME PARTIAL: CPT

## 2022-04-05 PROCEDURE — 99285 EMERGENCY DEPT VISIT HI MDM: CPT | Mod: 25

## 2022-04-05 PROCEDURE — 94640 AIRWAY INHALATION TREATMENT: CPT

## 2022-04-05 PROCEDURE — 84484 ASSAY OF TROPONIN QUANT: CPT

## 2022-04-05 RX ORDER — IPRATROPIUM/ALBUTEROL SULFATE 18-103MCG
3 AEROSOL WITH ADAPTER (GRAM) INHALATION
Refills: 0 | Status: COMPLETED | OUTPATIENT
Start: 2022-04-05 | End: 2022-04-05

## 2022-04-05 RX ORDER — ALBUTEROL 90 UG/1
1 AEROSOL, METERED ORAL ONCE
Refills: 0 | Status: COMPLETED | OUTPATIENT
Start: 2022-04-05 | End: 2022-04-05

## 2022-04-05 RX ORDER — MAGNESIUM SULFATE 500 MG/ML
2 VIAL (ML) INJECTION ONCE
Refills: 0 | Status: DISCONTINUED | OUTPATIENT
Start: 2022-04-05 | End: 2022-04-05

## 2022-04-05 RX ADMIN — Medication 3 MILLILITER(S): at 19:36

## 2022-04-05 RX ADMIN — Medication 3 MILLILITER(S): at 19:46

## 2022-04-05 RX ADMIN — Medication 125 MILLIGRAM(S): at 19:26

## 2022-04-05 RX ADMIN — Medication 3 MILLILITER(S): at 19:26

## 2022-04-05 RX ADMIN — ALBUTEROL 1 PUFF(S): 90 AEROSOL, METERED ORAL at 21:40

## 2022-04-05 SDOH — ECONOMIC STABILITY - HOUSING INSECURITY: HOMELESSNESS UNSPECIFIED: Z59.00

## 2022-04-05 NOTE — ED PROVIDER NOTE - NSFOLLOWUPINSTRUCTIONS_ED_ALL_ED_FT
ALBUTEROL INHALER/NEBULIZER AS NEEDED. PREDNISONE AS PRESCRIBED.     RECOMMEND STOP SMOKING.    ANY XRAY IMAGING RESULTS GIVEN IN THE EMERGENCY DEPARTMENT ARE PRELIMINARY UNTIL FORMALLY READ BY A RADIOLOGIST. YOU WILL BE CONTACTED IF THERE ARE ANY SIGNIFICANT CHANGES IN THE FINAL READ.    PLEASE RETURN TO THE EMERGENCY DEPARTMENT IF  FEVER, CHEST PAIN, SHORTNESS OF BREATH, ABDOMINAL PAIN, VOMITING, OTHER CONCERNING SYMPTOMS.    PLEASE CONTACT SPENCER KELLY ([facility] EMERGENCY DEPARTMENT CLINICAL REFERRAL COORDINATOR) TO ASSIST IN SCHEDULING YOUR FOLLOW-UP APPOINTMENT.    Monday - Friday 11am-7pm  [phone]  [email or web address]

## 2022-04-05 NOTE — ED PROVIDER NOTE - PROGRESS NOTE DETAILS
sx resolved. ambulating at baseline. eating sandwhich. sx resolved. ambulating at baseline. eating sandwich.

## 2022-04-05 NOTE — ED PROVIDER NOTE - PHYSICAL EXAMINATION
CONST: nontoxic NAD speaking in full sentences  HEAD: atraumatic  EYES: conjunctivae clear, PERRL, EOMI  ENT: mmm  NECK: supple/FROM, no jvd  CARD: rrr no murmurs  CHEST: +bl end-exp wheezing, no rales/rhonchi/stridor/tripoding  ABD: soft, nd, nttp, no rebound/guarding  EXT: FROM, symmetric distal pulses intact  SKIN: warm, dry, no pedal edema/ttp/rash, cap refill <2sec  NEURO: a+ox3, 5/5 strength x4, gross sensation intact x4, baseline gait

## 2022-04-05 NOTE — ED PROVIDER NOTE - OBJECTIVE STATEMENT
62M undomiciled, daily smoker (50py), copd/emphysema (no intubations), complete covid19 vaccination, c/o acute sob/wheezing/chest tightness ~1h pta while [place] on train almost immediately after smoking a cigarette. feels similar to prior copd exacerbations. +mild chronic cough w/o change in sputum. no fever/chills, no abd pain/n/v, no diarrhea, no hematochezia/melena, no pedal edema/pain/rash, no prior covid, no trauma, no etoh-dpt/ivdu.

## 2022-04-05 NOTE — ED ADULT TRIAGE NOTE - CHIEF COMPLAINT QUOTE
SOB and CP x 30 mins PTA. tachypneic and audibly wheezing in triage.  PMH of COPD, emphysema, asthma, chronic bronchitis. no prior intubations.

## 2022-04-05 NOTE — ED PROVIDER NOTE - CLINICAL SUMMARY MEDICAL DECISION MAKING FREE TEXT BOX
avss. no systemic sx. no active cp. mild acute resp distress. no leukocytosis vs significant anemia vs electrolyte abnl. trop wnl. ekg w/o significant st/t changes. cxr w/o acute focal consol vs ptx vs pulm edema vs widened mediastinum. found to have mild copd exacerbation. sx resolved s/p duoneb x3/prednisone. steady gait. tolerating po. no indication for abx at this time. will dc w/ outpatient pcp fu, prednisone, strict return precautions. pt agrees w/ plan. questions answered.

## 2022-04-05 NOTE — ED ADULT NURSE NOTE - OBJECTIVE STATEMENT
Patient endorses smoking a cigarette and developed sob. hx copd, not on home 02, never been intubated in the past. Speaking 4 to 5 word sentences before becoming sob. Wheezing noted on exam. Voice clear, dry cough noted. Denies fever/chills/dizziness/chest pain. No accessory muscle use, patient states breathing worsens with activity.

## 2022-04-05 NOTE — ED PROVIDER NOTE - PATIENT PORTAL LINK FT
You can access the FollowMyHealth Patient Portal offered by [facility] by registering at the following website: [email or web address]. By joining [employer]’s FollowMyHealth portal, you will also be able to view your health information using other applications (apps) compatible with our system.

## 2022-04-30 ENCOUNTER — EMERGENCY (EMERGENCY)
Facility: HOSPITAL | Age: 62
LOS: 1 days | Discharge: HOME | End: 2022-04-30
Attending: EMERGENCY MEDICINE | Admitting: EMERGENCY MEDICINE
Payer: MEDICARE

## 2022-04-30 VITALS
DIASTOLIC BLOOD PRESSURE: 84 MMHG | HEART RATE: 93 BPM | RESPIRATION RATE: 24 BRPM | SYSTOLIC BLOOD PRESSURE: 162 MMHG | OXYGEN SATURATION: 96 % | HEIGHT: 60 IN | TEMPERATURE: 98 F

## 2022-04-30 DIAGNOSIS — Z90.89 ACQUIRED ABSENCE OF OTHER ORGANS: Chronic | ICD-10-CM

## 2022-04-30 DIAGNOSIS — Z98.49 CATARACT EXTRACTION STATUS, UNSPECIFIED EYE: Chronic | ICD-10-CM

## 2022-04-30 DIAGNOSIS — R06.02 SHORTNESS OF BREATH: ICD-10-CM

## 2022-04-30 DIAGNOSIS — J44.1 CHRONIC OBSTRUCTIVE PULMONARY DISEASE WITH (ACUTE) EXACERBATION: ICD-10-CM

## 2022-04-30 DIAGNOSIS — Z98.890 OTHER SPECIFIED POSTPROCEDURAL STATES: Chronic | ICD-10-CM

## 2022-04-30 PROCEDURE — 99284 EMERGENCY DEPT VISIT MOD MDM: CPT | Mod: FS

## 2022-04-30 RX ORDER — IPRATROPIUM/ALBUTEROL SULFATE 18-103MCG
3 AEROSOL WITH ADAPTER (GRAM) INHALATION
Refills: 0 | Status: COMPLETED | OUTPATIENT
Start: 2022-04-30 | End: 2022-04-30

## 2022-04-30 RX ORDER — ALBUTEROL 90 UG/1
2.5 AEROSOL, METERED ORAL ONCE
Refills: 0 | Status: COMPLETED | OUTPATIENT
Start: 2022-04-30 | End: 2022-04-30

## 2022-04-30 RX ADMIN — Medication 3 MILLILITER(S): at 22:00

## 2022-04-30 RX ADMIN — Medication 3 MILLILITER(S): at 22:26

## 2022-04-30 RX ADMIN — ALBUTEROL 2.5 MILLIGRAM(S): 90 AEROSOL, METERED ORAL at 23:42

## 2022-04-30 RX ADMIN — Medication 3 MILLILITER(S): at 22:15

## 2022-04-30 RX ADMIN — Medication 60 MILLIGRAM(S): at 22:39

## 2022-04-30 NOTE — ED PROVIDER NOTE - PATIENT PORTAL LINK FT
You can access the FollowMyHealth Patient Portal offered by [facility] by registering at the following website: [email or web address]. By joining [employer]’s FollowMyHealth portal, you will also be able to view your health information using other applications (apps) compatible with our system.

## 2022-04-30 NOTE — ED ADULT NURSE NOTE - OBJECTIVE STATEMENT
61y Male c/o asthma exacerbation. Pt reports feeling sob this evening and used inhaler "mutable times . I am not sure how many" with no improvement. Wheezing noted bilaterally on ausculation. Equal bilateral chest rise. Protecting airway. Pt noted to be tachypneic RR 24 with o2 saturation 95 on room air. Pt is daily smoker about a half a day. Denies chest pain, fever, chills, dizziness, cough, n/v/d.

## 2022-04-30 NOTE — ED ADULT TRIAGE NOTE - ARRIVAL INFO ADDITIONAL COMMENTS
pt c/o sudden onset of sob while at a meeting tonight not relieved with his inhaler.   speaking in complete sentences but does have HOUSE

## 2022-04-30 NOTE — ED PROVIDER NOTE - OBJECTIVE STATEMENT
The pt is a 62 y/o M, who presents to ED c/o asthma exacerbation - states that was at AA meeting and started to feel sob, used his inhaler w/o relief. Pt c/o sob, and wheezing. Last asthma attack "a while ago", + steroids, + hosp in past, never intubated. Denies cp, cough, palpitations, n/v/d, abd pain, sore throat, fevers, chills.

## 2022-04-30 NOTE — ED PROVIDER NOTE - MUSCULOSKELETAL, MLM
"Functional Restoration Program  Occupational Therapy   Daily Therapy Note  [facility] Day 11     Name: Gordon Griffin III  MRN:[id number]  Date: 12/22/2020    Diagnosis:   Encounter Diagnoses   Name Primary?    Decreased independence with activities of daily living     Alteration in instrumental activities of daily living (IADL)     Impaired functional mobility, balance, gait, and endurance        [facility] daily activities may consist of:   Biometrics   Group stretching   Individualized PT w/ resisted strengthening    Individualized OT w/ functional lifting & training   Group cognitive behavioral therapy   Informative lecture   Mindfulness    Individualized treatment:  Start time: 2:35 PM  End time: 3:50 PM  Total Billable time: 75 min    Subjective: He reports "My toes curled under for the first time last night in a long time, but otherwise im doing okay today".         Pain report: 6  Location: low back     Objective: Refer to [facility] protocol for details and explanation of each activity.   Mr. Griffin participated in the following dynamic functional therapeutic activities:    Individualized Treatment: Increased resistance levels of functional conditioning exercises according to personal recovery goals. Activities include: Dynamic reaching, [id number] lifting, sustained standing activity, maximal lifting, 2-handed carry, sustained seated tasks, push and pull, waist-to-shoulder lift, box/container carry, endurance training. Daily functional conditioning progress is documented on a flow sheet which is available upon request.    Pt participated in functional lifting/endurance activities in order to increase pt's participation with vocational, selfcare ADLs, and leisure activities in order to improve quality of life.     Functional Activities:     Pt completed BUE dynamic reaching tasks in various functional ranges, with continued focus on hip rotation/weight shifting/positioning of pelvis, x 5# x 12 reps, with goal " Spine appears normal, range of motion is not limited, no muscle or joint tenderness "to independently pace to stay within 3-5 rating on charo exertion scale, rated as sort of hard (4 /10) exertion.     Pt completed functional lifting, floor to waist, x 16 # with exertion rated as moderate (3/10), focused education on slow and controlled movement and using strength > momentum.     Pt completed maximal lift x28 #, rated as hard (5/10), continued education focused on neutral spine positioning and pushing through heels for safety and activation of correct muscles.    Pt participated in functional lift waist to shoulder, x 12 # with a rating of sort of hard (4/10). Pt educated on slow and controlled movements, coordinating movement with breath, core engagement and maintaining neutral spine position in standing.    Pt completed sustained standing activity at table top x 10 mins to complete /pinch strengthening with blue theraputty, bilaterally: 10 grasps x 3 second holds each hand, rolls for finger extension, MCP flexion, finger adduction with resistance, finger spreading, and tripod pinch. Focused education on standing posture, proper ergonomics for standing desk work, weight shifting as needed and keeping a slight bend in the knees, rated sort of hard (4/10).     Pt participated in seated mindfulness/breathing activity to complete x10 diaphragmatic breaths independently. Pt with improvements in independence with technique.     Pt participated in endurance activity on stationary bike for 10min at L3, rated as moderate (3/10), continued education re: controlled breathing and pacing.     Pt participated in supine guided mindfulness progressive muscle relaxation activity for stress management, improved body awareness, to practice diaphragmatic breathing and to practice being present. Pt noted "I really enjoyed that, I felt like I could actually fall asleep"    Length of Treatment: Mr. Griffin participated in program activities from 11 am through 4 pm today.     No environmental, cultural, spiritual, " developmental or education needs expressed or noted.    Assessment:     Mr. Griffin presents with good tolerance to treatment today, though with continued high distractibility, requiring mod-max verbal cues for redirection and to maintain attention to task. Pt with good tolerance to all weight increases in functional lifting tasks and with improved independence with proper mechanics. Pt with good response to breathing and mindfulness activity and noted that she wants to start practicing at home. Pt with good independence with /pinch HEP and good tolerance to blue TP. Pt continues to require increased time to complete functional activities with continued fatigue and need for extended rest breaks but overall continues to progress towards personal goals.     Pt is unable to fully participate in work, recreational activities, and home-based activities because of decreased functional endurance, limited positional tolerance, fear of re-injury, and fear of increased pain. She will continue to benefit from participating in a conditioning program designed to increase functional strength, flexibility, and endurance in order to meet functional goals.     FRP Goals: While working towards the long-term (3 month) functional goals listed above, Mr. Griffin will accomplish the following short term goals during the 3-week intensive rehabilitation program. Mr. Griffin will:      1. Develop a viable return to work plan. progressing  2. Demonstrate physical capacities consistent with a light-medium work demand level. MET 12/21/2020   3. Demonstrate increased functional strength by lifting 20lbs floor to waist  and 20lbs waist to shoulder in order to meet demand of work/home routine. MET 12/21/2020  4. Increase her positional tolerance to the level needed for work and home activities. progressing  5.  Implement self-care strategies during the program and at home to control pain. progressing  6.  Pt will demonstrate use of mindfulness,  stress management, and coping  strategies for improved participation in daily routine.  progressing    UPDATED goals 12/21/2020:  1. Demonstrate physical capacities consistent with a medium-heavy work demand level  2. 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 More comprehensive physical  testing integrated with clinical findings would be required to derived an accurate work capacity.       Plan:     Continue per RACQUEL Martinez OT, AMALIA, 12/22/2020   Occupational Therapy

## 2022-04-30 NOTE — ED PROVIDER NOTE - ATTENDING APP SHARED VISIT CONTRIBUTION OF CARE
60 yo f w COPD, asthma with prior hospitalization, no intubation presents to ED with concern for asthma/copd exacerbation while in a "stuffy" room today.  No fever or chills.  On exam, patient is non toxic in appearance.  + Wheezes to bilateral lung fields, no retractions, speaking in full sentences.  Will give labs, steroid, reassess and dispo accordingly.

## 2022-04-30 NOTE — ED PROVIDER NOTE - CLINICAL SUMMARY MEDICAL DECISION MAKING FREE TEXT BOX
pt c/o wheezing and sob while at AA meeting, used mdi w/o relief, well appearing, non toxic, + wheezing upon arrival - given nebs and po steroids w/good symptom control, will dc w/burst of steroids, has mdi, no cp/cough/fevers, no indication for any imaging or labs at this time, f/u w/pmd, pt understands and agrees w/plan

## 2022-04-30 NOTE — ED ADULT NURSE NOTE - NSIMPLEMENTINTERV_GEN_ALL_ED
Implemented All Universal Safety Interventions:  [place] to call system. Call bell, personal items and telephone within reach. Instruct patient to call for assistance. Room bathroom lighting operational. Non-slip footwear when patient is off stretcher. Physically safe environment: no spills, clutter or unnecessary equipment. Stretcher in lowest position, wheels locked, appropriate side rails in place.

## 2022-04-30 NOTE — ED PROVIDER NOTE - HIV OFFER
11/16/2021        Pedrito Splinter  [address] [facility] [address]    Dear Pedrito Ugarte: Your healthcare provider has ordered a low dose CT scan of the chest for lung cancer screening. You will find enclosed, information about CT lung screening. Please review the statement of understanding, you will be asked to sign a copy of this at the time of your CT scan    If you have not already been contacted to make the appointment for your scan, please call our scheduling department at [phone]    Keep in mind that CT lung screening does not take the place of smoking cessation. If you are a current smoker, you will find enclosed smoking cessation resources. Please do not hesitate to contact me if you have any questions or concerns.     [address] [facility],      [facility] Screening [facility]  [phone]
Previously Declined (within the last year)

## 2022-04-30 NOTE — ED PROVIDER NOTE - RESPIRATORY, MLM
+ expiratory wheezes b/l, no rales or rhonchi b/l, speaking in long/full sentences, no resp distress - pulse ox 97% RA

## 2022-05-01 VITALS
SYSTOLIC BLOOD PRESSURE: 122 MMHG | DIASTOLIC BLOOD PRESSURE: 76 MMHG | TEMPERATURE: 98 F | HEART RATE: 83 BPM | OXYGEN SATURATION: 98 % | RESPIRATION RATE: 18 BRPM

## 2022-05-01 PROCEDURE — 94640 AIRWAY INHALATION TREATMENT: CPT

## 2022-05-01 PROCEDURE — 99285 EMERGENCY DEPT VISIT HI MDM: CPT | Mod: 25

## 2022-05-02 NOTE — ED ADULT NURSE NOTE - NS ED NURSE IV DC DT
Nephrology Consult Note      Inpatient consult to Nephrology  Consult performed by: Elif Rivera MD  Consult ordered by: Jeanne Pearce DO          Impression:  1. Cardiorespiratory arrest: In a patient with a prior history of arrhythmias.    His most recent (mixed venous) blood gas documented a pH of 7.06 with an oxygen saturation of 68%. He is currently intubated on ventilatory support and is on pressors.  Blood pressures remain low normal to frankly low.  2.  Acute kidney injury: The patient's baseline renal function is currently unknown to me.  He was admitted with a BUN and creatinine of 37 and 1.53 mg/dL which have since increased to 41 and 2.04 mg/dL.  There were initial problems with Jean catheter placement but the patient does not appear to have an obstructive uropathy.  Acute renal failure is likely hemodynamically mediated.  3.  Hyperkalemia: Serum potassium levels remain elevated.  This is contributed to by an underlying metabolic acidosis.  Blood sugars are normal.  4.  Lactic acidosis: Due to cardiovascular collapse with shock.  Lactate levels remain elevated but fluctuating.  5.  Leukocytosis: WBC count on admission was 23,000 and have declined slightly overnight.  T-max has been 100.2 °F.  While elevated WBC counts are likely related to events surrounding the arrest, he will need to be monitored closely for possibility of sepsis, especially as the CT chest does document evidence of segmental atelectasis with possible superimposed infection.      Recommendations:  Continue pressor support, maintain hemodynamic stability.  Manage acid-base and electrolyte abnormalities.  Consider early initiation of antibiotic therapy if indicated.  Obtain a urinalysis and urine electrolytes.  Monitor renal function closely.  Should azotemia persist and worsen, the patient may require dialytic support down the road.    Findings were discussed with the unit RN as well as the patient's  Patient unresponsive         Last Recorded Vitals  Blood pressure (!) 88/60, pulse 75, temperature 97.7 °F (36.5 °C), temperature source Rectal, resp. rate (!) 10, weight (!) 151.8 kg (334 lb 10.5 oz), SpO2 98 %.    Physical Exam  Physical Exam  Vitals reviewed.   Constitutional:       General: He is not in acute distress.     Comments: Intubated.  On ventilator support.   HENT:      Head: Normocephalic.      Nose: Nose normal.      Mouth/Throat:      Comments: ET tube is in situ connected to the ventilator.     Neck: Neck supple.   Eyes:      General: No scleral icterus.     Comments: Pinpoint pupils bilaterally.  Normal conjunctivae.   Cardiovascular:      Rate and Rhythm: Regular rhythm. Tachycardia present.      Pulses: Normal pulses.      Heart sounds: Normal heart sounds. No murmur heard.  Pulmonary:      Breath sounds: Rales present.      Comments: Intubated.  Abdominal:      General: There is distension.      Palpations: There is no mass.      Tenderness: There is no guarding.   Genitourinary:     Penis: Normal.       Comments: Jean catheter is in situ draining a small amount of concentrated urine.  Musculoskeletal:         General: No swelling or deformity.      Right lower leg: No edema.      Left lower leg: No edema.   Skin:     Coloration: Skin is not jaundiced.      Findings: No bruising.   Neurological:      Comments: Intubated.  Currently unresponsive.          Relevant Results  Recent Labs     05/01/22 2247 05/02/22  0309   SODIUM 131* 130*   POTASSIUM 4.3 5.5*   CO2 12* 14*   ANIONGAP 22* 21*   GLUCOSE 140* 110*   BUN 37* 41*   CREATININE 1.53* 2.04*   BCRAT 24 20   CALCIUM 8.2* 7.7*   BILIRUBIN 0.5  --    AST 79*  --    GPT 46  --    ALKPT 120*  --    GLOB 4.1*  --    AGR 0.8*  --         Recent Labs     05/01/22 2247 05/02/22  0309   WBC 23.3* 19.3*   RBC 3.76* 3.35*   HGB 11.2* 9.9*   HCT 36.1* 31.3*     162   MCV 96.0 93.4   MCH 29.8 29.6   MCHC 31.0* 31.6*   NRBCRE 1* 1*   ASEG  --  14.9*   ALYMP  --  2.9   AMONO  --  1.5*   AEO  --  0.0   ABAS  --  0.0   PMOR  --  Normal         CT HEAD WO CONTRAST    Result Date: 5/1/2022  EXAM: CT HEAD WO CONTRAST CLINICAL INDICATION: Headache, intracranial hemorrhage suspected Mental status change, unknown cause. COMPARISON: None. TECHNIQUE: Multiple axial images of the head were obtained from the base of the skull to the vertex. No contrast was administered. Sagittal and coronal reformats were created. FINDINGS: Scalp is unremarkable. The calvarium is intact. No evidence of acute intracranial hemorrhage. No mass effect, midline shift, or herniation.  Gray-white differentiation is preserved. Age-appropriate appearance of the ventricles and sulci. Ventricles are unremarkable.  Basal cisterns are patent. No more than minimal scattered mucosal thickening within the paranasal sinuses. Mastoid air cells are clear. No abnormality identified in the orbits.     No acute intracranial hemorrhage, large territorial infarct, or mass effect. Electronically Signed by: ANNABELLA EVRDE M.D. Signed on: 5/1/2022 10:23 PM     XR ABDOMEN 1 VIEW    Result Date: 5/1/2022  EXAM: XR ABDOMEN 1 VIEW CLINICAL INDICATION: NG tube placement COMPARISON: None.     FINDINGS/IMPRESSION: Tip of the NG tube projects under left hemidiaphragm, likely within gastric lumen. Electronically Signed by: BRYAN ELAM MD Signed on: 5/1/2022 11:52 PM     XR CHEST AP OR PA 1 VIEW    Result Date: 5/2/2022  EXAMINATION: XR CHEST PA OR AP 1 VIEW HISTORY: Fluid overload COMPARISON: Comparison made with a chest radiograph from earlier today. FINDINGS: An endotracheal tube tube terminates in the midthoracic trachea. A left internal jugular central venous catheter ends in the left brachiocephalic vein. An enteric tube can be followed to the stomach. Diffuse bilateral interstitial and patchy airspace opacities are  unchanged. No pleural effusion or pneumothorax is identified. The cardiac silhouette remains enlarged. The cardiomediastinal silhouette is stable.     Unchanged diffuse bilateral interstitial and patchy airspace opacities, likely representing pulmonary edema and/or pneumonia. Electronically Signed by: PAMELA HENSLEY MD Signed on: 5/2/2022 10:04 AM     XR CHEST AP OR PA 1 VIEW    Result Date: 5/2/2022  History: Central Line Positioning Exam: XR CHEST PA OR AP 1 VIEW. Comparison: Same day. Findings: Endotracheal tube tip terminates in mid trachea, unchanged.  Left IJ central venous catheter tip again terminates near the mid left brachiocephalic vessel.  NG tube tip is in the body the stomach. The cardiac silhouette is stable . No significant change in bilateral perihilar patchy consolidations.  Trace right pleural effusion.  No pneumothorax.  No acute osseous abnormality.     Impression: 1.   Left IJ central venous catheter tip again terminates near the mid left brachiocephalic vessel. 2.   No significant change in bilateral perihilar patchy consolidations. Electronically Signed by: BARBARA QUINTANA MD Signed on: 5/2/2022 8:14 AM     XR CHEST AP OR PA 1 VIEW    Addendum Date: 5/2/2022    ADDENDUM: 05/02/22 02:22 Call Doctor Regarding Above results, called Nurse Guido  on 05/02 02:22 (-05:00)     Result Date: 5/2/2022  CR^Critical Result^I9 Patient: DAINA RUSSO  Time Out: 02:10 Exam(s): FILM CXR 1 VIEW EXAM:   XR Chest, 1 View CLINICAL HISTORY:    Reason for exam: Central line placement verfication. Cardiac arrest, cause unspecified (CMS/HCC). TECHNIQUE:   Frontal view of the chest. COMPARISON:   5/1/2022 FINDINGS:   Endotracheal tube tip at the clavicles.  Orogastric tube tip in the abdomen.  Left internal jugular central venous line with tip just to the right of midline likely in the subclavian vein.  Cardiomediastinal silhouette remains enlarged.  However, interval development of right apical pleural fluid.  No  [facility]* [address] [id number] [phone] Transthoracic Echocardiogram (TTE) Patient: Perfecto Macias Lxiv    Study Date/Time:    May 1 2022 10:55PM MRN:     [id number]              FIN#:               [id number] :     1956            Ht/Wt: Age:     65                    BSA/BMI: Gender:  M                     Baseline BP: Ordering Physician:   Chaka Gutierrez  Referring Physician:  Chaka Gutierrez  Attending Physician:  Chaka Gutierrez Performing Physician: Cely Singh MD Diagnostic Physician: Cely Singh MD Sonographer:          Marcelina Banerjee RDCS  -------------------------------------------------------------------------- INDICATIONS:   Cardiac arrest. -------------------------------------------------------------------------- STUDY CONCLUSIONS SUMMARY: 1. Procedure narrative: Transthoracic echocardiography was performed.    Image quality was suboptimal. The study was technically limited due to    restricted patient mobility and body habitus. Intravenous contrast     (Definity 2ml) was administered. 2. Left ventricle: The cavity size is normal. The ejection fraction was    measured by visual estimation. The ejection fraction is 40%. 3. Left atrium: The atrium is mildly dilated. 4. Pericardium, extracardiac: The pericardium is normal in appearance. -------------------------------------------------------------------------- STUDY DATA:  Patient room number: ER.  Procedure:  Transthoracic echocardiography was performed. Image quality was suboptimal. The study was technically limited due to restricted patient mobility and body habitus. Intravenous contrast (Definity 2ml) was administered.  M-mode, complete 2D, complete spectral Doppler, and color Doppler.  Study status: STAT.  Study completion:  There were no complications. FINDINGS LEFT VENTRICLE:  The cavity size is normal. Systolic function is mildly reduced.    The ejection fraction was measured by visual estimation. The ejection fraction is 40%. AORTIC VALVE:  The annulus is normal. The valve is trileaflet. The leaflets are mildly sclerotic. Cusp separation is normal.  Doppler: Transvalvular velocity is within the normal range. There is no stenosis. No regurgitation.    The mean systolic gradient is 3mm Hg. The peak systolic gradient is 4mm Hg. AORTA:  Aortic root: The aortic root is normal in size. Ascending aorta: The ascending aorta is normal in size. MITRAL VALVE:   Structurally normal valve. The annulus is normal. The leaflets are normal thickness. Leaflet separation is normal.  Doppler: Transvalvular velocity is within the normal range. There is no evidence for stenosis.  No regurgitation.    The peak diastolic gradient is 3mm Hg. ATRIAL SEPTUM:  The septum is normal. LEFT ATRIUM:  The atrium is mildly dilated. RIGHT VENTRICLE:  The cavity size is normal. Wall thickness is normal. Systolic function is normal. VENTRICULAR SEPTUM:   Thickness is normal.  Normal septal motion. Normal contour.    There is no evidence of a  ventricular septal defect. PULMONIC VALVE:   Not visualized. TRICUSPID VALVE:  The leaflets are normal thickness. Leaflet separation is normal.  Doppler:   No regurgitation. RIGHT ATRIUM:  The atrium is normal in size. PERICARDIUM:  The pericardium is normal in appearance. SYSTEMIC VEINS: Inferior vena cava: The vessel is normal in size. -------------------------------------------------------------------------- Measurements  Left ventricle           Value    Ref        Aortic valve        Value       Ref  SHAE, LAX chord           4.8   cm 4.2 - 5.8  Peak v, S           1.1   m/sec ----  ESD, LAX chord           4.0   cm 2.5 - 4.0  Mean v, S           0.79  m/sec ----  PW, ED, LAX    (H)       1.1   cm 0.6 - 1.0  Mean grad, S        3     mm Hg ----  PW, ED         (H)       1.1   cm 0.6 - 1.0  Peak grad, S        4     mm Hg ----  IVS/PW, ED               0.86     ---------  EDV                      113   ml 62 - 150   Mitral valve        Value       Ref  ESV            (H)       65    ml 21 - 61    Peak E              0.86  m/sec ----  EF             (L)       40    %  52 - 72    Peak A              0.4   m/sec ----  SV                       39    ml ---------  Decel time          201   ms    ----                                               Peak grad, D        3     mm Hg ----  Ventricular septum       Value    Ref        Peak E/A ratio      2.2         ----  IVS, ED                  0.9   cm 0.6 - 1.0                                               Aortic root         Value       Ref  Right ventricle          Value    Ref        Root diam, ED       2.7   cm    ----  SHAE, LAX                 2.7   cm ---------   Left atrium              Value    Ref  AP dim, ES     (H)       4.7   cm 3.0 - 4.0 Legend: (L)  and  (H)  ara values outside specified reference range. Prepared and electronically signed by Cely Singh MD 05/02/2022 00:20    CTA CHEST ABDOMEN PELVIS W CONTRAST    Result Date: 5/1/2022  PROCEDURE:  CTA CHEST  ABDOMEN PELVIS W CONTRAST HISTORY: Abdominal pain, aortic dissection suspected please eval for pe also TECHNIQUE: CTA of the chest, abdomen, and pelvis was performed following the administration of 90 ml intravenous contrast according to a dissection protocol. Three dimensional postprocessing was performed by the technologist and sent to the workstation for review. COMPARISON: None FINDINGS: Chest: No pulmonary embolus identified to the level of the lobar arteries on evaluation limited by suboptimal contrast opacification and positioning as well as respiratory motion artifact. No evidence of aortic dissection. The thoracic aorta and pulmonary artery appear normal. Large consolidative opacities involving the right greater than left posterior lungs likely represents segmental atelectasis, with superimposed infection not excluded.  There is perihilar groundglass opacity within both lungs likely reflecting pulmonary edema. Small bilateral pleural effusions.  No pneumothorax. Mild cardiomegaly. There is no pericardial effusion.  Coronary calcifications are noted. No mediastinal, hilar or axillary lymphadenopathy is identified. The bones appear intact.  Endotracheal tube in appropriate position. Abdomen and Pelvis: The abdominal aorta measures up to 3.0 cm along the infrarenal portion, with irregular mural plaque and calcifications.  No evidence of dissection or rupture. The liver and gallbladder appear normal without evidence of biliary ductal dilatation. The spleen, pancreas and adrenal glands appear normal. The kidneys appear normal without evidence of hydronephrosis. The stomach appears normal. The small and large bowel are normal in caliber without evidence of obstruction. There is no intraperitoneal free air or fluid. The urinary bladder and prostate appear normal. No lymphadenopathy is identified. There is aortic and illiofemoral atherosclerotic disease. Mild thoracolumbar scoliosis.  Moderate to severe multilevel  degenerative disc disease within the lumbar spine.  No acute fractures.     No pulmonary embolus identified to the level of the lobar arteries on evaluation limited by suboptimal contrast opacification and positioning as well as respiratory motion artifact. No evidence of aortic dissection or rupture. Ectatic infrarenal abdominal aorta measures up to 3.0 cm in diameter.  Moderate to severe atherosclerotic disease. Large consolidative opacities involving the right greater than left posterior lungs likely represents segmental atelectasis, with superimposed infection not excluded.  Perihilar groundglass opacity within both lungs likely reflecting pulmonary edema. Small bilateral pleural effusions.  Electronically Signed by: ANNABELLA VERDE M.D. Signed on: 5/1/2022 10:46 PM        Principal Problem:    Cardiac arrest (CMS/HCC)  Resolved Problems:    * No resolved hospital problems. *          Elif Rivera MD  5/2/2022           17-Feb-2022 06:33

## 2022-05-05 NOTE — ED ADULT TRIAGE NOTE - ESI TRIAGE ACUITY LEVEL, MLM
05/05/22 1111   Discharge/Handoff   Discharged with O2   Transport Nurse marina CARDOSO   Additional Transport Assist Tasha HOLMT   Handoff given to Jahaira MG from ICU   Oxygen Therapy   SpO2 98 %   Device (Oxygen Therapy) nasal cannula   O2 Device Nasal cannula   Oxygen Delivery 2 LPM   Valuables   Patient Belongings remains with patient   Patient Belongings Remaining with Patient glasses;shoes;walker;clothing   Did you bring any home meds/supplements to the hospital?  No       Pt transported to ICU for overflow M/s Bed. Family notified. SAURAV christopher to take report. RN aware that pt has some difficulty hearing, no hearing aides here. Pt has some disorientation to time. RN aware of this. VSS--known a-fib BBB.    3

## 2022-05-08 ENCOUNTER — EMERGENCY (EMERGENCY)
Facility: HOSPITAL | Age: 62
LOS: 1 days | Discharge: HOME | End: 2022-05-08
Attending: EMERGENCY MEDICINE | Admitting: EMERGENCY MEDICINE
Payer: MEDICARE

## 2022-05-08 VITALS
HEART RATE: 89 BPM | SYSTOLIC BLOOD PRESSURE: 163 MMHG | RESPIRATION RATE: 20 BRPM | OXYGEN SATURATION: 96 % | DIASTOLIC BLOOD PRESSURE: 91 MMHG | TEMPERATURE: 98 F | WEIGHT: 173.94 LBS | HEIGHT: 60 IN

## 2022-05-08 DIAGNOSIS — J43.9 EMPHYSEMA, UNSPECIFIED: ICD-10-CM

## 2022-05-08 DIAGNOSIS — Z90.89 ACQUIRED ABSENCE OF OTHER ORGANS: Chronic | ICD-10-CM

## 2022-05-08 DIAGNOSIS — Z20.822 CONTACT WITH AND (SUSPECTED) EXPOSURE TO COVID-19: ICD-10-CM

## 2022-05-08 DIAGNOSIS — J45.901 UNSPECIFIED ASTHMA WITH (ACUTE) EXACERBATION: ICD-10-CM

## 2022-05-08 DIAGNOSIS — Z98.49 CATARACT EXTRACTION STATUS, UNSPECIFIED EYE: Chronic | ICD-10-CM

## 2022-05-08 DIAGNOSIS — F17.200 NICOTINE DEPENDENCE, UNSPECIFIED, UNCOMPLICATED: ICD-10-CM

## 2022-05-08 DIAGNOSIS — F10.10 ALCOHOL ABUSE, UNCOMPLICATED: ICD-10-CM

## 2022-05-08 DIAGNOSIS — F31.9 BIPOLAR DISORDER, UNSPECIFIED: ICD-10-CM

## 2022-05-08 DIAGNOSIS — R06.02 SHORTNESS OF BREATH: ICD-10-CM

## 2022-05-08 DIAGNOSIS — Z98.890 OTHER SPECIFIED POSTPROCEDURAL STATES: Chronic | ICD-10-CM

## 2022-05-08 LAB — SARS-COV-2 RNA SPEC QL NAA+PROBE: NEGATIVE — SIGNIFICANT CHANGE UP

## 2022-05-08 PROCEDURE — 94640 AIRWAY INHALATION TREATMENT: CPT

## 2022-05-08 PROCEDURE — 99284 EMERGENCY DEPT VISIT MOD MDM: CPT | Mod: 25

## 2022-05-08 PROCEDURE — 93005 ELECTROCARDIOGRAM TRACING: CPT

## 2022-05-08 PROCEDURE — 71045 X-RAY EXAM CHEST 1 VIEW: CPT | Mod: 26

## 2022-05-08 PROCEDURE — 87635 SARS-COV-2 COVID-19 AMP PRB: CPT

## 2022-05-08 PROCEDURE — 99285 EMERGENCY DEPT VISIT HI MDM: CPT | Mod: 25

## 2022-05-08 PROCEDURE — 71045 X-RAY EXAM CHEST 1 VIEW: CPT

## 2022-05-08 PROCEDURE — 93010 ELECTROCARDIOGRAM REPORT: CPT | Mod: 59

## 2022-05-08 RX ORDER — FLUTICASONE PROPIONATE AND SALMETEROL 50; 250 UG/1; UG/1
2 POWDER ORAL; RESPIRATORY (INHALATION)
Qty: 1 | Refills: 0
Start: 2022-05-08 | End: 2022-06-06

## 2022-05-08 RX ORDER — FLUTICASONE PROPIONATE AND SALMETEROL 50; 250 UG/1; UG/1
2 POWDER ORAL; RESPIRATORY (INHALATION)
Qty: 1 | Refills: 0
Start: 2022-05-08 | End: 2023-01-18

## 2022-05-08 RX ORDER — IPRATROPIUM/ALBUTEROL SULFATE 18-103MCG
3 AEROSOL WITH ADAPTER (GRAM) INHALATION
Refills: 0 | Status: COMPLETED | OUTPATIENT
Start: 2022-05-08 | End: 2022-05-08

## 2022-05-08 RX ORDER — ACETAMINOPHEN 500 MG
2 TABLET ORAL
Qty: 40 | Refills: 0
Start: 2022-05-08 | End: 2022-05-12

## 2022-05-08 RX ORDER — ALBUTEROL 90 UG/1
2.5 AEROSOL, METERED ORAL ONCE
Refills: 0 | Status: COMPLETED | OUTPATIENT
Start: 2022-05-08 | End: 2022-05-08

## 2022-05-08 RX ORDER — ALBUTEROL 90 UG/1
2 AEROSOL, METERED ORAL EVERY 6 HOURS
Refills: 0 | Status: DISCONTINUED | OUTPATIENT
Start: 2022-05-08 | End: 2022-05-12

## 2022-05-08 RX ADMIN — ALBUTEROL 2.5 MILLIGRAM(S): 90 AEROSOL, METERED ORAL at 19:21

## 2022-05-08 RX ADMIN — ALBUTEROL 2 PUFF(S): 90 AEROSOL, METERED ORAL at 19:21

## 2022-05-08 RX ADMIN — Medication 50 MILLIGRAM(S): at 16:46

## 2022-05-08 RX ADMIN — Medication 3 MILLILITER(S): at 17:07

## 2022-05-08 RX ADMIN — Medication 3 MILLILITER(S): at 17:22

## 2022-05-08 RX ADMIN — Medication 3 MILLILITER(S): at 16:47

## 2022-05-08 NOTE — ED PROVIDER NOTE - PATIENT PORTAL LINK FT
You can access the FollowMyHealth Patient Portal offered by [facility] by registering at the following website: [email or web address]. By joining [employer]’s FollowMyHealth portal, you will also be able to view your health information using other applications (apps) compatible with our system.

## 2022-05-08 NOTE — ED PROVIDER NOTE - CLINICAL SUMMARY MEDICAL DECISION MAKING FREE TEXT BOX
asthma flare- nebs, steroids, cxr asthma flare- nebs, steroids, cxr- lungs markedly improved- min wheezing- feels better- nebs/steroids/advair

## 2022-05-08 NOTE — ED PROVIDER NOTE - OBJECTIVE STATEMENT
61 m w ho asthma, psych DO- co sob- awoke this am w dyspnea- took his neb- felt temporary improvement- then the sx returned  no f/c no cough- smokes- but down to 5 cigs/day  no n/v  took his psych meds- helped his anxiety significantly  mod dyspnea

## 2022-05-08 NOTE — ED ADULT TRIAGE NOTE - CHIEF COMPLAINT QUOTE
pt c/o Trouble breathing since this morning. Pt states ' I have lung problems but also I have a very bad anxiety and I don't know if is making feel this way" ekg in progress.

## 2022-05-08 NOTE — ED ADULT NURSE NOTE - OBJECTIVE STATEMENT
61M, hx asthma, presents with worsening SO since this morning. Pt reports using nebulizer tx this morning, with slight improvement. Pt denies cp, abd pain, nausea, vomiting, diarrhea, f/c, or known sick contacts

## 2022-05-24 ENCOUNTER — EMERGENCY (EMERGENCY)
Facility: HOSPITAL | Age: 62
LOS: 1 days | Discharge: HOME | End: 2022-05-24
Attending: EMERGENCY MEDICINE | Admitting: EMERGENCY MEDICINE
Payer: MEDICARE

## 2022-05-24 VITALS
RESPIRATION RATE: 18 BRPM | HEART RATE: 90 BPM | OXYGEN SATURATION: 97 % | DIASTOLIC BLOOD PRESSURE: 69 MMHG | TEMPERATURE: 98 F | HEIGHT: 60 IN | SYSTOLIC BLOOD PRESSURE: 126 MMHG

## 2022-05-24 DIAGNOSIS — Z87.19 PERSONAL HISTORY OF OTHER DISEASES OF THE DIGESTIVE SYSTEM: ICD-10-CM

## 2022-05-24 DIAGNOSIS — Z90.89 ACQUIRED ABSENCE OF OTHER ORGANS: ICD-10-CM

## 2022-05-24 DIAGNOSIS — R05.9 COUGH, UNSPECIFIED: ICD-10-CM

## 2022-05-24 DIAGNOSIS — Z20.822 CONTACT WITH AND (SUSPECTED) EXPOSURE TO COVID-19: ICD-10-CM

## 2022-05-24 DIAGNOSIS — F17.200 NICOTINE DEPENDENCE, UNSPECIFIED, UNCOMPLICATED: ICD-10-CM

## 2022-05-24 DIAGNOSIS — J43.9 EMPHYSEMA, UNSPECIFIED: ICD-10-CM

## 2022-05-24 DIAGNOSIS — Z90.89 ACQUIRED ABSENCE OF OTHER ORGANS: Chronic | ICD-10-CM

## 2022-05-24 DIAGNOSIS — Z98.49 CATARACT EXTRACTION STATUS, UNSPECIFIED EYE: Chronic | ICD-10-CM

## 2022-05-24 DIAGNOSIS — F10.10 ALCOHOL ABUSE, UNCOMPLICATED: ICD-10-CM

## 2022-05-24 DIAGNOSIS — F32.A DEPRESSION, UNSPECIFIED: ICD-10-CM

## 2022-05-24 DIAGNOSIS — Z59.00 HOMELESSNESS UNSPECIFIED: ICD-10-CM

## 2022-05-24 DIAGNOSIS — Z98.890 OTHER SPECIFIED POSTPROCEDURAL STATES: Chronic | ICD-10-CM

## 2022-05-24 LAB
ANION GAP SERPL CALC-SCNC: 11 MMOL/L — SIGNIFICANT CHANGE UP (ref 5–17)
BUN SERPL-MCNC: 15 MG/DL — SIGNIFICANT CHANGE UP (ref 7–23)
CALCIUM SERPL-MCNC: 8.6 MG/DL — SIGNIFICANT CHANGE UP (ref 8.4–10.5)
CHLORIDE SERPL-SCNC: 105 MMOL/L — SIGNIFICANT CHANGE UP (ref 96–108)
CO2 SERPL-SCNC: 25 MMOL/L — SIGNIFICANT CHANGE UP (ref 22–31)
CREAT SERPL-MCNC: 0.92 MG/DL — SIGNIFICANT CHANGE UP (ref 0.5–1.3)
EGFR: 95 ML/MIN/1.73M2 — SIGNIFICANT CHANGE UP
GLUCOSE SERPL-MCNC: 115 MG/DL — HIGH (ref 70–99)
HCT VFR BLD CALC: 28 % — LOW (ref 39–50)
HGB BLD-MCNC: 9.1 G/DL — LOW (ref 13–17)
MCHC RBC-ENTMCNC: 30.7 PG — SIGNIFICANT CHANGE UP (ref 27–34)
MCHC RBC-ENTMCNC: 32.5 GM/DL — SIGNIFICANT CHANGE UP (ref 32–36)
MCV RBC AUTO: 94.6 FL — SIGNIFICANT CHANGE UP (ref 80–100)
NRBC # BLD: 0 /100 WBCS — SIGNIFICANT CHANGE UP (ref 0–0)
NT-PROBNP SERPL-SCNC: 71 PG/ML — SIGNIFICANT CHANGE UP (ref 0–300)
PLATELET # BLD AUTO: 242 K/UL — SIGNIFICANT CHANGE UP (ref 150–400)
POTASSIUM SERPL-MCNC: 4.4 MMOL/L — SIGNIFICANT CHANGE UP (ref 3.5–5.3)
POTASSIUM SERPL-SCNC: 4.4 MMOL/L — SIGNIFICANT CHANGE UP (ref 3.5–5.3)
RBC # BLD: 2.96 M/UL — LOW (ref 4.2–5.8)
RBC # FLD: 17 % — HIGH (ref 10.3–14.5)
SARS-COV-2 RNA SPEC QL NAA+PROBE: NEGATIVE — SIGNIFICANT CHANGE UP
SODIUM SERPL-SCNC: 141 MMOL/L — SIGNIFICANT CHANGE UP (ref 135–145)
TROPONIN T SERPL-MCNC: <0.01 NG/ML — SIGNIFICANT CHANGE UP (ref 0–0.01)
WBC # BLD: 6.69 K/UL — SIGNIFICANT CHANGE UP (ref 3.8–10.5)
WBC # FLD AUTO: 6.69 K/UL — SIGNIFICANT CHANGE UP (ref 3.8–10.5)

## 2022-05-24 PROCEDURE — 99283 EMERGENCY DEPT VISIT LOW MDM: CPT | Mod: 25

## 2022-05-24 PROCEDURE — 94640 AIRWAY INHALATION TREATMENT: CPT

## 2022-05-24 PROCEDURE — 84484 ASSAY OF TROPONIN QUANT: CPT

## 2022-05-24 PROCEDURE — 36415 COLL VENOUS BLD VENIPUNCTURE: CPT

## 2022-05-24 PROCEDURE — 85027 COMPLETE CBC AUTOMATED: CPT

## 2022-05-24 PROCEDURE — 83880 ASSAY OF NATRIURETIC PEPTIDE: CPT

## 2022-05-24 PROCEDURE — 87635 SARS-COV-2 COVID-19 AMP PRB: CPT

## 2022-05-24 PROCEDURE — 71046 X-RAY EXAM CHEST 2 VIEWS: CPT

## 2022-05-24 PROCEDURE — 71046 X-RAY EXAM CHEST 2 VIEWS: CPT | Mod: 26

## 2022-05-24 PROCEDURE — 80048 BASIC METABOLIC PNL TOTAL CA: CPT

## 2022-05-24 PROCEDURE — 99284 EMERGENCY DEPT VISIT MOD MDM: CPT | Mod: 25

## 2022-05-24 RX ORDER — ALBUTEROL 90 UG/1
3 AEROSOL, METERED ORAL
Qty: 1 | Refills: 0
Start: 2022-05-24

## 2022-05-24 RX ORDER — ALBUTEROL 90 UG/1
1 AEROSOL, METERED ORAL
Qty: 1 | Refills: 0
Start: 2022-05-24

## 2022-05-24 RX ORDER — ALBUTEROL 90 UG/1
2.5 AEROSOL, METERED ORAL
Refills: 0 | Status: DISCONTINUED | OUTPATIENT
Start: 2022-05-24 | End: 2022-05-28

## 2022-05-24 RX ORDER — IPRATROPIUM/ALBUTEROL SULFATE 18-103MCG
3 AEROSOL WITH ADAPTER (GRAM) INHALATION ONCE
Refills: 0 | Status: COMPLETED | OUTPATIENT
Start: 2022-05-24 | End: 2022-05-24

## 2022-05-24 RX ADMIN — Medication 3 MILLILITER(S): at 22:10

## 2022-05-24 RX ADMIN — Medication 60 MILLIGRAM(S): at 22:10

## 2022-05-24 RX ADMIN — ALBUTEROL 2.5 MILLIGRAM(S): 90 AEROSOL, METERED ORAL at 22:10

## 2022-05-24 SDOH — ECONOMIC STABILITY - HOUSING INSECURITY: HOMELESSNESS UNSPECIFIED: Z59.00

## 2022-05-24 NOTE — ED ADULT NURSE NOTE - OBJECTIVE STATEMENT
61M presents to ED for shortness of breath that started today. Pt states that he has been having colds "on and off" with chest tightness and phlegm production. Pt states that his nebulizer ran out and so he has had to use his inhaler but it hasn't been too effective. Pt endorses intermittent chills and "breaking out in sweats". Pt is an active smoker but has cut down significantly over the last few months. PMH COPD, asthma.

## 2022-05-24 NOTE — ED PROVIDER NOTE - PATIENT PORTAL LINK FT
You can access the FollowMyHealth Patient Portal offered by [facility] by registering at the following website: [email or web address]. By joining [email or web address]’s FollowMyHealth portal, you will also be able to view your health information using other applications (apps) compatible with our system.

## 2022-05-24 NOTE — ED PROVIDER NOTE - NSFOLLOWUPINSTRUCTIONS_ED_ALL_ED_FT
Please follow up with your primary physician in 1-2 days for re evaluation.  Please return to ER immediately should your symptoms worsen or if you have any concern prior to this recommended follow up.        Chronic Obstructive Pulmonary Disease       Chronic obstructive pulmonary disease (COPD) is a long-term (chronic) lung problem. When you have COPD, it is hard for air to get in and out of your lungs.    Usually the condition gets worse over time, and your lungs will never return to normal. There are things you can do to keep yourself as healthy as possible.      What are the causes?    •Smoking. This is the most common cause.      •Certain genes passed from parent to child (inherited).        What increases the risk?    •Being exposed to secondhand smoke from cigarettes, pipes, or cigars.      •Being exposed to chemicals and other irritants, such as fumes and dust in the work environment.      •Having chronic lung conditions or infections.        What are the signs or symptoms?    •Shortness of breath, especially during physical activity.      •A long-term cough with a large amount of thick mucus. Sometimes, the cough may not have any mucus (dry cough).      •Wheezing.      •Breathing quickly.      •Skin that looks gray or blue, especially in the fingers, toes, or lips.      •Feeling tired (fatigue).      •Weight loss.      •Chest tightness.      •Having infections often.      •Episodes when breathing symptoms become much worse (exacerbations).      At the later stages of this disease, you may have swelling in the ankles, feet, or legs.      How is this treated?    •Taking medicines.      •Quitting smoking, if you smoke.      •Rehabilitation. This includes steps to make your body work better. It may involve a team of specialists.      •Doing exercises.      •Making changes to your diet.      •Using oxygen.      •Lung surgery.      •Lung transplant.      •Comfort measures (palliative care).        Follow these instructions at home:    Medicines     •Take over-the-counter and prescription medicines only as told by your doctor.      •Talk to your doctor before taking any cough or allergy medicines. You may need to avoid medicines that cause your lungs to be dry.      Lifestyle     •If you smoke, stop smoking. Smoking makes the problem worse.      • Do not smoke or use any products that contain nicotine or tobacco. If you need help quitting, ask your doctor.      •Avoid being around things that make your breathing worse. This may include smoke, chemicals, and fumes.      •Stay active, but remember to rest as well.      •Learn and use tips on how to manage stress and control your breathing.      •Make sure you get enough sleep. Most adults need at least 7 hours of sleep every night.      •Eat healthy foods. Eat smaller meals more often. Rest before meals.        Controlled breathing      Learn and use tips on how to control your breathing as told by your doctor. Try:  •Breathing in (inhaling) through your nose for 1 second. Then, pucker your lips and breath out (exhale) through your lips for 2 seconds.      •Putting one hand on your belly (abdomen). Breathe in slowly through your nose for 1 second. Your hand on your belly should move out. Pucker your lips and breathe out slowly through your lips. Your hand on your belly should move in as you breathe out.      Controlled coughing     Learn and use controlled coughing to clear mucus from your lungs. Follow these steps:  1.Lean your head a little forward.      2.Breathe in deeply.      3.Try to hold your breath for 3 seconds.      4.Keep your mouth slightly open while coughing 2 times.      5.Spit any mucus out into a tissue.      6.Rest and do the steps again 1 or 2 times as needed.      General instructions    •Make sure you get all the shots (vaccines) that your doctor recommends. Ask your doctor about a flu shot and a pneumonia shot.      •Use oxygen therapy and pulmonary rehabilitation if told by your doctor. If you need home oxygen therapy, ask your doctor if you should buy a tool to measure your oxygen level (oximeter).      •Make a COPD action plan with your doctor. This helps you to know what to do if you feel worse than usual.      •Manage any other conditions you have as told by your doctor.      •Avoid going outside when it is very hot, cold, or humid.      •Avoid people who have a sickness you can catch (contagious).      •Keep all follow-up visits.        Contact a doctor if:    •You cough up more mucus than usual.      •There is a change in the color or thickness of the mucus.      •It is harder to breathe than usual.      •Your breathing is faster than usual.      •You have trouble sleeping.      •You need to use your medicines more often than usual.      •You have trouble doing your normal activities such as getting dressed or walking around the house.        Get help right away if:    •You have shortness of breath while resting.    •You have shortness of breath that stops you from:  •Being able to talk.      •Doing normal activities.        •Your chest hurts for longer than 5 minutes.      •Your skin color is more blue than usual.      •Your pulse oximeter shows that you have low oxygen for longer than 5 minutes.      •You have a fever.      •You feel too tired to breathe normally.      These symptoms may represent a serious problem that is an emergency. Do not wait to see if the symptoms will go away. Get medical help right away. Call your local emergency services (911 in the [place] Do not drive yourself to the hospital.       Summary    •Chronic obstructive pulmonary disease (COPD) is a long-term lung problem.      •The way your lungs work will never return to normal. Usually the condition gets worse over time. There are things you can do to keep yourself as healthy as possible.      •Take over-the-counter and prescription medicines only as told by your doctor.      •If you smoke, stop. Smoking makes the problem worse.      This information is not intended to replace advice given to you by your health care provider. Make sure you discuss any questions you have with your health care provider.      Document Revised: 10/26/2021 Document Reviewed: 10/26/2021    Elsevier Patient Education © 2022 [employer]

## 2022-05-24 NOTE — ED PROVIDER NOTE - OBJECTIVE STATEMENT
61 year old undomiciled male with history of daily tobacco use (50py), copd/emphysema (no intubations), complete covid19 vaccination presents to ED with concern for several days of cough, congestion and wheezing.  Patient states he is out of her inhaler / nebules and would like a refill.  Patient denies associated fever, chills, headache, visual changes, chest pain, abdominal pain, nausea, emesis, changes to bowel movements, peripheral edema, calf pain/tenderness, recent travel, known sick contacts or any additional acute complaints or concerns at this time.

## 2022-05-24 NOTE — ED PROVIDER NOTE - NS ED ROS FT
Constitutional: No fever or chills.   Eyes: No pain, blurry vision, or discharge.  ENMT: No hearing changes, pain, discharge or infections. No neck pain or stiffness.  Cardiac: No chest pain, SOB or edema. No chest pain with exertion.  Respiratory: + Cough, no respiratory distress. + History of COPD, + tobacco use.    GI: No nausea, vomiting, diarrhea or abdominal pain.  : No dysuria, frequency or burning.  MS: No myalgia, muscle weakness, joint pain or back pain.  Neuro: No headache or weakness. No LOC.  Skin: No skin rash.

## 2022-05-24 NOTE — ED PROVIDER NOTE - PHYSICAL EXAMINATION
VITAL SIGNS: I have reviewed nursing notes and confirm.  CONSTITUTIONAL: Non toxic; in no acute distress.   SKIN:  Warm and dry, no acute rash.   HEAD:  Normocephalic, atraumatic.  EYES: PERRL.  EOM intact; conjunctiva and sclera clear.  ENT: No nasal discharge; airway clear.   NECK: Supple; non tender.  CARD: S1, S2 normal; no murmurs, gallops, or rubs. Regular rate and rhythm.   RESP:  Clear to auscultation b/l, no wheezes, rales or rhonchi.  ABD: Normal bowel sounds; soft; non-distended; non-tender; no guarding/rebound.  EXT: Normal ROM. No peripheral edema.    NEURO: Alert, oriented, grossly unremarkable.  No facial asymmetry.    PSYCH: Cooperative, mood and affect appropriate.

## 2022-05-24 NOTE — ED PROVIDER NOTE - CLINICAL SUMMARY MEDICAL DECISION MAKING FREE TEXT BOX
Patient presents to ED with concern for COPD exacerbation in setting of recent URI symptoms.  No increased work of breathing, speaking comfortably in full sentences.  Lungs with exp wheezes to bilateral lung fields.  Labs, CXR reviewed and WNL.  Patient feeling improved in ED with nebs, steroids and is in agreement with plan for discharge with neb refill, albuterol inh, several day course of steroid and prompt outpatient follow up.  Strict return to ED precautions discussed with patient.  Patient is advised to follow up with their PCP in 1-2 days without fail.  Patient instructed to return to ED immediately should their symptoms worsen or if there is any concern prior to the recommended PCP follow up.  Patient is aware of plan and verbalizes their understanding.  Will discharge home at this time.

## 2022-06-20 NOTE — ED ADULT TRIAGE NOTE - BSA (M2)
2.18 on the discharge service for the patient. I have reviewed and made amendments to the documentation where necessary.

## 2022-08-25 ENCOUNTER — EMERGENCY (EMERGENCY)
Facility: HOSPITAL | Age: 62
LOS: 1 days | Discharge: HOME | End: 2022-08-25
Attending: EMERGENCY MEDICINE | Admitting: EMERGENCY MEDICINE
Payer: MEDICARE

## 2022-08-25 VITALS
WEIGHT: 173.94 LBS | OXYGEN SATURATION: 97 % | DIASTOLIC BLOOD PRESSURE: 91 MMHG | RESPIRATION RATE: 20 BRPM | HEART RATE: 91 BPM | SYSTOLIC BLOOD PRESSURE: 160 MMHG | TEMPERATURE: 98 F | HEIGHT: 60 IN

## 2022-08-25 DIAGNOSIS — Z98.890 OTHER SPECIFIED POSTPROCEDURAL STATES: Chronic | ICD-10-CM

## 2022-08-25 DIAGNOSIS — Z90.89 ACQUIRED ABSENCE OF OTHER ORGANS: Chronic | ICD-10-CM

## 2022-08-25 DIAGNOSIS — F17.210 NICOTINE DEPENDENCE, CIGARETTES, UNCOMPLICATED: ICD-10-CM

## 2022-08-25 DIAGNOSIS — J45.901 UNSPECIFIED ASTHMA WITH (ACUTE) EXACERBATION: ICD-10-CM

## 2022-08-25 DIAGNOSIS — Z98.49 CATARACT EXTRACTION STATUS, UNSPECIFIED EYE: Chronic | ICD-10-CM

## 2022-08-25 DIAGNOSIS — F41.9 ANXIETY DISORDER, UNSPECIFIED: ICD-10-CM

## 2022-08-25 DIAGNOSIS — J43.9 EMPHYSEMA, UNSPECIFIED: ICD-10-CM

## 2022-08-25 DIAGNOSIS — Z59.00 HOMELESSNESS UNSPECIFIED: ICD-10-CM

## 2022-08-25 PROCEDURE — 93005 ELECTROCARDIOGRAM TRACING: CPT

## 2022-08-25 PROCEDURE — 94640 AIRWAY INHALATION TREATMENT: CPT

## 2022-08-25 PROCEDURE — 71045 X-RAY EXAM CHEST 1 VIEW: CPT | Mod: 26

## 2022-08-25 PROCEDURE — 99283 EMERGENCY DEPT VISIT LOW MDM: CPT | Mod: 25

## 2022-08-25 PROCEDURE — 99284 EMERGENCY DEPT VISIT MOD MDM: CPT | Mod: FS,25

## 2022-08-25 PROCEDURE — 71045 X-RAY EXAM CHEST 1 VIEW: CPT

## 2022-08-25 RX ORDER — ALBUTEROL 90 UG/1
2 AEROSOL, METERED ORAL
Qty: 8 | Refills: 0
Start: 2022-08-25

## 2022-08-25 RX ORDER — ALBUTEROL 90 UG/1
2 AEROSOL, METERED ORAL
Qty: 2 | Refills: 0
Start: 2022-08-25 | End: 2022-08-29

## 2022-08-25 RX ORDER — ACETAMINOPHEN 500 MG
975 TABLET ORAL ONCE
Refills: 0 | Status: COMPLETED | OUTPATIENT
Start: 2022-08-25 | End: 2022-08-25

## 2022-08-25 RX ORDER — IPRATROPIUM/ALBUTEROL SULFATE 18-103MCG
3 AEROSOL WITH ADAPTER (GRAM) INHALATION ONCE
Refills: 0 | Status: COMPLETED | OUTPATIENT
Start: 2022-08-25 | End: 2022-08-25

## 2022-08-25 RX ORDER — HYDROXYZINE HCL 10 MG
50 TABLET ORAL ONCE
Refills: 0 | Status: COMPLETED | OUTPATIENT
Start: 2022-08-25 | End: 2022-08-25

## 2022-08-25 RX ADMIN — Medication 3 MILLILITER(S): at 01:43

## 2022-08-25 RX ADMIN — Medication 975 MILLIGRAM(S): at 01:43

## 2022-08-25 RX ADMIN — Medication 50 MILLIGRAM(S): at 01:46

## 2022-08-25 RX ADMIN — Medication 975 MILLIGRAM(S): at 02:17

## 2022-08-25 RX ADMIN — Medication 50 MILLIGRAM(S): at 01:27

## 2022-08-25 SDOH — ECONOMIC STABILITY - HOUSING INSECURITY: HOMELESSNESS UNSPECIFIED: Z59.00

## 2022-08-25 NOTE — ED PROVIDER NOTE - NSFOLLOWUPINSTRUCTIONS_ED_ALL_ED_FT
Continue taking your asthma meds as directed, continue albuterol inhaler as needed for your symptoms.   Take steroids, once a day for 5 days - prescription sent to your pharmacy.  Follow up with your Primary Care Doctor within one week - if you do not have a Primary Care Doctor call the number above to see someone in our primary care clinic.  Return to the Emergency Department for persistent, worsening or new symptoms including difficulty breathing, severe cough, high fever, chest pain, or any other serious concerns.

## 2022-08-25 NOTE — ED ADULT NURSE NOTE - OBJECTIVE STATEMENT
patient alert and orientedx3, presenting to the ed for mild headache, occasional dry cough, and feeling mildly anxious. Patient was requesting hydroxazine that he takes at home for his anxiety and requesting tylenol for his headache. Patient speaking full sentences without sob. Voice clear, b/l equal chest expansion noted with each breath. Patient denies dizziness/back pain/chest pain.

## 2022-08-25 NOTE — ED PROVIDER NOTE - PATIENT PORTAL LINK FT
You can access the FollowMyHealth Patient Portal offered by [facility] by registering at the following website: [email or web address]. By joining [facility]’s FollowMyHealth portal, you will also be able to view your health information using other applications (apps) compatible with our system.

## 2022-08-25 NOTE — ED PROVIDER NOTE - PHYSICAL EXAMINATION
Constitutional : Well appearing, non-toxic, no acute distress. calm and cooperative. awake, alert, oriented to person, place, time/situation.  Head : head normocephalic, atraumatic  EENMT : eyes clear bilaterally, PERRL, EOMI. airway patent. moist mucous membranes. neck supple.  Cardiac : Normal rate, regular rhythm. No murmur appreciated, no LE edema.  Resp : diffuse inspiratory / expiratory wheezing. no rales / rhonchi. no respiratory distress.   Gastro : abdomen soft, nontender, nondistended. no rebound or guarding. no CVAT.  MSK :  range of motion is not limited, no muscle or joint tenderness  Back : No evidence of trauma. No spinal or CVA tenderness.  Vasc : Extremities warm and well perfused. 2+ radial and DP pulses. cap refill <2 seconds  Neuro : Alert and oriented, CNII-XII grossly intact, no focal deficits, no motor or sensory deficits.  Skin : Skin normal color for race, warm, dry and intact. No evidence of rash.  Psych : Alert and oriented to person, place, time/situation. normal mood and affect. no apparent risk to self or others.

## 2022-08-25 NOTE — ED PROVIDER NOTE - NSFOLLOWUPCLINICS_GEN_ALL_ED_FT
[facility]  Family Medicine  [address]  [place], [place] [address]  Phone: [phone]  Fax:

## 2022-08-25 NOTE — ED PROVIDER NOTE - CLINICAL SUMMARY MEDICAL DECISION MAKING FREE TEXT BOX
pt w history of asthma / copd. smoker. here w asthma exacerbation after smoking cigarette this evening. ran out of inhaler.   pt overall well appearing, no respiratory distress. vitals stable. diffuse wheezing on exam.   cxr to r/o pna  duo nebs  oral steroids  if improvement of symptoms with treatments in ED will dc on short course steroids pt w history of asthma / copd. smoker. here w asthma exacerbation after smoking cigarette this evening. ran out of inhaler.   pt overall well appearing, no respiratory distress. vitals stable. diffuse wheezing on exam.   cxr to r/o pna  duo nebs  oral steroids  also requesting dose of hydroxyzine which he notes helps w his anxiety during asthma exacerbations  if improvement of symptoms with treatments in ED will dc on short course steroids

## 2022-08-25 NOTE — ED PROVIDER NOTE - ATTENDING APP SHARED VISIT CONTRIBUTION OF CARE
smoker with asthma/copd here with wheezing tonight. diffuse, normal work of breathing. afebrile. cxr neg for infiltrate. plan nebs/ prednisone for asthma/copd exacerbation.

## 2022-08-25 NOTE — ED PROVIDER NOTE - PROGRESS NOTE DETAILS
cxr similar to prior  on reeval pt feeling much better after nebs / po steroids  reexam w clear lungs, no wheezing  pt feels comfortable w dc. script for inhaler and steroids sent.     All results reviewed with the patient verbally. Discharge plan and return precautions d/w pt who verbalized understanding and agrees with plan. All questions answered. Vitals WNL. Ready for d/c.

## 2022-08-25 NOTE — ED ADULT TRIAGE NOTE - CHIEF COMPLAINT QUOTE
Patient presents to ER with chief complaints of shortness of breath and chest tightness since yesterday.

## 2022-08-25 NOTE — ED PROVIDER NOTE - OBJECTIVE STATEMENT
undomiciled male with history of daily tobacco use (50py), copd/emphysema (no intubations), 61 yr old male, undomiciled, history of daily tobacco use (50py), copd/emphysema (no intubations), presents to the Emergency Department with asthma exacerbation. pt states that he was at an AA meeting [employer]. went outside and smoked a cigarette which triggered his asthma, started wheezing, felt SOB. used his inhaler with relief. symptoms returned but pt was out of his inhaler so came for evaluation.  At this time complains sob, and wheezing. requesting refill on inhaler.   Denies cp, cough, palpitations, n/v/d, abd pain, sore throat, fevers, chills.

## 2022-09-08 NOTE — ED ADULT NURSE NOTE - CADM POA PRESS ULCER
What Type Of Note Output Would You Prefer (Optional)?: Bullet Format
What Is The Reason For Today's Visit?: Full Body Skin Examination
What Is The Reason For Today's Visit? (Being Monitored For X): concerning skin lesions on an annual basis
No

## 2022-11-25 ENCOUNTER — EMERGENCY (EMERGENCY)
Facility: HOSPITAL | Age: 62
LOS: 1 days | Discharge: LEFT BEFORE DISCHARGE | End: 2022-11-25
Attending: EMERGENCY MEDICINE | Admitting: EMERGENCY MEDICINE
Payer: MEDICARE

## 2022-11-25 VITALS
TEMPERATURE: 98 F | OXYGEN SATURATION: 98 % | RESPIRATION RATE: 18 BRPM | SYSTOLIC BLOOD PRESSURE: 131 MMHG | HEART RATE: 87 BPM | WEIGHT: 182.98 LBS | DIASTOLIC BLOOD PRESSURE: 80 MMHG

## 2022-11-25 DIAGNOSIS — Z98.49 CATARACT EXTRACTION STATUS, UNSPECIFIED EYE: Chronic | ICD-10-CM

## 2022-11-25 DIAGNOSIS — Z53.21 PROCEDURE AND TREATMENT NOT CARRIED OUT DUE TO PATIENT LEAVING PRIOR TO BEING SEEN BY HEALTH CARE PROVIDER: ICD-10-CM

## 2022-11-25 DIAGNOSIS — Z98.890 OTHER SPECIFIED POSTPROCEDURAL STATES: Chronic | ICD-10-CM

## 2022-11-25 DIAGNOSIS — Z90.89 ACQUIRED ABSENCE OF OTHER ORGANS: Chronic | ICD-10-CM

## 2022-11-25 DIAGNOSIS — R05.9 COUGH, UNSPECIFIED: ICD-10-CM

## 2022-11-25 LAB
FLUAV AG NPH QL: SIGNIFICANT CHANGE UP
FLUBV AG NPH QL: SIGNIFICANT CHANGE UP
RSV RNA NPH QL NAA+NON-PROBE: SIGNIFICANT CHANGE UP
SARS-COV-2 RNA SPEC QL NAA+PROBE: SIGNIFICANT CHANGE UP

## 2022-11-25 PROCEDURE — 87637 SARSCOV2&INF A&B&RSV AMP PRB: CPT

## 2022-11-25 PROCEDURE — 71046 X-RAY EXAM CHEST 2 VIEWS: CPT | Mod: 26

## 2022-11-25 PROCEDURE — L9991: CPT

## 2022-11-25 PROCEDURE — 71046 X-RAY EXAM CHEST 2 VIEWS: CPT

## 2022-11-25 NOTE — ED ADULT TRIAGE NOTE - ARRIVAL FROM
Date Performed: 10/15/2017    HISTORY OF PRESENT ILLNESS:  An 87-year-old woman brought in by her family with increasing agitation.  The patient has known dementia and is cared for by her  at home.  Previously there has not been much in the way of behavioral issues, but recently has become more and more of a problem.  She is agitated after , often refusing to get undressed for bed or to take medications.  Her appetite is somewhat decreased.  She is usually pretty good during the day, though.  On the day of admission, she was yelling and screaming and was not able to be talked down.  Family was concerned and brought her to the hospital.  Workup in the emergency room did not really find an acute illness.  Because of the inability to care for her at home, patient was admitted.    PAST MEDICAL HISTORY:  The patient has hypertension.  She has microscopic colitis, possible mild hyperparathyroidism, osteoporosis and she has the dementia.      PAST SURGICAL HISTORY:    Surgeries include total abdominal hysterectomy and not much else.    MEDICATIONS:  Amlodipine 2.5 mg daily.  Budesonide 3 mg once daily for colitis.   Metoprolol 50 mg b.i.d.  HCTZ 12.5 mg daily.  Calcium supplements.  Aspirin.    HABITS:  Nonsmoker, no alcohol.    SOCIAL HISTORY:  She is , lives with her .    FAMILY HISTORY:  Mother  at age 68, had diabetes.  Father lived to 76, had heart disease.  Has a sister with Parkinson's, another sister with heart disease.  She has at least 2 daughters.    REVIEW OF SYSTEMS:  She recently had some dry retching but that passed quickly and has been a problem since.  Appetite for food and liquids has been decreased.  She has not had any cough or diarrhea.  She has had a number of falls and walks very poorly with just a shuffling gait.  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  She has chronic lower extremity edema, which has gotten better since we reduced  her Amlodipine dosage.  They have a wheelchair for her at home but she refuses to use it.  She does use a cane.    PHYSICAL EXAMINATION:  VITAL SIGNS:  Her heart rate is about 100, respirations 20, blood pressure 140/90, pulse ox 97% on room air, temperature is 98.2.  GENERAL:  Alert woman who is somewhat hard of hearing.  She follows directions to some extent but sometimes difficult to know if she has understood what you have told her or not from either a hearing standpoint or just comprehension standpoint.  For example, when I asked her to squeeze my fingers, it was not clear to me if she understood what I was asking her or if she just did not want to do it.  She does not have any signs of head trauma.  HEENT:  Her pupils were reactive.  Extraocular motions seem to be grossly intact.  Oral mucosa was not able to be examined as she would not allow it.  No facial asymmetry.  External eyes, nose, and lips were normal.  NECK:  No JVD, no carotid bruits.  LUNGS:  Clear to somewhat limited exam; percussion could not be done.  HEART:  Regular rate and rhythm.  No murmurs or ectopy.  ABDOMEN:  Soft, nontender, no hepatosplenomegaly or masses.  EXTREMITIES:  About 1+ edema.  Pedal pulses were decreased.  No obvious skin breakdown but again exam was fairly limited.  She seemed to move all extremities but I could not test it as she was not cooperating.    ADMITTING LABORATORY DATA:  Really unremarkable, mildly elevated SGOT, BUN of 31.  Creatinine is normal.  White blood count was 11.5.  Repeat this morning though was only 7.8, hemoglobin is normal.  Urinalysis is normal.  CT of her head showed age-related changes.    ASSESSMENT AND PLAN:  Elderly woman who has had progressively worsening dementia, admitted with increasing agitation.  There does not seem to be an acute infectious process or other illness to explain this.  I suspect it is just worsening of her disease process.  I spoke to her daughter and her  and  explained my feelings to them.  They have been told about a geriatrics consult and would like to proceed with that so that order was placed.  Will have PT and OT see her.  Most importantly will have  see them regarding their future options for care.  Her code status right now is full code based on her expressed wishes on a previous power of  for health care.  I did discuss with them the ability to change that to a no code situation if they feel that would be more appropriate at this point in her life.  We will activate her power of  for health care.      Dictated By: Raphael Badillo MD  Signing Provider: MD JAVIER Choi/brad ([id number])  DD: 10/16/2017 08:25:06 TD: 10/16/2017 08:47:46    Copy Sent To:    Home

## 2022-11-25 NOTE — ED ADULT NURSE NOTE - OBJECTIVE STATEMENT
Pt presented to ED with c/o of persistent cough. Saw pcp last week, had a course of antibiotic. AOX4. Patient denies chest pain, discomfort, shortness of breath, difficulty breathing and any form of distress not noted. Patient oriented to ED area. All needs attended. Rounding in progress. Fall risk precautions maintained. Pt presented to ED with c/o of persistent cough. Was seen by pcp last week, had a course of antibiotics, takes tylenol prn. AOX4. Patient denies chest pain, discomfort, shortness of breath, difficulty breathing and any form of distress not noted. Patient oriented to ED area. All needs attended. Rounding in progress. Fall risk precautions maintained.

## 2022-11-25 NOTE — ED ADULT TRIAGE NOTE - CHIEF COMPLAINT QUOTE
cough for 3 weeks, went to his pmd,  prescribed antibiotics for a week finished last week, took Tylenol 2 tabs of extra strength due to headache and cold

## 2022-12-08 ENCOUNTER — EMERGENCY (EMERGENCY)
Facility: HOSPITAL | Age: 62
LOS: 1 days | Discharge: HOME | End: 2022-12-08
Admitting: STUDENT IN AN ORGANIZED HEALTH CARE EDUCATION/TRAINING PROGRAM
Payer: MEDICARE

## 2022-12-08 VITALS
DIASTOLIC BLOOD PRESSURE: 72 MMHG | WEIGHT: 171.3 LBS | OXYGEN SATURATION: 97 % | RESPIRATION RATE: 18 BRPM | TEMPERATURE: 98 F | SYSTOLIC BLOOD PRESSURE: 130 MMHG | HEART RATE: 86 BPM
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
## 2022-12-08 DIAGNOSIS — Z90.89 ACQUIRED ABSENCE OF OTHER ORGANS: ICD-10-CM

## 2022-12-08 DIAGNOSIS — Z98.49 CATARACT EXTRACTION STATUS, UNSPECIFIED EYE: Chronic | ICD-10-CM

## 2022-12-08 DIAGNOSIS — R07.89 OTHER CHEST PAIN: ICD-10-CM

## 2022-12-08 DIAGNOSIS — J43.9 EMPHYSEMA, UNSPECIFIED: ICD-10-CM

## 2022-12-08 DIAGNOSIS — Z98.890 OTHER SPECIFIED POSTPROCEDURAL STATES: Chronic | ICD-10-CM

## 2022-12-08 DIAGNOSIS — Z90.89 ACQUIRED ABSENCE OF OTHER ORGANS: Chronic | ICD-10-CM

## 2022-12-08 DIAGNOSIS — J45.909 UNSPECIFIED ASTHMA, UNCOMPLICATED: ICD-10-CM

## 2022-12-08 DIAGNOSIS — Z20.822 CONTACT WITH AND (SUSPECTED) EXPOSURE TO COVID-19: ICD-10-CM

## 2022-12-08 DIAGNOSIS — F32.A DEPRESSION, UNSPECIFIED: ICD-10-CM

## 2022-12-08 DIAGNOSIS — Z87.19 PERSONAL HISTORY OF OTHER DISEASES OF THE DIGESTIVE SYSTEM: ICD-10-CM

## 2022-12-08 DIAGNOSIS — B97.89 OTHER VIRAL AGENTS AS THE CAUSE OF DISEASES CLASSIFIED ELSEWHERE: ICD-10-CM
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
## 2022-12-08 DIAGNOSIS — J06.9 ACUTE UPPER RESPIRATORY INFECTION, UNSPECIFIED: ICD-10-CM

## 2022-12-08 DIAGNOSIS — T48.6X6A UNDERDOSING OF ANTIASTHMATICS, INITIAL ENCOUNTER: ICD-10-CM

## 2022-12-08 DIAGNOSIS — Z91.14 PATIENT'S OTHER NONCOMPLIANCE WITH MEDICATION REGIMEN: ICD-10-CM

## 2022-12-08 PROCEDURE — 99284 EMERGENCY DEPT VISIT MOD MDM: CPT

## 2022-12-08 NOTE — ED ADULT TRIAGE NOTE - CHIEF COMPLAINT QUOTE
Pt reports cold like symptoms and asthma. Pt states "I've been taking my inhalers all day" without relief.

## 2022-12-08 NOTE — ED ADULT NURSE NOTE - OBJECTIVE STATEMENT
Patient a+o x4 c/o sob with exertion, states "I need a breathing treatment". Speaking in full sentences without difficulty, denies sob at this time, no cp/dizziness/n/v/fever/chills.

## 2022-12-08 NOTE — ED ADULT TRIAGE NOTE - NSWEIGHTCALCTOOLDRUG_GEN_A_CORE
Transplant Surgery H&P                                                        HPI:                                                      Ms. Crain is a 24 year old female who comes to clinic today for preop prior to planned laparoscopic kidney donation surgery. The patient was previously reviewed by the living donor multidisciplinary selection committee and found to be medically and psychosocially appropriate for voluntary kidney donation. The patient denies any feelings of being pressured to proceed with kidney donation.  Health events since donor evaluation: None    Special considerations:   Constipation: no  PONV: yes  History of Urinary retention? no  Significant neck/back/joint concerns for lateral decubitus positioning?: No  Islam: No    MEDICAL HISTORY:                                                      Patient Active Problem List    Diagnosis Date Noted     Transplant donor evaluation 07/13/2018     Priority: Medium      Past Medical History:   Diagnosis Date     DUB (dysfunctional uterine bleeding)     s/p hysterectomy      GERD (gastroesophageal reflux disease)      Migraine     botox     Tachycardia      Past Surgical History:   Procedure Laterality Date     HYSTERECTOMY       TONSILLECTOMY       Current Outpatient Medications   Medication Sig Dispense Refill     cetirizine (ZYRTEC) 10 MG tablet Take 10 mg by mouth daily       DULoxetine (CYMBALTA) 20 MG capsule Take 20 mg by mouth daily       omeprazole (PRILOSEC) 20 MG CR capsule Take 20 mg by mouth daily       topiramate (TOPAMAX) 50 MG tablet Take 25 mg by mouth 2 times daily       OTC products: None, except as noted above  Allergies   Allergen Reactions     Indocin [Indomethacin]      Dizziness       Norco [Hydrocodone-Acetaminophen] Nausea and Vomiting     Penicillins Hives     Allergy when an infant     Latex Rash     Sumatriptan Palpitations      Social History     Tobacco Use     Smoking status: Never Smoker     Smokeless tobacco:  Never Used   Substance Use Topics     Alcohol use: Yes     Comment: Occasional- 1-2x per year     History   Drug Use No       REVIEW OF SYSTEMS:                                                    CONSTITUTIONAL: NEGATIVE for fever, chills, change in weight  INTEGUMENTARY/SKIN: NEGATIVE for worrisome rashes, moles or lesions  EYES: NEGATIVE for vision changes or irritation  ENT/MOUTH: NEGATIVE for ear, mouth and throat problems  RESP: NEGATIVE for significant cough or SOB  BREAST: NEGATIVE for masses, tenderness or discharge  CV: NEGATIVE for chest pain, palpitations or peripheral edema  GI: NEGATIVE for nausea, abdominal pain, heartburn, or change in bowel habits  : NEGATIVE for frequency, dysuria, or hematuria  MUSCULOSKELETAL: NEGATIVE for significant arthralgias or myalgia  NEURO: NEGATIVE for weakness, dizziness or paresthesias  ENDOCRINE: NEGATIVE for temperature intolerance, skin/hair changes  HEME: NEGATIVE for bleeding problems  PSYCHIATRIC: NEGATIVE for changes in mood or affect    EXAM:                                                    /87   Pulse 110   Wt 82.1 kg (181 lb)   SpO2 98%   BMI 30.12 kg/m      GENERAL APPEARANCE: healthy, alert and no distress     EYES: EOMI, PERRL     HENT: ear canals and TM's normal and nose and mouth without ulcers or lesions     NECK: no adenopathy, no asymmetry, masses, or scars and thyroid normal to palpation     RESP: lungs clear to auscultation - no rales, rhonchi or wheezes     CV: regular rates and rhythm, normal S1 S2, no S3 or S4 and no murmur, click or rub     ABDOMEN:  soft, nontender, no HSM or masses and bowel sounds normal. Scars consistent with history.  Supraumbilical scar     MS: extremities normal- no gross deformities noted, no evidence of inflammation in joints, FROM in all extremities.     SKIN: no suspicious lesions or rashes     NEURO: Normal strength and tone, sensory exam grossly normal, mentation intact and speech normal     PSYCH:  mentation appears normal. and affect normal/bright     LYMPHATICS: No cervical adenopathy    DIAGNOSTICS:                                                      EKG: appears normal, NSR, normal axis, normal intervals, no acute ST/T changes c/w ischemia, no LVH by voltage criteria, unchanged from previous tracings  Chest XRay  Labs Resulted Today:   Results for orders placed or performed in visit on 05/20/19   UA with Microscopic reflex to Culture   Result Value Ref Range    Color Urine Straw     Appearance Urine Clear     Glucose Urine Negative NEG^Negative mg/dL    Bilirubin Urine Negative NEG^Negative    Ketones Urine Negative NEG^Negative mg/dL    Specific Gravity Urine 1.008 1.003 - 1.035    Blood Urine Negative NEG^Negative    pH Urine 7.0 5.0 - 7.0 pH    Protein Albumin Urine Negative NEG^Negative mg/dL    Urobilinogen mg/dL 0.0 0.0 - 2.0 mg/dL    Nitrite Urine Negative NEG^Negative    Leukocyte Esterase Urine Negative NEG^Negative    Source Midstream Urine     WBC Urine 0 0 - 5 /HPF    RBC Urine 0 0 - 2 /HPF    Squamous Epithelial /HPF Urine 1 0 - 1 /HPF   HCG quantitative pregnancy   Result Value Ref Range    HCG Quantitative Serum <1 0 - 5 IU/L   Creatinine   Result Value Ref Range    Creatinine 0.70 0.52 - 1.04 mg/dL    GFR Estimate >90 >60 mL/min/[1.73_m2]    GFR Estimate If Black >90 >60 mL/min/[1.73_m2]   Hemoglobin   Result Value Ref Range    Hemoglobin 15.2 11.7 - 15.7 g/dL   ABO/Rh type and screen   Result Value Ref Range    ABO O     RH(D) Pos     Antibody Screen Neg     Test Valid Only At          [facility]    Specimen Expires 05/23/2019      Labs Drawn and in Process:   Unresulted Labs Ordered in the Past 30 Days of this Admission     Date and Time Order Name Status Description    5/20/2019 0842 HLA FINAL CROSSMATCH DONOR In process         Recent Labs   Lab Test 05/20/19  1055 07/20/18  0735   HGB 15.2 14.0   PLT  --  344   INR  --  0.98   NA  --  138    POTASSIUM  --  3.8   CR 0.70 0.75   A1C  --  5.4      Assessment:                                                    Healthy voluntary kidney donor    There have been no significant intercurrent medical problems or change of intent in proceeding with kidney donation as scheduled on 5/21/2019.    1. Labs reviewed and within normal limits: Yes  2. EKG (7/20/2018): appears normal, NSR  3. Paired Exchange case: Yes  4. ABO= O  5. Laterality: right  6. Outstanding issues: Final ABO and cross match    Plan:                                                      1. Consent: Done  2. Outstanding issues: Final ABO and cross match     Signed Electronically by: Mable Sosa    Ms. Crain was seen and evaluated today as part of a shared APRN/PA visit.     I personally reviewed past medical and surgical history, vital signs, medications and labs, present and past medical history,and significant physical exam findings with the advanced practice provider.    My key findings include:  Labs and vital reviewed.  All WNL.  Exam benign    Key management decisions made by me and carried out under my direction include:  Consent obtained with full explanation of risk and benefits of surgery  All questions answered  Pt suitable for donation tomorrow    Britney Dial MD FACS    used

## 2022-12-09 VITALS
HEART RATE: 80 BPM | RESPIRATION RATE: 19 BRPM | DIASTOLIC BLOOD PRESSURE: 77 MMHG | SYSTOLIC BLOOD PRESSURE: 142 MMHG | OXYGEN SATURATION: 98 % | TEMPERATURE: 98 F

## 2022-12-09 PROCEDURE — 99283 EMERGENCY DEPT VISIT LOW MDM: CPT | Mod: 25

## 2022-12-09 PROCEDURE — 94640 AIRWAY INHALATION TREATMENT: CPT

## 2022-12-09 PROCEDURE — 87637 SARSCOV2&INF A&B&RSV AMP PRB: CPT

## 2022-12-09 RX ORDER — IPRATROPIUM/ALBUTEROL SULFATE 18-103MCG
3 AEROSOL WITH ADAPTER (GRAM) INHALATION ONCE
Refills: 0 | Status: COMPLETED | OUTPATIENT
Start: 2022-12-09 | End: 2022-12-09

## 2022-12-09 RX ADMIN — Medication 3 MILLILITER(S): at 01:52

## 2022-12-09 RX ADMIN — Medication 60 MILLIGRAM(S): at 01:52

## 2022-12-09 NOTE — ED PROVIDER NOTE - PHYSICAL EXAMINATION
Constitutional: awake and alert, in no acute distress  HEENT: head normocephalic and atraumatic. moist mucous membranes  Eyes: extraocular movements intact, normal conjunctiva  Neck: supple, normal ROM  Cardiovascular: regular rate   Pulmonary: no respiratory distress, b/l  wheezes, no rales,   Gastrointestinal: abdomen flat and nondistended  Skin: warm, dry, normal for ethnicity  Musculoskeletal: no edema, no deformity, NROM  Neurological: oriented x4, no focal neurologic deficit.   Psychiatric: calm and cooperative, no SI/HI

## 2022-12-09 NOTE — ED PROVIDER NOTE - PATIENT PORTAL LINK FT
You can access the FollowMyHealth Patient Portal offered by [facility] by registering at the following website: [email or web address]. By joining [facility]’s FollowMyHealth portal, you will also be able to view your health information using other applications (apps) compatible with our system.

## 2022-12-09 NOTE — ED PROVIDER NOTE - CLINICAL SUMMARY MEDICAL DECISION MAKING FREE TEXT BOX
61 yo male with h/o asthma/copd in the Er c/o wheezing and chest tightness tonight. Pt reports he woke up to go to the bathroom and felt very tight in his chest. Pt mentioned he runs out of his rescue inhalers and his nebulizer machine is not working.  Pt denies CP, fever, chills, has no other complaints. Was tested covid negative  last month, fully vaccinated last year for covid.  pt afebrile, in NAD. no labored breathing. suspect viral respiratory infection with some asthma exacerbation. plan : symptomatic treatment, anticipate d/c home with oral prednisone and albuterol inhaler, will check for flu/covid.

## 2022-12-09 NOTE — ED PROVIDER NOTE - NSFOLLOWUPINSTRUCTIONS_ED_ALL_ED_FT
Viral Respiratory Infection      A viral respiratory infection is an illness that affects parts of the body that are used for breathing. These include the lungs, nose, and throat. It is caused by a germ called a virus.    Some examples of this kind of infection are:  •A cold.      •The flu (influenza).      •A respiratory syncytial virus (RSV) infection.        What are the causes?    This condition is caused by a virus. It spreads from person to person. You can get the virus if:  •You breathe in droplets from someone who is sick.      •You come in contact with people who are sick.      •You touch mucus or other fluid from a person who is sick.        What are the signs or symptoms?    Symptoms of this condition include:  •A stuffy or runny nose.      •A sore throat.      •A cough.      •Shortness of breath.      •Trouble breathing.      •Yellow or green fluid in the nose.      Other symptoms may include:  •A fever.      •Sweating or chills.      •Tiredness (fatigue).      •Achy muscles.      •A headache.        How is this treated?    This condition may be treated with:  •Medicines that treat viruses.      •Medicines that make it easy to breathe.      •Medicines that are sprayed into the nose.      •Acetaminophen or NSAIDs, such as ibuprofen, to treat fever.        Follow these instructions at home:    Managing pain and congestion     •Take over-the-counter and prescription medicines only as told by your doctor.    •If you have a sore throat, gargle with salt water. Do this 3–4 times a day or as needed.  •To make salt water, dissolve ½–1 tsp (3–6 g) of salt in 1 cup (237 mL) of warm water. Make sure that all the salt dissolves.        •Use nose drops made from salt water. This helps with stuffiness (congestion). It also helps soften the skin around your nose.    •Take 2 tsp (10 mL) of honey at bedtime to lessen coughing at night.  •Do not give honey to children who are younger than 1 year old.        •Drink enough fluid to keep your pee (urine) pale yellow.        General instructions   A sign telling the reader not to smoke.   •Rest as much as possible.      • Do not drink alcohol.      • Do not smoke or use any products that contain nicotine or tobacco. If you need help quitting, ask your doctor.      •Keep all follow-up visits.        How is this prevented?       Washing hands with soap and water.       A person covering her mouth and nose with a cloth while sneezing.     •Get a flu shot every year. Ask your doctor when you should get your flu shot.    • Do not let other people get your germs. If you are sick:  •Wash your hands with soap and water often. Wash your hands after you cough or sneeze. Wash hands for at least 20 seconds. If you cannot use soap and water, use hand .      •Cover your mouth when you cough. Cover your nose and mouth when you sneeze.      •Do not share cups or eating utensils.      •Clean commonly used objects often. Clean commonly touched surfaces.      •Stay home from work or school.        •Avoid contact with people who are sick during cold and flu season. This is in fall and winter.        Get help if:    •Your symptoms last for 10 days or longer.      •Your symptoms get worse over time.      •You have very bad pain in your face or forehead.      •Parts of your jaw or neck get very swollen.      •You have shortness of breath.        Get help right away if:    •You feel pain or pressure in your chest.      •You have trouble breathing.      •You faint or feel like you will faint.      •You keep vomiting and it gets worse.      •You feel confused.      These symptoms may be an emergency. Get help right away. Call your local emergency services (911 in the [place]   • Do not wait to see if the symptoms will go away.        •  Do not drive yourself to the hospital.         Summary    •A viral respiratory infection is an illness that affects parts of the body that are used for breathing.      •Examples of this illness include a cold, the flu, and a respiratory syncytial virus (RSV) infection.      •The infection can cause a runny nose, cough, sore throat, and fever.      •Follow what your doctor tells you about taking medicines, drinking lots of fluid, washing your hands, resting at home, and avoiding people who are sick.      This information is not intended to replace advice given to you by your health care provider. Make sure you discuss any questions you have with your health care provider.

## 2022-12-09 NOTE — ED PROVIDER NOTE - OBJECTIVE STATEMENT
63 yo male with h/o asthma/copd in the Er c/o wheezing and chest tightness tonight. Pt reports he woke up to go to the bathroom and felt very tight in his chest. Pt mentioned he runs out of his rescue inhalers and his nebulizer machine is not working.  Pt denies CP, fever, chills, has no other complaints. Was tested covid negative  last month, fully vaccinated last year for covid.

## 2022-12-15 ENCOUNTER — EMERGENCY (EMERGENCY)
Facility: HOSPITAL | Age: 62
LOS: 1 days | Discharge: LEFT BEFORE DISCHARGE | End: 2022-12-15
Attending: EMERGENCY MEDICINE | Admitting: EMERGENCY MEDICINE
Payer: MEDICARE

## 2022-12-15 VITALS
RESPIRATION RATE: 19 BRPM | HEART RATE: 100 BPM | SYSTOLIC BLOOD PRESSURE: 111 MMHG | TEMPERATURE: 98 F | OXYGEN SATURATION: 98 % | DIASTOLIC BLOOD PRESSURE: 68 MMHG | WEIGHT: 167.99 LBS

## 2022-12-15 DIAGNOSIS — Z53.21 PROCEDURE AND TREATMENT NOT CARRIED OUT DUE TO PATIENT LEAVING PRIOR TO BEING SEEN BY HEALTH CARE PROVIDER: ICD-10-CM

## 2022-12-15 DIAGNOSIS — R07.89 OTHER CHEST PAIN: ICD-10-CM

## 2022-12-15 DIAGNOSIS — Z90.89 ACQUIRED ABSENCE OF OTHER ORGANS: Chronic | ICD-10-CM

## 2022-12-15 DIAGNOSIS — Z98.49 CATARACT EXTRACTION STATUS, UNSPECIFIED EYE: Chronic | ICD-10-CM

## 2022-12-15 DIAGNOSIS — Z98.890 OTHER SPECIFIED POSTPROCEDURAL STATES: Chronic | ICD-10-CM

## 2022-12-15 PROCEDURE — L9991: CPT

## 2022-12-15 PROCEDURE — 93005 ELECTROCARDIOGRAM TRACING: CPT

## 2022-12-15 NOTE — ED ADULT NURSE NOTE - NSIMPLEMENTINTERV_GEN_ALL_ED
Implemented All Universal Safety Interventions:  [place] to call system. Call bell, personal items and telephone within reach. Instruct patient to call for assistance. Room bathroom lighting operational. Non-slip footwear when patient is off stretcher. Physically safe environment: no spills, clutter or unnecessary equipment. Stretcher in lowest position, wheels locked, appropriate side rails in place.

## 2022-12-15 NOTE — ED ADULT NURSE NOTE - OBJECTIVE STATEMENT
pt c/o generalized chest discomfort and sob coming off the train an hour ago. denies any other symptoms.

## 2022-12-19 ENCOUNTER — EMERGENCY (EMERGENCY)
Facility: HOSPITAL | Age: 62
LOS: 1 days | Discharge: HOME | End: 2022-12-19
Attending: EMERGENCY MEDICINE | Admitting: EMERGENCY MEDICINE
Payer: MEDICARE

## 2022-12-19 VITALS
HEART RATE: 92 BPM | RESPIRATION RATE: 18 BRPM | TEMPERATURE: 98 F | DIASTOLIC BLOOD PRESSURE: 73 MMHG | SYSTOLIC BLOOD PRESSURE: 124 MMHG | OXYGEN SATURATION: 98 % | WEIGHT: 171.08 LBS

## 2022-12-19 DIAGNOSIS — Z98.49 CATARACT EXTRACTION STATUS, UNSPECIFIED EYE: Chronic | ICD-10-CM

## 2022-12-19 DIAGNOSIS — R05.9 COUGH, UNSPECIFIED: ICD-10-CM

## 2022-12-19 DIAGNOSIS — Z98.890 OTHER SPECIFIED POSTPROCEDURAL STATES: Chronic | ICD-10-CM

## 2022-12-19 DIAGNOSIS — Z90.89 ACQUIRED ABSENCE OF OTHER ORGANS: Chronic | ICD-10-CM

## 2022-12-19 DIAGNOSIS — Z87.09 PERSONAL HISTORY OF OTHER DISEASES OF THE RESPIRATORY SYSTEM: ICD-10-CM

## 2022-12-19 DIAGNOSIS — Z20.822 CONTACT WITH AND (SUSPECTED) EXPOSURE TO COVID-19: ICD-10-CM

## 2022-12-19 DIAGNOSIS — J44.1 CHRONIC OBSTRUCTIVE PULMONARY DISEASE WITH (ACUTE) EXACERBATION: ICD-10-CM

## 2022-12-19 PROCEDURE — 99283 EMERGENCY DEPT VISIT LOW MDM: CPT | Mod: 25

## 2022-12-19 PROCEDURE — 94640 AIRWAY INHALATION TREATMENT: CPT

## 2022-12-19 PROCEDURE — 71045 X-RAY EXAM CHEST 1 VIEW: CPT

## 2022-12-19 PROCEDURE — 87637 SARSCOV2&INF A&B&RSV AMP PRB: CPT

## 2022-12-19 PROCEDURE — 71045 X-RAY EXAM CHEST 1 VIEW: CPT | Mod: 26

## 2022-12-19 PROCEDURE — 99284 EMERGENCY DEPT VISIT MOD MDM: CPT | Mod: FS,25

## 2022-12-19 RX ORDER — IPRATROPIUM/ALBUTEROL SULFATE 18-103MCG
3 AEROSOL WITH ADAPTER (GRAM) INHALATION
Refills: 0 | Status: DISCONTINUED | OUTPATIENT
Start: 2022-12-19 | End: 2022-12-23

## 2022-12-19 RX ADMIN — Medication 50 MILLIGRAM(S): at 22:24

## 2022-12-19 RX ADMIN — Medication 3 MILLILITER(S): at 22:25

## 2022-12-19 NOTE — ED PROVIDER NOTE - NSFOLLOWUPINSTRUCTIONS_ED_ALL_ED_FT
Use albuterol inhalers as prescribed.   Take prednisone as prescribed x 5 days.     Follow up with your Primary Care Doctor within one week - if you do not have a Primary Care Doctor call the number above to see someone in our primary care clinic.    Return to the Emergency Department for persistent, worsening or new symptoms including difficulty breathing, severe cough, high fever, chest pain, or any other serious concerns.

## 2022-12-19 NOTE — ED PROVIDER NOTE - CLINICAL SUMMARY MEDICAL DECISION MAKING FREE TEXT BOX
pt w hx of chronic bronchitis, copd. here w cough, SOB, wheezing x few days. no fever, chest pain, leg swelling. no improvement w home inhaler. out of home nebs.   pt sleeping comfortably, nontoxic. vitals stable - not hypoxic or tachypneic. expiratory wheezing throughout. no rales / rhonchi.   suspect copd exacerbation, possible viral illness.   r/o pna  ecg in triage nonischemic. pt denies chest pain. do not suspect acs.   plan - viral swab, cxr  trial steroids, duonebs.  reassess

## 2022-12-19 NOTE — ED PROVIDER NOTE - PROGRESS NOTE DETAILS
cxr w/o pna. swab negative  pt feeling better after nebs. rpt lung exam clear.   will dc on short course steroids.     All results reviewed with the patient verbally. Discharge plan and return precautions d/w pt who verbalized understanding and agrees with plan. All questions answered. Vitals WNL. Ready for d/c.

## 2022-12-19 NOTE — ED PROVIDER NOTE - OBJECTIVE STATEMENT
62 yr old male, history of chronic bronchitis, copd, presents to the Emergency Department with cough / SOB. pt reports recent 62 yr old male, history of chronic bronchitis, copd, presents to the Emergency Department with cough / SOB. pt reports recent congestion, cough that was improving. now w continued cough, sob, wheezing. cough non-productive. worse x 2 days. feels like his copd. home inhalers have not been working and did not  his albuterol / nebulizer refills. no fever, chest pain, SOB, abd pain, n/v/d, leg swelling.

## 2022-12-19 NOTE — ED PROVIDER NOTE - PATIENT PORTAL LINK FT
You can access the FollowMyHealth Patient Portal offered by [facility] by registering at the following website: [email or web address]. By joining [email or web address]’s FollowMyHealth portal, you will also be able to view your health information using other applications (apps) compatible with our system.

## 2022-12-19 NOTE — ED PROVIDER NOTE - PHYSICAL EXAMINATION
Constitutional : sleeping comfortably. nontoxic appearing. awake, alert, oriented to person, place, time/situation.  Head : head normocephalic, atraumatic  EENMT : eyes clear bilaterally, PERRL, EOMI. airway patent. moist mucous membranes. neck supple.  Cardiac : Normal rate, regular rhythm. No murmur appreciated, no LE edema.  Resp : expiratory wheezing throughout. no rales, rhonchi. Respirations even and unlabored.   Gastro : abdomen soft, nontender, nondistended. no rebound or guarding. no CVAT.  MSK :  range of motion is not limited, no muscle or joint tenderness  Back : No evidence of trauma. No spinal or CVA tenderness.  Vasc : Extremities warm and well perfused. 2+ radial and DP pulses. cap refill <2 seconds  Neuro : Alert and oriented, CNII-XII grossly intact, no focal deficits, no motor or sensory deficits.  Skin : Skin normal color for race, warm, dry and intact. No evidence of rash.  Psych : Alert and oriented to person, place, time/situation. normal mood and affect. no apparent risk to self or others.

## 2022-12-20 VITALS
TEMPERATURE: 98 F | HEART RATE: 85 BPM | DIASTOLIC BLOOD PRESSURE: 84 MMHG | OXYGEN SATURATION: 96 % | SYSTOLIC BLOOD PRESSURE: 162 MMHG | RESPIRATION RATE: 18 BRPM

## 2022-12-20 NOTE — ED ADULT NURSE NOTE - OBJECTIVE STATEMENT
pt. with MHx of COPD, presents with c/o cough, congestion, SOB. pt. states his inhaler was not helping for SOB, cough abd wheezing he has had for the past couple of days. pt. denies chest pain, fever, chills, n/v, dizziness, diaphoresis, weakness.

## 2022-12-28 NOTE — ED ADULT NURSE NOTE - TOBACCO CESSATION EDUCATION/COUNSELLING(PROVIDED IF TOBACCO USED IN THE PAST 30 DAYS- CORE MEASURE SITES)
Offered and patient declined
Patient is from [facility] for bilateral lower extremity swelling.

## 2023-01-03 ENCOUNTER — EMERGENCY (EMERGENCY)
Facility: HOSPITAL | Age: 63
LOS: 1 days | Discharge: LEFT BEFORE DISCHARGE | End: 2023-01-03
Attending: EMERGENCY MEDICINE | Admitting: EMERGENCY MEDICINE
Payer: MEDICARE

## 2023-01-03 VITALS
WEIGHT: 171.08 LBS | RESPIRATION RATE: 18 BRPM | DIASTOLIC BLOOD PRESSURE: 74 MMHG | SYSTOLIC BLOOD PRESSURE: 133 MMHG | HEART RATE: 99 BPM | OXYGEN SATURATION: 96 % | TEMPERATURE: 99 F

## 2023-01-03 DIAGNOSIS — Z98.49 CATARACT EXTRACTION STATUS, UNSPECIFIED EYE: Chronic | ICD-10-CM

## 2023-01-03 DIAGNOSIS — Z98.890 OTHER SPECIFIED POSTPROCEDURAL STATES: Chronic | ICD-10-CM

## 2023-01-03 DIAGNOSIS — Z90.89 ACQUIRED ABSENCE OF OTHER ORGANS: Chronic | ICD-10-CM

## 2023-01-03 LAB
ALBUMIN SERPL ELPH-MCNC: 3.8 G/DL — SIGNIFICANT CHANGE UP (ref 3.3–5)
ALP SERPL-CCNC: 69 U/L — SIGNIFICANT CHANGE UP (ref 40–120)
ALT FLD-CCNC: 19 U/L — SIGNIFICANT CHANGE UP (ref 10–45)
ANION GAP SERPL CALC-SCNC: 6 MMOL/L — SIGNIFICANT CHANGE UP (ref 5–17)
APTT BLD: 23.7 SEC — LOW (ref 27.5–35.5)
AST SERPL-CCNC: 14 U/L — SIGNIFICANT CHANGE UP (ref 10–40)
BASOPHILS # BLD AUTO: 0.01 K/UL — SIGNIFICANT CHANGE UP (ref 0–0.2)
BASOPHILS NFR BLD AUTO: 0.1 % — SIGNIFICANT CHANGE UP (ref 0–2)
BILIRUB SERPL-MCNC: 0.2 MG/DL — SIGNIFICANT CHANGE UP (ref 0.2–1.2)
BUN SERPL-MCNC: 17 MG/DL — SIGNIFICANT CHANGE UP (ref 7–23)
CALCIUM SERPL-MCNC: 8.6 MG/DL — SIGNIFICANT CHANGE UP (ref 8.4–10.5)
CHLORIDE SERPL-SCNC: 100 MMOL/L — SIGNIFICANT CHANGE UP (ref 96–108)
CK MB CFR SERPL CALC: 1.7 NG/ML — SIGNIFICANT CHANGE UP (ref 0–6.7)
CK SERPL-CCNC: 43 U/L — SIGNIFICANT CHANGE UP (ref 30–200)
CO2 SERPL-SCNC: 31 MMOL/L — SIGNIFICANT CHANGE UP (ref 22–31)
CREAT SERPL-MCNC: 0.75 MG/DL — SIGNIFICANT CHANGE UP (ref 0.5–1.3)
EGFR: 102 ML/MIN/1.73M2 — SIGNIFICANT CHANGE UP
EOSINOPHIL # BLD AUTO: 0.03 K/UL — SIGNIFICANT CHANGE UP (ref 0–0.5)
EOSINOPHIL NFR BLD AUTO: 0.3 % — SIGNIFICANT CHANGE UP (ref 0–6)
GLUCOSE SERPL-MCNC: 134 MG/DL — HIGH (ref 70–99)
HCT VFR BLD CALC: 27.9 % — LOW (ref 39–50)
HGB BLD-MCNC: 8.9 G/DL — LOW (ref 13–17)
IMM GRANULOCYTES NFR BLD AUTO: 1.1 % — HIGH (ref 0–0.9)
INR BLD: 0.98 — SIGNIFICANT CHANGE UP (ref 0.88–1.16)
LYMPHOCYTES # BLD AUTO: 1.21 K/UL — SIGNIFICANT CHANGE UP (ref 1–3.3)
LYMPHOCYTES # BLD AUTO: 11.9 % — LOW (ref 13–44)
MAGNESIUM SERPL-MCNC: 2 MG/DL — SIGNIFICANT CHANGE UP (ref 1.6–2.6)
MCHC RBC-ENTMCNC: 29.3 PG — SIGNIFICANT CHANGE UP (ref 27–34)
MCHC RBC-ENTMCNC: 31.9 GM/DL — LOW (ref 32–36)
MCV RBC AUTO: 91.8 FL — SIGNIFICANT CHANGE UP (ref 80–100)
MONOCYTES # BLD AUTO: 0.75 K/UL — SIGNIFICANT CHANGE UP (ref 0–0.9)
MONOCYTES NFR BLD AUTO: 7.4 % — SIGNIFICANT CHANGE UP (ref 2–14)
NEUTROPHILS # BLD AUTO: 8.02 K/UL — HIGH (ref 1.8–7.4)
NEUTROPHILS NFR BLD AUTO: 79.2 % — HIGH (ref 43–77)
NRBC # BLD: 0 /100 WBCS — SIGNIFICANT CHANGE UP (ref 0–0)
NT-PROBNP SERPL-SCNC: 184 PG/ML — SIGNIFICANT CHANGE UP (ref 0–300)
PLATELET # BLD AUTO: 226 K/UL — SIGNIFICANT CHANGE UP (ref 150–400)
POTASSIUM SERPL-MCNC: 4.2 MMOL/L — SIGNIFICANT CHANGE UP (ref 3.5–5.3)
POTASSIUM SERPL-SCNC: 4.2 MMOL/L — SIGNIFICANT CHANGE UP (ref 3.5–5.3)
PROT SERPL-MCNC: 5.9 G/DL — LOW (ref 6–8.3)
PROTHROM AB SERPL-ACNC: 11.7 SEC — SIGNIFICANT CHANGE UP (ref 10.5–13.4)
RBC # BLD: 3.04 M/UL — LOW (ref 4.2–5.8)
RBC # FLD: 17.5 % — HIGH (ref 10.3–14.5)
SARS-COV-2 RNA SPEC QL NAA+PROBE: POSITIVE
SODIUM SERPL-SCNC: 137 MMOL/L — SIGNIFICANT CHANGE UP (ref 135–145)
TROPONIN T SERPL-MCNC: 0.01 NG/ML — SIGNIFICANT CHANGE UP (ref 0–0.01)
WBC # BLD: 10.13 K/UL — SIGNIFICANT CHANGE UP (ref 3.8–10.5)
WBC # FLD AUTO: 10.13 K/UL — SIGNIFICANT CHANGE UP (ref 3.8–10.5)

## 2023-01-03 PROCEDURE — 82550 ASSAY OF CK (CPK): CPT

## 2023-01-03 PROCEDURE — 36415 COLL VENOUS BLD VENIPUNCTURE: CPT

## 2023-01-03 PROCEDURE — 85610 PROTHROMBIN TIME: CPT

## 2023-01-03 PROCEDURE — 83880 ASSAY OF NATRIURETIC PEPTIDE: CPT

## 2023-01-03 PROCEDURE — 85730 THROMBOPLASTIN TIME PARTIAL: CPT

## 2023-01-03 PROCEDURE — 83735 ASSAY OF MAGNESIUM: CPT

## 2023-01-03 PROCEDURE — 80053 COMPREHEN METABOLIC PANEL: CPT

## 2023-01-03 PROCEDURE — 82553 CREATINE MB FRACTION: CPT

## 2023-01-03 PROCEDURE — L9992: CPT

## 2023-01-03 PROCEDURE — 85025 COMPLETE CBC W/AUTO DIFF WBC: CPT

## 2023-01-03 PROCEDURE — 84484 ASSAY OF TROPONIN QUANT: CPT

## 2023-01-03 PROCEDURE — 87635 SARS-COV-2 COVID-19 AMP PRB: CPT

## 2023-01-03 RX ORDER — ALBUTEROL 90 UG/1
2.5 AEROSOL, METERED ORAL ONCE
Refills: 0 | Status: COMPLETED | OUTPATIENT
Start: 2023-01-03 | End: 2023-01-03

## 2023-01-03 RX ORDER — IPRATROPIUM/ALBUTEROL SULFATE 18-103MCG
3 AEROSOL WITH ADAPTER (GRAM) INHALATION ONCE
Refills: 0 | Status: COMPLETED | OUTPATIENT
Start: 2023-01-03 | End: 2023-01-03

## 2023-01-03 NOTE — ED ADULT NURSE REASSESSMENT NOTE - NS ED NURSE REASSESS COMMENT FT1
called pt primary number.  pt states he wanted to go home. pt did not want to back.   provider was aware.

## 2023-01-03 NOTE — ED PROVIDER NOTE - ATTENDING APP SHARED VISIT CONTRIBUTION OF CARE
63 yo, ho of COPD,/ emphysema  CP / SOB since this afternoon, no relief w albuterol, reports congestion / cough for a few days, productive, frequent BM's but not loose,   diffuse wheezing   consider pna , consider COPD exaccerbation secondary to COVID + , consider ACS, will offer Paxlovid ,   steroids / nebs ,   EKG reassuring,   dispo pending workup / reeval.

## 2023-01-03 NOTE — ED ADULT NURSE NOTE - NSELOPED_ED_ALL_ED
Patient and/or family announced that they are leaving. They were advised to stay, advised to return if worse./Call was placed to patient's given phone number to arrange for patient to  instructions and/or prescription./Patient's chart was referred to charge nurse/supervisor for disposition of prescription and/or discharge instructions./Physician notified

## 2023-01-03 NOTE — ED ADULT TRIAGE NOTE - CHIEF COMPLAINT QUOTE
Pt states "I have been having chest pain and shortness of breath since this morning. I have a history of bronchitis, COPD and emphysema."

## 2023-01-04 ENCOUNTER — EMERGENCY (EMERGENCY)
Facility: HOSPITAL | Age: 63
LOS: 1 days | Discharge: HOME | End: 2023-01-04
Attending: STUDENT IN AN ORGANIZED HEALTH CARE EDUCATION/TRAINING PROGRAM | Admitting: STUDENT IN AN ORGANIZED HEALTH CARE EDUCATION/TRAINING PROGRAM
Payer: MEDICARE

## 2023-01-04 VITALS
OXYGEN SATURATION: 96 % | TEMPERATURE: 99 F | RESPIRATION RATE: 20 BRPM | DIASTOLIC BLOOD PRESSURE: 76 MMHG | HEART RATE: 106 BPM | SYSTOLIC BLOOD PRESSURE: 124 MMHG | WEIGHT: 171.08 LBS

## 2023-01-04 VITALS
DIASTOLIC BLOOD PRESSURE: 67 MMHG | OXYGEN SATURATION: 95 % | RESPIRATION RATE: 19 BRPM | HEART RATE: 90 BPM | TEMPERATURE: 97 F | SYSTOLIC BLOOD PRESSURE: 131 MMHG

## 2023-01-04 DIAGNOSIS — M79.10 MYALGIA, UNSPECIFIED SITE: ICD-10-CM

## 2023-01-04 DIAGNOSIS — R05.9 COUGH, UNSPECIFIED: ICD-10-CM

## 2023-01-04 DIAGNOSIS — Z90.89 ACQUIRED ABSENCE OF OTHER ORGANS: Chronic | ICD-10-CM

## 2023-01-04 DIAGNOSIS — F17.200 NICOTINE DEPENDENCE, UNSPECIFIED, UNCOMPLICATED: ICD-10-CM

## 2023-01-04 DIAGNOSIS — Z20.822 CONTACT WITH AND (SUSPECTED) EXPOSURE TO COVID-19: ICD-10-CM

## 2023-01-04 DIAGNOSIS — J43.9 EMPHYSEMA, UNSPECIFIED: ICD-10-CM

## 2023-01-04 DIAGNOSIS — Z98.49 CATARACT EXTRACTION STATUS, UNSPECIFIED EYE: Chronic | ICD-10-CM

## 2023-01-04 DIAGNOSIS — Z98.890 OTHER SPECIFIED POSTPROCEDURAL STATES: Chronic | ICD-10-CM

## 2023-01-04 LAB
ALBUMIN SERPL ELPH-MCNC: 3.9 G/DL — SIGNIFICANT CHANGE UP (ref 3.3–5)
ALP SERPL-CCNC: 66 U/L — SIGNIFICANT CHANGE UP (ref 40–120)
ALT FLD-CCNC: 22 U/L — SIGNIFICANT CHANGE UP (ref 10–45)
ANION GAP SERPL CALC-SCNC: 6 MMOL/L — SIGNIFICANT CHANGE UP (ref 5–17)
APTT BLD: 25.4 SEC — LOW (ref 27.5–35.5)
AST SERPL-CCNC: 18 U/L — SIGNIFICANT CHANGE UP (ref 10–40)
BASOPHILS # BLD AUTO: 0.01 K/UL — SIGNIFICANT CHANGE UP (ref 0–0.2)
BASOPHILS NFR BLD AUTO: 0.1 % — SIGNIFICANT CHANGE UP (ref 0–2)
BILIRUB SERPL-MCNC: 0.2 MG/DL — SIGNIFICANT CHANGE UP (ref 0.2–1.2)
BUN SERPL-MCNC: 20 MG/DL — SIGNIFICANT CHANGE UP (ref 7–23)
CALCIUM SERPL-MCNC: 8.4 MG/DL — SIGNIFICANT CHANGE UP (ref 8.4–10.5)
CHLORIDE SERPL-SCNC: 104 MMOL/L — SIGNIFICANT CHANGE UP (ref 96–108)
CK MB CFR SERPL CALC: 1.7 NG/ML — SIGNIFICANT CHANGE UP (ref 0–6.7)
CK SERPL-CCNC: 49 U/L — SIGNIFICANT CHANGE UP (ref 30–200)
CO2 SERPL-SCNC: 29 MMOL/L — SIGNIFICANT CHANGE UP (ref 22–31)
CREAT SERPL-MCNC: 1.13 MG/DL — SIGNIFICANT CHANGE UP (ref 0.5–1.3)
EGFR: 73 ML/MIN/1.73M2 — SIGNIFICANT CHANGE UP
EOSINOPHIL # BLD AUTO: 0.04 K/UL — SIGNIFICANT CHANGE UP (ref 0–0.5)
EOSINOPHIL NFR BLD AUTO: 0.5 % — SIGNIFICANT CHANGE UP (ref 0–6)
GLUCOSE SERPL-MCNC: 114 MG/DL — HIGH (ref 70–99)
HCT VFR BLD CALC: 28.3 % — LOW (ref 39–50)
HGB BLD-MCNC: 9 G/DL — LOW (ref 13–17)
IMM GRANULOCYTES NFR BLD AUTO: 0.9 % — SIGNIFICANT CHANGE UP (ref 0–0.9)
INR BLD: 1 — SIGNIFICANT CHANGE UP (ref 0.88–1.16)
LYMPHOCYTES # BLD AUTO: 1.08 K/UL — SIGNIFICANT CHANGE UP (ref 1–3.3)
LYMPHOCYTES # BLD AUTO: 13.9 % — SIGNIFICANT CHANGE UP (ref 13–44)
MCHC RBC-ENTMCNC: 29.2 PG — SIGNIFICANT CHANGE UP (ref 27–34)
MCHC RBC-ENTMCNC: 31.8 GM/DL — LOW (ref 32–36)
MCV RBC AUTO: 91.9 FL — SIGNIFICANT CHANGE UP (ref 80–100)
MONOCYTES # BLD AUTO: 0.79 K/UL — SIGNIFICANT CHANGE UP (ref 0–0.9)
MONOCYTES NFR BLD AUTO: 10.2 % — SIGNIFICANT CHANGE UP (ref 2–14)
NEUTROPHILS # BLD AUTO: 5.77 K/UL — SIGNIFICANT CHANGE UP (ref 1.8–7.4)
NEUTROPHILS NFR BLD AUTO: 74.4 % — SIGNIFICANT CHANGE UP (ref 43–77)
NRBC # BLD: 0 /100 WBCS — SIGNIFICANT CHANGE UP (ref 0–0)
PLATELET # BLD AUTO: 250 K/UL — SIGNIFICANT CHANGE UP (ref 150–400)
POTASSIUM SERPL-MCNC: 4.8 MMOL/L — SIGNIFICANT CHANGE UP (ref 3.5–5.3)
POTASSIUM SERPL-SCNC: 4.8 MMOL/L — SIGNIFICANT CHANGE UP (ref 3.5–5.3)
PROT SERPL-MCNC: 6.2 G/DL — SIGNIFICANT CHANGE UP (ref 6–8.3)
PROTHROM AB SERPL-ACNC: 11.9 SEC — SIGNIFICANT CHANGE UP (ref 10.5–13.4)
RBC # BLD: 3.08 M/UL — LOW (ref 4.2–5.8)
RBC # FLD: 17.9 % — HIGH (ref 10.3–14.5)
SARS-COV-2 RNA SPEC QL NAA+PROBE: NEGATIVE — SIGNIFICANT CHANGE UP
SODIUM SERPL-SCNC: 139 MMOL/L — SIGNIFICANT CHANGE UP (ref 135–145)
TROPONIN T SERPL-MCNC: 0.01 NG/ML — SIGNIFICANT CHANGE UP (ref 0–0.01)
WBC # BLD: 7.76 K/UL — SIGNIFICANT CHANGE UP (ref 3.8–10.5)
WBC # FLD AUTO: 7.76 K/UL — SIGNIFICANT CHANGE UP (ref 3.8–10.5)

## 2023-01-04 PROCEDURE — 36415 COLL VENOUS BLD VENIPUNCTURE: CPT

## 2023-01-04 PROCEDURE — 84484 ASSAY OF TROPONIN QUANT: CPT

## 2023-01-04 PROCEDURE — 82550 ASSAY OF CK (CPK): CPT

## 2023-01-04 PROCEDURE — 96374 THER/PROPH/DIAG INJ IV PUSH: CPT

## 2023-01-04 PROCEDURE — 80053 COMPREHEN METABOLIC PANEL: CPT

## 2023-01-04 PROCEDURE — 71045 X-RAY EXAM CHEST 1 VIEW: CPT

## 2023-01-04 PROCEDURE — 94640 AIRWAY INHALATION TREATMENT: CPT

## 2023-01-04 PROCEDURE — 85610 PROTHROMBIN TIME: CPT

## 2023-01-04 PROCEDURE — 82553 CREATINE MB FRACTION: CPT

## 2023-01-04 PROCEDURE — 99285 EMERGENCY DEPT VISIT HI MDM: CPT | Mod: FS

## 2023-01-04 PROCEDURE — 87635 SARS-COV-2 COVID-19 AMP PRB: CPT

## 2023-01-04 PROCEDURE — 71045 X-RAY EXAM CHEST 1 VIEW: CPT | Mod: 26

## 2023-01-04 PROCEDURE — 99285 EMERGENCY DEPT VISIT HI MDM: CPT | Mod: 25

## 2023-01-04 PROCEDURE — 85025 COMPLETE CBC W/AUTO DIFF WBC: CPT

## 2023-01-04 PROCEDURE — 85730 THROMBOPLASTIN TIME PARTIAL: CPT

## 2023-01-04 RX ORDER — DEXAMETHASONE 0.5 MG/5ML
6 ELIXIR ORAL ONCE
Refills: 0 | Status: COMPLETED | OUTPATIENT
Start: 2023-01-04 | End: 2023-01-04

## 2023-01-04 RX ORDER — IPRATROPIUM/ALBUTEROL SULFATE 18-103MCG
3 AEROSOL WITH ADAPTER (GRAM) INHALATION ONCE
Refills: 0 | Status: DISCONTINUED | OUTPATIENT
Start: 2023-01-04 | End: 2023-01-04

## 2023-01-04 RX ORDER — SODIUM CHLORIDE 9 MG/ML
1000 INJECTION INTRAMUSCULAR; INTRAVENOUS; SUBCUTANEOUS ONCE
Refills: 0 | Status: COMPLETED | OUTPATIENT
Start: 2023-01-04 | End: 2023-01-04

## 2023-01-04 RX ORDER — ALBUTEROL 90 UG/1
2.5 AEROSOL, METERED ORAL ONCE
Refills: 0 | Status: COMPLETED | OUTPATIENT
Start: 2023-01-04 | End: 2023-01-04

## 2023-01-04 RX ORDER — IPRATROPIUM BROMIDE 0.2 MG/ML
500 SOLUTION, NON-ORAL INHALATION ONCE
Refills: 0 | Status: COMPLETED | OUTPATIENT
Start: 2023-01-04 | End: 2023-01-04

## 2023-01-04 RX ADMIN — ALBUTEROL 2.5 MILLIGRAM(S): 90 AEROSOL, METERED ORAL at 21:00

## 2023-01-04 RX ADMIN — SODIUM CHLORIDE 1000 MILLILITER(S): 9 INJECTION INTRAMUSCULAR; INTRAVENOUS; SUBCUTANEOUS at 20:45

## 2023-01-04 RX ADMIN — ALBUTEROL 2.5 MILLIGRAM(S): 90 AEROSOL, METERED ORAL at 22:08

## 2023-01-04 RX ADMIN — Medication 500 MICROGRAM(S): at 22:08

## 2023-01-04 RX ADMIN — Medication 6 MILLIGRAM(S): at 20:45

## 2023-01-04 RX ADMIN — Medication 500 MICROGRAM(S): at 21:00

## 2023-01-04 NOTE — ED PROVIDER NOTE - OBJECTIVE STATEMENT
63 y/o M pt with PMHx of chronic bronchitis, COPD, and emphysema presents to ED c/o cough, chest congestion, chest tightness, body aches, and chills for the past few days. Pt tested positive for covid yesterday. Today at dinner, he felt more tightness in his chest and wheezes, so decided to come to ED. Pt denies fever, chest pain, nausea, vomiting, diarrhea, rash, or any other acute complaints. Pt is a smoker.

## 2023-01-04 NOTE — ED PROVIDER NOTE - ATTENDING APP SHARED VISIT CONTRIBUTION OF CARE
61 y/o M pt with PMHx of COPD, emphysema, and smoker presents to ED c/o chest congestion, chest tightness, shortness of breath, body aches, and cough x few days. Pt tested positive for covid yesterday, but is afebrile, non-toxic appearing, no respiratory distress on exam, and able to speak in full sentences. Plan labs, CXR, give nebs, steroids IV, hydrate, and reevaluate.   ECG- non-ischemic, normal troponin and labs, CXR- no pneumonia, pneumothorax. Pt improved after treatment. appears well, no hypoxia, not tachypneic, no wheezes.  covid PCR- neg. today. unlikely ACS, Pneumonia, PE or dissection. COPD and covid(+/-)pt clinically well appearing, feels better and stable for  out pt care.

## 2023-01-04 NOTE — ED PROVIDER NOTE - NSFOLLOWUPINSTRUCTIONS_ED_ALL_ED_FT
continue all your medications as prescribed and follow up with your  PMD next week for re-evaluation    Living with COPD      Being diagnosed with chronic obstructive pulmonary disease (COPD) changes your life physically and emotionally. Having COPD can affect your ability to work and do things you enjoy.    COPD is not the same for everyone, and it may change over time. Your health care providers can help you come up with the COPD management plan that works best for you.      How to manage lifestyle changes    Treatment plan     •Work closely with your health care providers.     •Follow your COPD management plan. This plan includes:  •Instructions about activities, exercises, diet, medicines, what to do when COPD flares up, and when to call your health care provider.      •A pulmonary rehabilitation program. In pulmonary rehab, you will learn about COPD, do exercises for fitness and breathing, and get support from health care providers and other people who have COPD.          Managing emotions and stress   Person talking with a counselor.   Living with a chronic disease means you may also struggle with stressful emotions, such as sadness, fear, and worry. Here are some ways to manage these emotions:  •Talk to someone about your fear, anxiety, depression, or stress.      •Learn strategies to avoid or reduce stress and ask for help if you are struggling with depression or anxiety.      •Consider joining a COPD support group, online or in person.      Adjusting to changes     COPD may limit the things you can do, but you can make certain changes to help you cope with the diagnosis.   •Ask for help when you need it. Getting support from friends, family, and your health care team is an important part of managing the condition.      •Try to get regular exercise as prescribed by a health care provider or pulmonary rehab team. Exercising can help COPD, even if you are a bit short of breath.    •Take steps to prevent infection and protect your lungs:  •Wash your hands often and avoid being in crowds.      •Stay away from friends and family members who are sick.      •Check your local air quality each day, and stay out of areas where air pollution is likely.          How to recognize changes in your condition    Recognizing changes in your COPD     COPD is a progressive disease. It is important to let the health care team know if your COPD is getting worse. Your treatment plan may need to change. Watch for:  •Increased shortness of breath, wheezing, cough, or fatigue.       •Loss of ability to exercise or perform daily activities, like climbing stairs.       •More frequent symptom flares.       •Signs of depression or anxiety.      Recognizing stress    It is normal to have additional stress when you have COPD. However, prolonged stress and anxiety can make COPD worse and lead to depression. Recognize the warning signs, which include:  •Feeling sad or worried more often or most of the time.      •Having less energy and losing interest in pleasurable activities.      •Changes in your appetite or sleeping patterns.      •Being easily angered or irritated.      •Having unexplained aches and pains, digestive problems, or headaches.        Follow these instructions at home:      Eating and drinking   Foods that contain some fiber, including fruits, vegetables, almonds, bread, and pasta.    •Eat foods that are high in fiber, such as fresh fruits and vegetables, whole grains, and beans. Limit foods that are high in fat and processed sugars, such as fried or sweet foods.      •Follow a balanced diet and maintain a healthy weight. Being overweight or underweight can make COPD worse. You may work with a dietitian as part of your pulmonary rehab program.      •Drink enough fluid to keep your urine pale yellow.    •If you drink alcohol:•Limit how much you have to:  •0–1 drink a day for women who are not pregnant.      •0–2 drinks a day for men.        •Know how much alcohol is in your drink. In the [place] one drink equals one 12 oz bottle of beer (355 mL), one 5 oz glass of wine (148 mL), or one 1½ oz glass of hard liquor (44 mL).        Lifestyle     •If you smoke, the most important thing that you can do is to stop smoking. Continuing to smoke will cause the disease to progress faster.      • Do not use any products that contain nicotine or tobacco. These products include cigarettes, chewing tobacco, and vaping devices, such as e-cigarettes. If you need help quitting, ask your health care provider.      •Avoid exposure to things that irritate your lungs, such as smoke, chemicals, and fumes.      Activity     •Balance exercise and rest. Take short walks every 1–2 hours. This is important to improve blood flow and breathing. Ask for help if you feel weak or unsteady.      •Do exercises that include controlled breathing with body movement, such as pham chi.      General instructions     •Take over-the-counter and prescription medicines only as told by your health care provider.      •Take vitamin and protein supplements as told by your health care provider or dietitian.      •Practice good oral hygiene and see your dental care provider regularly. An oral infection can also spread to your lungs.      •Make sure you receive all the vaccines that your health care provider recommends.      •Keep all follow-up visits. This is important.        Contact a health care provider if you:    •Are struggling to manage your COPD.      •Have emotional stress that interferes with your ability to cope with COPD.        Get help right away if you:    •Have thoughts of suicide, death, or hurting yourself or others.      If you ever feel like you may hurt yourself or others, or have thoughts about taking your own life, get help right away. Go to your nearest emergency department or:   • Call your local emergency services (799 in the [place]        • Call a suicide crisis helpline, such as the National Suicide Prevention Lifeline at 1-315.934.2868 or 737 in the [place] This is open 24 hours a day in the [place]        • Text the Crisis Text Line at 611252 (in the [place]         Summary    •Being diagnosed with chronic obstructive pulmonary disease (COPD) changes your life physically and emotionally.      •Work with your health care providers and follow your COPD management plan.      •A pulmonary rehabilitation program is an important part of COPD management.      •Prolonged stress, anxiety, and depression can make COPD worse.      •Let your health care provider know if emotional stress interferes with your ability to cope with and manage COPD.      This information is not intended to replace advice given to you by your health care provider. Make sure you discuss any questions you have with your health care provider.

## 2023-01-04 NOTE — ED PROVIDER NOTE - CONSTITUTIONAL, MLM
Well appearing, comfortable, speaking in full sentences, awake, alert, oriented to person, place, time/situation and in no apparent distress. normal...

## 2023-01-04 NOTE — ED PROVIDER NOTE - CLINICAL SUMMARY MEDICAL DECISION MAKING FREE TEXT BOX
63 y/o M pt with PMHx of COPD, emphysema, and smoker presents to ED c/o chest congestion, chest tightness, shortness of breath, body aches, and cough x few days. Pt tested positive for covid yesterday, but is afebrile, non-toxic appearing, no respiratory distress on exam, and able to speak in full sentences. Plan labs, CXR, give nebs, steroids IV, hydrate, and reevaluate. 61 y/o M pt with PMHx of COPD, emphysema, and smoker presents to ED c/o chest congestion, chest tightness, shortness of breath, body aches, and cough x few days. Pt tested positive for covid yesterday, but is afebrile, non-toxic appearing, no respiratory distress on exam, and able to speak in full sentences. Plan labs, CXR, give nebs, steroids IV, hydrate, and reevaluate.   ECG- non-ischemic, normal troponin and labs, CXR- no pneumonia, pneumothorax. Pt improved after treatment. appears well, no hypoxia, not tachypneic, no wheezes.  covid PCR- neg. today. unlikely ACS, Pneumonia, PE or dissection. COPD and covid(+/-)pt clinically well appearing, feels better and stable for  out pt care.

## 2023-01-04 NOTE — ED ADULT TRIAGE NOTE - CHIEF COMPLAINT QUOTE
Pt c/o chest pain and shortness of breath today. Tested positive for COVID yesterday. Denies vomiting, fevers. Speaking clearly in full sentences.

## 2023-01-04 NOTE — ED PROVIDER NOTE - MUSCULOSKELETAL, MLM
Spine appears normal, range of motion is not limited, no muscle or joint tenderness, palpable pulses symmetrical in bilateral LEs.

## 2023-01-04 NOTE — ED ADULT NURSE NOTE - OBJECTIVE STATEMENT
Patient presents to the ED complaining of chest pain and shortness of breath today after eating dinner. Patient report that he has a cold that might have triggered his symptoms. Denies any fever or chills.

## 2023-01-04 NOTE — ED PROVIDER NOTE - PATIENT PORTAL LINK FT
You can access the FollowMyHealth Patient Portal offered by [facility] by registering at the following website: [email or web address]. By joining [employer]’s FollowMyHealth portal, you will also be able to view your health information using other applications (apps) compatible with our system.

## 2023-01-04 NOTE — ED PROVIDER NOTE - NSFOLLOWUPCLINICS_GEN_ALL_ED_FT
[facility]  Family Medicine  [address]  [place], [place] [address]  Phone: [phone]  Fax:

## 2023-01-05 DIAGNOSIS — Z53.21 PROCEDURE AND TREATMENT NOT CARRIED OUT DUE TO PATIENT LEAVING PRIOR TO BEING SEEN BY HEALTH CARE PROVIDER: ICD-10-CM

## 2023-01-05 DIAGNOSIS — R07.9 CHEST PAIN, UNSPECIFIED: ICD-10-CM

## 2023-01-06 ENCOUNTER — EMERGENCY (EMERGENCY)
Facility: HOSPITAL | Age: 63
LOS: 1 days | Discharge: HOME | End: 2023-01-06
Attending: STUDENT IN AN ORGANIZED HEALTH CARE EDUCATION/TRAINING PROGRAM | Admitting: STUDENT IN AN ORGANIZED HEALTH CARE EDUCATION/TRAINING PROGRAM
Payer: MEDICARE

## 2023-01-06 VITALS
HEART RATE: 90 BPM | TEMPERATURE: 98 F | OXYGEN SATURATION: 98 % | RESPIRATION RATE: 18 BRPM | SYSTOLIC BLOOD PRESSURE: 128 MMHG | DIASTOLIC BLOOD PRESSURE: 72 MMHG

## 2023-01-06 VITALS
RESPIRATION RATE: 17 BRPM | HEIGHT: 72 IN | SYSTOLIC BLOOD PRESSURE: 132 MMHG | HEART RATE: 95 BPM | TEMPERATURE: 98 F | OXYGEN SATURATION: 96 % | WEIGHT: 171.08 LBS | DIASTOLIC BLOOD PRESSURE: 63 MMHG

## 2023-01-06 DIAGNOSIS — T50.996A UNDERDOSING OF OTHER DRUGS, MEDICAMENTS AND BIOLOGICAL SUBSTANCES, INITIAL ENCOUNTER: ICD-10-CM

## 2023-01-06 DIAGNOSIS — Z98.49 CATARACT EXTRACTION STATUS, UNSPECIFIED EYE: Chronic | ICD-10-CM

## 2023-01-06 DIAGNOSIS — Z90.89 ACQUIRED ABSENCE OF OTHER ORGANS: Chronic | ICD-10-CM

## 2023-01-06 DIAGNOSIS — J43.9 EMPHYSEMA, UNSPECIFIED: ICD-10-CM
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
## 2023-01-06 DIAGNOSIS — F17.200 NICOTINE DEPENDENCE, UNSPECIFIED, UNCOMPLICATED: ICD-10-CM

## 2023-01-06 DIAGNOSIS — Z91.14 PATIENT'S OTHER NONCOMPLIANCE WITH MEDICATION REGIMEN: ICD-10-CM

## 2023-01-06 DIAGNOSIS — F32.A DEPRESSION, UNSPECIFIED: ICD-10-CM
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
## 2023-01-06 DIAGNOSIS — Z86.16 PERSONAL HISTORY OF COVID-19: ICD-10-CM

## 2023-01-06 DIAGNOSIS — Z87.19 PERSONAL HISTORY OF OTHER DISEASES OF THE DIGESTIVE SYSTEM: ICD-10-CM

## 2023-01-06 DIAGNOSIS — Z87.09 PERSONAL HISTORY OF OTHER DISEASES OF THE RESPIRATORY SYSTEM: ICD-10-CM

## 2023-01-06 DIAGNOSIS — Z98.890 OTHER SPECIFIED POSTPROCEDURAL STATES: Chronic | ICD-10-CM

## 2023-01-06 DIAGNOSIS — R06.02 SHORTNESS OF BREATH: ICD-10-CM

## 2023-01-06 DIAGNOSIS — Z90.89 ACQUIRED ABSENCE OF OTHER ORGANS: ICD-10-CM

## 2023-01-06 PROCEDURE — 99284 EMERGENCY DEPT VISIT MOD MDM: CPT | Mod: 25

## 2023-01-06 PROCEDURE — 94640 AIRWAY INHALATION TREATMENT: CPT

## 2023-01-06 PROCEDURE — 99284 EMERGENCY DEPT VISIT MOD MDM: CPT

## 2023-01-06 RX ORDER — IPRATROPIUM BROMIDE 0.2 MG/ML
500 SOLUTION, NON-ORAL INHALATION
Refills: 0 | Status: COMPLETED | OUTPATIENT
Start: 2023-01-06 | End: 2023-01-06

## 2023-01-06 RX ORDER — IPRATROPIUM/ALBUTEROL SULFATE 18-103MCG
3 AEROSOL WITH ADAPTER (GRAM) INHALATION
Refills: 0 | Status: DISCONTINUED | OUTPATIENT
Start: 2023-01-06 | End: 2023-01-06

## 2023-01-06 RX ORDER — ALBUTEROL 90 UG/1
2.5 AEROSOL, METERED ORAL
Refills: 0 | Status: COMPLETED | OUTPATIENT
Start: 2023-01-06 | End: 2023-01-06

## 2023-01-06 RX ADMIN — Medication 500 MICROGRAM(S): at 20:53

## 2023-01-06 RX ADMIN — ALBUTEROL 2.5 MILLIGRAM(S): 90 AEROSOL, METERED ORAL at 21:17

## 2023-01-06 RX ADMIN — ALBUTEROL 2.5 MILLIGRAM(S): 90 AEROSOL, METERED ORAL at 20:53

## 2023-01-06 RX ADMIN — ALBUTEROL 2.5 MILLIGRAM(S): 90 AEROSOL, METERED ORAL at 20:57

## 2023-01-06 RX ADMIN — Medication 500 MICROGRAM(S): at 20:57

## 2023-01-06 RX ADMIN — Medication 20 MILLIGRAM(S): at 20:53

## 2023-01-06 RX ADMIN — Medication 500 MICROGRAM(S): at 21:17

## 2023-01-06 NOTE — ED PROVIDER NOTE - PHYSICAL EXAMINATION
CONSTITUTIONAL: Non-toxic; in no apparent distress  HEAD: Normocephalic; atraumatic  EYES: PERRL; EOM intact   ENMT: External appears normal  NECK: Supple; non-tender  CARD: Normal S1, S2; no murmurs, rubs, or gallops  RESP: Normal chest excursion with respiration; + BL wheeze throughout lung fields, no crackles.  ABD: Soft, non-distended; non-tender  EXT: Normal ROM in all four extremities; non-tender to palpation  SKIN: Warm, dry, no rash  NEURO:  No focal neurological deficiencies.

## 2023-01-06 NOTE — ED PROVIDER NOTE - NSFOLLOWUPINSTRUCTIONS_ED_ALL_ED_FT
Follow up with your primary medical doctor as soon as possible.    Return to the emergency department if your symptoms worsen or if you develop new symptoms.  If you have any problems with followup, please call the ED Referral Coordinator at [phone].    Chronic Obstructive Pulmonary Disease    Chronic obstructive pulmonary disease (COPD) is a lung condition in which airflow from the lungs is limited. Causes include smoking, secondhand smoke exposure, genetics, or recurrent infections. Take all medicines (inhaled or pills) as directed by your health care provider. Avoid exposure to irritants such as smoke, chemicals, and fumes that aggravate your breathing.    If you are a smoker, the most important thing that you can do is stop smoking. Continuing to smoke will cause further lung damage and breathing trouble. Ask your health care provider for help with quitting smoking.    SEEK IMMEDIATE MEDICAL CARE IF YOU HAVE ANY OF THE FOLLOWING SYMPTOMS: shortness of breath at rest or when talking, bluish discoloration of lips, skin, fever, worsening cough, unexplained chest pain, or lightheadedness/dizziness.

## 2023-01-06 NOTE — ED ADULT TRIAGE NOTE - CHIEF COMPLAINT QUOTE
Patient presents to ED c/o SOB x2 hrs prior to arrival. Denies chest pain. PMH COPD, Emphysema, Asthma (Ran out of medications today).

## 2023-01-06 NOTE — ED PROVIDER NOTE - CLINICAL SUMMARY MEDICAL DECISION MAKING FREE TEXT BOX
Presentation most likely COPD exacerbation.  Wells low risk  Plan for duonebs x3, steroids, +/- Mg if unimproved.  Will reassess and monitor airway/breathing closely  Admission if necessary

## 2023-01-06 NOTE — ED PROVIDER NOTE - NS ED ROS FT
CONSTITUTIONAL: No fever, no chills, no fatigue  EYES: No eye redness, no visual changes  ENT: No ear pain, no sore throat  CARDIOVASCULAR: No chest pain, no palpitations  RESPIRATORY: +cough, +SOB  GI: No abdominal pain, no nausea, no vomiting, no constipation, no diarrhea  GENITOURINARY: No dysuria, no frequency, no hematuria  MUSCULOSKELETAL: No back pain, no joint pain, no myalgias  SKIN: No rash, no peripheral edema  NEURO: No headache, no confusion    ALL OTHER SYSTEMS NEGATIVE.

## 2023-01-06 NOTE — ED ADULT NURSE NOTE - OBJECTIVE STATEMENT
Patient complaints of SOB for 2 hrs.   Denies CP, Dizziness, HA, abdominal pain, or other discomfort at this time.  PMHX of COPD, Emphysema, Asthma.  As per patient, asthma medications are ran out today so he didn't get any medication for SOB.  Patient is alert and oriented, A&O4, steady gait noted.

## 2023-01-06 NOTE — ED PROVIDER NOTE - PATIENT PORTAL LINK FT
You can access the FollowMyHealth Patient Portal offered by [facility] by registering at the following website: [email or web address]. By joining [employer]’s FollowMyHealth portal, you will also be able to view your health information using other applications (apps) compatible with our system.

## 2023-01-06 NOTE — ED PROVIDER NOTE - OBJECTIVE STATEMENT
63 yo M w PMH of chronic bronchitis, COPD, p/w SOB, cough, wheeze, similar to prior COPD exacerbations. Pt states he has not taken medications for past 2 days. Recently tested covid positive, no hypoxia in ED or at home. Pt denies fever, chest pain, nausea, vomiting, diarrhea, rash, or any other acute complaints. Pt is a smoker.

## 2023-01-12 ENCOUNTER — EMERGENCY (EMERGENCY)
Facility: HOSPITAL | Age: 63
LOS: 1 days | Discharge: HOME | End: 2023-01-12
Attending: EMERGENCY MEDICINE | Admitting: EMERGENCY MEDICINE
Payer: MEDICARE

## 2023-01-12 VITALS
RESPIRATION RATE: 18 BRPM | DIASTOLIC BLOOD PRESSURE: 75 MMHG | OXYGEN SATURATION: 97 % | TEMPERATURE: 98 F | HEIGHT: 72 IN | HEART RATE: 108 BPM | SYSTOLIC BLOOD PRESSURE: 128 MMHG | WEIGHT: 171.08 LBS

## 2023-01-12 DIAGNOSIS — Z98.890 OTHER SPECIFIED POSTPROCEDURAL STATES: Chronic | ICD-10-CM

## 2023-01-12 DIAGNOSIS — Z90.89 ACQUIRED ABSENCE OF OTHER ORGANS: Chronic | ICD-10-CM

## 2023-01-12 DIAGNOSIS — Z98.49 CATARACT EXTRACTION STATUS, UNSPECIFIED EYE: Chronic | ICD-10-CM

## 2023-01-12 PROCEDURE — 93005 ELECTROCARDIOGRAM TRACING: CPT

## 2023-01-12 PROCEDURE — 99284 EMERGENCY DEPT VISIT MOD MDM: CPT

## 2023-01-12 PROCEDURE — 99283 EMERGENCY DEPT VISIT LOW MDM: CPT

## 2023-01-12 NOTE — ED PROVIDER NOTE - CLINICAL SUMMARY MEDICAL DECISION MAKING FREE TEXT BOX
Pt with chest pain, dyspnea - multiple visits for similar presentation in past, EKG sinus tach, upon my eval pt stating he is feeling better and requesting discharge.  No acute distress,  no wheezing on exam.  Pt left prior to discharge papers.

## 2023-01-12 NOTE — ED ADULT NURSE REASSESSMENT NOTE - NS ED NURSE REASSESS COMMENT FT1
Pt saw provider and left, verbalized to provider that he is feeling better and is refusing treatment at this time.

## 2023-01-12 NOTE — ED ADULT NURSE NOTE - NSIMPLEMENTINTERV_GEN_ALL_ED
Implemented All Universal Safety Interventions:  [place] to call system. Call bell, personal items and telephone within reach. Instruct patient to call for assistance. Room bathroom lighting operational. Non-slip footwear when patient is off stretcher. Physically safe environment: no spills, clutter or unnecessary equipment. Stretcher in lowest position, wheels locked, appropriate side rails in place.

## 2023-01-12 NOTE — ED PROVIDER NOTE - OBJECTIVE STATEMENT
63 yo M w PMH of chronic bronchitis, COPD, presents to ED with chest discomfort.  Pt with multiple prior presentations to ED for similar symptoms.  Upon my evaluation pt states that he feels better and wants to leave.  Declines receiving neb treatment or other treatment.  Pt denies fever, chills, radiation of pain.

## 2023-01-12 NOTE — ED ADULT NURSE NOTE - OBJECTIVE STATEMENT
61 yo M PMHx chronic bronchitis, emphysema, Asthma, COPD, Depression, Bipolar presents to ED co CP, lightheadedness, dizziness x 2 hours. Pt states, "CP started after eating dinner." Pt reports pain as intermittent, sharp. Took inhaler prior to coming with no relief. Pt denies pain radiation and nausea. Pt reports head cold and productive cough x weeks.

## 2023-01-13 ENCOUNTER — EMERGENCY (EMERGENCY)
Facility: HOSPITAL | Age: 63
LOS: 1 days | Discharge: HOME | End: 2023-01-13
Attending: STUDENT IN AN ORGANIZED HEALTH CARE EDUCATION/TRAINING PROGRAM | Admitting: STUDENT IN AN ORGANIZED HEALTH CARE EDUCATION/TRAINING PROGRAM
Payer: MEDICARE

## 2023-01-13 VITALS
TEMPERATURE: 98 F | SYSTOLIC BLOOD PRESSURE: 151 MMHG | OXYGEN SATURATION: 96 % | HEIGHT: 72 IN | RESPIRATION RATE: 20 BRPM | DIASTOLIC BLOOD PRESSURE: 89 MMHG | WEIGHT: 171.08 LBS | HEART RATE: 106 BPM

## 2023-01-13 VITALS
TEMPERATURE: 98 F | RESPIRATION RATE: 18 BRPM | OXYGEN SATURATION: 95 % | HEART RATE: 96 BPM | SYSTOLIC BLOOD PRESSURE: 111 MMHG | DIASTOLIC BLOOD PRESSURE: 65 MMHG

## 2023-01-13 DIAGNOSIS — Z98.49 CATARACT EXTRACTION STATUS, UNSPECIFIED EYE: Chronic | ICD-10-CM

## 2023-01-13 DIAGNOSIS — Z90.89 ACQUIRED ABSENCE OF OTHER ORGANS: Chronic | ICD-10-CM

## 2023-01-13 DIAGNOSIS — Z98.890 OTHER SPECIFIED POSTPROCEDURAL STATES: Chronic | ICD-10-CM

## 2023-01-13 PROCEDURE — 99284 EMERGENCY DEPT VISIT MOD MDM: CPT | Mod: 25

## 2023-01-13 PROCEDURE — 94640 AIRWAY INHALATION TREATMENT: CPT

## 2023-01-13 PROCEDURE — 99284 EMERGENCY DEPT VISIT MOD MDM: CPT

## 2023-01-13 RX ORDER — IPRATROPIUM/ALBUTEROL SULFATE 18-103MCG
3 AEROSOL WITH ADAPTER (GRAM) INHALATION
Refills: 0 | Status: COMPLETED | OUTPATIENT
Start: 2023-01-13 | End: 2023-01-13

## 2023-01-13 RX ADMIN — Medication 3 MILLILITER(S): at 20:44

## 2023-01-13 RX ADMIN — Medication 3 MILLILITER(S): at 20:38

## 2023-01-13 RX ADMIN — Medication 3 MILLILITER(S): at 20:00

## 2023-01-13 NOTE — ED PROVIDER NOTE - NS ED ROS FT
CONSTITUTIONAL: No fever, no chills, no fatigue  EYES: No eye redness, no visual changes  ENT: No ear pain, no sore throat  CARDIOVASCULAR: No chest pain, no palpitations  RESPIRATORY: No cough, +SOB  GI: No abdominal pain, no nausea, no vomiting, no constipation, no diarrhea  GENITOURINARY: No dysuria, no frequency, no hematuria  MUSCULOSKELETAL: No back pain, no joint pain, no myalgias  SKIN: No rash, no peripheral edema  NEURO: No headache, no confusion    ALL OTHER SYSTEMS NEGATIVE.

## 2023-01-13 NOTE — ED PROVIDER NOTE - OBJECTIVE STATEMENT
61 yo M w PMH of chronic bronchitis, COPD, p/w SOB, cough, wheeze, similar to prior COPD exacerbations. Pt states he has not taken medications for past 2 days. Tested covid positive 2 wks ago, no hypoxia in ED or at home. Pt denies fever, chest pain, nausea, vomiting, diarrhea, rash, or any other acute complaints. Pt is a smoker.

## 2023-01-13 NOTE — ED PROVIDER NOTE - NSFOLLOWUPINSTRUCTIONS_ED_ALL_ED_FT
Follow up with your primary medical doctor as soon as possible.    Return to the emergency department if your symptoms worsen or if you develop new symptoms.  If you have any problems with followup, please call the ED Referral Coordinator at [phone].    Chronic Obstructive Pulmonary Disease    Chronic obstructive pulmonary disease (COPD) is a lung condition in which airflow from the lungs is limited. Causes include smoking, secondhand smoke exposure, genetics, or recurrent infections. Take all medicines (inhaled or pills) as directed by your health care provider. Avoid exposure to irritants such as smoke, chemicals, and fumes that aggravate your breathing.    If you are a smoker, the most important thing that you can do is stop smoking. Continuing to smoke will cause further lung damage and breathing trouble. Ask your health care provider for help with quitting smoking.    SEEK IMMEDIATE MEDICAL CARE IF YOU HAVE ANY OF THE FOLLOWING SYMPTOMS: shortness of breath at rest or when talking, bluish discoloration of lips, skin, fever, worsening cough, unexplained chest pain, or lightheadedness/dizziness.

## 2023-01-13 NOTE — ED ADULT TRIAGE NOTE - CHIEF COMPLAINT QUOTE
Pt c/o abdominal pain and shortness of breath x1 hour. Seen in ED yesterday, states he felt better but now he feels short of breath again. Speaking clearly in full sentences. Hx of asthma, COPD. States he ran out of inhalers.

## 2023-01-13 NOTE — ED PROVIDER NOTE - PHYSICAL EXAMINATION
CONSTITUTIONAL: Non-toxic; in no apparent distress  HEAD: Normocephalic; atraumatic  EYES: PERRL; EOM intact   ENMT: External appears normal  NECK: Supple; non-tender  CARD: Normal S1, S2; no murmurs, rubs, or gallops  RESP: Normal chest excursion with respiration; + BL exp wheeze  ABD: Soft, non-distended; non-tender  EXT: Normal ROM in all four extremities; non-tender to palpation  SKIN: Warm, dry, no rash  NEURO:  No focal neurological deficiencies.

## 2023-01-13 NOTE — ED PROVIDER NOTE - PATIENT PORTAL LINK FT
You can access the FollowMyHealth Patient Portal offered by [facility] by registering at the following website: [email or web address]. By joining [employer]’s FollowMyHealth portal, you will also be able to view your health information using other applications (apps) compatible with our system.

## 2023-01-16 DIAGNOSIS — R07.89 OTHER CHEST PAIN: ICD-10-CM

## 2023-01-16 DIAGNOSIS — F32.A DEPRESSION, UNSPECIFIED: ICD-10-CM

## 2023-01-16 DIAGNOSIS — F17.200 NICOTINE DEPENDENCE, UNSPECIFIED, UNCOMPLICATED: ICD-10-CM

## 2023-01-16 DIAGNOSIS — R00.0 TACHYCARDIA, UNSPECIFIED: ICD-10-CM

## 2023-01-16 DIAGNOSIS — Z82.49 FAMILY HISTORY OF ISCHEMIC HEART DISEASE AND OTHER DISEASES OF THE CIRCULATORY SYSTEM: ICD-10-CM

## 2023-01-16 DIAGNOSIS — J43.9 EMPHYSEMA, UNSPECIFIED: ICD-10-CM

## 2023-01-16 DIAGNOSIS — Z90.89 ACQUIRED ABSENCE OF OTHER ORGANS: ICD-10-CM

## 2023-01-16 DIAGNOSIS — Z87.19 PERSONAL HISTORY OF OTHER DISEASES OF THE DIGESTIVE SYSTEM: ICD-10-CM

## 2023-01-17 DIAGNOSIS — J43.9 EMPHYSEMA, UNSPECIFIED: ICD-10-CM

## 2023-01-17 DIAGNOSIS — T50.906A UNDERDOSING OF UNSPECIFIED DRUGS, MEDICAMENTS AND BIOLOGICAL SUBSTANCES, INITIAL ENCOUNTER: ICD-10-CM

## 2023-01-17 DIAGNOSIS — Z90.89 ACQUIRED ABSENCE OF OTHER ORGANS: ICD-10-CM

## 2023-01-17 DIAGNOSIS — Z91.14 PATIENT'S OTHER NONCOMPLIANCE WITH MEDICATION REGIMEN: ICD-10-CM

## 2023-01-17 DIAGNOSIS — F17.200 NICOTINE DEPENDENCE, UNSPECIFIED, UNCOMPLICATED: ICD-10-CM

## 2023-01-17 DIAGNOSIS — Z86.59 PERSONAL HISTORY OF OTHER MENTAL AND BEHAVIORAL DISORDERS: ICD-10-CM

## 2023-01-17 DIAGNOSIS — Z86.16 PERSONAL HISTORY OF COVID-19: ICD-10-CM

## 2023-01-17 DIAGNOSIS — F32.A DEPRESSION, UNSPECIFIED: ICD-10-CM
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
## 2023-01-17 DIAGNOSIS — R06.02 SHORTNESS OF BREATH: ICD-10-CM

## 2023-01-17 DIAGNOSIS — Z87.19 PERSONAL HISTORY OF OTHER DISEASES OF THE DIGESTIVE SYSTEM: ICD-10-CM
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
## 2023-02-16 ENCOUNTER — EMERGENCY (EMERGENCY)
Facility: HOSPITAL | Age: 63
LOS: 1 days | Discharge: HOME | End: 2023-02-16
Attending: STUDENT IN AN ORGANIZED HEALTH CARE EDUCATION/TRAINING PROGRAM | Admitting: EMERGENCY MEDICINE
Payer: MEDICARE

## 2023-02-16 VITALS
SYSTOLIC BLOOD PRESSURE: 129 MMHG | OXYGEN SATURATION: 98 % | WEIGHT: 209.44 LBS | DIASTOLIC BLOOD PRESSURE: 86 MMHG | HEART RATE: 72 BPM | TEMPERATURE: 98 F | HEIGHT: 72 IN | RESPIRATION RATE: 16 BRPM

## 2023-02-16 VITALS
RESPIRATION RATE: 18 BRPM | OXYGEN SATURATION: 99 % | SYSTOLIC BLOOD PRESSURE: 137 MMHG | HEART RATE: 89 BPM | DIASTOLIC BLOOD PRESSURE: 86 MMHG

## 2023-02-16 DIAGNOSIS — J44.1 CHRONIC OBSTRUCTIVE PULMONARY DISEASE WITH (ACUTE) EXACERBATION: ICD-10-CM

## 2023-02-16 DIAGNOSIS — Z98.49 CATARACT EXTRACTION STATUS, UNSPECIFIED EYE: Chronic | ICD-10-CM

## 2023-02-16 DIAGNOSIS — F17.200 NICOTINE DEPENDENCE, UNSPECIFIED, UNCOMPLICATED: ICD-10-CM

## 2023-02-16 DIAGNOSIS — Z87.09 PERSONAL HISTORY OF OTHER DISEASES OF THE RESPIRATORY SYSTEM: ICD-10-CM

## 2023-02-16 DIAGNOSIS — Z90.89 ACQUIRED ABSENCE OF OTHER ORGANS: Chronic | ICD-10-CM

## 2023-02-16 DIAGNOSIS — F31.9 BIPOLAR DISORDER, UNSPECIFIED: ICD-10-CM

## 2023-02-16 DIAGNOSIS — F10.929 ALCOHOL USE, UNSPECIFIED WITH INTOXICATION, UNSPECIFIED: ICD-10-CM

## 2023-02-16 DIAGNOSIS — Z98.890 OTHER SPECIFIED POSTPROCEDURAL STATES: Chronic | ICD-10-CM

## 2023-02-16 DIAGNOSIS — Z90.09 ACQUIRED ABSENCE OF OTHER PART OF HEAD AND NECK: ICD-10-CM

## 2023-02-16 DIAGNOSIS — R07.9 CHEST PAIN, UNSPECIFIED: ICD-10-CM

## 2023-02-16 PROCEDURE — 94640 AIRWAY INHALATION TREATMENT: CPT

## 2023-02-16 PROCEDURE — 99284 EMERGENCY DEPT VISIT MOD MDM: CPT

## 2023-02-16 PROCEDURE — 99285 EMERGENCY DEPT VISIT HI MDM: CPT | Mod: 25

## 2023-02-16 PROCEDURE — 93005 ELECTROCARDIOGRAM TRACING: CPT

## 2023-02-16 RX ORDER — ACETAMINOPHEN 500 MG
650 TABLET ORAL ONCE
Refills: 0 | Status: COMPLETED | OUTPATIENT
Start: 2023-02-16 | End: 2023-02-16

## 2023-02-16 RX ORDER — IPRATROPIUM/ALBUTEROL SULFATE 18-103MCG
3 AEROSOL WITH ADAPTER (GRAM) INHALATION ONCE
Refills: 0 | Status: COMPLETED | OUTPATIENT
Start: 2023-02-16 | End: 2023-02-16

## 2023-02-16 RX ADMIN — Medication 650 MILLIGRAM(S): at 06:18

## 2023-02-16 RX ADMIN — Medication 3 MILLILITER(S): at 01:12

## 2023-02-16 NOTE — ED ADULT TRIAGE NOTE - CHIEF COMPLAINT QUOTE
Pt presents to ER c/o intermittent, non-radiating midsternal chest pain. Pt denies any other complaints at this time.

## 2023-02-16 NOTE — ED PROVIDER NOTE - PROGRESS NOTE DETAILS
Pts pain feels improved after treatment.  Still intoxicated, no signs of wd. Denies coingestion or trauma.  Will continue to observe for sobriety.

## 2023-02-16 NOTE — ED PROVIDER NOTE - CLINICAL SUMMARY MEDICAL DECISION MAKING FREE TEXT BOX
Most likely COPD exacerbation, given history of COPD and similarity to past episodes; pt has never been intubated for COPD. Low suspicion for serious etiology of chest pain. Not likely to be ACS, given unremarkable EKG and prior presentations with similar symptoms. Not likely to be cardiac tamponade, given cardiopulmonary exam findings and normal vital signs. Not likely to be pneumothorax, given BL lung sounds heard on examination. Not likely to be pneumonia, given no fever/cough/other respiratory symptoms or systemic symptoms. Not likely to be PE, Wells low risk. Not likely to be aortic dissection, given normal vitals with no pulse deficits and unlikely presentation. Dispo pending reassessment after treatment.    Plan: EKG, Bronchodilator

## 2023-02-16 NOTE — ED PROVIDER NOTE - PATIENT PORTAL LINK FT
You can access the FollowMyHealth Patient Portal offered by [facility] by registering at the following website: [email or web address]. By joining [employer]’s FollowMyHealth portal, you will also be able to view your health information using other applications (apps) compatible with our system.

## 2023-02-16 NOTE — ED ADULT NURSE NOTE - OBJECTIVE STATEMENT
pt presents to the ED c/o CP, pt unable to provide medical hx at this time, endorsed drinking tonight. Pt denies any cardiac hx. Pt does not appear in acute distress, no use of accessory muscles noted while pt is resting in stretcher. EKG ordered upon arrival, will continue to monitor pt at this time

## 2023-02-16 NOTE — ED PROVIDER NOTE - NSFOLLOWUPINSTRUCTIONS_ED_ALL_ED_FT
Follow up with your primary medical doctor as soon as possible.    Return to the emergency department if your symptoms worsen or if you develop new symptoms.  If you have any problems with followup, please call the ED Referral Coordinator at [phone].    Chronic Obstructive Pulmonary Disease    Chronic obstructive pulmonary disease (COPD) is a lung condition in which airflow from the lungs is limited. Causes include smoking, secondhand smoke exposure, genetics, or recurrent infections. Take all medicines (inhaled or pills) as directed by your health care provider. Avoid exposure to irritants such as smoke, chemicals, and fumes that aggravate your breathing.    If you are a smoker, the most important thing that you can do is stop smoking. Continuing to smoke will cause further lung damage and breathing trouble. Ask your health care provider for help with quitting smoking.    SEEK IMMEDIATE MEDICAL CARE IF YOU HAVE ANY OF THE FOLLOWING SYMPTOMS: shortness of breath at rest or when talking, bluish discoloration of lips, skin, fever, worsening cough, unexplained chest pain, or lightheadedness/dizziness.

## 2023-02-16 NOTE — ED ADULT NURSE REASSESSMENT NOTE - NS ED NURSE REASSESS COMMENT FT1
pt able to ambulate to the bathroom independently, returned to bed to rest. will continue to monitor pt pending sobriety

## 2023-02-16 NOTE — ED PROVIDER NOTE - OBJECTIVE STATEMENT
63 yo M, well known to ED for many presentations for same complaints, w PMH of chronic bronchitis, COPD, p/w chest pain, SOB, cough, wheeze, similar to prior COPD exacerbations. Pain is substernal, non-radiating, similar to prior COPD exacerbations. Pt states he has not taken medications for past several days. He appears moderately intoxicated, admits to alcohol use. Pt denies fever, nausea, vomiting, diarrhea, rash, trauma, or any other acute complaints. Pt is a smoker.

## 2023-02-16 NOTE — ED PROVIDER NOTE - PHYSICAL EXAMINATION
CONSTITUTIONAL: Non-toxic; in no apparent distress, disheveled, malodorous  HEAD: Normocephalic; atraumatic  EYES: PERRL; EOM intact   ENMT: External appears normal  NECK: Supple; non-tender  CARD: Normal S1, S2; no murmurs, rubs, or gallops  RESP: Normal chest excursion with respiration; breath sounds clear and equal bilaterally  ABD: Soft, non-distended; non-tender  EXT: Normal ROM in all four extremities; non-tender to palpation  SKIN: Warm, dry, no rash  NEURO:  No focal neurological deficiencies.

## 2023-03-01 ENCOUNTER — EMERGENCY (EMERGENCY)
Facility: HOSPITAL | Age: 63
LOS: 1 days | Discharge: LEFT BEFORE DISCHARGE | End: 2023-03-01
Admitting: STUDENT IN AN ORGANIZED HEALTH CARE EDUCATION/TRAINING PROGRAM
Payer: MEDICARE

## 2023-03-01 ENCOUNTER — EMERGENCY (EMERGENCY)
Facility: HOSPITAL | Age: 63
LOS: 1 days | Discharge: LEFT BEFORE DISCHARGE | End: 2023-03-01
Attending: STUDENT IN AN ORGANIZED HEALTH CARE EDUCATION/TRAINING PROGRAM | Admitting: STUDENT IN AN ORGANIZED HEALTH CARE EDUCATION/TRAINING PROGRAM
Payer: MEDICARE

## 2023-03-01 VITALS
DIASTOLIC BLOOD PRESSURE: 99 MMHG | OXYGEN SATURATION: 99 % | RESPIRATION RATE: 18 BRPM | SYSTOLIC BLOOD PRESSURE: 168 MMHG | HEART RATE: 83 BPM | WEIGHT: 175.05 LBS | TEMPERATURE: 98 F | HEIGHT: 72 IN

## 2023-03-01 VITALS
RESPIRATION RATE: 18 BRPM | TEMPERATURE: 98 F | HEART RATE: 74 BPM | WEIGHT: 169.09 LBS | SYSTOLIC BLOOD PRESSURE: 139 MMHG | OXYGEN SATURATION: 96 % | HEIGHT: 72 IN | DIASTOLIC BLOOD PRESSURE: 92 MMHG

## 2023-03-01 DIAGNOSIS — Z53.21 PROCEDURE AND TREATMENT NOT CARRIED OUT DUE TO PATIENT LEAVING PRIOR TO BEING SEEN BY HEALTH CARE PROVIDER: ICD-10-CM

## 2023-03-01 DIAGNOSIS — Z98.49 CATARACT EXTRACTION STATUS, UNSPECIFIED EYE: Chronic | ICD-10-CM

## 2023-03-01 DIAGNOSIS — Z53.29 PROCEDURE AND TREATMENT NOT CARRIED OUT BECAUSE OF PATIENT'S DECISION FOR OTHER REASONS: ICD-10-CM

## 2023-03-01 DIAGNOSIS — R07.89 OTHER CHEST PAIN: ICD-10-CM

## 2023-03-01 DIAGNOSIS — Z90.89 ACQUIRED ABSENCE OF OTHER ORGANS: Chronic | ICD-10-CM

## 2023-03-01 DIAGNOSIS — Z98.890 OTHER SPECIFIED POSTPROCEDURAL STATES: Chronic | ICD-10-CM

## 2023-03-01 DIAGNOSIS — F17.200 NICOTINE DEPENDENCE, UNSPECIFIED, UNCOMPLICATED: ICD-10-CM

## 2023-03-01 DIAGNOSIS — F14.90 COCAINE USE, UNSPECIFIED, UNCOMPLICATED: ICD-10-CM

## 2023-03-01 DIAGNOSIS — J44.9 CHRONIC OBSTRUCTIVE PULMONARY DISEASE, UNSPECIFIED: ICD-10-CM

## 2023-03-01 DIAGNOSIS — Z00.00 ENCOUNTER FOR GENERAL ADULT MEDICAL EXAMINATION WITHOUT ABNORMAL FINDINGS: ICD-10-CM

## 2023-03-01 DIAGNOSIS — F19.99 OTHER PSYCHOACTIVE SUBSTANCE USE, UNSPECIFIED WITH UNSPECIFIED PSYCHOACTIVE SUBSTANCE-INDUCED DISORDER: ICD-10-CM

## 2023-03-01 DIAGNOSIS — R40.0 SOMNOLENCE: ICD-10-CM

## 2023-03-01 LAB
ANION GAP SERPL CALC-SCNC: 12 MMOL/L — SIGNIFICANT CHANGE UP (ref 5–17)
BASOPHILS # BLD AUTO: 0.09 K/UL — SIGNIFICANT CHANGE UP (ref 0–0.2)
BASOPHILS NFR BLD AUTO: 0.9 % — SIGNIFICANT CHANGE UP (ref 0–2)
BUN SERPL-MCNC: 13 MG/DL — SIGNIFICANT CHANGE UP (ref 7–23)
CALCIUM SERPL-MCNC: 9 MG/DL — SIGNIFICANT CHANGE UP (ref 8.4–10.5)
CHLORIDE SERPL-SCNC: 101 MMOL/L — SIGNIFICANT CHANGE UP (ref 96–108)
CO2 SERPL-SCNC: 24 MMOL/L — SIGNIFICANT CHANGE UP (ref 22–31)
CREAT SERPL-MCNC: 0.78 MG/DL — SIGNIFICANT CHANGE UP (ref 0.5–1.3)
EGFR: 101 ML/MIN/1.73M2 — SIGNIFICANT CHANGE UP
EOSINOPHIL # BLD AUTO: 0.12 K/UL — SIGNIFICANT CHANGE UP (ref 0–0.5)
EOSINOPHIL NFR BLD AUTO: 1.2 % — SIGNIFICANT CHANGE UP (ref 0–6)
GLUCOSE SERPL-MCNC: 94 MG/DL — SIGNIFICANT CHANGE UP (ref 70–99)
HCT VFR BLD CALC: 46.5 % — SIGNIFICANT CHANGE UP (ref 39–50)
HGB BLD-MCNC: 13.6 G/DL — SIGNIFICANT CHANGE UP (ref 13–17)
IMM GRANULOCYTES NFR BLD AUTO: 0.7 % — SIGNIFICANT CHANGE UP (ref 0–0.9)
LYMPHOCYTES # BLD AUTO: 1.49 K/UL — SIGNIFICANT CHANGE UP (ref 1–3.3)
LYMPHOCYTES # BLD AUTO: 14.8 % — SIGNIFICANT CHANGE UP (ref 13–44)
MCHC RBC-ENTMCNC: 29.2 GM/DL — LOW (ref 32–36)
MCHC RBC-ENTMCNC: 30.2 PG — SIGNIFICANT CHANGE UP (ref 27–34)
MCV RBC AUTO: 103.3 FL — HIGH (ref 80–100)
MONOCYTES # BLD AUTO: 0.81 K/UL — SIGNIFICANT CHANGE UP (ref 0–0.9)
MONOCYTES NFR BLD AUTO: 8 % — SIGNIFICANT CHANGE UP (ref 2–14)
NEUTROPHILS # BLD AUTO: 7.51 K/UL — HIGH (ref 1.8–7.4)
NEUTROPHILS NFR BLD AUTO: 74.4 % — SIGNIFICANT CHANGE UP (ref 43–77)
NRBC # BLD: 0 /100 WBCS — SIGNIFICANT CHANGE UP (ref 0–0)
PLATELET # BLD AUTO: 253 K/UL — SIGNIFICANT CHANGE UP (ref 150–400)
POTASSIUM SERPL-MCNC: 4.3 MMOL/L — SIGNIFICANT CHANGE UP (ref 3.5–5.3)
POTASSIUM SERPL-SCNC: 4.3 MMOL/L — SIGNIFICANT CHANGE UP (ref 3.5–5.3)
RBC # BLD: 4.5 M/UL — SIGNIFICANT CHANGE UP (ref 4.2–5.8)
RBC # FLD: 18.3 % — HIGH (ref 10.3–14.5)
SODIUM SERPL-SCNC: 137 MMOL/L — SIGNIFICANT CHANGE UP (ref 135–145)
TROPONIN T SERPL-MCNC: <0.01 NG/ML — SIGNIFICANT CHANGE UP (ref 0–0.01)
WBC # BLD: 10.09 K/UL — SIGNIFICANT CHANGE UP (ref 3.8–10.5)
WBC # FLD AUTO: 10.09 K/UL — SIGNIFICANT CHANGE UP (ref 3.8–10.5)

## 2023-03-01 PROCEDURE — 80048 BASIC METABOLIC PNL TOTAL CA: CPT

## 2023-03-01 PROCEDURE — 84484 ASSAY OF TROPONIN QUANT: CPT

## 2023-03-01 PROCEDURE — L9991: CPT

## 2023-03-01 PROCEDURE — 99284 EMERGENCY DEPT VISIT MOD MDM: CPT

## 2023-03-01 PROCEDURE — 82962 GLUCOSE BLOOD TEST: CPT

## 2023-03-01 PROCEDURE — 93005 ELECTROCARDIOGRAM TRACING: CPT

## 2023-03-01 PROCEDURE — 36415 COLL VENOUS BLD VENIPUNCTURE: CPT

## 2023-03-01 PROCEDURE — 99283 EMERGENCY DEPT VISIT LOW MDM: CPT

## 2023-03-01 PROCEDURE — 85025 COMPLETE CBC W/AUTO DIFF WBC: CPT

## 2023-03-01 NOTE — ED ADULT NURSE NOTE - OBJECTIVE STATEMENT
62 year old M patient, A+OX3, ambulatory w steady gait presents to ED w no complaints.  Actively seen sticking fingers done throat to induce vomitting & states "I vomitted".  Denies abd pain.  ADmits to drinking daily and "doing drugs", will not state which specifially.  Speaking full sentences wo distress, no tremors noted, no slurred speech noted.

## 2023-03-01 NOTE — ED ADULT TRIAGE NOTE - CHIEF COMPLAINT QUOTE
pt BIBA from across the street for eval AMS "I've been drinkin trina and marlonin weed" denies fall trauma inury

## 2023-03-01 NOTE — ED ADULT TRIAGE NOTE - CHIEF COMPLAINT QUOTE
Patient arrives ambulatory with EMS report stating "we found him with a cigarette in his mouth, he uses drugs and drinks alcohol."  Patient says "I am going to throw up."  Noted to be sticking fingers in mouth to induce vomiting.

## 2023-03-01 NOTE — ED PROVIDER NOTE - CLINICAL SUMMARY MEDICAL DECISION MAKING FREE TEXT BOX
Low suspicion for acute pathology such as ACS, pneumonia, pneumothorax  however pt has polysubstance use and endorses cocaine use, will get ekg/trop to r/o cocaine chest pain  Patient is clinically intoxicated.  Will butterfly for labs as patient is high elopement risk    ---->pt eloped prior to lab results or cxr

## 2023-03-01 NOTE — ED PROVIDER NOTE - OBJECTIVE STATEMENT
62-year-old male with history of chronic bronchitis, COPD, polysubstance use, well-known to this ED, comes in for chest pain.  Patient says he was just smoking and drinking.  Of note, patient was here earlier today and left without being seen.  During interview patient is somnolent sitting in chair, answering questions but seems annoyed.  Screaming that he wants to be in a stretcher wants to lie down.  When asked if he uses cocaine pt says yes, unknown when he last used.

## 2023-03-01 NOTE — ED ADULT NURSE NOTE - NS ED NURSE ELOPE COMMENTS
Upon SSA trying to take patient to xray, pt noted not to be in patient treatment area.  MD made aware.

## 2023-03-01 NOTE — ED ADULT NURSE NOTE - BREATH SOUNDS, MLM
Clear
,[email or web address]

## 2023-03-01 NOTE — ED ADULT NURSE NOTE - OBJECTIVE STATEMENT
62 year old M patient with c/o of being brought in by EMS after "drinking hooch and smoking weed" across the street.  Pt was just in the ER and LWBS because "he didn't feel like leaving".  PT A+OX3, ambulatory w steady gait.  Denies any complaints.  States he flagged down an EMS truck because he was cold.

## 2023-03-01 NOTE — ED ADULT TRIAGE NOTE - MEANS OF ARRIVAL
LAST OV 9-4-19. Rx refill for Osphena given as requested. On active medication list.     
wheelchair

## 2023-03-07 NOTE — ED ADULT NURSE NOTE - ISOLATION PROVIDED EDUCATION
Patient is requesting a return phone call from the [address] to discuss what she can take over the counter for pain management while she completes physical therapy for her LT Knee. Patient had surgery for her LT Knee on 12/05/22 and understands at this time the doctor will not prescribe any more pain medications. Patient can be reached at [phone]. Patient

## 2023-03-25 ENCOUNTER — EMERGENCY (EMERGENCY)
Facility: HOSPITAL | Age: 63
LOS: 1 days | Discharge: LEFT BEFORE DISCHARGE | End: 2023-03-25
Admitting: EMERGENCY MEDICINE
Payer: MEDICARE

## 2023-03-25 VITALS
SYSTOLIC BLOOD PRESSURE: 113 MMHG | TEMPERATURE: 98 F | HEART RATE: 89 BPM | DIASTOLIC BLOOD PRESSURE: 69 MMHG | RESPIRATION RATE: 18 BRPM | HEIGHT: 72 IN | OXYGEN SATURATION: 95 %

## 2023-03-25 DIAGNOSIS — Z90.89 ACQUIRED ABSENCE OF OTHER ORGANS: Chronic | ICD-10-CM

## 2023-03-25 DIAGNOSIS — Z98.890 OTHER SPECIFIED POSTPROCEDURAL STATES: Chronic | ICD-10-CM

## 2023-03-25 DIAGNOSIS — Z98.49 CATARACT EXTRACTION STATUS, UNSPECIFIED EYE: Chronic | ICD-10-CM

## 2023-03-25 DIAGNOSIS — R05.9 COUGH, UNSPECIFIED: ICD-10-CM

## 2023-03-25 DIAGNOSIS — R06.9 UNSPECIFIED ABNORMALITIES OF BREATHING: ICD-10-CM

## 2023-03-25 DIAGNOSIS — Z53.21 PROCEDURE AND TREATMENT NOT CARRIED OUT DUE TO PATIENT LEAVING PRIOR TO BEING SEEN BY HEALTH CARE PROVIDER: ICD-10-CM

## 2023-03-25 PROCEDURE — L9991: CPT

## 2023-03-25 NOTE — ED ADULT TRIAGE NOTE - OTHER COMPLAINTS
cough with difficult breathing and reproducible chest discomfort with cough. hx of copd, asthma, emphesema. ekg done

## 2023-04-28 NOTE — ED ADULT NURSE NOTE - FACIAL SYMMETRY
[FreeTextEntry1] : MRI right shoulder:\par 1. AC joint arthrosis.\par 2. Infraspinatus tendinopathy and fraying with interstitial tear at the myotendinous junction, 3 cm in length x 5 mm in width intramuscular ganglion with no muscle atrophy or tear.\par 3. Supraspinatus tendinopathy and fraying with 10 x 10 mm articular insertional tear and no muscle atrophy.\par 4. Diffuse tear of the superior labrum with biceps tendinopathy, tenosynovitis, and diffuse tear of the horizontal segment and anchor. Fraying of the inferior labrum.\par 5. Mild arthrosis of the glenohumeral joint with joint effusion.\par 6. Capsular thickening anterior which can be seen with adhesive capsulitis. symmetrical

## 2023-05-05 NOTE — DISCHARGE NOTE NURSING/CASE MANAGEMENT/SOCIAL WORK - NSFLUVACAGEDISCH_IMM_ALL_CORE
Left foot partial 5th ray amputation with the 5th metatarsal resected midshaft  Further purulence found on incision plantarly and dorsally  Wound irrigated with 1L pulse lavage   Clean and dirty culture sent to Lab  Incision left open due to severe infection. Potential grafting procedure to be scheduled week of 5/1 see operative report Adult

## 2023-05-07 ENCOUNTER — EMERGENCY (EMERGENCY)
Facility: HOSPITAL | Age: 63
LOS: 1 days | Discharge: HOME | End: 2023-05-07
Attending: EMERGENCY MEDICINE | Admitting: EMERGENCY MEDICINE
Payer: MEDICARE

## 2023-05-07 VITALS
TEMPERATURE: 97 F | DIASTOLIC BLOOD PRESSURE: 75 MMHG | SYSTOLIC BLOOD PRESSURE: 115 MMHG | HEART RATE: 91 BPM | OXYGEN SATURATION: 97 % | HEIGHT: 72 IN | RESPIRATION RATE: 17 BRPM | WEIGHT: 169.98 LBS

## 2023-05-07 DIAGNOSIS — Z98.49 CATARACT EXTRACTION STATUS, UNSPECIFIED EYE: Chronic | ICD-10-CM

## 2023-05-07 DIAGNOSIS — Z90.89 ACQUIRED ABSENCE OF OTHER ORGANS: Chronic | ICD-10-CM

## 2023-05-07 DIAGNOSIS — Z98.890 OTHER SPECIFIED POSTPROCEDURAL STATES: Chronic | ICD-10-CM

## 2023-05-07 PROCEDURE — 94640 AIRWAY INHALATION TREATMENT: CPT

## 2023-05-07 PROCEDURE — 99284 EMERGENCY DEPT VISIT MOD MDM: CPT

## 2023-05-07 PROCEDURE — 99283 EMERGENCY DEPT VISIT LOW MDM: CPT | Mod: 25

## 2023-05-07 RX ORDER — IPRATROPIUM/ALBUTEROL SULFATE 18-103MCG
3 AEROSOL WITH ADAPTER (GRAM) INHALATION ONCE
Refills: 0 | Status: COMPLETED | OUTPATIENT
Start: 2023-05-07 | End: 2023-05-07

## 2023-05-07 RX ORDER — ALBUTEROL 90 UG/1
1 AEROSOL, METERED ORAL ONCE
Refills: 0 | Status: COMPLETED | OUTPATIENT
Start: 2023-05-07 | End: 2023-05-07

## 2023-05-07 RX ADMIN — ALBUTEROL 1 PUFF(S): 90 AEROSOL, METERED ORAL at 23:18

## 2023-05-07 RX ADMIN — Medication 3 MILLILITER(S): at 23:28

## 2023-05-07 NOTE — ED PROVIDER NOTE - NSFOLLOWUPINSTRUCTIONS_ED_ALL_ED_FT
PLEASE FOLLOW-UP WITH YOUR PCP IN 1-2 DAYS.    ANY IMAGING RESULTS GIVEN IN THE EMERGENCY DEPARTMENT ARE PRELIMINARY UNTIL FORMALLY READ BY A RADIOLOGIST. YOU WILL BE CONTACTED IF THERE ARE ANY SIGNIFICANT CHANGES IN THE FINAL READ.    PLEASE RETURN TO THE EMERGENCY DEPARTMENT IF FEVER, CHEST PAIN, SHORTNESS OF BREATH, PERSISTENT WHEEZING DESPITE ALBUTEROL, ABDOMINAL PAIN, VOMITING, OTHER CONCERNING SYMPTOMS.    PLEASE CONTACT SPENCER KELLY ([facility] EMERGENCY DEPARTMENT CLINICAL REFERRAL COORDINATOR) TO ASSIST IN SCHEDULING YOUR FOLLOW-UP APPOINTMENT.    Monday - Friday 11am-7pm  [phone]  [email or web address]

## 2023-05-07 NOTE — ED PROVIDER NOTE - PATIENT PORTAL LINK FT
You can access the FollowMyHealth Patient Portal offered by [facility] by registering at the following website: [email or web address]. By joining [facility]’s FollowMyHealth portal, you will also be able to view your health information using other applications (apps) compatible with our system.

## 2023-05-07 NOTE — ED ADULT NURSE NOTE - CHIEF COMPLAINT QUOTE
Pt presents ambulatory with c/o "productive cough" x approx 10 days. States out of inhalers, seen by PCP today. Prescribed "steroids, ABX, inhalers" but unable to go to pharmacy to  meds per pt. Exhibits wet cough, respiratory effort WNL. [place].

## 2023-05-07 NOTE — ED PROVIDER NOTE - PHYSICAL EXAMINATION
CONST: nontoxic NAD speaking in full sentences   HEAD: atraumatic  EYES: conjunctivae clear  ENT: mmm  NECK: supple/FROM  CARD: rrr no murmurs  CHEST: ctab no r/r/w, no stridor/retractions/tripoding  ABD: soft, nd, nttp, no rebound/guarding  EXT: FROM, symmetric distal pulses intact  SKIN: warm, dry, no rash, no pedal edema/ttp/rash, cap refill <2sec  NEURO: a+ox3, 5/5 strength x4, gross sensation intact x4, baseline gait

## 2023-05-07 NOTE — ED ADULT TRIAGE NOTE - NS ED NURSE AMBULANCES
Calling [facility] acupuncture appt on 10/8/19 at 12:00pm.  Patient confirmed. [facility]

## 2023-05-07 NOTE — ED PROVIDER NOTE - OBJECTIVE STATEMENT
62M undomiciled, daily smoker (50py), copd/emphysema (no intubations), seen by pcp earlier today for 10d productive cough given albuterol inhaler/neb/steroid Rx but was not able to get Rx before pharmacy closed. no fever/chills, no cp/sob, no abd pain/n/v, no pedal edema/pain/rash.

## 2023-05-07 NOTE — ED ADULT TRIAGE NOTE - CHIEF COMPLAINT QUOTE
Pt presents ambulatory with c/o "productive cough" x approx 10 days. States out of inhalers, seen by PCP today. Prescribed "steroids, ABX, inhalers" but unable to go to pharmacy to  meds per pt. Exhibits wet cough, respiratory effort WNL. [place].

## 2023-05-07 NOTE — ED PROVIDER NOTE - CLINICAL SUMMARY MEDICAL DECISION MAKING FREE TEXT BOX
hx obtained from pt and chart review. avss. nontoxic. NAD. no systemic sx. sleeping comfortably. no active cp. non acute resp distress. s/p albuterol neb x2 per pt request. sx controlled. already w/ appropriate Rx sent to pharmacy. no indication for labs/imaging at this time. will dc w/ outpatient pcp fu, albuterol inhaler, strict return precautions. pt agrees w/ plan. questions answered.

## 2023-05-07 NOTE — ED ADULT NURSE NOTE - OBJECTIVE STATEMENT
Pt presents to ER c/o "productive cough" x approx 10 days. States out of inhalers, seen by PCP today. Prescribed "steroids, ABX, inhalers" but unable to go to pharmacy to  meds per pt. Exhibits wet cough, respiratory effort WNL. Pt A&O x3, calm and cooperative, airway patent, respirations regular, non-labored, lungs clear bilaterally to auscultation, abdomen soft non-tender, normoactive bowel sounds noted in all quadrants, strong palpable peripheral pulses noted, skin warm dry intact. Pt waiting ER provider evaluation.

## 2023-05-09 DIAGNOSIS — Z87.19 PERSONAL HISTORY OF OTHER DISEASES OF THE DIGESTIVE SYSTEM: ICD-10-CM

## 2023-05-09 DIAGNOSIS — R05.9 COUGH, UNSPECIFIED: ICD-10-CM

## 2023-05-09 DIAGNOSIS — F31.9 BIPOLAR DISORDER, UNSPECIFIED: ICD-10-CM

## 2023-05-09 DIAGNOSIS — F17.200 NICOTINE DEPENDENCE, UNSPECIFIED, UNCOMPLICATED: ICD-10-CM

## 2023-05-09 DIAGNOSIS — J44.9 CHRONIC OBSTRUCTIVE PULMONARY DISEASE, UNSPECIFIED: ICD-10-CM

## 2023-05-09 DIAGNOSIS — Z90.09 ACQUIRED ABSENCE OF OTHER PART OF HEAD AND NECK: ICD-10-CM

## 2023-05-09 DIAGNOSIS — Z86.59 PERSONAL HISTORY OF OTHER MENTAL AND BEHAVIORAL DISORDERS: ICD-10-CM

## 2023-05-09 DIAGNOSIS — F32.A DEPRESSION, UNSPECIFIED: ICD-10-CM

## 2023-05-09 DIAGNOSIS — Z59.00 HOMELESSNESS UNSPECIFIED: ICD-10-CM

## 2023-05-09 SDOH — ECONOMIC STABILITY - HOUSING INSECURITY: HOMELESSNESS UNSPECIFIED: Z59.00

## 2023-06-18 ENCOUNTER — EMERGENCY (EMERGENCY)
Facility: HOSPITAL | Age: 63
LOS: 1 days | Discharge: HOME | End: 2023-06-18
Attending: EMERGENCY MEDICINE | Admitting: EMERGENCY MEDICINE
Payer: MEDICARE

## 2023-06-18 VITALS
TEMPERATURE: 98 F | RESPIRATION RATE: 95 BRPM | WEIGHT: 169.98 LBS | HEART RATE: 95 BPM | HEIGHT: 72 IN | OXYGEN SATURATION: 95 % | SYSTOLIC BLOOD PRESSURE: 142 MMHG | DIASTOLIC BLOOD PRESSURE: 88 MMHG

## 2023-06-18 VITALS
DIASTOLIC BLOOD PRESSURE: 86 MMHG | HEART RATE: 95 BPM | OXYGEN SATURATION: 95 % | SYSTOLIC BLOOD PRESSURE: 157 MMHG | RESPIRATION RATE: 17 BRPM | TEMPERATURE: 99 F

## 2023-06-18 DIAGNOSIS — Z98.49 CATARACT EXTRACTION STATUS, UNSPECIFIED EYE: Chronic | ICD-10-CM

## 2023-06-18 DIAGNOSIS — Z86.69 PERSONAL HISTORY OF OTHER DISEASES OF THE NERVOUS SYSTEM AND SENSE ORGANS: ICD-10-CM

## 2023-06-18 DIAGNOSIS — Z87.891 PERSONAL HISTORY OF NICOTINE DEPENDENCE: ICD-10-CM

## 2023-06-18 DIAGNOSIS — J43.9 EMPHYSEMA, UNSPECIFIED: ICD-10-CM

## 2023-06-18 DIAGNOSIS — Z20.822 CONTACT WITH AND (SUSPECTED) EXPOSURE TO COVID-19: ICD-10-CM

## 2023-06-18 DIAGNOSIS — Z90.89 ACQUIRED ABSENCE OF OTHER ORGANS: ICD-10-CM

## 2023-06-18 DIAGNOSIS — Z87.19 PERSONAL HISTORY OF OTHER DISEASES OF THE DIGESTIVE SYSTEM: ICD-10-CM

## 2023-06-18 DIAGNOSIS — R07.89 OTHER CHEST PAIN: ICD-10-CM

## 2023-06-18 DIAGNOSIS — B34.1 ENTEROVIRUS INFECTION, UNSPECIFIED: ICD-10-CM

## 2023-06-18 DIAGNOSIS — R06.02 SHORTNESS OF BREATH: ICD-10-CM

## 2023-06-18 DIAGNOSIS — B34.8 OTHER VIRAL INFECTIONS OF UNSPECIFIED SITE: ICD-10-CM

## 2023-06-18 DIAGNOSIS — Z98.890 OTHER SPECIFIED POSTPROCEDURAL STATES: Chronic | ICD-10-CM

## 2023-06-18 DIAGNOSIS — F31.9 BIPOLAR DISORDER, UNSPECIFIED: ICD-10-CM

## 2023-06-18 DIAGNOSIS — Z90.89 ACQUIRED ABSENCE OF OTHER ORGANS: Chronic | ICD-10-CM

## 2023-06-18 DIAGNOSIS — Z59.00 HOMELESSNESS UNSPECIFIED: ICD-10-CM

## 2023-06-18 LAB
RAPID RVP RESULT: DETECTED
RV+EV RNA SPEC QL NAA+PROBE: DETECTED
SARS-COV-2 RNA SPEC QL NAA+PROBE: SIGNIFICANT CHANGE UP

## 2023-06-18 PROCEDURE — 99284 EMERGENCY DEPT VISIT MOD MDM: CPT

## 2023-06-18 PROCEDURE — 99283 EMERGENCY DEPT VISIT LOW MDM: CPT | Mod: 25

## 2023-06-18 PROCEDURE — 0225U NFCT DS DNA&RNA 21 SARSCOV2: CPT

## 2023-06-18 PROCEDURE — 71045 X-RAY EXAM CHEST 1 VIEW: CPT | Mod: 26

## 2023-06-18 PROCEDURE — 71045 X-RAY EXAM CHEST 1 VIEW: CPT

## 2023-06-18 PROCEDURE — 94640 AIRWAY INHALATION TREATMENT: CPT

## 2023-06-18 RX ORDER — IPRATROPIUM/ALBUTEROL SULFATE 18-103MCG
3 AEROSOL WITH ADAPTER (GRAM) INHALATION ONCE
Refills: 0 | Status: COMPLETED | OUTPATIENT
Start: 2023-06-18 | End: 2023-06-18

## 2023-06-18 RX ORDER — ALBUTEROL 90 UG/1
1 AEROSOL, METERED ORAL ONCE
Refills: 0 | Status: COMPLETED | OUTPATIENT
Start: 2023-06-18 | End: 2023-06-18

## 2023-06-18 RX ADMIN — Medication 3 MILLILITER(S): at 11:48

## 2023-06-18 RX ADMIN — ALBUTEROL 1 PUFF(S): 90 AEROSOL, METERED ORAL at 14:16

## 2023-06-18 RX ADMIN — Medication 50 MILLIGRAM(S): at 11:50

## 2023-06-18 SDOH — ECONOMIC STABILITY - HOUSING INSECURITY: HOMELESSNESS UNSPECIFIED: Z59.00

## 2023-06-18 NOTE — ED PROVIDER NOTE - PHYSICAL EXAMINATION
VITAL SIGNS: I have reviewed nursing notes and confirm.  CONSTITUTIONAL: Well-developed; well-nourished; in no acute respiratory distress.  SKIN: Agree with RN documentation regarding decubitus evaluation. Remainder of skin exam is warm and dry, no acute rash.  HEAD: Normocephalic; atraumatic.  EYES: PERRL, EOM intact; conjunctiva and sclera clear.  ENT: No nasal discharge; airway clear.  NECK: Supple; non tender.  CARD: S1, S2 normal; no murmurs, gallops, or rubs. Regular rate and rhythm.  RESP: (+) wheezing bilat.   ABD: Normal bowel sounds; soft; non-distended; non-tender; no hepatosplenomegaly.  EXT: Normal ROM. No clubbing, cyanosis or edema.  NEURO: Alert, oriented. Grossly unremarkable.  PSYCH: Cooperative, appropriate.

## 2023-06-18 NOTE — ED PROVIDER NOTE - NSFOLLOWUPINSTRUCTIONS_ED_ALL_ED_FT
Use inhaler as needed for wheezing.  Stop smoking cigarettes.  Return to ED with any worsening symptoms or other concerns.    Viral Respiratory Infection  A respiratory infection is an illness that affects part of the respiratory system, such as the lungs, nose, or throat. A respiratory infection that is caused by a virus is called a viral respiratory infection.    Common types of viral respiratory infections include:  A cold.  The flu (influenza).  A respiratory syncytial virus (RSV) infection.  What are the causes?  This condition is caused by a virus. The virus may spread through contact with droplets or direct contact with infected people or their mucus or secretions. The virus may spread from person to person (is contagious).    What are the signs or symptoms?  Symptoms of this condition include:  A stuffy or runny nose.  A sore throat or cough.  Shortness of breath or difficulty breathing.  Yellow or green mucus (sputum).  Other symptoms may include:  A fever.  Sweating or chills.  Fatigue.  Achy muscles.  A headache.  How is this diagnosed?  This condition may be diagnosed based on:  Your symptoms.  A physical exam.  Testing of secretions from the nose or throat.  Chest X-ray.  How is this treated?  This condition may be treated with medicines, such as:  Antiviral medicine. This may shorten the length of time a person has symptoms.  Expectorants. These make it easier to cough up mucus.  Decongestant nasal sprays.  Acetaminophen or NSAIDs, such as ibuprofen, to relieve fever and pain.  Antibiotic medicines are not prescribed for viral infections.This is because antibiotics are designed to kill bacteria. They do not kill viruses.    Follow these instructions at home:  Managing pain and congestion    Take over-the-counter and prescription medicines only as told by your health care provider.  If you have a sore throat, gargle with a mixture of salt and water 3–4 times a day or as needed. To make salt water, completely dissolve ½–1 tsp (3–6 g) of salt in 1 cup (237 mL) of warm water.  Use nose drops made from salt water to ease congestion and soften raw skin around your nose.  Take 2 tsp (10 mL) of honey at bedtime to lessen coughing at night.  Do not give honey to children who are younger than 1 year.  Drink enough fluid to keep your urine pale yellow. This helps prevent dehydration and helps loosen up mucus.  General instructions    A sign telling the reader not to smoke.  Rest as much as possible.  Do not drink alcohol.  Do not use any products that contain nicotine or tobacco. These products include cigarettes, chewing tobacco, and vaping devices, such as e-cigarettes. If you need help quitting, ask your health care provider.  Keep all follow-up visits. This is important.  How is this prevented?  Washing hands with soap and water.  A person covering her mouth and nose with a cloth while sneezing.  Get an annual flu shot. You may get the flu shot in late summer, fall, or winter. Ask your health care provider when you should get your flu shot.  Avoid spreading your infection to other people. If you are sick:  Wash your hands with soap and water often, especially after you cough or sneeze. Wash for at least 20 seconds. If soap and water are not available, use alcohol-based hand .  Cover your mouth when you cough. Cover your nose and mouth when you sneeze.  Do not share cups or eating utensils.  Clean commonly used objects often. Clean commonly touched surfaces.  Stay home from work or school as told by your health care provider.  Avoid contact with people who are sick during cold and flu season. This is generally fall and winter.  Contact a health care provider if:  Your symptoms last for 10 days or longer.  Your symptoms get worse over time.  You have severe sinus pain in your face or forehead.  The glands in your jaw or neck become very swollen.  You have shortness of breath.  Get help right away if you:  Feel pain or pressure in your chest.  Have trouble breathing.  Faint or feel like you will faint.  Have severe and persistent vomiting.  Feel confused or disoriented.  These symptoms may represent a serious problem that is an emergency. Do not wait to see if the symptoms will go away. Get medical help right away. Call your local emergency services (911 in the [place] Do not drive yourself to the hospital.    Summary  A respiratory infection is an illness that affects part of the respiratory system, such as the lungs, nose, or throat. A respiratory infection that is caused by a virus is called a viral respiratory infection.  Common types of viral respiratory infections include a cold, influenza, and respiratory syncytial virus (RSV) infection.  Symptoms of this condition include a stuffy or runny nose, cough, fatigue, achy muscles, sore throat, and fevers or chills.  Antibiotic medicines are not prescribed for viral infections. This is because antibiotics are designed to kill bacteria. They are not effective against viruses.  This information is not intended to replace advice given to you by your health care provider. Make sure you discuss any questions you have with your health care provider.    Document Revised: 03/24/2022 Document Reviewed: 03/24/2022

## 2023-06-18 NOTE — ED PROVIDER NOTE - CLINICAL SUMMARY MEDICAL DECISION MAKING FREE TEXT BOX
61 y/o M, with COPD, smoking hx, and multiple ED visits, now presents to the ED for chest pain and wheezing. Pt had URI sxs. On exam, pt has +bilat wheezing. Will give Nebs, steroids, order RVP panel, Cxr, and anticipate dc. 61 y/o M, with COPD, smoking hx, and multiple ED visits, now presents to the ED for chest pain and wheezing. Pt had URI sxs. On exam, pt has +bilat wheezing. Will give Nebs, steroids, order RVP panel, Cxr, and anticipate dc.    Pt pos for entero/rhino  Feels better with nebs  will give pt inhaler for home

## 2023-06-18 NOTE — ED PROVIDER NOTE - WR ORDER DATE AND TIME 1
Andrea Collado attended 3 hours of group today.     The group topic was Addiction 101, patient was responsive to topic.     Patient's engagement in the group session: medium     Total group size: 6      JAZLYN Funes, [facility]  4/23/2021, 12:42 PM  
Telemedicine Visit: The patient's condition can be safely assessed and treated via synchronous audio and visual telemedicine encounter.      Reason for Telemedicine Visit: Services only offered telehealth    Originating Site (Patient Location): Patient's home    Distant Site (Provider Location): Provider Remote Setting- Home Office    Consent:  The patient/guardian has verbally consented to: the potential risks and benefits of telemedicine (video visit) versus in person care; bill my insurance or make self-payment for services provided; and responsibility for payment of non-covered services.     Mode of Communication:  Video Conference via Zoom    Call Started at: 9:30 AM  Call Ended at: 12:10 PM    As the provider I attest to compliance with applicable laws and regulations related to telemedicine.  
18-Jun-2023 11:40

## 2023-06-18 NOTE — ED ADULT NURSE NOTE - NS ED PATIENT SAFETY CONCERN
Subjective   Emily Haas is a 70 y.o. female.     Hypertension   This is a chronic problem. The current episode started more than 1 year ago. The problem is unchanged. The problem is controlled. Pertinent negatives include no chest pain, orthopnea, palpitations, peripheral edema, PND or shortness of breath.   Hyperlipidemia   This is a chronic problem. The current episode started more than 1 year ago. The problem is uncontrolled. Recent lipid tests were reviewed and are high. Exacerbating diseases include hypothyroidism and obesity. Pertinent negatives include no chest pain, myalgias or shortness of breath.   Hypothyroidism   This is a chronic problem. The current episode started more than 1 year ago. The problem occurs constantly. The problem has been unchanged. Pertinent negatives include no arthralgias, chest pain or myalgias.   Diabetes   She presents for her follow-up diabetic visit. She has type 2 diabetes mellitus. Her disease course has been stable. Pertinent negatives for hypoglycemia include no nervousness/anxiousness. Pertinent negatives for diabetes include no chest pain and no foot paresthesias. Her breakfast blood glucose range is generally 110-130 mg/dl. (118 - 130) An ACE inhibitor/angiotensin II receptor blocker is being taken.   Depression   Visit Type: initial  Onset of symptoms: 1 to 6 months ago  Progression since onset: gradually worsening  Patient is not experiencing: depressed mood, excessive worry, insomnia, irritability, nervousness/anxiety, palpitations and shortness of breath.    Obesity   This is a chronic problem. The current episode started more than 1 year ago. The problem occurs constantly. The problem has been gradually improving. Pertinent negatives include no arthralgias, chest pain or myalgias.        The following portions of the patient's history were reviewed and updated as appropriate: allergies, current medications, past family history, past medical history, past social  history, past surgical history and problem list.    Review of Systems   Constitutional: Negative for irritability.   Respiratory: Negative for shortness of breath.    Cardiovascular: Negative for chest pain, palpitations, orthopnea and PND.   Genitourinary: Negative for dysuria and hematuria.   Musculoskeletal: Negative for arthralgias and myalgias.   Psychiatric/Behavioral: Negative for dysphoric mood. The patient is not nervous/anxious and does not have insomnia.        Objective   Physical Exam   Constitutional: She is oriented to person, place, and time. No distress.   Neurological: She is alert and oriented to person, place, and time.   Psychiatric: She has a normal mood and affect. Her speech is normal and behavior is normal. Judgment and thought content normal.   Vitals reviewed.      Assessment/Plan   Problems Addressed this Visit        Cardiovascular and Mediastinum    Essential hypertension - Primary    Pure hypercholesterolemia       Endocrine    Acquired hypothyroidism    Relevant Medications    SYNTHROID 125 MCG tablet    Diabetes mellitus type 2 without retinopathy (CMS/HCC)       Other    Depression          You have chosen to receive care through a telephone visit. Do you consent to use a telephone visit for your medical care today? Yes  This visit has been rescheduled as a phone visit to comply with patient safety concerns in accordance with CDC recommendations. Total time of discussion was   16 minutes.          Visit Vitals  /70   Wt 85.3 kg (188 lb)   LMP  (LMP Unknown)   BMI 31.28 kg/m²       Body mass index is 31.28 kg/m².            This document has been electronically signed by Fernando Hutton MD on August 12, 2020 10:06         No

## 2023-06-18 NOTE — ED PROVIDER NOTE - OBJECTIVE STATEMENT
61 y/o M, undomiciled, PMHx of COPD, emphysema (no intubations), polysubstance abuse disorder, smoker, multiple visits here for similar flu like sxs, now coming in for difficulty breathing and chest pain. Pt had cold last week w/ congestion and cough. Pt denies fever and chills. Pt tried using inhaler w/o relief. Pt states sxs are similar to his COPD.

## 2023-06-18 NOTE — ED PROVIDER NOTE - PATIENT PORTAL LINK FT
You can access the FollowMyHealth Patient Portal offered by [facility] by registering at the following website: [email or web address]. By joining [facility]’s FollowMyHealth portal, you will also be able to view your health information using other applications (apps) compatible with our system.

## 2023-07-08 ENCOUNTER — EMERGENCY (EMERGENCY)
Facility: HOSPITAL | Age: 63
LOS: 1 days | Discharge: HOME | End: 2023-07-08
Attending: STUDENT IN AN ORGANIZED HEALTH CARE EDUCATION/TRAINING PROGRAM | Admitting: STUDENT IN AN ORGANIZED HEALTH CARE EDUCATION/TRAINING PROGRAM
Payer: MEDICARE

## 2023-07-08 VITALS — RESPIRATION RATE: 21 BRPM | HEART RATE: 105 BPM | OXYGEN SATURATION: 97 %

## 2023-07-08 VITALS
WEIGHT: 171.08 LBS | OXYGEN SATURATION: 94 % | HEART RATE: 104 BPM | DIASTOLIC BLOOD PRESSURE: 100 MMHG | TEMPERATURE: 98 F | SYSTOLIC BLOOD PRESSURE: 177 MMHG | RESPIRATION RATE: 20 BRPM | HEIGHT: 72 IN

## 2023-07-08 DIAGNOSIS — Z98.49 CATARACT EXTRACTION STATUS, UNSPECIFIED EYE: Chronic | ICD-10-CM

## 2023-07-08 DIAGNOSIS — Z98.890 OTHER SPECIFIED POSTPROCEDURAL STATES: Chronic | ICD-10-CM

## 2023-07-08 DIAGNOSIS — R06.2 WHEEZING: ICD-10-CM

## 2023-07-08 DIAGNOSIS — Z90.89 ACQUIRED ABSENCE OF OTHER ORGANS: ICD-10-CM

## 2023-07-08 DIAGNOSIS — Z86.69 PERSONAL HISTORY OF OTHER DISEASES OF THE NERVOUS SYSTEM AND SENSE ORGANS: ICD-10-CM

## 2023-07-08 DIAGNOSIS — J45.909 UNSPECIFIED ASTHMA, UNCOMPLICATED: ICD-10-CM

## 2023-07-08 DIAGNOSIS — R60.0 LOCALIZED EDEMA: ICD-10-CM

## 2023-07-08 DIAGNOSIS — Z87.19 PERSONAL HISTORY OF OTHER DISEASES OF THE DIGESTIVE SYSTEM: ICD-10-CM

## 2023-07-08 DIAGNOSIS — Z90.89 ACQUIRED ABSENCE OF OTHER ORGANS: Chronic | ICD-10-CM

## 2023-07-08 DIAGNOSIS — F31.9 BIPOLAR DISORDER, UNSPECIFIED: ICD-10-CM

## 2023-07-08 DIAGNOSIS — J43.9 EMPHYSEMA, UNSPECIFIED: ICD-10-CM

## 2023-07-08 DIAGNOSIS — F17.200 NICOTINE DEPENDENCE, UNSPECIFIED, UNCOMPLICATED: ICD-10-CM

## 2023-07-08 LAB
ANION GAP SERPL CALC-SCNC: 11 MMOL/L — SIGNIFICANT CHANGE UP (ref 5–17)
BASE EXCESS BLDV CALC-SCNC: 6.4 MMOL/L — HIGH (ref -2–3)
BASOPHILS # BLD AUTO: 0.05 K/UL — SIGNIFICANT CHANGE UP (ref 0–0.2)
BASOPHILS NFR BLD AUTO: 0.5 % — SIGNIFICANT CHANGE UP (ref 0–2)
BUN SERPL-MCNC: 24 MG/DL — HIGH (ref 7–23)
CALCIUM SERPL-MCNC: 8.8 MG/DL — SIGNIFICANT CHANGE UP (ref 8.4–10.5)
CHLORIDE SERPL-SCNC: 108 MMOL/L — SIGNIFICANT CHANGE UP (ref 96–108)
CO2 BLDV-SCNC: 35.3 MMOL/L — HIGH (ref 22–26)
CO2 SERPL-SCNC: 29 MMOL/L — SIGNIFICANT CHANGE UP (ref 22–31)
CREAT SERPL-MCNC: 1.01 MG/DL — SIGNIFICANT CHANGE UP (ref 0.5–1.3)
EGFR: 84 ML/MIN/1.73M2 — SIGNIFICANT CHANGE UP
EOSINOPHIL # BLD AUTO: 0.2 K/UL — SIGNIFICANT CHANGE UP (ref 0–0.5)
EOSINOPHIL NFR BLD AUTO: 2 % — SIGNIFICANT CHANGE UP (ref 0–6)
GLUCOSE SERPL-MCNC: 106 MG/DL — HIGH (ref 70–99)
HCO3 BLDV-SCNC: 34 MMOL/L — HIGH (ref 22–29)
HCT VFR BLD CALC: 36.5 % — LOW (ref 39–50)
HGB BLD-MCNC: 11.6 G/DL — LOW (ref 13–17)
IMM GRANULOCYTES NFR BLD AUTO: 0.4 % — SIGNIFICANT CHANGE UP (ref 0–0.9)
LYMPHOCYTES # BLD AUTO: 2.06 K/UL — SIGNIFICANT CHANGE UP (ref 1–3.3)
LYMPHOCYTES # BLD AUTO: 21 % — SIGNIFICANT CHANGE UP (ref 13–44)
MCHC RBC-ENTMCNC: 31.1 PG — SIGNIFICANT CHANGE UP (ref 27–34)
MCHC RBC-ENTMCNC: 31.8 GM/DL — LOW (ref 32–36)
MCV RBC AUTO: 97.9 FL — SIGNIFICANT CHANGE UP (ref 80–100)
MONOCYTES # BLD AUTO: 0.86 K/UL — SIGNIFICANT CHANGE UP (ref 0–0.9)
MONOCYTES NFR BLD AUTO: 8.8 % — SIGNIFICANT CHANGE UP (ref 2–14)
NEUTROPHILS # BLD AUTO: 6.59 K/UL — SIGNIFICANT CHANGE UP (ref 1.8–7.4)
NEUTROPHILS NFR BLD AUTO: 67.3 % — SIGNIFICANT CHANGE UP (ref 43–77)
NRBC # BLD: 0 /100 WBCS — SIGNIFICANT CHANGE UP (ref 0–0)
NT-PROBNP SERPL-SCNC: 80 PG/ML — SIGNIFICANT CHANGE UP (ref 0–300)
PCO2 BLDV: 58 MMHG — HIGH (ref 42–55)
PH BLDV: 7.37 — SIGNIFICANT CHANGE UP (ref 7.32–7.43)
PLATELET # BLD AUTO: 273 K/UL — SIGNIFICANT CHANGE UP (ref 150–400)
PO2 BLDV: 99 MMHG — HIGH (ref 25–45)
POTASSIUM SERPL-MCNC: 4.4 MMOL/L — SIGNIFICANT CHANGE UP (ref 3.5–5.3)
POTASSIUM SERPL-SCNC: 4.4 MMOL/L — SIGNIFICANT CHANGE UP (ref 3.5–5.3)
RBC # BLD: 3.73 M/UL — LOW (ref 4.2–5.8)
RBC # FLD: 15.9 % — HIGH (ref 10.3–14.5)
SAO2 % BLDV: 96.8 % — HIGH (ref 67–88)
SODIUM SERPL-SCNC: 148 MMOL/L — HIGH (ref 135–145)
WBC # BLD: 9.8 K/UL — SIGNIFICANT CHANGE UP (ref 3.8–10.5)
WBC # FLD AUTO: 9.8 K/UL — SIGNIFICANT CHANGE UP (ref 3.8–10.5)

## 2023-07-08 PROCEDURE — 80048 BASIC METABOLIC PNL TOTAL CA: CPT

## 2023-07-08 PROCEDURE — 36415 COLL VENOUS BLD VENIPUNCTURE: CPT

## 2023-07-08 PROCEDURE — 82803 BLOOD GASES ANY COMBINATION: CPT

## 2023-07-08 PROCEDURE — 96374 THER/PROPH/DIAG INJ IV PUSH: CPT

## 2023-07-08 PROCEDURE — 99285 EMERGENCY DEPT VISIT HI MDM: CPT | Mod: 25

## 2023-07-08 PROCEDURE — 83880 ASSAY OF NATRIURETIC PEPTIDE: CPT

## 2023-07-08 PROCEDURE — 85025 COMPLETE CBC W/AUTO DIFF WBC: CPT

## 2023-07-08 PROCEDURE — 99285 EMERGENCY DEPT VISIT HI MDM: CPT

## 2023-07-08 PROCEDURE — 94640 AIRWAY INHALATION TREATMENT: CPT

## 2023-07-08 PROCEDURE — 71045 X-RAY EXAM CHEST 1 VIEW: CPT | Mod: 26

## 2023-07-08 PROCEDURE — 71045 X-RAY EXAM CHEST 1 VIEW: CPT

## 2023-07-08 RX ORDER — IPRATROPIUM/ALBUTEROL SULFATE 18-103MCG
3 AEROSOL WITH ADAPTER (GRAM) INHALATION ONCE
Refills: 0 | Status: COMPLETED | OUTPATIENT
Start: 2023-07-08 | End: 2023-07-08

## 2023-07-08 RX ORDER — IPRATROPIUM/ALBUTEROL SULFATE 18-103MCG
3 AEROSOL WITH ADAPTER (GRAM) INHALATION
Refills: 0 | Status: COMPLETED | OUTPATIENT
Start: 2023-07-08 | End: 2023-07-08

## 2023-07-08 RX ADMIN — Medication 3 MILLILITER(S): at 21:42

## 2023-07-08 RX ADMIN — Medication 3 MILLILITER(S): at 22:22

## 2023-07-08 RX ADMIN — Medication 3 MILLILITER(S): at 21:00

## 2023-07-08 RX ADMIN — Medication 125 MILLIGRAM(S): at 21:27

## 2023-07-08 RX ADMIN — Medication 3 MILLILITER(S): at 21:29

## 2023-07-08 NOTE — ED PROVIDER NOTE - PHYSICAL EXAMINATION
CONST: nontoxic NAD speaking in full sentences, audible wheezing but speaking full sentences  HEAD: atraumatic  EYES: conjunctivae clear  NECK: supple  CARD: regular rate  CHEST: breathing comfortably, no stridor/retractions/tripoding, + inspiratory  and expiratory wheezing  EXT: FROM, mild edema lower legs  SKIN: warm, dry  NEURO: awake alert answering questions following commands moving all extremities

## 2023-07-08 NOTE — ED PROVIDER NOTE - CLINICAL SUMMARY MEDICAL DECISION MAKING FREE TEXT BOX
Patient here with COPD/emphysema exacerbation  No respiratory distress, no hypoxia  Plan for basic labs, CXR, nebs, steroids, reassess    --> Labs and x-ray grossly unremarkable, patient feeling better after treatments.  Ambulatory saturation 97%, patient without distress on ambulation, wants to go home.  Still with some wheezing however improved from April, and patient likely has chronic wheezing given his extensive lung disease.  Will discharge with course of steroids, patient says he has nebs at home and does not need refill.  Also counseled on smoking cessation

## 2023-07-08 NOTE — ED ADULT NURSE REASSESSMENT NOTE - NSFALLUNIVINTERV_ED_ALL_ED
Bed/Stretcher in lowest position, wheels locked, appropriate side rails in place/Call bell, personal items and telephone in reach/Instruct patient to call for assistance before getting out of bed/chair/stretcher/Non-slip footwear applied when patient is off stretcher/[place] to call system/Physically safe environment - no spills, clutter or unnecessary equipment/Purposeful proactive rounding/Room/bathroom lighting operational, light cord in reach

## 2023-07-08 NOTE — ED PROVIDER NOTE - PATIENT PORTAL LINK FT
You can access the FollowMyHealth Patient Portal offered by [facility] by registering at the following website: [email or web address]. By joining [facility]’s FollowMyHealth portal, you will also be able to view your health information using other applications (apps) compatible with our system.

## 2023-07-08 NOTE — ED ADULT NURSE NOTE - PRO INTERPRETER NEED 2
Urodynamic study results were reviewed.  Dr. 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  She can be scheduled for a new patient Neurology appointment, any neurologist.   English

## 2023-07-08 NOTE — ED PROVIDER NOTE - OBJECTIVE STATEMENT
62-year-old male active smoker with emphysema, COPD, asthma comes in for evaluation of wheezing and shortness of breath.  Patient says he was at the [employer] getting free dinner when he noticed he was wheezing and having a hard time breathing.  Also notes chronic leg swelling.  Says he is followed by primary doctor, has multiple albuterol at home and also recently finished a course of oral steroids.  History of prior admissions, never intubations.

## 2023-07-08 NOTE — ED ADULT NURSE NOTE - OBJECTIVE STATEMENT
Patient c/o chest pain w/ SOB, wheezIng and chest congestion, noted wheezing on bilateral lung fields, no difficulty speaking in long sentences.  EKG sinus tacchcyardia w/ PACs in ED.  Patient w/ Hx of COPD emphysema, polysubstance drug abuse, ETOH abuse, Bipolar, with multiple visits to ED for similar symptoms, cough, colds and SOB.

## 2023-07-08 NOTE — ED ADULT NURSE REASSESSMENT NOTE - NS ED NURSE REASSESS COMMENT FT1
Patient /aoX3, c/o of SOB, wheezing noted on bilateral lung fields, no difficulty speaking in long sentences, no chest pain.  Vital signs stable.  Left hand PIV #22 in place, all labs sent,n o  complications.  DUoneb med neb TX ongoing, Solumedrol 125mg IVP adminsitered.  Xray done.  Results and disposition pending.
pt received from evening shift RN - as per report pt presents to the ED for wheezing hx of asthma/COPD. pt was given breathing trmt prior to hand-off, pt continues to have expiratory wheeze. MD @ bedside, pt to be given another neb trmt and will re-evaluate w/ trial ambulation.

## 2023-07-08 NOTE — ED ADULT TRIAGE NOTE - CHIEF COMPLAINT QUOTE
Pt presents to ED C/O SOB worsening x weeks with wheezing, congestion and CP. Pt reports hx emphysema, COPD, chronic bronchitis, asthma. Current cigarette smoker. States " I take a water pill for swelling in my legs" Audible wheezes during assessment. EKG in progress.

## 2023-07-23 ENCOUNTER — EMERGENCY (EMERGENCY)
Facility: HOSPITAL | Age: 63
LOS: 1 days | Discharge: HOME | End: 2023-07-23
Attending: STUDENT IN AN ORGANIZED HEALTH CARE EDUCATION/TRAINING PROGRAM | Admitting: STUDENT IN AN ORGANIZED HEALTH CARE EDUCATION/TRAINING PROGRAM
Payer: MEDICARE

## 2023-07-23 VITALS
HEART RATE: 81 BPM | RESPIRATION RATE: 17 BRPM | TEMPERATURE: 98 F | DIASTOLIC BLOOD PRESSURE: 82 MMHG | OXYGEN SATURATION: 94 % | SYSTOLIC BLOOD PRESSURE: 148 MMHG

## 2023-07-23 VITALS
HEART RATE: 100 BPM | SYSTOLIC BLOOD PRESSURE: 127 MMHG | OXYGEN SATURATION: 94 % | RESPIRATION RATE: 18 BRPM | TEMPERATURE: 99 F | DIASTOLIC BLOOD PRESSURE: 68 MMHG | HEIGHT: 72 IN | WEIGHT: 171.08 LBS

## 2023-07-23 DIAGNOSIS — Z98.890 OTHER SPECIFIED POSTPROCEDURAL STATES: Chronic | ICD-10-CM

## 2023-07-23 DIAGNOSIS — J44.9 CHRONIC OBSTRUCTIVE PULMONARY DISEASE, UNSPECIFIED: ICD-10-CM

## 2023-07-23 DIAGNOSIS — F17.200 NICOTINE DEPENDENCE, UNSPECIFIED, UNCOMPLICATED: ICD-10-CM

## 2023-07-23 DIAGNOSIS — Z90.89 ACQUIRED ABSENCE OF OTHER ORGANS: Chronic | ICD-10-CM

## 2023-07-23 DIAGNOSIS — R06.02 SHORTNESS OF BREATH: ICD-10-CM

## 2023-07-23 DIAGNOSIS — R07.89 OTHER CHEST PAIN: ICD-10-CM

## 2023-07-23 DIAGNOSIS — Z98.49 CATARACT EXTRACTION STATUS, UNSPECIFIED EYE: Chronic | ICD-10-CM

## 2023-07-23 PROCEDURE — 96374 THER/PROPH/DIAG INJ IV PUSH: CPT

## 2023-07-23 PROCEDURE — 99284 EMERGENCY DEPT VISIT MOD MDM: CPT

## 2023-07-23 PROCEDURE — 94640 AIRWAY INHALATION TREATMENT: CPT

## 2023-07-23 PROCEDURE — 99285 EMERGENCY DEPT VISIT HI MDM: CPT | Mod: 25

## 2023-07-23 RX ORDER — IPRATROPIUM/ALBUTEROL SULFATE 18-103MCG
3 AEROSOL WITH ADAPTER (GRAM) INHALATION
Refills: 0 | Status: COMPLETED | OUTPATIENT
Start: 2023-07-23 | End: 2023-07-23

## 2023-07-23 RX ORDER — ACETAMINOPHEN 500 MG
650 TABLET ORAL ONCE
Refills: 0 | Status: COMPLETED | OUTPATIENT
Start: 2023-07-23 | End: 2023-07-23

## 2023-07-23 RX ADMIN — Medication 125 MILLIGRAM(S): at 21:10

## 2023-07-23 RX ADMIN — Medication 3 MILLILITER(S): at 21:10

## 2023-07-23 RX ADMIN — Medication 650 MILLIGRAM(S): at 22:21

## 2023-07-23 RX ADMIN — Medication 3 MILLILITER(S): at 21:23

## 2023-07-23 RX ADMIN — Medication 3 MILLILITER(S): at 21:48

## 2023-07-23 NOTE — ED ADULT NURSE NOTE - CAS TRG GEN SKIN COLOR
Appreciate CHF team and CTsx team input  Would surveil for other aneurysms as above  Maintain SBP < 120 and HR closer to 60      Maurizio [phone]
Normal for race

## 2023-07-23 NOTE — ED ADULT NURSE REASSESSMENT NOTE - NS ED NURSE REASSESS COMMENT FT1
Patient endorsing sx relief, states that his breathing feels more comfortable. Ambulated well without assistance, patient provided meal.

## 2023-07-23 NOTE — ED ADULT NURSE NOTE - NSFALLUNIVINTERV_ED_ALL_ED
Bed/Stretcher in lowest position, wheels locked, appropriate side rails in place/Call bell, personal items and telephone in reach/Instruct patient to call for assistance before getting out of bed/chair/stretcher/Non-slip footwear applied when patient is off stretcher/[place] to call system/Physically safe environment - no spills, clutter or unnecessary equipment/Purposeful proactive rounding/Room/bathroom lighting operational, light cord in reach

## 2023-07-23 NOTE — ED ADULT NURSE NOTE - OBJECTIVE STATEMENT
Patient is a 62yoM presenting to the ED for sob. Patient is axox3, spont breathing on RA, ambulated well. Endorsing worsening sob with excretion.

## 2023-07-23 NOTE — ED PROVIDER NOTE - OBJECTIVE STATEMENT
62-year-old male active smoker with emphysema, COPD, asthma comes in for evaluation of wheezing chest tightness and shortness of breath. Multiple prior ED visits for same. Pt says he has a cold. History of prior admissions, never intubations. Wants nebs and a sandwich

## 2023-07-23 NOTE — ED PROVIDER NOTE - PHYSICAL EXAMINATION
CONST: nontoxic NAD speaking in full sentences  HEAD: atraumatic  EYES: conjunctivae clear  NECK: supple  CARD: regular rate  CHEST: +b/l insp and exp wheezing, breathing comfortably on RA, no stridor/retractions/tripoding  EXT: FROM  SKIN: warm, dry  NEURO: awake alert answering questions following commands moving all extremities

## 2023-07-23 NOTE — ED PROVIDER NOTE - PATIENT PORTAL LINK FT
You can access the FollowMyHealth Patient Portal offered by [facility] by registering at the following website: [email or web address]. By joining [facility]’s FollowMyHealth portal, you will also be able to view your health information using other applications (apps) compatible with our system.

## 2023-07-23 NOTE — ED PROVIDER NOTE - CLINICAL SUMMARY MEDICAL DECISION MAKING FREE TEXT BOX
hx obtained from pt and chart review. avss. nontoxic. NAD. no systemic sx. sleeping comfortably.   s/p albuterol nebs and steroids, sx controlled.   pt has albuterol at home  repeat exam w/ improvement in wheezing, still some wheeze but much better  multiple visits for same, no indication for labs/imaging at this time. will dc w/ outpatient pcp fu, strict return precautions. pt agrees w/ plan. questions answered.

## 2023-08-02 NOTE — ED ADULT NURSE NOTE - HAVE YOU RECEIVED AT LEAST TWO PFIZER AND/OR MODERNA VACCINATIONS (IN ANY COMBINATION) AND/OR ONE JOHNSON & JOHNSON VACCINATION?
Yes Bilobed Flap Text: The defect edges were debeveled with a #15 scalpel blade. Given the location of the defect and the proximity to free margins a bilobe flap was deemed most appropriate. Using a sterile surgical marker, an appropriate bilobe flap drawn around the defect. The area thus outlined was incised deep to adipose tissue with a #15 scalpel blade. The skin margins were undermined to an appropriate distance in all directions utilizing iris scissors. Following this, the designed flap was carried over into the primary defect and sutured into place.

## 2023-08-31 NOTE — ED ADULT NURSE NOTE - NSSEPSISSUSPECTED_ED_A_ED
8/30/23 status update 2nd request- Please update the EPA team on the status of this prior authorization.    Please see the denial letter from 8/29/23, please advise   
Contacted pharmacy. Insurance covers 500 mg tablets. New script sent, portal message sent to patient.         
levetiracetam 1000 MG TABLET SR 24 HR(Elepsia) Pending    Insurance response  Prescription Drug Insurance: [employer]  Notes: Prior authorization submitted to patient insurance company and will update provider when decision has been made        
levetiracetam 1000 MG TABLET SR 24 HR(Elepsia) has been denied    Contacted insurance for denial/appeal information.  If received at MD office, please fax to EPA team at [phone] and update this encounter with how you would like to proceed. If received by EPA team will update provider with this information.    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      
No

## 2023-09-07 NOTE — ED ADULT TRIAGE NOTE - PAIN: PRESENCE, MLM
[facility] at [facility]  Cardiovascular Consultation Note    Reason for consultation: A-fib RVR    History of Present Illness:  75-year-old female with history of previous atrial arrhythmia and ablation in [place] in 2006.  Carotid artery disease with previous CEA, depression, hypertension, prior stroke and peripheral neuropathy.  The patient presents to the hospital after she fell.  When she arrived she was found to be in A-fib RVR and her blood pressure was markedly elevated.  The patient has had back surgeries and suffers from severe peripheral neuropathy.  The patient tells me she falls at least once a month.  I discussed the case with her daughter who is a physician [place] and she tells me her mother falls multiple times per month.  Daughter states she also drinks more than the patient acknowledges.  The patient denies any orthostatic symptoms.  She denies passing out.  She has had no palpitations.  She has had no tightness heaviness squeezing pressure chest jaw throat arms.  She has had some mild lower extremity edema recently.  For the last 6 weeks she has had shortness of breath with minimal activity and increased fatigue.  Cardiac risk factors: Age greater than 65 #2 hypertension 3 hyperlipidemia #4 vascular disease  [id number]-DXFt score is 7    Past medical and surgical history, social and family history reviewed in EMR.    REVIEW OF SYSTEMS:     Past Medical History:   Diagnosis Date    Atrial tachycardia     Carotid artery disease     Depression     Hypertension     Hyponatremia     Peripheral neuropathy     Stroke (cerebrum)      Past Surgical History:   Procedure Laterality Date    CARDIAC ABLATION      CAROTID ENDARTERECTOMY Left     CHOLECYSTECTOMY      KNEE ARTHROPLASTY Bilateral     NISSEN FUNDOPLICATION      TONSILLECTOMY AND ADENOIDECTOMY       Social History     Socioeconomic History    Marital status:    Tobacco Use    Smoking status: Former     Years:  20.00     Types: Cigarettes     Quit date:      Years since quittin.7    Smokeless tobacco: Never   Vaping Use    Vaping Use: Former    Substances: Nicotine    Devices: Disposable   Substance and Sexual Activity    Alcohol use: Yes     Alcohol/week: 6.0 standard drinks     Types: 6 Glasses of wine per week     Comment: 1 bottle of wine per night    Drug use: Never    Sexual activity: Defer     Family History   Problem Relation Age of Onset    Multiple sclerosis Mother     Cancer Mother     Stroke Father        H&P ROS reviewed and pertinent CV ROS as noted in HPI.    Cardiac: No palpitations.  No coronary artery disease.  Previous ablation in 2008.  Previous stroke  Respiratory: Complains of dyspnea for the last 6 weeks  Lower Extremities: Complains of severe neuropathy.  He has noticed a trivial amount of lower extremity edema  GI: No nausea vomiting diarrhea bright red blood per rectum or melena  Neuro: Has had prior stroke/patient states she cannot feel her feet at all  Hematology: No bleeding diathesis ecchymosis or petechia  Renal: No history of kidney stones hematuria or kidney disease  Musculoskeletal: She has had bilateral knee replacement  Endocrine: No diabetes or hypothyroidism  Constitutional: No fever chills or weight loss  Psych: No depression or suicidal ideation      Physical Exam: General very pleasant well-developed female in bed 75 degree angle not dyspneic tachypneic current heart rate 80s       HEENT: Scar over left carotid.  No JVP or bruits.  No icterus       Respiratory: Equal bilateral symmetrical expansion clear auscultation bilaterally       Cardiovascular: Irregular rate and rhythm with no murmur and no edema palpation       GI: Soft flat nontender       Lower Extremities: Scars over both knees.  No other lesions       Neuro: Facial expressions are symmetrical moves all 4 extremities       Skin: Warm and dry       Psych: Pleasant affect    Results Review: EKG shows A-fib RVR no  ischemia.  BNP is mildly elevated.  Sodium is 134.  Review of telemetry shows A-fib with much better controlled rates now           Vital Sign Min/Max for last 24 hours  Temp  Min: 98.2 °F (36.8 °C)  Max: 98.5 °F (36.9 °C)   BP  Min: 136/85  Max: 171/107   Pulse  Min: 83  Max: 135   Resp  Min: 18  Max: 20   SpO2  Min: 91 %  Max: 96 %   No data recorded    No intake or output data in the 24 hours ending 09/07/23 0909          Current Facility-Administered Medications:     acetaminophen (TYLENOL) tablet 650 mg, 650 mg, Oral, Q6H PRN, Sophia Gordon PA-C, 650 mg at 09/07/23 0051    atorvastatin (LIPITOR) tablet 20 mg, 20 mg, Oral, Nightly, Day, Edel BOWEN MD, 20 mg at 09/07/23 0052    dilTIAZem (CARDIZEM) 125 mg in sodium chloride 0.9 % 125 mL infusion, 5-15 mg/hr, Intravenous, Titrated, Tony Schwartz MD, Last Rate: 5 mL/hr at 09/06/23 2146, 5 mg/hr at 09/06/23 2146    escitalopram (LEXAPRO) tablet 10 mg, 10 mg, Oral, Nightly, Day, Edel BOWEN MD, 10 mg at 09/07/23 0052    folic acid (FOLVITE) tablet 1 mg, 1 mg, Oral, Daily, Day, Edel BOWEN MD, 1 mg at 09/07/23 0812    heparin 17440 units/250 mL (100 units/mL) in 0.45 % NaCl infusion, 11.5 Units/kg/hr, Intravenous, Titrated, Alina Moncada, [facility], Last Rate: 10.74 mL/hr at 09/07/23 0534, 11.5 Units/kg/hr at 09/07/23 0534    lisinopril (PRINIVIL,ZESTRIL) tablet 20 mg, 20 mg, Oral, Q24H, 20 mg at 09/07/23 0812 **AND** hydroCHLOROthiazide (HYDRODIURIL) oral 12.5 mg, 12.5 mg, Oral, Q24H, Day, Edel BOWEN MD    LORazepam (ATIVAN) tablet 1 mg, 1 mg, Oral, Q1H PRN **OR** LORazepam (ATIVAN) injection 1 mg, 1 mg, Intravenous, Q1H PRN **OR** LORazepam (ATIVAN) tablet 2 mg, 2 mg, Oral, Q1H PRN **OR** LORazepam (ATIVAN) injection 2 mg, 2 mg, Intravenous, Q1H PRN **OR** LORazepam (ATIVAN) injection 2 mg, 2 mg, Intravenous, Q15 Min PRN **OR** LORazepam (ATIVAN) injection 2 mg, 2 mg, Intramuscular, Q15 Min PRN, Day, Edel BOWEN MD    LORazepam (ATIVAN) tablet 2 mg, 2 mg, Oral, Q6H, 2 mg  at 09/07/23 0051 **FOLLOWED BY** [START ON 9/8/2023] LORazepam (ATIVAN) tablet 1 mg, 1 mg, Oral, Q6H, Day, Edel BOWEN MD    melatonin tablet 5 mg, 5 mg, Oral, Nightly, DayEdel MD, 5 mg at 09/07/23 0053    multivitamin with minerals 1 tablet, 1 tablet, Oral, Daily, Day, Edel BOWEN MD, 1 tablet at 09/07/23 0812    Pharmacy to Dose Heparin, , Does not apply, Continuous PRN, Tony Schwartz MD    sodium chloride 0.9 % flush 10 mL, 10 mL, Intravenous, PRN, Tony Schwartz MD    sodium chloride 0.9 % flush 10 mL, 10 mL, Intravenous, Q12H, Day, Edel BOWEN MD, 10 mL at 09/07/23 0813    sodium chloride 0.9 % flush 10 mL, 10 mL, Intravenous, PRN, Edel Elder MD    sodium chloride 0.9 % infusion 40 mL, 40 mL, Intravenous, PRN, DayEdel MD    thiamine (B-1) injection 200 mg, 200 mg, Intravenous, Q8H, 200 mg at 09/07/23 0531 **FOLLOWED BY** [START ON 9/12/2023] thiamine (VITAMIN B-1) tablet 100 mg, 100 mg, Oral, Daily, Day, Edel BOWEN MD    Lab Review:   Results from last 7 days   Lab Units 09/07/23  0403 09/06/23  1935   WBC 10*3/mm3 5.41 5.95   HEMOGLOBIN g/dL 12.1 12.9   PLATELETS 10*3/mm3 258 272     Results from last 7 days   Lab Units 09/06/23  1935   SODIUM mmol/L 134*   POTASSIUM mmol/L 4.3   CO2 mmol/L 24.0   BUN mg/dL 13   CREATININE mg/dL 0.65   MAGNESIUM mg/dL 1.8   GLUCOSE mg/dL 108*     Estimated Creatinine Clearance: 90.3 mL/min (by C-G formula based on SCr of 0.65 mg/dL).        .lipd  Lab Results   Component Value Date    AST 23 09/06/2023    ALT 16 09/06/2023       Radiology Reports:  Imaging Results (Last 72 Hours)       Procedure Component Value Units Date/Time    CT Head Without Contrast [[id number]] Collected: 09/06/23 2125     Updated: 09/06/23 2133    Narrative:      CT HEAD WO CONTRAST    Date of Exam: 9/6/2023 9:20 PM EDT    Indication: head injury.    Comparison: None available.    Technique: Axial CT images were obtained of the head without contrast administration.  Automated exposure  denies pain/discomfort control and iterative construction methods were used.      Findings:  There is a contusion at the right parietal scalp. No acute intracranial hemorrhage. No acute large territory infarct. There are mild scattered subcortical and periventricular white matter hypodensities which are nonspecific and can be seen in the setting   of chronic small vessel ischemic change. No extra-axial collections. No midline shift or herniation. Normal size and configuration of the ventricles. Normal appearance of the orbits. The partially imaged paranasal sinuses are clear. The mastoid air   cells are clear. No acute calvarial fracture. No acute osseous findings.      Impression:      Impression:  Right parietal scalp contusion. No acute intracranial findings.        Electronically Signed: Mike Patel MD    9/6/2023 9:30 PM EDT    Workstation ID: [id number]    XR Chest 1 View [[id number]] Collected: 09/06/23 1950     Updated: 09/06/23 1953    Narrative:      XR CHEST 1 VW    Date of Exam: 9/6/2023 7:25 PM EDT    Indication: Dysrhythmia triage protocol    Comparison: None available.    Findings:         There is no acute infiltrate.     There is no large pleural effusion.    The cardiac silhouette is unremarkable.    The visualized negra structures demonstrate no abnormality.        Impression:      Impression:    No acute pulmonary disease.      Electronically Signed: Osvaldo Pantoja MD    9/6/2023 7:50 PM EDT    Workstation ID: [id number]            Assessment/Plan: A-fib RVR-the patient did not feel her heart racing.  However for 6-week she has had increased shortness of breath and fatigue.  Her MIK0KH6-PPNb score is 7 but she also has significant risk of bleeding because of her frequent falls.  I discussed the case with her daughter who is a physician.  She informs me that patient falls multiple times monthly.  I discussed the case with our EP service for evaluation of the watchman.  Current recommendations are metoprolol 25 mg every  12 hours  Continue aspirin  Recommend starting Eliquis 1 week after discharge  Given a CT scan of the heart to look at the left atrial appendage  Patient will be seen within a couple weeks for the Watchman evaluation      German Moreland MD  09/07/23  09:09 EDT

## 2023-10-05 ENCOUNTER — EMERGENCY (EMERGENCY)
Facility: HOSPITAL | Age: 63
LOS: 1 days | Discharge: HOME | End: 2023-10-05
Attending: STUDENT IN AN ORGANIZED HEALTH CARE EDUCATION/TRAINING PROGRAM | Admitting: STUDENT IN AN ORGANIZED HEALTH CARE EDUCATION/TRAINING PROGRAM
Payer: MEDICARE

## 2023-10-05 VITALS
DIASTOLIC BLOOD PRESSURE: 73 MMHG | HEART RATE: 112 BPM | WEIGHT: 171.08 LBS | SYSTOLIC BLOOD PRESSURE: 105 MMHG | RESPIRATION RATE: 20 BRPM | OXYGEN SATURATION: 96 % | HEIGHT: 72 IN | TEMPERATURE: 98 F

## 2023-10-05 DIAGNOSIS — Z98.49 CATARACT EXTRACTION STATUS, UNSPECIFIED EYE: Chronic | ICD-10-CM

## 2023-10-05 DIAGNOSIS — J44.1 CHRONIC OBSTRUCTIVE PULMONARY DISEASE WITH (ACUTE) EXACERBATION: ICD-10-CM

## 2023-10-05 DIAGNOSIS — Z98.890 OTHER SPECIFIED POSTPROCEDURAL STATES: Chronic | ICD-10-CM

## 2023-10-05 DIAGNOSIS — Z86.69 PERSONAL HISTORY OF OTHER DISEASES OF THE NERVOUS SYSTEM AND SENSE ORGANS: ICD-10-CM

## 2023-10-05 DIAGNOSIS — Z90.89 ACQUIRED ABSENCE OF OTHER ORGANS: Chronic | ICD-10-CM

## 2023-10-05 DIAGNOSIS — Z87.19 PERSONAL HISTORY OF OTHER DISEASES OF THE DIGESTIVE SYSTEM: ICD-10-CM

## 2023-10-05 DIAGNOSIS — R00.0 TACHYCARDIA, UNSPECIFIED: ICD-10-CM

## 2023-10-05 DIAGNOSIS — F17.200 NICOTINE DEPENDENCE, UNSPECIFIED, UNCOMPLICATED: ICD-10-CM

## 2023-10-05 DIAGNOSIS — Z90.89 ACQUIRED ABSENCE OF OTHER ORGANS: ICD-10-CM

## 2023-10-05 DIAGNOSIS — R06.02 SHORTNESS OF BREATH: ICD-10-CM

## 2023-10-05 DIAGNOSIS — F31.9 BIPOLAR DISORDER, UNSPECIFIED: ICD-10-CM

## 2023-10-05 PROCEDURE — 99285 EMERGENCY DEPT VISIT HI MDM: CPT | Mod: FS

## 2023-10-05 RX ORDER — IPRATROPIUM/ALBUTEROL SULFATE 18-103MCG
3 AEROSOL WITH ADAPTER (GRAM) INHALATION
Refills: 0 | Status: DISCONTINUED | OUTPATIENT
Start: 2023-10-05 | End: 2023-10-05

## 2023-10-05 RX ADMIN — Medication 3 MILLILITER(S): at 23:45

## 2023-10-05 RX ADMIN — Medication 3 MILLILITER(S): at 23:11

## 2023-10-05 NOTE — ED ADULT NURSE NOTE - NSFALLUNIVINTERV_ED_ALL_ED
Bed/Stretcher in lowest position, wheels locked, appropriate side rails in place/Call bell, personal items and telephone in reach/Instruct patient to call for assistance before getting out of bed/chair/stretcher/Non-slip footwear applied when patient is off stretcher/[place] to call system/Physically safe environment - no spills, clutter or unnecessary equipment/Purposeful proactive rounding/Room/bathroom lighting operational, light cord in reach

## 2023-10-05 NOTE — ED ADULT TRIAGE NOTE - CHIEF COMPLAINT QUOTE
Pt bibems from street c/o SOB and chest pain. Hx of emphysema, bronchitis, and asthma.Pt wheezing per EMS, given x1 duoneb with some relief. +productive cough.

## 2023-10-06 VITALS
OXYGEN SATURATION: 97 % | HEART RATE: 82 BPM | SYSTOLIC BLOOD PRESSURE: 132 MMHG | TEMPERATURE: 98 F | DIASTOLIC BLOOD PRESSURE: 67 MMHG | RESPIRATION RATE: 18 BRPM

## 2023-10-06 LAB
ANION GAP SERPL CALC-SCNC: 11 MMOL/L — SIGNIFICANT CHANGE UP (ref 5–17)
BUN SERPL-MCNC: 19 MG/DL — SIGNIFICANT CHANGE UP (ref 7–23)
CALCIUM SERPL-MCNC: 8.7 MG/DL — SIGNIFICANT CHANGE UP (ref 8.4–10.5)
CHLORIDE SERPL-SCNC: 102 MMOL/L — SIGNIFICANT CHANGE UP (ref 96–108)
CO2 SERPL-SCNC: 25 MMOL/L — SIGNIFICANT CHANGE UP (ref 22–31)
CREAT SERPL-MCNC: 0.89 MG/DL — SIGNIFICANT CHANGE UP (ref 0.5–1.3)
EGFR: 97 ML/MIN/1.73M2 — SIGNIFICANT CHANGE UP
GLUCOSE SERPL-MCNC: 262 MG/DL — HIGH (ref 70–99)
HCT VFR BLD CALC: 32.6 % — LOW (ref 39–50)
HGB BLD-MCNC: 10.4 G/DL — LOW (ref 13–17)
MCHC RBC-ENTMCNC: 30.3 PG — SIGNIFICANT CHANGE UP (ref 27–34)
MCHC RBC-ENTMCNC: 31.9 GM/DL — LOW (ref 32–36)
MCV RBC AUTO: 95 FL — SIGNIFICANT CHANGE UP (ref 80–100)
NRBC # BLD: 0 /100 WBCS — SIGNIFICANT CHANGE UP (ref 0–0)
NT-PROBNP SERPL-SCNC: 58 PG/ML — SIGNIFICANT CHANGE UP (ref 0–300)
PLATELET # BLD AUTO: 178 K/UL — SIGNIFICANT CHANGE UP (ref 150–400)
POTASSIUM SERPL-MCNC: 4.3 MMOL/L — SIGNIFICANT CHANGE UP (ref 3.5–5.3)
POTASSIUM SERPL-SCNC: 4.3 MMOL/L — SIGNIFICANT CHANGE UP (ref 3.5–5.3)
RBC # BLD: 3.43 M/UL — LOW (ref 4.2–5.8)
RBC # FLD: 15.9 % — HIGH (ref 10.3–14.5)
SODIUM SERPL-SCNC: 138 MMOL/L — SIGNIFICANT CHANGE UP (ref 135–145)
TROPONIN T, HIGH SENSITIVITY RESULT: 11 NG/L — SIGNIFICANT CHANGE UP (ref 0–51)
WBC # BLD: 11.04 K/UL — HIGH (ref 3.8–10.5)
WBC # FLD AUTO: 11.04 K/UL — HIGH (ref 3.8–10.5)

## 2023-10-06 PROCEDURE — 36415 COLL VENOUS BLD VENIPUNCTURE: CPT

## 2023-10-06 PROCEDURE — 80048 BASIC METABOLIC PNL TOTAL CA: CPT

## 2023-10-06 PROCEDURE — 84484 ASSAY OF TROPONIN QUANT: CPT

## 2023-10-06 PROCEDURE — 83880 ASSAY OF NATRIURETIC PEPTIDE: CPT

## 2023-10-06 PROCEDURE — 71045 X-RAY EXAM CHEST 1 VIEW: CPT

## 2023-10-06 PROCEDURE — 93005 ELECTROCARDIOGRAM TRACING: CPT

## 2023-10-06 PROCEDURE — 93010 ELECTROCARDIOGRAM REPORT: CPT

## 2023-10-06 PROCEDURE — 71045 X-RAY EXAM CHEST 1 VIEW: CPT | Mod: 26

## 2023-10-06 PROCEDURE — 85027 COMPLETE CBC AUTOMATED: CPT

## 2023-10-06 PROCEDURE — 99285 EMERGENCY DEPT VISIT HI MDM: CPT | Mod: 25

## 2023-10-06 PROCEDURE — 94640 AIRWAY INHALATION TREATMENT: CPT

## 2023-10-06 RX ORDER — ALBUTEROL 90 UG/1
2.5 AEROSOL, METERED ORAL
Refills: 0 | Status: DISCONTINUED | OUTPATIENT
Start: 2023-10-06 | End: 2023-10-09

## 2023-10-06 RX ORDER — IPRATROPIUM/ALBUTEROL SULFATE 18-103MCG
3 AEROSOL WITH ADAPTER (GRAM) INHALATION
Refills: 0 | Status: COMPLETED | OUTPATIENT
Start: 2023-10-06 | End: 2023-10-06

## 2023-10-06 RX ADMIN — Medication 3 MILLILITER(S): at 01:05

## 2023-10-06 RX ADMIN — Medication 50 MILLIGRAM(S): at 00:12

## 2023-10-06 RX ADMIN — Medication 3 MILLILITER(S): at 01:36

## 2023-10-06 RX ADMIN — ALBUTEROL 2.5 MILLIGRAM(S): 90 AEROSOL, METERED ORAL at 03:09

## 2023-10-06 RX ADMIN — ALBUTEROL 2.5 MILLIGRAM(S): 90 AEROSOL, METERED ORAL at 02:55

## 2023-10-06 NOTE — ED PROVIDER NOTE - NS ED ROS FT
CONSTITUTIONAL: No fever, no chills, no fatigue  EYES: No eye redness, no visual changes  ENT: No ear pain, no sore throat  CARDIOVASCULAR: No chest pain, no palpitations  RESPIRATORY: No cough, + SOB  GI: No abdominal pain, no nausea, no vomiting, no constipation, no diarrhea  GENITOURINARY: No dysuria, no frequency, no hematuria  MUSCULOSKELETAL: No back pain, no joint pain, no myalgias  SKIN: No rash, no peripheral edema  NEURO: No headache, no confusion    ALL OTHER SYSTEMS NEGATIVE.

## 2023-10-06 NOTE — ED PROVIDER NOTE - PROGRESS NOTE DETAILS
Patient not complaining of chest pain, mildly  improved WOB and wheeze, will continue to treat with nebs. We will perform chest pain work-up with EKG, CXR, labs. R/o ACS. Low susp of PE.

## 2023-10-06 NOTE — ED PROVIDER NOTE - PHYSICAL EXAMINATION
CONSTITUTIONAL: Non-toxic; in no apparent distress  HEAD: Normocephalic; atraumatic  EYES: PERRL; EOM intact   ENMT: External appears normal  NECK: Supple; non-tender  CARD: Normal S1, S2; no murmurs, rubs, or gallops  RESP: Normal chest excursion with respiration; + exp wheeze BL  ABD: Soft, non-distended; non-tender  EXT: Normal ROM in all four extremities; non-tender to palpation  SKIN: Warm, dry, no rash  NEURO:  No focal neurological deficiencies.

## 2023-10-06 NOTE — ED PROVIDER NOTE - CLINICAL SUMMARY MEDICAL DECISION MAKING FREE TEXT BOX
Presentation most likely asthma/COPD exacerbation in known asthmatic.  [place]eron[place] low risk  EKG shows sinus tachycardia likely 2/2 neb use  Plan for duonebs x3, steroids, +/- Mg  Will reassess and monitor airway/breathing closely  Admission if necessary

## 2023-10-06 NOTE — ED PROVIDER NOTE - NSFOLLOWUPINSTRUCTIONS_ED_ALL_ED_FT
Follow up with your primary medical doctor as soon as possible.    Return to the emergency department if your symptoms worsen or if you develop new symptoms.  If you have any problems with followup, please call the ED Referral Coordinator at [phone].    Asthma    Asthma is a condition in which the airways tighten and narrow, making it difficult to breath. Asthma episodes, also called asthma attacks, range from minor to life-threatening. Symptoms include wheezing, coughing, chest tightness, or shortness of breath. The diagnosis of asthma is made by a review of your medical history and a physical exam, but may involve additional testing. Asthma cannot be cured, but medicines and lifestyle changes can help control it. Avoid triggers of asthma which may include animal dander, pollen, mold, smoke, air pollutants, etc.     SEEK IMMEDIATE MEDICAL CARE IF YOU HAVE ANY OF THE FOLLOWING SYMPTOMS: worsening of symptoms, shortness of breath at rest, chest pain, bluish discoloration to lips or fingertips, lightheadedness/dizziness, or fever.

## 2023-10-06 NOTE — ED PROVIDER NOTE - OBJECTIVE STATEMENT
61 yo M w PMH of emphysema, COPD, asthma, p/w SOB since today. Pt states it is his typical type of SOB caused by asthma/copd. Refusing EKG and IV in ED. States he would only like nebs. No leg swelling, cough, hemoptysis, exogenous estrogen, recent travel, surgery, malignancy, personal or FHx of VTE.

## 2023-10-06 NOTE — ED PROVIDER NOTE - PATIENT PORTAL LINK FT
You can access the FollowMyHealth Patient Portal offered by [facility] by registering at the following website: [email or web address]. By joining [employer]’s FollowMyHealth portal, you will also be able to view your health information using other applications (apps) compatible with our system.

## 2023-10-06 NOTE — ED PROVIDER NOTE - NSICDXPASTMEDICALHX_GEN_ALL_CORE_FT
GYN PROGRESS NOTE    Patient evaluated at the bedside. No acute events. Patient denies CP/SOB/dizziness/nausea/vomiting/abdominal pain/calf pain. Patient would like to sleep now.     O:   T(C): 37.6 (03-24-19 @ 08:49), Max: 37.7 (03-23-19 @ 16:57)  HR: 91 (03-24-19 @ 08:49) (77 - 94)  BP: 94/64 (03-24-19 @ 08:49) (94/64 - 123/81)  RR: 16 (03-24-19 @ 08:49) (16 - 18)  SpO2: 98% (03-24-19 @ 08:49) (96% - 100%)  Wt(kg): --    GEN: patient appears well  LUNGS: no respiratory distress  ABD: soft, nontender  Pelvic: joseph draining clear urine   EXT: no calf tenderness        03-23 @ 07:01  -  03-24 @ 07:00  --------------------------------------------------------  IN: 3556 mL / OUT: 1975 mL / NET: 1581 mL    03-24 @ 07:01  -  03-24 @ 09:33  --------------------------------------------------------  IN: 190 mL / OUT: 0 mL / NET: 190 mL PAST MEDICAL HISTORY:  Bipolar disease, chronic     COPD (chronic obstructive pulmonary disease)     Depression     Emphysema with chronic bronchitis     History of ETOH abuse

## 2023-10-18 NOTE — ED ADULT TRIAGE NOTE - BSA (M2)
2.07 Cephalexin Counseling: I counseled the patient regarding use of cephalexin as an antibiotic for prophylactic and/or therapeutic purposes. Cephalexin (commonly prescribed under brand name Keflex) is a cephalosporin antibiotic which is active against numerous classes of bacteria, including most skin bacteria. Side effects may include nausea, diarrhea, gastrointestinal upset, rash, hives, yeast infections, and in rare cases, hepatitis, kidney disease, seizures, fever, confusion, neurologic symptoms, and others. Patients with severe allergies to penicillin medications are cautioned that there is about a 10% incidence of cross-reactivity with cephalosporins. When possible, patients with penicillin allergies should use alternatives to cephalosporins for antibiotic therapy.

## 2023-10-19 ENCOUNTER — EMERGENCY (EMERGENCY)
Facility: HOSPITAL | Age: 63
LOS: 1 days | Discharge: HOME | End: 2023-10-19
Attending: STUDENT IN AN ORGANIZED HEALTH CARE EDUCATION/TRAINING PROGRAM | Admitting: STUDENT IN AN ORGANIZED HEALTH CARE EDUCATION/TRAINING PROGRAM
Payer: MEDICARE

## 2023-10-19 VITALS
OXYGEN SATURATION: 96 % | HEART RATE: 95 BPM | TEMPERATURE: 98 F | SYSTOLIC BLOOD PRESSURE: 154 MMHG | RESPIRATION RATE: 18 BRPM | HEIGHT: 72 IN | DIASTOLIC BLOOD PRESSURE: 78 MMHG | WEIGHT: 171.08 LBS

## 2023-10-19 DIAGNOSIS — Z90.89 ACQUIRED ABSENCE OF OTHER ORGANS: Chronic | ICD-10-CM

## 2023-10-19 DIAGNOSIS — Z98.890 OTHER SPECIFIED POSTPROCEDURAL STATES: Chronic | ICD-10-CM

## 2023-10-19 DIAGNOSIS — J44.1 CHRONIC OBSTRUCTIVE PULMONARY DISEASE WITH (ACUTE) EXACERBATION: ICD-10-CM

## 2023-10-19 DIAGNOSIS — Z98.49 CATARACT EXTRACTION STATUS, UNSPECIFIED EYE: Chronic | ICD-10-CM

## 2023-10-19 DIAGNOSIS — R06.02 SHORTNESS OF BREATH: ICD-10-CM

## 2023-10-19 DIAGNOSIS — J45.909 UNSPECIFIED ASTHMA, UNCOMPLICATED: ICD-10-CM

## 2023-10-19 PROCEDURE — 99284 EMERGENCY DEPT VISIT MOD MDM: CPT

## 2023-10-19 RX ORDER — IPRATROPIUM/ALBUTEROL SULFATE 18-103MCG
3 AEROSOL WITH ADAPTER (GRAM) INHALATION
Refills: 0 | Status: COMPLETED | OUTPATIENT
Start: 2023-10-19 | End: 2023-10-19

## 2023-10-19 RX ADMIN — Medication 3 MILLILITER(S): at 22:54

## 2023-10-19 RX ADMIN — Medication 3 MILLILITER(S): at 22:56

## 2023-10-19 RX ADMIN — Medication 3 MILLILITER(S): at 22:53

## 2023-10-19 NOTE — ED ADULT NURSE NOTE - OBJECTIVE STATEMENT
Received patient ambulatory to the ED with chief complaints of chest discomfort started this afternoon accompanied with intermittent shortness of breath for the past 5 days. Denies fever, vomiting, occasional cough, productive. Patient AOX4, speaking full sentences. Chest rise equal and symmetrical bilaterally, respirations even and nonlabored. Past medical history of COPD on medications. No obvious trauma/injury/deformity noted. Patient oriented to ED area. All needs attended. POC reviewed. Purposeful proactive hourly rounding in progress.

## 2023-10-19 NOTE — ED ADULT TRIAGE NOTE - CHIEF COMPLAINT QUOTE
Pt reports SOB x2 days and generalized CP that started tonight. Pt reports dx with respiratory infection last week, reports he is currently on abx. Pt reports hx of COPD, asthma, chronic bronchitis and emphysema.

## 2023-10-19 NOTE — ED ADULT NURSE NOTE - NSFALLUNIVINTERV_ED_ALL_ED
Bed/Stretcher in lowest position, wheels locked, appropriate side rails in place/Call bell, personal items and telephone in reach/Instruct patient to call for assistance before getting out of bed/chair/stretcher/Non-slip footwear applied when patient is off stretcher/[place] to call system/Physically safe environment - no spills, clutter or unnecessary equipment/Purposeful proactive rounding/Room/bathroom lighting operational, light cord in reach

## 2023-10-20 VITALS
RESPIRATION RATE: 19 BRPM | HEART RATE: 80 BPM | OXYGEN SATURATION: 96 % | DIASTOLIC BLOOD PRESSURE: 72 MMHG | SYSTOLIC BLOOD PRESSURE: 130 MMHG | TEMPERATURE: 98 F

## 2023-10-20 PROCEDURE — 99284 EMERGENCY DEPT VISIT MOD MDM: CPT | Mod: 25

## 2023-10-20 PROCEDURE — 94640 AIRWAY INHALATION TREATMENT: CPT

## 2023-10-20 RX ORDER — ALBUTEROL 90 UG/1
2.5 AEROSOL, METERED ORAL ONCE
Refills: 0 | Status: COMPLETED | OUTPATIENT
Start: 2023-10-20 | End: 2023-10-20

## 2023-10-20 RX ADMIN — ALBUTEROL 2.5 MILLIGRAM(S): 90 AEROSOL, METERED ORAL at 01:05

## 2023-10-20 NOTE — ED PROVIDER NOTE - PATIENT PORTAL LINK FT
You can access the FollowMyHealth Patient Portal offered by [facility] by registering at the following website: [email or web address]. By joining [employer]’s FollowMyHealth portal, you will also be able to view your health information using other applications (apps) compatible with our system.

## 2023-10-20 NOTE — ED PROVIDER NOTE - NSFOLLOWUPINSTRUCTIONS_ED_ALL_ED_FT
Follow up with your primary medical doctor as soon as possible.    Return to the emergency department if your symptoms worsen or if you develop new symptoms.  If you have any problems with followup, please call the ED Referral Coordinator at [phone].    Chronic Obstructive Pulmonary Disease    Chronic obstructive pulmonary disease (COPD) is a lung condition in which airflow from the lungs is limited. Causes include smoking, secondhand smoke exposure, genetics, or recurrent infections. Take all medicines (inhaled or pills) as directed by your health care provider. Avoid exposure to irritants such as smoke, chemicals, and fumes that aggravate your breathing.    If you are a smoker, the most important thing that you can do is stop smoking. Continuing to smoke will cause further lung damage and breathing trouble. Ask your health care provider for help with quitting smoking.    SEEK IMMEDIATE MEDICAL CARE IF YOU HAVE ANY OF THE FOLLOWING SYMPTOMS: shortness of breath at rest or when talking, bluish discoloration of lips, skin, fever, worsening cough, unexplained chest pain, or lightheadedness/dizziness.

## 2023-10-20 NOTE — ED PROVIDER NOTE - CLINICAL SUMMARY MEDICAL DECISION MAKING FREE TEXT BOX
Presentation most likely asthma/copd exacerbation in known COPDer.  PERC negative  Plan for duonebs x3 improved symptoms.  Pt feels improved after treatment.

## 2023-10-20 NOTE — ED PROVIDER NOTE - PHYSICAL EXAMINATION
CONSTITUTIONAL: Non-toxic; in no apparent distress  HEAD: Normocephalic; atraumatic  EYES: PERRL; EOM intact   ENMT: External appears normal  NECK: Supple; non-tender  CARD: Normal S1, S2; no murmurs, rubs, or gallops  RESP: Normal chest excursion with respiration; + BL exp wheeze throughout lung fields  ABD: Soft, non-distended; non-tender  EXT: Normal ROM in all four extremities; non-tender to palpation  SKIN: Warm, dry, no rash  NEURO:  No focal neurological deficiencies.

## 2023-11-09 ENCOUNTER — EMERGENCY (EMERGENCY)
Facility: HOSPITAL | Age: 63
LOS: 1 days | Discharge: HOME | End: 2023-11-09
Attending: STUDENT IN AN ORGANIZED HEALTH CARE EDUCATION/TRAINING PROGRAM | Admitting: STUDENT IN AN ORGANIZED HEALTH CARE EDUCATION/TRAINING PROGRAM
Payer: MEDICARE

## 2023-11-09 VITALS
TEMPERATURE: 97 F | HEIGHT: 72 IN | WEIGHT: 160.06 LBS | RESPIRATION RATE: 18 BRPM | DIASTOLIC BLOOD PRESSURE: 82 MMHG | SYSTOLIC BLOOD PRESSURE: 139 MMHG | OXYGEN SATURATION: 98 % | HEART RATE: 97 BPM

## 2023-11-09 VITALS
OXYGEN SATURATION: 96 % | RESPIRATION RATE: 18 BRPM | SYSTOLIC BLOOD PRESSURE: 131 MMHG | DIASTOLIC BLOOD PRESSURE: 73 MMHG | HEART RATE: 84 BPM | TEMPERATURE: 97 F

## 2023-11-09 DIAGNOSIS — Z90.89 ACQUIRED ABSENCE OF OTHER ORGANS: Chronic | ICD-10-CM

## 2023-11-09 DIAGNOSIS — Z98.49 CATARACT EXTRACTION STATUS, UNSPECIFIED EYE: Chronic | ICD-10-CM

## 2023-11-09 DIAGNOSIS — Z87.891 PERSONAL HISTORY OF NICOTINE DEPENDENCE: ICD-10-CM

## 2023-11-09 DIAGNOSIS — J44.1 CHRONIC OBSTRUCTIVE PULMONARY DISEASE WITH (ACUTE) EXACERBATION: ICD-10-CM

## 2023-11-09 DIAGNOSIS — Z98.890 OTHER SPECIFIED POSTPROCEDURAL STATES: Chronic | ICD-10-CM

## 2023-11-09 DIAGNOSIS — R06.02 SHORTNESS OF BREATH: ICD-10-CM

## 2023-11-09 PROCEDURE — 99284 EMERGENCY DEPT VISIT MOD MDM: CPT

## 2023-11-09 PROCEDURE — 94640 AIRWAY INHALATION TREATMENT: CPT

## 2023-11-09 PROCEDURE — 99283 EMERGENCY DEPT VISIT LOW MDM: CPT | Mod: 25

## 2023-11-09 RX ORDER — IPRATROPIUM/ALBUTEROL SULFATE 18-103MCG
3 AEROSOL WITH ADAPTER (GRAM) INHALATION ONCE
Refills: 0 | Status: COMPLETED | OUTPATIENT
Start: 2023-11-09 | End: 2023-11-09

## 2023-11-09 RX ADMIN — Medication 3 MILLILITER(S): at 20:56

## 2023-11-09 NOTE — ED PROVIDER NOTE - PATIENT PORTAL LINK FT
You can access the FollowMyHealth Patient Portal offered by [facility] by registering at the following website: [email or web address]. By joining [employer]’s FollowMyHealth portal, you will also be able to view your health information using other applications (apps) compatible with our system.

## 2023-11-09 NOTE — ED ADULT TRIAGE NOTE - CHIEF COMPLAINT QUOTE
Patient PMH COPD to the ED c/o shortness of breath with cough after walking up flight of stairs at [place], endorses forgetting inhaler. Duoneb given by EMS, patient endorses relief post neb. Denies cardiac hx, denies chest pain or dizziness. Ambulatory with steady gait, 98% on RA, AAOX4, NAD.

## 2023-11-09 NOTE — ED PROVIDER NOTE - OBJECTIVE STATEMENT
61 yo M w PMH of emphysema, COPD, asthma, p/w SOB since today. Pt states it is his typical type of SOB caused by asthma/copd. Refusing IV in ED. States he would only like nebs. States he has had URI symptoms for the past week that have been mild, including dry cough. No fever. No leg swelling, hemoptysis, exogenous estrogen, recent travel, surgery, malignancy, personal or FHx of VTE.

## 2023-11-09 NOTE — ED PROVIDER NOTE - NSFOLLOWUPINSTRUCTIONS_ED_ALL_ED_FT
Follow up with your primary medical doctor as soon as possible.    Return to the emergency department if your symptoms worsen or if you develop new symptoms.  If you have any problems with followup, please call the ED Referral Coordinator at [phone].    Chronic Obstructive Pulmonary Disease    Chronic obstructive pulmonary disease (COPD) is a lung condition in which airflow from the lungs is limited. Causes include smoking, secondhand smoke exposure, genetics, or recurrent infections. Take all medicines (inhaled or pills) as directed by your health care provider. Avoid exposure to irritants such as smoke, chemicals, and fumes that aggravate your breathing.    If you are a smoker, the most important thing that you can do is stop smoking. Continuing to smoke will cause further lung damage and breathing trouble. Ask your health care provider for help with quitting smoking.    SEEK IMMEDIATE MEDICAL CARE IF YOU HAVE ANY OF THE FOLLOWING SYMPTOMS: shortness of breath at rest or when talking, bluish discoloration of lips, skin, fever, worsening cough, unexplained chest pain, or lightheadedness/dizziness.

## 2023-11-09 NOTE — ED PROVIDER NOTE - PHYSICAL EXAMINATION
CONSTITUTIONAL: Non-toxic; in no apparent distress  HEAD: Normocephalic; atraumatic  EYES: PERRL; EOM intact   ENMT: External appears normal  NECK: Supple; non-tender  CARD: Normal S1, S2; no murmurs, rubs, or gallops  RESP: Normal chest excursion with respiration; Mild expiratory wheeze bilaterally, no crackles  ABD: Soft, non-distended; non-tender  EXT: Normal ROM in all four extremities; non-tender to palpation  SKIN: Warm, dry, no rash  NEURO:  No focal neurological deficiencies.

## 2023-11-10 NOTE — ED ADULT NURSE NOTE - CHIEF COMPLAINT QUOTE
Patient PMH COPD to the ED c/o shortness of breath with cough after walking up flight of stairs at [place], endorses forgetting inhaler. Duoneb given by EMS, patient endorses relief post neb. Denies cardiac hx, denies chest pain or dizziness. Ambulatory with steady gait, 98% on RA, AAOX4, NAD.

## 2023-11-16 NOTE — ED ADULT TRIAGE NOTE - NS ED TRIAGE AVPU SCALE
> CT Abdomen and Pelvis 11/8: Compared to the prior study of 6/13/22, interval progression of disease with enlarging liver metastasis, peritoneal carcinomatosis and omental caking. New right iliac bone sclerosis concerning for bony metastasis. New large volume of abdominopelvic ascites.  Patent main portal vein.  > s/p Para 11/14 with 2.5L removed
Alert-The patient is alert, awake and responds to voice. The patient is oriented to time, place, and person. The triage nurse is able to obtain subjective information.

## 2023-12-04 ENCOUNTER — EMERGENCY (EMERGENCY)
Facility: HOSPITAL | Age: 63
LOS: 1 days | Discharge: HOME | End: 2023-12-04
Attending: EMERGENCY MEDICINE | Admitting: INTERNAL MEDICINE
Payer: MEDICARE

## 2023-12-04 VITALS
WEIGHT: 160.94 LBS | RESPIRATION RATE: 16 BRPM | SYSTOLIC BLOOD PRESSURE: 160 MMHG | HEART RATE: 78 BPM | OXYGEN SATURATION: 96 % | DIASTOLIC BLOOD PRESSURE: 70 MMHG | TEMPERATURE: 98 F | HEIGHT: 72 IN

## 2023-12-04 DIAGNOSIS — M79.89 OTHER SPECIFIED SOFT TISSUE DISORDERS: ICD-10-CM

## 2023-12-04 DIAGNOSIS — F31.9 BIPOLAR DISORDER, UNSPECIFIED: ICD-10-CM

## 2023-12-04 DIAGNOSIS — R06.02 SHORTNESS OF BREATH: ICD-10-CM

## 2023-12-04 DIAGNOSIS — Z98.49 CATARACT EXTRACTION STATUS, UNSPECIFIED EYE: Chronic | ICD-10-CM

## 2023-12-04 DIAGNOSIS — F17.200 NICOTINE DEPENDENCE, UNSPECIFIED, UNCOMPLICATED: ICD-10-CM

## 2023-12-04 DIAGNOSIS — Z98.890 OTHER SPECIFIED POSTPROCEDURAL STATES: Chronic | ICD-10-CM

## 2023-12-04 DIAGNOSIS — I10 ESSENTIAL (PRIMARY) HYPERTENSION: ICD-10-CM

## 2023-12-04 DIAGNOSIS — J44.1 CHRONIC OBSTRUCTIVE PULMONARY DISEASE WITH (ACUTE) EXACERBATION: ICD-10-CM

## 2023-12-04 DIAGNOSIS — R79.89 OTHER SPECIFIED ABNORMAL FINDINGS OF BLOOD CHEMISTRY: ICD-10-CM

## 2023-12-04 DIAGNOSIS — Z90.89 ACQUIRED ABSENCE OF OTHER ORGANS: Chronic | ICD-10-CM

## 2023-12-04 PROCEDURE — 99285 EMERGENCY DEPT VISIT HI MDM: CPT

## 2023-12-05 ENCOUNTER — TRANSCRIPTION ENCOUNTER (OUTPATIENT)
Age: 63
End: 2023-12-05

## 2023-12-05 VITALS
RESPIRATION RATE: 18 BRPM | TEMPERATURE: 98 F | SYSTOLIC BLOOD PRESSURE: 161 MMHG | OXYGEN SATURATION: 96 % | HEART RATE: 96 BPM | DIASTOLIC BLOOD PRESSURE: 93 MMHG

## 2023-12-05 DIAGNOSIS — F10.11 ALCOHOL ABUSE, IN REMISSION: ICD-10-CM

## 2023-12-05 DIAGNOSIS — J44.9 CHRONIC OBSTRUCTIVE PULMONARY DISEASE, UNSPECIFIED: ICD-10-CM

## 2023-12-05 DIAGNOSIS — M79.89 OTHER SPECIFIED SOFT TISSUE DISORDERS: ICD-10-CM

## 2023-12-05 DIAGNOSIS — F31.9 BIPOLAR DISORDER, UNSPECIFIED: ICD-10-CM

## 2023-12-05 DIAGNOSIS — Z29.9 ENCOUNTER FOR PROPHYLACTIC MEASURES, UNSPECIFIED: ICD-10-CM

## 2023-12-05 LAB
ALBUMIN SERPL ELPH-MCNC: 4.2 G/DL — SIGNIFICANT CHANGE UP (ref 3.3–5)
ALBUMIN SERPL ELPH-MCNC: 4.2 G/DL — SIGNIFICANT CHANGE UP (ref 3.3–5)
ALP SERPL-CCNC: 127 U/L — HIGH (ref 40–120)
ALP SERPL-CCNC: 127 U/L — HIGH (ref 40–120)
ALT FLD-CCNC: 22 U/L — SIGNIFICANT CHANGE UP (ref 10–45)
ALT FLD-CCNC: 22 U/L — SIGNIFICANT CHANGE UP (ref 10–45)
ANION GAP SERPL CALC-SCNC: 10 MMOL/L — SIGNIFICANT CHANGE UP (ref 5–17)
ANION GAP SERPL CALC-SCNC: 10 MMOL/L — SIGNIFICANT CHANGE UP (ref 5–17)
AST SERPL-CCNC: 26 U/L — SIGNIFICANT CHANGE UP (ref 10–40)
AST SERPL-CCNC: 26 U/L — SIGNIFICANT CHANGE UP (ref 10–40)
BASE EXCESS BLDV CALC-SCNC: 5.2 MMOL/L — HIGH (ref -2–3)
BASE EXCESS BLDV CALC-SCNC: 5.2 MMOL/L — HIGH (ref -2–3)
BASOPHILS # BLD AUTO: 0.04 K/UL — SIGNIFICANT CHANGE UP (ref 0–0.2)
BASOPHILS # BLD AUTO: 0.04 K/UL — SIGNIFICANT CHANGE UP (ref 0–0.2)
BASOPHILS NFR BLD AUTO: 0.5 % — SIGNIFICANT CHANGE UP (ref 0–2)
BASOPHILS NFR BLD AUTO: 0.5 % — SIGNIFICANT CHANGE UP (ref 0–2)
BILIRUB SERPL-MCNC: 0.4 MG/DL — SIGNIFICANT CHANGE UP (ref 0.2–1.2)
BILIRUB SERPL-MCNC: 0.4 MG/DL — SIGNIFICANT CHANGE UP (ref 0.2–1.2)
BUN SERPL-MCNC: 17 MG/DL — SIGNIFICANT CHANGE UP (ref 7–23)
BUN SERPL-MCNC: 17 MG/DL — SIGNIFICANT CHANGE UP (ref 7–23)
CALCIUM SERPL-MCNC: 9.2 MG/DL — SIGNIFICANT CHANGE UP (ref 8.4–10.5)
CALCIUM SERPL-MCNC: 9.2 MG/DL — SIGNIFICANT CHANGE UP (ref 8.4–10.5)
CHLORIDE SERPL-SCNC: 98 MMOL/L — SIGNIFICANT CHANGE UP (ref 96–108)
CHLORIDE SERPL-SCNC: 98 MMOL/L — SIGNIFICANT CHANGE UP (ref 96–108)
CO2 BLDV-SCNC: 33.1 MMOL/L — HIGH (ref 22–26)
CO2 BLDV-SCNC: 33.1 MMOL/L — HIGH (ref 22–26)
CO2 SERPL-SCNC: 27 MMOL/L — SIGNIFICANT CHANGE UP (ref 22–31)
CO2 SERPL-SCNC: 27 MMOL/L — SIGNIFICANT CHANGE UP (ref 22–31)
CREAT SERPL-MCNC: 0.7 MG/DL — SIGNIFICANT CHANGE UP (ref 0.5–1.3)
CREAT SERPL-MCNC: 0.7 MG/DL — SIGNIFICANT CHANGE UP (ref 0.5–1.3)
EGFR: 104 ML/MIN/1.73M2 — SIGNIFICANT CHANGE UP
EGFR: 104 ML/MIN/1.73M2 — SIGNIFICANT CHANGE UP
EOSINOPHIL # BLD AUTO: 0.09 K/UL — SIGNIFICANT CHANGE UP (ref 0–0.5)
EOSINOPHIL # BLD AUTO: 0.09 K/UL — SIGNIFICANT CHANGE UP (ref 0–0.5)
EOSINOPHIL NFR BLD AUTO: 1 % — SIGNIFICANT CHANGE UP (ref 0–6)
EOSINOPHIL NFR BLD AUTO: 1 % — SIGNIFICANT CHANGE UP (ref 0–6)
GLUCOSE SERPL-MCNC: 102 MG/DL — HIGH (ref 70–99)
GLUCOSE SERPL-MCNC: 102 MG/DL — HIGH (ref 70–99)
HCO3 BLDV-SCNC: 32 MMOL/L — HIGH (ref 22–29)
HCO3 BLDV-SCNC: 32 MMOL/L — HIGH (ref 22–29)
HCT VFR BLD CALC: 35.1 % — LOW (ref 39–50)
HCT VFR BLD CALC: 35.1 % — LOW (ref 39–50)
HGB BLD-MCNC: 11.5 G/DL — LOW (ref 13–17)
HGB BLD-MCNC: 11.5 G/DL — LOW (ref 13–17)
IMM GRANULOCYTES NFR BLD AUTO: 0.3 % — SIGNIFICANT CHANGE UP (ref 0–0.9)
IMM GRANULOCYTES NFR BLD AUTO: 0.3 % — SIGNIFICANT CHANGE UP (ref 0–0.9)
LYMPHOCYTES # BLD AUTO: 1.5 K/UL — SIGNIFICANT CHANGE UP (ref 1–3.3)
LYMPHOCYTES # BLD AUTO: 1.5 K/UL — SIGNIFICANT CHANGE UP (ref 1–3.3)
LYMPHOCYTES # BLD AUTO: 17.3 % — SIGNIFICANT CHANGE UP (ref 13–44)
LYMPHOCYTES # BLD AUTO: 17.3 % — SIGNIFICANT CHANGE UP (ref 13–44)
MCHC RBC-ENTMCNC: 29 PG — SIGNIFICANT CHANGE UP (ref 27–34)
MCHC RBC-ENTMCNC: 29 PG — SIGNIFICANT CHANGE UP (ref 27–34)
MCHC RBC-ENTMCNC: 32.8 GM/DL — SIGNIFICANT CHANGE UP (ref 32–36)
MCHC RBC-ENTMCNC: 32.8 GM/DL — SIGNIFICANT CHANGE UP (ref 32–36)
MCV RBC AUTO: 88.4 FL — SIGNIFICANT CHANGE UP (ref 80–100)
MCV RBC AUTO: 88.4 FL — SIGNIFICANT CHANGE UP (ref 80–100)
MONOCYTES # BLD AUTO: 1.16 K/UL — HIGH (ref 0–0.9)
MONOCYTES # BLD AUTO: 1.16 K/UL — HIGH (ref 0–0.9)
MONOCYTES NFR BLD AUTO: 13.4 % — SIGNIFICANT CHANGE UP (ref 2–14)
MONOCYTES NFR BLD AUTO: 13.4 % — SIGNIFICANT CHANGE UP (ref 2–14)
NEUTROPHILS # BLD AUTO: 5.85 K/UL — SIGNIFICANT CHANGE UP (ref 1.8–7.4)
NEUTROPHILS # BLD AUTO: 5.85 K/UL — SIGNIFICANT CHANGE UP (ref 1.8–7.4)
NEUTROPHILS NFR BLD AUTO: 67.5 % — SIGNIFICANT CHANGE UP (ref 43–77)
NEUTROPHILS NFR BLD AUTO: 67.5 % — SIGNIFICANT CHANGE UP (ref 43–77)
NRBC # BLD: 0 /100 WBCS — SIGNIFICANT CHANGE UP (ref 0–0)
NRBC # BLD: 0 /100 WBCS — SIGNIFICANT CHANGE UP (ref 0–0)
NT-PROBNP SERPL-SCNC: 804 PG/ML — HIGH (ref 0–300)
NT-PROBNP SERPL-SCNC: 804 PG/ML — HIGH (ref 0–300)
PCO2 BLDV: 52 MMHG — SIGNIFICANT CHANGE UP (ref 42–55)
PCO2 BLDV: 52 MMHG — SIGNIFICANT CHANGE UP (ref 42–55)
PH BLDV: 7.39 — SIGNIFICANT CHANGE UP (ref 7.32–7.43)
PH BLDV: 7.39 — SIGNIFICANT CHANGE UP (ref 7.32–7.43)
PLATELET # BLD AUTO: 283 K/UL — SIGNIFICANT CHANGE UP (ref 150–400)
PLATELET # BLD AUTO: 283 K/UL — SIGNIFICANT CHANGE UP (ref 150–400)
PO2 BLDV: 42 MMHG — SIGNIFICANT CHANGE UP (ref 25–45)
PO2 BLDV: 42 MMHG — SIGNIFICANT CHANGE UP (ref 25–45)
POTASSIUM SERPL-MCNC: 4.1 MMOL/L — SIGNIFICANT CHANGE UP (ref 3.5–5.3)
POTASSIUM SERPL-MCNC: 4.1 MMOL/L — SIGNIFICANT CHANGE UP (ref 3.5–5.3)
POTASSIUM SERPL-SCNC: 4.1 MMOL/L — SIGNIFICANT CHANGE UP (ref 3.5–5.3)
POTASSIUM SERPL-SCNC: 4.1 MMOL/L — SIGNIFICANT CHANGE UP (ref 3.5–5.3)
PROT SERPL-MCNC: 6.8 G/DL — SIGNIFICANT CHANGE UP (ref 6–8.3)
PROT SERPL-MCNC: 6.8 G/DL — SIGNIFICANT CHANGE UP (ref 6–8.3)
RAPID RVP RESULT: SIGNIFICANT CHANGE UP
RAPID RVP RESULT: SIGNIFICANT CHANGE UP
RBC # BLD: 3.97 M/UL — LOW (ref 4.2–5.8)
RBC # BLD: 3.97 M/UL — LOW (ref 4.2–5.8)
RBC # FLD: 16.4 % — HIGH (ref 10.3–14.5)
RBC # FLD: 16.4 % — HIGH (ref 10.3–14.5)
SAO2 % BLDV: 61.8 % — LOW (ref 67–88)
SAO2 % BLDV: 61.8 % — LOW (ref 67–88)
SARS-COV-2 RNA SPEC QL NAA+PROBE: SIGNIFICANT CHANGE UP
SARS-COV-2 RNA SPEC QL NAA+PROBE: SIGNIFICANT CHANGE UP
SODIUM SERPL-SCNC: 135 MMOL/L — SIGNIFICANT CHANGE UP (ref 135–145)
SODIUM SERPL-SCNC: 135 MMOL/L — SIGNIFICANT CHANGE UP (ref 135–145)
TROPONIN T, HIGH SENSITIVITY RESULT: 11 NG/L — SIGNIFICANT CHANGE UP (ref 0–51)
TROPONIN T, HIGH SENSITIVITY RESULT: 11 NG/L — SIGNIFICANT CHANGE UP (ref 0–51)
WBC # BLD: 8.67 K/UL — SIGNIFICANT CHANGE UP (ref 3.8–10.5)
WBC # BLD: 8.67 K/UL — SIGNIFICANT CHANGE UP (ref 3.8–10.5)
WBC # FLD AUTO: 8.67 K/UL — SIGNIFICANT CHANGE UP (ref 3.8–10.5)
WBC # FLD AUTO: 8.67 K/UL — SIGNIFICANT CHANGE UP (ref 3.8–10.5)

## 2023-12-05 PROCEDURE — 80053 COMPREHEN METABOLIC PANEL: CPT

## 2023-12-05 PROCEDURE — 93005 ELECTROCARDIOGRAM TRACING: CPT

## 2023-12-05 PROCEDURE — 87637 SARSCOV2&INF A&B&RSV AMP PRB: CPT

## 2023-12-05 PROCEDURE — G0378: CPT

## 2023-12-05 PROCEDURE — 82803 BLOOD GASES ANY COMBINATION: CPT

## 2023-12-05 PROCEDURE — 0225U NFCT DS DNA&RNA 21 SARSCOV2: CPT

## 2023-12-05 PROCEDURE — 36415 COLL VENOUS BLD VENIPUNCTURE: CPT

## 2023-12-05 PROCEDURE — 99285 EMERGENCY DEPT VISIT HI MDM: CPT | Mod: 25

## 2023-12-05 PROCEDURE — 84484 ASSAY OF TROPONIN QUANT: CPT

## 2023-12-05 PROCEDURE — 83880 ASSAY OF NATRIURETIC PEPTIDE: CPT

## 2023-12-05 PROCEDURE — 93010 ELECTROCARDIOGRAM REPORT: CPT

## 2023-12-05 PROCEDURE — 99222 1ST HOSP IP/OBS MODERATE 55: CPT | Mod: GC

## 2023-12-05 PROCEDURE — 85025 COMPLETE CBC W/AUTO DIFF WBC: CPT

## 2023-12-05 PROCEDURE — 96374 THER/PROPH/DIAG INJ IV PUSH: CPT

## 2023-12-05 PROCEDURE — 71045 X-RAY EXAM CHEST 1 VIEW: CPT | Mod: 26

## 2023-12-05 PROCEDURE — 71045 X-RAY EXAM CHEST 1 VIEW: CPT

## 2023-12-05 PROCEDURE — 94640 AIRWAY INHALATION TREATMENT: CPT

## 2023-12-05 PROCEDURE — 96375 TX/PRO/DX INJ NEW DRUG ADDON: CPT

## 2023-12-05 RX ORDER — RISPERIDONE 4 MG/1
2 TABLET ORAL DAILY
Refills: 0 | Status: DISCONTINUED | OUTPATIENT
Start: 2023-12-05 | End: 2023-12-05

## 2023-12-05 RX ORDER — ALBUTEROL 90 UG/1
0 AEROSOL, METERED ORAL
Qty: 0 | Refills: 3 | DISCHARGE

## 2023-12-05 RX ORDER — HYDROCHLOROTHIAZIDE 25 MG
1 TABLET ORAL
Qty: 0 | Refills: 0 | DISCHARGE
Start: 2023-12-05

## 2023-12-05 RX ORDER — MONTELUKAST 4 MG/1
1 TABLET, CHEWABLE ORAL
Qty: 0 | Refills: 0 | DISCHARGE

## 2023-12-05 RX ORDER — GABAPENTIN 400 MG/1
0 CAPSULE ORAL
Qty: 0 | Refills: 0 | DISCHARGE

## 2023-12-05 RX ORDER — RISPERIDONE 4 MG/1
1 TABLET ORAL
Qty: 0 | Refills: 0 | DISCHARGE
Start: 2023-12-05

## 2023-12-05 RX ORDER — LANOLIN ALCOHOL/MO/W.PET/CERES
3 CREAM (GRAM) TOPICAL AT BEDTIME
Refills: 0 | Status: DISCONTINUED | OUTPATIENT
Start: 2023-12-05 | End: 2023-12-05

## 2023-12-05 RX ORDER — ALBUTEROL 90 UG/1
2 AEROSOL, METERED ORAL
Qty: 1 | Refills: 0
Start: 2023-12-05 | End: 2024-01-03

## 2023-12-05 RX ORDER — ACAMPROSATE CALCIUM 333 MG/1
0 TABLET, DELAYED RELEASE ORAL
Qty: 0 | Refills: 0 | DISCHARGE

## 2023-12-05 RX ORDER — FLUOXETINE HCL 10 MG
1 CAPSULE ORAL
Refills: 0 | DISCHARGE

## 2023-12-05 RX ORDER — FUROSEMIDE 40 MG
20 TABLET ORAL ONCE
Refills: 0 | Status: COMPLETED | OUTPATIENT
Start: 2023-12-05 | End: 2023-12-05

## 2023-12-05 RX ORDER — RISPERIDONE 4 MG/1
0 TABLET ORAL
Qty: 0 | Refills: 0 | DISCHARGE

## 2023-12-05 RX ORDER — IPRATROPIUM/ALBUTEROL SULFATE 18-103MCG
3 AEROSOL WITH ADAPTER (GRAM) INHALATION
Refills: 0 | Status: COMPLETED | OUTPATIENT
Start: 2023-12-05 | End: 2023-12-05

## 2023-12-05 RX ORDER — IPRATROPIUM/ALBUTEROL SULFATE 18-103MCG
3 AEROSOL WITH ADAPTER (GRAM) INHALATION EVERY 6 HOURS
Refills: 0 | Status: DISCONTINUED | OUTPATIENT
Start: 2023-12-05 | End: 2023-12-05

## 2023-12-05 RX ORDER — ALBUTEROL 90 UG/1
3 AEROSOL, METERED ORAL
Qty: 1 | Refills: 0
Start: 2023-12-05

## 2023-12-05 RX ORDER — HYDROCHLOROTHIAZIDE 25 MG
0 TABLET ORAL
Qty: 0 | Refills: 1 | DISCHARGE

## 2023-12-05 RX ORDER — ONDANSETRON 8 MG/1
4 TABLET, FILM COATED ORAL EVERY 8 HOURS
Refills: 0 | Status: DISCONTINUED | OUTPATIENT
Start: 2023-12-05 | End: 2023-12-05

## 2023-12-05 RX ORDER — GABAPENTIN 400 MG/1
600 CAPSULE ORAL DAILY
Refills: 0 | Status: DISCONTINUED | OUTPATIENT
Start: 2023-12-05 | End: 2023-12-05

## 2023-12-05 RX ORDER — KETOROLAC TROMETHAMINE 30 MG/ML
15 SYRINGE (ML) INJECTION ONCE
Refills: 0 | Status: DISCONTINUED | OUTPATIENT
Start: 2023-12-05 | End: 2023-12-05

## 2023-12-05 RX ORDER — DEXAMETHASONE 0.5 MG/5ML
10 ELIXIR ORAL ONCE
Refills: 0 | Status: COMPLETED | OUTPATIENT
Start: 2023-12-05 | End: 2023-12-05

## 2023-12-05 RX ORDER — GABAPENTIN 400 MG/1
1 CAPSULE ORAL
Qty: 0 | Refills: 0 | DISCHARGE
Start: 2023-12-05

## 2023-12-05 RX ORDER — FLUOXETINE HCL 10 MG
40 CAPSULE ORAL DAILY
Refills: 0 | Status: DISCONTINUED | OUTPATIENT
Start: 2023-12-05 | End: 2023-12-05

## 2023-12-05 RX ORDER — ACETAMINOPHEN 500 MG
650 TABLET ORAL EVERY 6 HOURS
Refills: 0 | Status: DISCONTINUED | OUTPATIENT
Start: 2023-12-05 | End: 2023-12-05

## 2023-12-05 RX ORDER — ENOXAPARIN SODIUM 100 MG/ML
40 INJECTION SUBCUTANEOUS EVERY 24 HOURS
Refills: 0 | Status: DISCONTINUED | OUTPATIENT
Start: 2023-12-05 | End: 2023-12-05

## 2023-12-05 RX ORDER — HYDROXYZINE HCL 10 MG
1 TABLET ORAL
Qty: 0 | Refills: 0 | DISCHARGE

## 2023-12-05 RX ORDER — FLUOXETINE HCL 10 MG
0 CAPSULE ORAL
Qty: 0 | Refills: 0 | DISCHARGE

## 2023-12-05 RX ORDER — DEXAMETHASONE 0.5 MG/5ML
10 ELIXIR ORAL ONCE
Refills: 0 | Status: DISCONTINUED | OUTPATIENT
Start: 2023-12-05 | End: 2023-12-05

## 2023-12-05 RX ADMIN — Medication 15 MILLIGRAM(S): at 02:50

## 2023-12-05 RX ADMIN — Medication 650 MILLIGRAM(S): at 09:30

## 2023-12-05 RX ADMIN — Medication 102 MILLIGRAM(S): at 02:50

## 2023-12-05 RX ADMIN — Medication 650 MILLIGRAM(S): at 08:44

## 2023-12-05 RX ADMIN — Medication 3 MILLILITER(S): at 03:02

## 2023-12-05 RX ADMIN — RISPERIDONE 2 MILLIGRAM(S): 4 TABLET ORAL at 12:10

## 2023-12-05 RX ADMIN — GABAPENTIN 600 MILLIGRAM(S): 400 CAPSULE ORAL at 12:10

## 2023-12-05 RX ADMIN — Medication 20 MILLIGRAM(S): at 06:29

## 2023-12-05 RX ADMIN — Medication 3 MILLILITER(S): at 02:56

## 2023-12-05 RX ADMIN — Medication 3 MILLILITER(S): at 08:44

## 2023-12-05 RX ADMIN — Medication 40 MILLIGRAM(S): at 12:10

## 2023-12-05 RX ADMIN — Medication 3 MILLILITER(S): at 02:51

## 2023-12-05 NOTE — ED ADULT NURSE NOTE - OBJECTIVE STATEMENT
63yM presents to ED endorsing cp and sob. States "I feel really short of breath." Speaking in full sentences NAD noted. Spont breathing on RA, unlabored. States cp is 7/10. Pt req new clothes because he urinated himself.

## 2023-12-05 NOTE — H&P ADULT - ATTENDING COMMENTS
63 M w/ PMH of alcohol use disorder (clean since May '23), Bipolar disorder, HTN, lifelong smoker (now cut down to few cigarettes/day), and COPD presented with worsening SOB and cough since last night concerning for COPD exacerbation    Patient seen and evaluated at bedside, significant improvement in symptoms per patient since IV steroids in ED.  No chest pain, no productive cough, sating well on room air and good air movement on exam.  Minimal expiratory wheezing.  Patient ambulating well without exacerbation of symptoms    #COPD exacerbation  - likely trigger is viral URI, COVID/flu negative, no infiltrates on CXR.  No signs of CHF overload and patient with normal Echo in 2021.  proBNP mildly elevated for age  - c/w course of PO prednisone  - c/w home advair and albuterol, also has nebulizer machine so will Rx Duonebs on dc  - no indication for antibiotic at this time  - has PCP follow up scheduled this week    Remainder as per resident note above

## 2023-12-05 NOTE — DISCHARGE NOTE PROVIDER - CARE PROVIDER_API CALL
Kim Prado  Phone: [phone]  Fax: (   )    -  Scheduled Appointment: 12/06/2023   Kim Prado  Phone: [phone]  Fax: (   )    -  Scheduled Appointment: 12/06/2023

## 2023-12-05 NOTE — H&P ADULT - PROBLEM SELECTOR PLAN 2
Patient presents w/ BL LE swelling, which he says is chronic and his PCP placed him on hydrochlorothiazide 25mg qd for this. Has some chronic venous stasis changes of LE. No crackles on admission. Was given Lasix 40 IVP in ED. No cardiomegaly on admission CXR.     - consider TTE  - ace wraps for LE swelling  - c/w HCTZ 25 qd Patient presents w/ BL LE swelling, which he says is chronic and his PCP placed him on hydrochlorothiazide 25mg qd for this. Has some chronic venous stasis changes of LE. No crackles on admission. Was given Lasix 40 IVP in ED. No cardiomegaly on admission CXR.     - TTE in 2021 was w/o significant abnormalities  - Leg elevation for LE swelling  - c/w HCTZ 25 qd  - received Lasix in ED  - hold additional diuretics for now

## 2023-12-05 NOTE — DISCHARGE NOTE NURSING/CASE MANAGEMENT/SOCIAL WORK - NSDCPEFALRISK_GEN_ALL_CORE
For information on Fall & Injury Prevention, visit: [email or web address] OR  [email or web address] OR  https://www.cdc.gov/steadi/patient.html For information on Fall & Injury Prevention, visit: [email or web address] OR  [email or web address] OR  https://www.cdc.gov/steadi/patient.html

## 2023-12-05 NOTE — ED PROVIDER NOTE - PHYSICAL EXAMINATION
VITAL SIGNS: I have reviewed nursing notes and confirm.  CONSTITUTIONAL: Well appearing, in no acute distress. Speaking in full sentences able to walk without distress hypoxic to 92% on room air  SKIN:  warm and dry, no acute rash.   HEAD:  normocephalic, atraumatic.  EYES: EOM intact; conjunctiva and sclera clear.  ENT: No nasal discharge; airway clear.   NECK: Supple.  CARD: S1, S2, Regular rate and rhythm.   RESP:   diffuse intermittent wheezes coarse cough speaking in full sentences normal voice, 92% on room air  ABD: Normal bowel sounds; soft; non-distended; non-tender; no guarding/ rebound.  EXT: Normal ROM. No peripheral edema. Pulses intact and equal b/l.  NEURO: Alert, oriented, grossly unremarkable  PSYCH: Cooperative, mood and affect appropriate.

## 2023-12-05 NOTE — ED PROVIDER NOTE - CARE PLAN
1 Principal Discharge DX:	COPD exacerbation  Secondary Diagnosis:	Elevated brain natriuretic peptide (BNP) level

## 2023-12-05 NOTE — ED PROVIDER NOTE - CLINICAL SUMMARY MEDICAL DECISION MAKING FREE TEXT BOX
63-year-old male with COPD exacerbation will evaluate for CHF plan for CBC CMP EKG cardiac enzyme troponin proBNP   EKG normal sinus rhythm 78   chest x-ray   POCUS echo shows normal ejection fraction roughly 50% no pericardial effusion or RV strain no B-lines question borderline RA enlargement     chest x-ray negative for pneumothorax hyperinflated lungs no pneumonia mild  increase inpulmonary vasculature   proBNP elevated may be mild cor pulmonale troponin negative patient improved comfortably resting on room air at 93 to 95% will admit for COPD exacerbation gentle Lasix given 63-year-old male with COPD exacerbation will evaluate for CHF plan for CBC CMP EKG cardiac enzyme troponin proBNP   EKG normal sinus rhythm 78   chest x-ray   POCUS echo shows normal ejection fraction roughly 50% no pericardial effusion or RV strain no B-lines question borderline RA enlargement     chest x-ray negative for pneumothorax hyperinflated lungs no pneumonia mild increase in pulmonary vasculature   proBNP elevated may be mild cor pulmonale troponin negative patient improved comfortably resting on room air at 93 to 95% will admit for COPD exacerbation gentle Lasix given

## 2023-12-05 NOTE — ED ADULT NURSE NOTE - NSFALLUNIVINTERV_ED_ALL_ED
Bed/Stretcher in lowest position, wheels locked, appropriate side rails in place/Call bell, personal items and telephone in reach/Instruct patient to call for assistance before getting out of bed/chair/stretcher/Non-slip footwear applied when patient is off stretcher/[place] to call system/Physically safe environment - no spills, clutter or unnecessary equipment/Purposeful proactive rounding/Room/bathroom lighting operational, light cord in reach Bed/Stretcher in lowest position, wheels locked, appropriate side rails in place/Call bell, personal items and telephone in reach/Instruct patient to call for assistance before getting out of bed/chair/stretcher/Non-slip footwear applied when patient is off stretcher/[place] to call system/Physically safe environment - no spills, clutter or unnecessary equipment/Purposeful proactive rounding/Room/bathroom lighting operational, light cord in reach

## 2023-12-05 NOTE — DISCHARGE NOTE PROVIDER - NSDCCPCAREPLAN_GEN_ALL_CORE_FT
PRINCIPAL DISCHARGE DIAGNOSIS  Diagnosis: COPD exacerbation  Assessment and Plan of Treatment: You were admitted with COPD, with concern for COPD exacerbation. You had SOB, but without elevated WBC count or abnormalities in your heart rate or increased temperature. You were treated with nebulized albuterol and given decadron (a steroid).  Next Steps:  1. Please follow up with your PCP during your appointment tomorrow  2. Please continue to take prednisone (a steroid) 40mg for 4 days  3. Please continue with your nebulizer and inhalers

## 2023-12-05 NOTE — ED PROVIDER NOTE - OBJECTIVE STATEMENT
63-year-old male with past medical history of alcohol use disorder clean x 1 year,  Bipolar, COPD smoker hypertension here today for shortness of breath requesting albuterol treatment.  Denies fever chills cough.  Admits to chest pressure and tightness typical of COPD type exacerbation.  No abdominal pain vomiting.

## 2023-12-05 NOTE — DISCHARGE NOTE PROVIDER - PROVIDER TOKENS
FREE:[LAST:[Unk],FIRST:[Kim],PHONE:[phone]],FAX:[(   )    -],SCHEDULEDAPPT:[12/06/2023]] FREE:[LAST:[Unk],FIRST:[Kim],PHONE:[phone]:[(   )    -],SCHEDULEDAPPT:[12/06/2023]]

## 2023-12-05 NOTE — H&P ADULT - ASSESSMENT
63M w/ PMH of alcohol use disorder (clean since May '23), Bipolar disorder, HTN, lifelong smoker (now cut down to few cigarettes/day), and COPD presents w/ SOB x1 day.

## 2023-12-05 NOTE — DISCHARGE NOTE PROVIDER - NSDCMRMEDTOKEN_GEN_ALL_CORE_FT
Advair  mcg-21 mcg/inh inhalation aerosol: 2 puff(s) inhaled 2 times a day   albuterol 2.5 mg/3 mL (0.083%) inhalation solution: 3 milliliter(s) by nebulizer 4 times a day  FLUoxetine 40 mg oral capsule: 1 cap(s) orally once a day  hydroCHLOROthiazide 25 mg oral tablet: 1 tab(s) orally once a day  hydrOXYzine hydrochloride 50 mg oral tablet: 1 tab(s) orally once a day (at bedtime)  nebulizer machine :   Neurontin 600 mg oral tablet: 1 tab(s) orally once a day  predniSONE 20 mg oral tablet: 2 tab(s) orally once a day  Proventil HFA 90 mcg/inh inhalation aerosol: 2 puff(s) inhaled every 6 hours  RisperDAL 2 mg oral tablet: 1 tab(s) orally once a day

## 2023-12-05 NOTE — H&P ADULT - PROBLEM SELECTOR PLAN 1
Patient has COPD w/ >8 presentations to ED within the past year for SOB symptoms. Influenza swab and COVID swab negative on admission, but full RVP not performed. Patient reported chest pain/tightness, troponin negative and BNP mildly elevated to 804 on admission. CXR clear on admission, w/o acute cardiopulmonary findings. #COPD exacerbation    Patient has COPD w/ >8 presentations to ED within the past year for similar SOB symptoms. Influenza swab and COVID swab negative on admission, but full RVP not performed. Patient reported chest pain/tightness, troponin negative and BNP mildly elevated to 804 on admission. CXR clear on admission, w/o acute cardiopulmonary findings. Received IV decadron 10mg in ED as well as duonebs x2. Some wheezing on exam, L>R, but patient appears overall well and walking to go to the bathroom during intake.     - c/w duonebs q6 standing  - s/p decadron 10mg IVP in ED  - ordered 40mg PO Prednisone for tomorrow, can start taper   - no acute indication for abx, minimal sputum and is clear/whitish, no fever, no leukocytosis, no malaise

## 2023-12-05 NOTE — DISCHARGE NOTE PROVIDER - HOSPITAL COURSE
#Discharge: do not delete    Patient is 64 yo M with PMH of alcohol use disorder (clean since May '23), Bipolar disorder, HTN, lifelong smoker (now cut down to few cigarettes/day), and COPD presents w/ SOB x1 day.     Hospital course (by problem):     #COPD exacerbation  Patient has COPD w/ >8 presentations to ED within the past year for similar SOB symptoms. Influenza swab and COVID swab negative on admission, but full RVP not performed. Patient reported some chest pain/tightness, troponin negative and BNP mildly elevated to 804 on admission. CXR clear on admission, w/o acute cardiopulmonary findings. Received IV decadron 10mg in ED as well as duonebs x2. Some wheezing on exam, L>R, but patient appears overall well, and walking around to go to the bathroom during intake.   - c/w duonebs q6 standing  - s/p decadron 10mg IVP in ED  - c/w Prednisone 40mg qd for 4 days  - f/u PCP tomorrow (has scheduled appointment)       #Swelling of lower extremity  Patient presents w/ BL LE swelling, which he says is chronic and his PCP placed him on hydrochlorothiazide 25mg qd for this. Has some chronic venous stasis changes of LE. No crackles on admission. Was given Lasix 40 IVP in ED. No cardiomegaly on admission CXR.     - TTE in 2021 was w/o significant abnormalities  - practice leg elevation at night, use compression stockings   - c/w HCTZ 25 qd per PCP  - received Lasix in ED  - no indication for diuretics     #Bipolar disease, chronic.   Chronic bipolar. Patient attends a "day care" 4days/week. Home meds =  risperidone 2mg qd, hydroxyzine 50mg qhs, and Fluoxetine 40mg qd  - c/w home meds.    #History of ETOH abuse.   Patient has long hx of ETOH abuse, now clean since May per patient. Attends AA meetings, has been on acomprosate in the past. Home meds = gabapentin 600mg qd.  -c/w home med.      Patient was discharged to: home with follow up appointment tomorrow w/ PCP     New medications: Prednisone 40mg PO qd x 4 days  Changes to old medications: N/A  Medications that were stopped: N/A    Items to follow up as outpatient: f/u with PCP appointment tomorrow     Physical exam at the time of discharge:  GENERAL: NAD, lying in bed comfortably  HEAD:  Atraumatic, Normocephalic  EYES: conjunctiva and sclera clear  ENT: Moist mucous membranes  NECK: Supple, No JVD  CHEST/LUNG: expiratory wheezing L>R, No rhonchi or crackles. Some coughing.   HEART: Regular rate and rhythm; No murmurs, rubs, or gallops  ABDOMEN: BSx4; Soft, nontender, nondistended  EXTREMITIES:  2+ Peripheral Pulses, brisk capillary refill. +1 pitting LE edema bilaterally   NERVOUS SYSTEM:  A&Ox3

## 2023-12-05 NOTE — H&P ADULT - HISTORY OF PRESENT ILLNESS
HPI:    63-year-old male with past medical history of alcohol use disorder clean x 1 year,  Bipolar, COPD smoker hypertension here today for shortness of breath requesting albuterol treatment.  Denies fever chills cough.  Admits to chest pressure and tightness typical of COPD type exacerbation.  No abdominal pain vomiting.    63-year-old male with COPD exacerbation will evaluate for CHF plan for CBC CMP EKG cardiac enzyme troponin proBNP   EKG normal sinus rhythm 78   chest x-ray   POCUS echo shows normal ejection fraction roughly 50% no pericardial effusion or RV strain no B-lines question borderline RA enlargement     chest x-ray negative for pneumothorax hyperinflated lungs no pneumonia mild  increase inpulmonary vasculature   proBNP elevated may be mild cor pulmonale troponin negative patient improved comfortably resting on room air at 93 to 95% will admit for COPD exacerbation gentle Lasix given    In the ED:  VS: T  97.9 , HR  78 , /70--> 141/77 (5hrs later) , RR  16  , SpO2  96 % RA  Labs: WBC 8.67, Hgb 11.5, MCV 88.4, Plt 283, Na 135, K 4.1, Cr 0.70, BUN 17, Gluc 102, Alk Phos 127, AST/ALT WNL, Trop T 11 (negative), proBNP 804  Flu swab: Negative. COVID swab: Negative. (Was not a full RVP)  EKG: NSR, QTc 401  Imaging: CXR  Orders: Decadron 10mg IV, Lasix 40mg IV, Toradol 15mg IV, Duoneb 3mL x 2        VITALS:   T(C): 36.7 (12-05-23 @ 04:49), Max: 36.7 (12-05-23 @ 04:49)  HR: 77 (12-05-23 @ 04:49) (77 - 78)  BP: 141/77 (12-05-23 @ 04:49) (141/77 - 160/70)  RR: 18 (12-05-23 @ 04:49) (16 - 18)  SpO2: 98% (12-05-23 @ 04:49) (96% - 98%)        PHYSICAL EXAM:  GENERAL: NAD, lying in bed comfortably  HEAD:  Atraumatic, Normocephalic  EYES: EOMI, PERRLA, conjunctiva and sclera clear  ENT: Moist mucous membranes  NECK: Supple, No JVD  CHEST/LUNG: Clear to auscultation bilaterally; No rales, rhonchi, wheezing, or rubs. Unlabored respirations  HEART: Regular rate and rhythm; No murmurs, rubs, or gallops  ABDOMEN: BSx4; Soft, nontender, nondistended  EXTREMITIES:  2+ Peripheral Pulses, brisk capillary refill. No clubbing, cyanosis, or edema  NERVOUS SYSTEM:  A&Ox3, no focal deficits   SKIN: No rashes or lesions      LABS:                        11.5   8.67  )-----------( 283      ( 05 Dec 2023 02:05 )             35.1       12-05    135  |  98  |  17  ----------------------------<  102<H>  4.1   |  27  |  0.70    Ca    9.2      05 Dec 2023 02:05    TPro  6.8  /  Alb  4.2  /  TBili  0.4  /  DBili  x   /  AST  26  /  ALT  22  /  AlkPhos  127<H>  12-05              Urinalysis Basic - ( 05 Dec 2023 02:05 )    Color: x / Appearance: x / SG: x / pH: x  Gluc: 102 mg/dL / Ketone: x  / Bili: x / Urobili: x   Blood: x / Protein: x / Nitrite: x   Leuk Esterase: x / RBC: x / WBC x   Sq Epi: x / Non Sq Epi: x / Bacteria: x                             HPI:    63 M w/ PMH of alcohol use disorder (clean since May '22), Bipolar disorder, HTN, lifelong smoker (now cut down to few cigarettes/day), and COPD presents w/ SOB x1 day. Patient states he went to the movies and from there to an AA meeting, and suddenly felt SOB w/ chest pain/tightness, so he came to the ED. He is familiar with these symptoms and feels they are associated w/ COPD for which he has been hospitalized before and treated w/ steroids and abx during those hospitalizations. He cannot remember how many times he's been hospitalized in the last year, but has presented to [facility] ED more than 8 times in the last year for similar symptoms.   Pt denies syncope or LOC, endorses some cough and URI symptoms recently. Denies fever or chills. Denies abdominal pain or vomiting. Patient states that he lives with 4 roommates and goes to a "day care" 4 days/week.     In the ED:  VS: T  97.9 , HR  78 , /70--> 141/77 (5hrs later) , RR  16  , SpO2  96 % RA  Labs: WBC 8.67, Hgb 11.5, MCV 88.4, Plt 283, Na 135, K 4.1, Cr 0.70, BUN 17, Gluc 102, Alk Phos 127, AST/ALT WNL, Trop T 11 (negative), proBNP 804  Flu swab: Negative. COVID swab: Negative. (Was not a full RVP)  EKG: NSR, QTc 401  Imaging: CXR  Orders: Decadron 10mg IV, Lasix 40mg IV, Toradol 15mg IV, Duoneb 3mL x 2        VITALS:   T(C): 36.7 (12-05-23 @ 04:49), Max: 36.7 (12-05-23 @ 04:49)  HR: 77 (12-05-23 @ 04:49) (77 - 78)  BP: 141/77 (12-05-23 @ 04:49) (141/77 - 160/70)  RR: 18 (12-05-23 @ 04:49) (16 - 18)  SpO2: 98% (12-05-23 @ 04:49) (96% - 98%)        PHYSICAL EXAM:  GENERAL: NAD, lying in bed comfortably  HEAD:  Atraumatic, Normocephalic  EYES: conjunctiva and sclera clear  ENT: Moist mucous membranes  NECK: Supple, No JVD  CHEST/LUNG: expiratory wheezing L>R, No rhonchi or crackles. Some coughing.   HEART: Regular rate and rhythm; No murmurs, rubs, or gallops  ABDOMEN: BSx4; Soft, nontender, nondistended  EXTREMITIES:  2+ Peripheral Pulses, brisk capillary refill. +1 pitting LE edema  NERVOUS SYSTEM:  A&Ox3        LABS:                        11.5   8.67  )-----------( 283      ( 05 Dec 2023 02:05 )             35.1       12-05    135  |  98  |  17  ----------------------------<  102<H>  4.1   |  27  |  0.70    Ca    9.2      05 Dec 2023 02:05    TPro  6.8  /  Alb  4.2  /  TBili  0.4  /  DBili  x   /  AST  26  /  ALT  22  /  AlkPhos  127<H>  12-05              Urinalysis Basic - ( 05 Dec 2023 02:05 )    Color: x / Appearance: x / SG: x / pH: x  Gluc: 102 mg/dL / Ketone: x  / Bili: x / Urobili: x   Blood: x / Protein: x / Nitrite: x   Leuk Esterase: x / RBC: x / WBC x   Sq Epi: x / Non Sq Epi: x / Bacteria: x                             HPI:    63 M w/ PMH of alcohol use disorder (clean since May '22), Bipolar disorder, HTN, lifelong smoker (now cut down to few cigarettes/day), and COPD presents w/ SOB x1 day. Patient states he went to the movies and from there to an AA meeting, and suddenly felt SOB w/ chest pain/tightness, so he came to the ED. He is familiar with these symptoms and feels they are associated w/ COPD for which he has been hospitalized before and treated w/ steroids and abx during those hospitalizations. He cannot remember how many times he's been hospitalized in the last year, but has presented to [facility] ED more than 8 times in the last year for similar symptoms.   Pt denies syncope or LOC, endorses some cough and URI symptoms recently. Denies fever or chills. Denies abdominal pain or vomiting. Patient states that he lives with 4 roommates and goes to a "day care" 4 days/week.     In the ED:  VS: T  97.9 , HR  78 , /70--> 141/77 (5hrs later) , RR  16  , SpO2  96 % RA  Labs: WBC 8.67, Hgb 11.5, MCV 88.4, Plt 283, Na 135, K 4.1, Cr 0.70, BUN 17, Gluc 102, Alk Phos 127, AST/ALT WNL, Trop T 11 (negative), proBNP 804  Flu swab: Negative. COVID swab: Negative. (Was not a full RVP)  EKG: NSR, QTc 401  Imaging: CXR  Orders: Decadron 10mg IV, Lasix 40mg IV, Toradol 15mg IV, Duoneb 3mL x 2        VITALS:   T(C): 36.7 (12-05-23 @ 04:49), Max: 36.7 (12-05-23 @ 04:49)  HR: 77 (12-05-23 @ 04:49) (77 - 78)  BP: 141/77 (12-05-23 @ 04:49) (141/77 - 160/70)  RR: 18 (12-05-23 @ 04:49) (16 - 18)  SpO2: 98% (12-05-23 @ 04:49) (96% - 98%)        PHYSICAL EXAM:  GENERAL: NAD, lying in bed comfortably  HEAD:  Atraumatic, Normocephalic  EYES: conjunctiva and sclera clear  ENT: Moist mucous membranes  NECK: Supple, No JVD  CHEST/LUNG: expiratory wheezing L>R, No rhonchi or crackles. Some coughing.   HEART: Regular rate and rhythm; No murmurs, rubs, or gallops  ABDOMEN: BSx4; Soft, nontender, nondistended  EXTREMITIES:  2+ Peripheral Pulses, brisk capillary refill. +1 pitting LE edema  NERVOUS SYSTEM:  A&Ox3        LABS:                        11.5   8.67  )-----------( 283      ( 05 Dec 2023 02:05 )             35.1       12-05    135  |  98  |  17  ----------------------------<  102<H>  4.1   |  27  |  0.70    Ca    9.2      05 Dec 2023 02:05    TPro  6.8  /  Alb  4.2  /  TBili  0.4  /  DBili  x   /  AST  26  /  ALT  22  /  AlkPhos  127<H>  12-05              Urinalysis Basic - ( 05 Dec 2023 02:05 )    Color: x / Appearance: x / SG: x / pH: x  Gluc: 102 mg/dL / Ketone: x  / Bili: x / Urobili: x   Blood: x / Protein: x / Nitrite: x   Leuk Esterase: x / RBC: x / WBC x   Sq Epi: x / Non Sq Epi: x / Bacteria: x                             HPI:    63 M w/ PMH of alcohol use disorder (clean since May '23), Bipolar disorder, HTN, lifelong smoker (now cut down to few cigarettes/day), and COPD presents w/ SOB x1 day. Patient states he went to the movies and from there to an AA meeting, and suddenly felt SOB w/ chest pain/tightness, so he came to the ED. He is familiar with these symptoms and feels they are associated w/ COPD for which he has been hospitalized before and treated w/ steroids and abx during those hospitalizations. He cannot remember how many times he's been hospitalized in the last year, but has presented to [facility] ED more than 8 times in the last year for similar symptoms.   Pt denies syncope or LOC, endorses some cough and URI symptoms recently. Denies fever or chills. Denies abdominal pain or vomiting. Patient states that he lives with 4 roommates and goes to a "day care" 4 days/week.     In the ED:  VS: T  97.9 , HR  78 , /70--> 141/77 (5hrs later) , RR  16  , SpO2  96 % RA  Labs: WBC 8.67, Hgb 11.5, MCV 88.4, Plt 283, Na 135, K 4.1, Cr 0.70, BUN 17, Gluc 102, Alk Phos 127, AST/ALT WNL, Trop T 11 (negative), proBNP 804  Flu swab: Negative. COVID swab: Negative. (Was not a full RVP)  EKG: NSR, QTc 401  Imaging: CXR  Orders: Decadron 10mg IV, Lasix 40mg IV, Toradol 15mg IV, Duoneb 3mL x 2        VITALS:   T(C): 36.7 (12-05-23 @ 04:49), Max: 36.7 (12-05-23 @ 04:49)  HR: 77 (12-05-23 @ 04:49) (77 - 78)  BP: 141/77 (12-05-23 @ 04:49) (141/77 - 160/70)  RR: 18 (12-05-23 @ 04:49) (16 - 18)  SpO2: 98% (12-05-23 @ 04:49) (96% - 98%)        PHYSICAL EXAM:  GENERAL: NAD, lying in bed comfortably  HEAD:  Atraumatic, Normocephalic  EYES: conjunctiva and sclera clear  ENT: Moist mucous membranes  NECK: Supple, No JVD  CHEST/LUNG: expiratory wheezing L>R, No rhonchi or crackles. Some coughing.   HEART: Regular rate and rhythm; No murmurs, rubs, or gallops  ABDOMEN: BSx4; Soft, nontender, nondistended  EXTREMITIES:  2+ Peripheral Pulses, brisk capillary refill. +1 pitting LE edema  NERVOUS SYSTEM:  A&Ox3        LABS:                        11.5   8.67  )-----------( 283      ( 05 Dec 2023 02:05 )             35.1       12-05    135  |  98  |  17  ----------------------------<  102<H>  4.1   |  27  |  0.70    Ca    9.2      05 Dec 2023 02:05    TPro  6.8  /  Alb  4.2  /  TBili  0.4  /  DBili  x   /  AST  26  /  ALT  22  /  AlkPhos  127<H>  12-05              Urinalysis Basic - ( 05 Dec 2023 02:05 )    Color: x / Appearance: x / SG: x / pH: x  Gluc: 102 mg/dL / Ketone: x  / Bili: x / Urobili: x   Blood: x / Protein: x / Nitrite: x   Leuk Esterase: x / RBC: x / WBC x   Sq Epi: x / Non Sq Epi: x / Bacteria: x                             HPI:    63 M w/ PMH of alcohol use disorder (clean since May '23), Bipolar disorder, HTN, lifelong smoker (now cut down to few cigarettes/day), and COPD presents w/ SOB x1 day. Patient states he went to the movies and from there to an AA meeting, and suddenly felt SOB w/ chest pain/tightness, so he came to the ED. He is familiar with these symptoms and feels they are associated w/ COPD for which he has been hospitalized before and treated w/ steroids and abx during those hospitalizations. He cannot remember how many times he's been hospitalized in the last year, but has presented to [facility] ED more than 8 times in the last year for similar symptoms.   Pt denies syncope or LOC, endorses some cough and URI symptoms recently. Denies fever or chills. Denies abdominal pain or vomiting. Patient states that he lives with 4 roommates and goes to a "day care" 4 days/week.     In the ED:  VS: T  97.9 , HR  78 , /70--> 141/77 (5hrs later) , RR  16  , SpO2  96 % RA  Labs: WBC 8.67, Hgb 11.5, MCV 88.4, Plt 283, Na 135, K 4.1, Cr 0.70, BUN 17, Gluc 102, Alk Phos 127, AST/ALT WNL, Trop T 11 (negative), proBNP 804  Flu swab: Negative. COVID swab: Negative. (Was not a full RVP)  EKG: NSR, QTc 401  Imaging: CXR  Orders: Decadron 10mg IV, Lasix 40mg IV, Toradol 15mg IV, Duoneb 3mL x 2        VITALS:   T(C): 36.7 (12-05-23 @ 04:49), Max: 36.7 (12-05-23 @ 04:49)  HR: 77 (12-05-23 @ 04:49) (77 - 78)  BP: 141/77 (12-05-23 @ 04:49) (141/77 - 160/70)  RR: 18 (12-05-23 @ 04:49) (16 - 18)  SpO2: 98% (12-05-23 @ 04:49) (96% - 98%)        PHYSICAL EXAM:  GENERAL: NAD, lying in bed comfortably  HEAD:  Atraumatic, Normocephalic  EYES: conjunctiva and sclera clear  ENT: Moist mucous membranes  NECK: Supple, No JVD  CHEST/LUNG: expiratory wheezing L>R, No rhonchi or crackles. Some coughing.   HEART: Regular rate and rhythm; No murmurs, rubs, or gallops  ABDOMEN: BSx4; Soft, nontender, nondistended  EXTREMITIES:  2+ Peripheral Pulses, brisk capillary refill. +1 pitting LE edema  NERVOUS SYSTEM:  A&Ox3        LABS:                        11.5   8.67  )-----------( 283      ( 05 Dec 2023 02:05 )             35.1       12-05    135  |  98  |  17  ----------------------------<  102<H>  4.1   |  27  |  0.70    Ca    9.2      05 Dec 2023 02:05    TPro  6.8  /  Alb  4.2  /  TBili  0.4  /  DBili  x   /  AST  26  /  ALT  22  /  AlkPhos  127<H>  12-05              Urinalysis Basic - ( 05 Dec 2023 02:05 )    Color: x / Appearance: x / SG: x / pH: x  Gluc: 102 mg/dL / Ketone: x  / Bili: x / Urobili: x   Blood: x / Protein: x / Nitrite: x   Leuk Esterase: x / RBC: x / WBC x   Sq Epi: x / Non Sq Epi: x / Bacteria: x

## 2023-12-05 NOTE — H&P ADULT - PROBLEM SELECTOR PLAN 4
Patient has long hx of ETOH abuse, now clean since May per patient. Attends AA meetings, has been on acomprosate in the past. Home meds = gabapentin 600mg qd.    -c/w home med

## 2023-12-05 NOTE — DISCHARGE NOTE NURSING/CASE MANAGEMENT/SOCIAL WORK - PATIENT PORTAL LINK FT
You can access the FollowMyHealth Patient Portal offered by [facility] by registering at the following website: [email or web address]. By joining [employer]’s FollowMyHealth portal, you will also be able to view your health information using other applications (apps) compatible with our system. You can access the FollowMyHealth Patient Portal offered by [facility] by registering at the following website: [email or web address]. By joining [employer]’s FollowMyHealth portal, you will also be able to view your health information using other applications (apps) compatible with our system.

## 2024-01-08 NOTE — ED ADULT NURSE NOTE - NSIMPLEMENTINTERV_GEN_ALL_ED
Last appointment 7/14/2023, next appointment on 1/12/2024. spouse states that he is unable to wait for refill.    Implemented All Universal Safety Interventions:  [place] to call system. Call bell, personal items and telephone within reach. Instruct patient to call for assistance. Room bathroom lighting operational. Non-slip footwear when patient is off stretcher. Physically safe environment: no spills, clutter or unnecessary equipment. Stretcher in lowest position, wheels locked, appropriate side rails in place.

## 2024-05-23 NOTE — ED ADULT NURSE NOTE - NSSISCREENINGQ4_ED_A_ED
[address]  [place] [address]  Tel.  [phone]  Fax. [phone] [address]  [place] [address]  Tel.  [phone]  Fax. [phone] [address] [place] [address]  Tel.  [phone]  Fax. [phone]     Sleep Apnea: After Your Visit  Your Care Instructions  Sleep apnea occurs when you frequently stop breathing for 10 seconds or longer during sleep. It can be mild to severe, based on the number of times per hour that you stop breathing or have slowed breathing. Blocked or narrowed airways in your nose, mouth, or throat can cause sleep apnea. Your airway can become blocked when your throat muscles and tongue relax during sleep.  Sleep apnea is common, occurring in 1 out of 20 individuals.  Individuals having any of the following characteristics should be evaluated and treated right away due to high risk and detrimental consequences from untreated sleep apnea:  Obesity  Congestive Heart failure  Atrial Fibrillation  Uncontrolled Hypertension  Type II Diabetes  Night-time Arrhythmias  Stroke  Pulmonary Hypertension  High-risk Driving Populations (pilots, truck drivers, etc.)  Patients Considering Weight-loss Surgery    How do you know you have sleep apnea?  You probably have sleep apnea if you answer 'yes' to 3 or more of the following questions:  S - Have you been told that you Snore?   T - Are you often Tired during the day?  O - Has anyone Observed you stop breathing while sleeping?  P- Do you have (or are being treated for) high blood Pressure?    B - Are you obese (Body Mass Index > 35)?  A - Is your Age 50 years old or older?  N - Is your Neck size greater than 16 inches?  G - Are you male Gender?  A sleep physician can prescribe a breathing device that prevents tissues in the throat from blocking your airway. Or your doctor may recommend using a dental device (oral breathing device) to help keep your airway open. In some cases, surgery may  No

## 2024-11-13 NOTE — ED ADULT NURSE NOTE - NS ED NURSE LEVEL OF CONSCIOUSNESS MENTAL STATUS
Quality 226: Preventive Care And Screening: Tobacco Use: Screening And Cessation Intervention: Patient screened for tobacco use and is an ex/non-smoker Detail Level: Generalized Awake/Alert/Cooperative

## 2024-11-23 NOTE — ED ADULT NURSE NOTE - PAIN: BODY LOCATION
Discharge and education instructions reviewed. Patient verbalized understanding, teach-back successful. Patient denied questions at this time. No acute distress noted. Patient instructed to follow-up as noted - return to emergency department if symptoms worsen. Patient verbalized understanding. Discharged per EDMD with discharge instructions.      head

## 2024-12-12 NOTE — ED ADULT NURSE NOTE - NS ED NURSE DISCH DISPOSITION
Pending Prescriptions:                       Disp   Refills    atorvastatin (LIPITOR) 80 MG tablet       90 tab*1            Sig: Take 1 tablet (80 mg) by mouth daily.      
Discharged

## 2025-01-14 NOTE — ED PROVIDER NOTE - NSTOBACCO TYPE_GEN_A_CORE_RD
We recommend you return to the ER for any acute changes or worsening of your condition but otherwise follow-up with your primary care provider in the next 2 to 3 days.  
Cigarettes

## 2025-01-28 NOTE — ED ADULT NURSE NOTE - CCCP TRG CHIEF CMPLNT
Patient was  seen by PCP today 01/28/2025    He received a script for RX:     lidocaine (LIDODERM) 5 %     Patient states that the pharmacy had notified him that a PA is required for this RX and that more documentation is needed in order for them to fill this RX    Patient is requesting that the office reach out to the pharmacy and or submit the documentation that is needed in order to get the PA approved    Please advise    difficulty breathing

## 2025-03-18 NOTE — H&P ADULT - NSICDXPASTSURGICALHX_GEN_ALL_CORE_FT
Medication:   Requested Prescriptions     Pending Prescriptions Disp Refills    buPROPion (WELLBUTRIN XL) 150 MG extended release tablet 60 tablet 0     Sig: Take 1 tablet by mouth 2 times daily      Last Filled:  2/18/25    Patient Phone Number: [phone] (home)     Last appt: 10/22/2024   Next appt: Visit date not found    Last OARRS:        No data to display              PDMP Monitoring:    Last PDMP Vinay as Reviewed ([place]):  Review User Review Instant Review Result   STAS MELLO 2/18/2025 10:25 AM Reviewed PDMP [1]     Preferred Pharmacy:     [facility]/pharmacy #[id number] - [facility] [place] - [address] -  [phone] - F [phone]  [address] [address]  Phone: [phone] Fax: [phone]    Recent Visits  Date Type Provider Dept   10/22/24 Office Visit Stas Mello MD [facility]   08/14/24 Office Visit Stas Mello MD [facility]   10/19/23 Office Visit Emily Pedroza APRN - CNP [facility]   Showing recent visits within past 540 days with a meds authorizing provider and meeting all other requirements  Future Appointments  No visits were found meeting these conditions.  Showing future appointments within next 150 days with a meds authorizing provider and meeting all other requirements     10/22/2024     PAST SURGICAL HISTORY:  H/O hernia repair     History of tonsillectomy

## 2025-04-24 NOTE — ED ADULT TRIAGE NOTE - AS TEMP SITE
Orders:    ALPRAZolam (XANAX) 0.5 MG tablet; take 1 tablet by mouth three times a day if needed for anxiety    
oral